# Patient Record
Sex: MALE | Race: WHITE | NOT HISPANIC OR LATINO | Employment: OTHER | URBAN - METROPOLITAN AREA
[De-identification: names, ages, dates, MRNs, and addresses within clinical notes are randomized per-mention and may not be internally consistent; named-entity substitution may affect disease eponyms.]

---

## 2019-10-08 ENCOUNTER — TRANSCRIBE ORDERS (OUTPATIENT)
Dept: CARDIOLOGY | Facility: HOSPITAL | Age: 74
End: 2019-10-08

## 2019-10-08 DIAGNOSIS — I25.10 CAD IN NATIVE ARTERY: ICD-10-CM

## 2019-10-08 DIAGNOSIS — I10 HYPERTENSION, UNSPECIFIED TYPE: Primary | ICD-10-CM

## 2019-10-08 DIAGNOSIS — E78.5 DYSLIPIDEMIA: ICD-10-CM

## 2020-11-22 PROCEDURE — U0003 INFECTIOUS AGENT DETECTION BY NUCLEIC ACID (DNA OR RNA); SEVERE ACUTE RESPIRATORY SYNDROME CORONAVIRUS 2 (SARS-COV-2) (CORONAVIRUS DISEASE [COVID-19]), AMPLIFIED PROBE TECHNIQUE, MAKING USE OF HIGH THROUGHPUT TECHNOLOGIES AS DESCRIBED BY CMS-2020-01-R: HCPCS | Performed by: NURSE PRACTITIONER

## 2020-12-25 ENCOUNTER — APPOINTMENT (OUTPATIENT)
Dept: CT IMAGING | Facility: HOSPITAL | Age: 75
End: 2020-12-25

## 2020-12-25 ENCOUNTER — HOSPITAL ENCOUNTER (OUTPATIENT)
Facility: HOSPITAL | Age: 75
Setting detail: OBSERVATION
Discharge: HOME | End: 2020-12-27
Attending: HOSPITALIST | Admitting: HOSPITALIST

## 2020-12-25 ENCOUNTER — APPOINTMENT (OUTPATIENT)
Dept: GENERAL RADIOLOGY | Facility: HOSPITAL | Age: 75
End: 2020-12-25

## 2020-12-25 DIAGNOSIS — R50.9 FEVER, UNSPECIFIED FEVER CAUSE: ICD-10-CM

## 2020-12-25 DIAGNOSIS — R41.82 ALTERED MENTAL STATUS, UNSPECIFIED ALTERED MENTAL STATUS TYPE: ICD-10-CM

## 2020-12-25 DIAGNOSIS — U07.1 COVID-19 VIRUS DETECTED: Primary | ICD-10-CM

## 2020-12-25 LAB
ALBUMIN SERPL-MCNC: 3.8 G/DL (ref 3.5–5.2)
ALBUMIN/GLOB SERPL: 1.2 G/DL
ALP SERPL-CCNC: 96 U/L (ref 39–117)
ALT SERPL W P-5'-P-CCNC: 26 U/L (ref 1–41)
AMPHET+METHAMPHET UR QL: POSITIVE
ANION GAP SERPL CALCULATED.3IONS-SCNC: 14 MMOL/L (ref 5–15)
AST SERPL-CCNC: 34 U/L (ref 1–40)
B PARAPERT DNA SPEC QL NAA+PROBE: NOT DETECTED
B PERT DNA SPEC QL NAA+PROBE: NOT DETECTED
BACTERIA UR QL AUTO: ABNORMAL /HPF
BARBITURATES UR QL SCN: NEGATIVE
BASOPHILS # BLD AUTO: 0 10*3/MM3 (ref 0–0.2)
BASOPHILS NFR BLD AUTO: 0.3 % (ref 0–1.5)
BENZODIAZ UR QL SCN: NEGATIVE
BILIRUB SERPL-MCNC: 1.2 MG/DL (ref 0–1.2)
BILIRUB UR QL STRIP: ABNORMAL
BUN SERPL-MCNC: 31 MG/DL (ref 8–23)
BUN/CREAT SERPL: 25.6 (ref 7–25)
C PNEUM DNA NPH QL NAA+NON-PROBE: NOT DETECTED
CALCIUM SPEC-SCNC: 8.5 MG/DL (ref 8.6–10.5)
CANNABINOIDS SERPL QL: NEGATIVE
CHLORIDE SERPL-SCNC: 93 MMOL/L (ref 98–107)
CLARITY UR: CLEAR
CO2 SERPL-SCNC: 22 MMOL/L (ref 22–29)
COCAINE UR QL: NEGATIVE
COLOR UR: ABNORMAL
CREAT SERPL-MCNC: 1.21 MG/DL (ref 0.76–1.27)
CRP SERPL-MCNC: 10.28 MG/DL (ref 0–0.5)
D DIMER PPP FEU-MCNC: 0.86 MG/L (FEU) (ref 0–0.59)
D-LACTATE SERPL-SCNC: 1.4 MMOL/L (ref 0.5–2)
DEPRECATED RDW RBC AUTO: 40.3 FL (ref 37–54)
EOSINOPHIL # BLD AUTO: 0 10*3/MM3 (ref 0–0.4)
EOSINOPHIL NFR BLD AUTO: 0.1 % (ref 0.3–6.2)
ERYTHROCYTE [DISTWIDTH] IN BLOOD BY AUTOMATED COUNT: 12.6 % (ref 12.3–15.4)
FLUAV SUBTYP SPEC NAA+PROBE: NOT DETECTED
FLUBV RNA ISLT QL NAA+PROBE: NOT DETECTED
GFR SERPL CREATININE-BSD FRML MDRD: 59 ML/MIN/1.73
GLOBULIN UR ELPH-MCNC: 3.1 GM/DL
GLUCOSE BLDC GLUCOMTR-MCNC: 185 MG/DL (ref 70–105)
GLUCOSE SERPL-MCNC: 263 MG/DL (ref 65–99)
GLUCOSE UR STRIP-MCNC: ABNORMAL MG/DL
HADV DNA SPEC NAA+PROBE: NOT DETECTED
HCOV 229E RNA SPEC QL NAA+PROBE: NOT DETECTED
HCOV HKU1 RNA SPEC QL NAA+PROBE: NOT DETECTED
HCOV NL63 RNA SPEC QL NAA+PROBE: NOT DETECTED
HCOV OC43 RNA SPEC QL NAA+PROBE: NOT DETECTED
HCT VFR BLD AUTO: 44.5 % (ref 37.5–51)
HGB BLD-MCNC: 15.1 G/DL (ref 13–17.7)
HGB UR QL STRIP.AUTO: ABNORMAL
HMPV RNA NPH QL NAA+NON-PROBE: NOT DETECTED
HPIV1 RNA SPEC QL NAA+PROBE: NOT DETECTED
HPIV2 RNA SPEC QL NAA+PROBE: NOT DETECTED
HPIV3 RNA NPH QL NAA+PROBE: NOT DETECTED
HPIV4 P GENE NPH QL NAA+PROBE: NOT DETECTED
HYALINE CASTS UR QL AUTO: ABNORMAL /LPF
KETONES UR QL STRIP: ABNORMAL
LDH SERPL-CCNC: 286 U/L (ref 135–225)
LEUKOCYTE ESTERASE UR QL STRIP.AUTO: NEGATIVE
LYMPHOCYTES # BLD AUTO: 0.5 10*3/MM3 (ref 0.7–3.1)
LYMPHOCYTES NFR BLD AUTO: 5.5 % (ref 19.6–45.3)
M PNEUMO IGG SER IA-ACNC: NOT DETECTED
MCH RBC QN AUTO: 30.8 PG (ref 26.6–33)
MCHC RBC AUTO-ENTMCNC: 34 G/DL (ref 31.5–35.7)
MCV RBC AUTO: 90.4 FL (ref 79–97)
METHADONE UR QL SCN: NEGATIVE
MONOCYTES # BLD AUTO: 0.4 10*3/MM3 (ref 0.1–0.9)
MONOCYTES NFR BLD AUTO: 4.7 % (ref 5–12)
NEUTROPHILS NFR BLD AUTO: 8.2 10*3/MM3 (ref 1.7–7)
NEUTROPHILS NFR BLD AUTO: 89.4 % (ref 42.7–76)
NITRITE UR QL STRIP: NEGATIVE
NRBC BLD AUTO-RTO: 0.1 /100 WBC (ref 0–0.2)
OPIATES UR QL: NEGATIVE
OXYCODONE UR QL SCN: NEGATIVE
PH UR STRIP.AUTO: 5.5 [PH] (ref 5–8)
PLATELET # BLD AUTO: 200 10*3/MM3 (ref 140–450)
PMV BLD AUTO: 8 FL (ref 6–12)
POTASSIUM SERPL-SCNC: 4.3 MMOL/L (ref 3.5–5.2)
PROCALCITONIN SERPL-MCNC: 0.25 NG/ML (ref 0–0.25)
PROT SERPL-MCNC: 6.9 G/DL (ref 6–8.5)
PROT UR QL STRIP: ABNORMAL
RBC # BLD AUTO: 4.92 10*6/MM3 (ref 4.14–5.8)
RBC # UR: ABNORMAL /HPF
REF LAB TEST METHOD: ABNORMAL
RHINOVIRUS RNA SPEC NAA+PROBE: NOT DETECTED
RSV RNA NPH QL NAA+NON-PROBE: NOT DETECTED
SARS-COV-2 RNA NPH QL NAA+NON-PROBE: DETECTED
SODIUM SERPL-SCNC: 129 MMOL/L (ref 136–145)
SP GR UR STRIP: 1.03 (ref 1–1.03)
SQUAMOUS #/AREA URNS HPF: ABNORMAL /HPF
UROBILINOGEN UR QL STRIP: ABNORMAL
WBC # BLD AUTO: 9.1 10*3/MM3 (ref 3.4–10.8)
WBC UR QL AUTO: ABNORMAL /HPF

## 2020-12-25 PROCEDURE — 85025 COMPLETE CBC W/AUTO DIFF WBC: CPT | Performed by: PHYSICIAN ASSISTANT

## 2020-12-25 PROCEDURE — 80307 DRUG TEST PRSMV CHEM ANLYZR: CPT | Performed by: PHYSICIAN ASSISTANT

## 2020-12-25 PROCEDURE — 0202U NFCT DS 22 TRGT SARS-COV-2: CPT | Performed by: PHYSICIAN ASSISTANT

## 2020-12-25 PROCEDURE — 25010000002 CEFTRIAXONE PER 250 MG: Performed by: PHYSICIAN ASSISTANT

## 2020-12-25 PROCEDURE — 82962 GLUCOSE BLOOD TEST: CPT

## 2020-12-25 PROCEDURE — 25010000002 ENOXAPARIN PER 10 MG: Performed by: HOSPITALIST

## 2020-12-25 PROCEDURE — 85379 FIBRIN DEGRADATION QUANT: CPT | Performed by: HOSPITALIST

## 2020-12-25 PROCEDURE — 96361 HYDRATE IV INFUSION ADD-ON: CPT

## 2020-12-25 PROCEDURE — G0378 HOSPITAL OBSERVATION PER HR: HCPCS

## 2020-12-25 PROCEDURE — 87040 BLOOD CULTURE FOR BACTERIA: CPT | Performed by: PHYSICIAN ASSISTANT

## 2020-12-25 PROCEDURE — 70450 CT HEAD/BRAIN W/O DYE: CPT

## 2020-12-25 PROCEDURE — 86140 C-REACTIVE PROTEIN: CPT | Performed by: PHYSICIAN ASSISTANT

## 2020-12-25 PROCEDURE — 99220 PR INITIAL OBSERVATION CARE/DAY 70 MINUTES: CPT | Performed by: HOSPITALIST

## 2020-12-25 PROCEDURE — 71045 X-RAY EXAM CHEST 1 VIEW: CPT

## 2020-12-25 PROCEDURE — 84145 PROCALCITONIN (PCT): CPT | Performed by: PHYSICIAN ASSISTANT

## 2020-12-25 PROCEDURE — 81001 URINALYSIS AUTO W/SCOPE: CPT | Performed by: PHYSICIAN ASSISTANT

## 2020-12-25 PROCEDURE — 96374 THER/PROPH/DIAG INJ IV PUSH: CPT

## 2020-12-25 PROCEDURE — 99285 EMERGENCY DEPT VISIT HI MDM: CPT

## 2020-12-25 PROCEDURE — 83605 ASSAY OF LACTIC ACID: CPT

## 2020-12-25 PROCEDURE — 83615 LACTATE (LD) (LDH) ENZYME: CPT | Performed by: PHYSICIAN ASSISTANT

## 2020-12-25 PROCEDURE — 96372 THER/PROPH/DIAG INJ SC/IM: CPT

## 2020-12-25 PROCEDURE — 80053 COMPREHEN METABOLIC PANEL: CPT | Performed by: PHYSICIAN ASSISTANT

## 2020-12-25 RX ORDER — AZITHROMYCIN 250 MG/1
500 TABLET, FILM COATED ORAL
Status: COMPLETED | OUTPATIENT
Start: 2020-12-26 | End: 2020-12-27

## 2020-12-25 RX ORDER — ACETAMINOPHEN 500 MG
1000 TABLET ORAL ONCE
Status: COMPLETED | OUTPATIENT
Start: 2020-12-25 | End: 2020-12-25

## 2020-12-25 RX ORDER — ACETAMINOPHEN 325 MG/1
650 TABLET ORAL EVERY 4 HOURS PRN
Status: DISCONTINUED | OUTPATIENT
Start: 2020-12-25 | End: 2020-12-27 | Stop reason: HOSPADM

## 2020-12-25 RX ORDER — SODIUM CHLORIDE 0.9 % (FLUSH) 0.9 %
10 SYRINGE (ML) INJECTION EVERY 12 HOURS SCHEDULED
Status: DISCONTINUED | OUTPATIENT
Start: 2020-12-25 | End: 2020-12-27 | Stop reason: HOSPADM

## 2020-12-25 RX ORDER — ACETAMINOPHEN 160 MG/5ML
650 SOLUTION ORAL EVERY 4 HOURS PRN
Status: DISCONTINUED | OUTPATIENT
Start: 2020-12-25 | End: 2020-12-27 | Stop reason: HOSPADM

## 2020-12-25 RX ORDER — SODIUM CHLORIDE 0.9 % (FLUSH) 0.9 %
10 SYRINGE (ML) INJECTION AS NEEDED
Status: DISCONTINUED | OUTPATIENT
Start: 2020-12-25 | End: 2020-12-27 | Stop reason: HOSPADM

## 2020-12-25 RX ORDER — ACETAMINOPHEN 650 MG/1
650 SUPPOSITORY RECTAL EVERY 4 HOURS PRN
Status: DISCONTINUED | OUTPATIENT
Start: 2020-12-25 | End: 2020-12-27 | Stop reason: HOSPADM

## 2020-12-25 RX ORDER — AZITHROMYCIN 250 MG/1
500 TABLET, FILM COATED ORAL ONCE
Status: COMPLETED | OUTPATIENT
Start: 2020-12-25 | End: 2020-12-25

## 2020-12-25 RX ORDER — CHOLECALCIFEROL (VITAMIN D3) 125 MCG
5 CAPSULE ORAL NIGHTLY PRN
Status: DISCONTINUED | OUTPATIENT
Start: 2020-12-25 | End: 2020-12-27 | Stop reason: HOSPADM

## 2020-12-25 RX ADMIN — SODIUM CHLORIDE 1000 ML: 9 INJECTION, SOLUTION INTRAVENOUS at 15:10

## 2020-12-25 RX ADMIN — Medication 10 ML: at 20:46

## 2020-12-25 RX ADMIN — AZITHROMYCIN DIHYDRATE 500 MG: 250 TABLET, FILM COATED ORAL at 15:09

## 2020-12-25 RX ADMIN — ENOXAPARIN SODIUM 40 MG: 40 INJECTION SUBCUTANEOUS at 22:56

## 2020-12-25 RX ADMIN — CEFTRIAXONE SODIUM 2 G: 10 INJECTION, POWDER, FOR SOLUTION INTRAVENOUS at 15:11

## 2020-12-25 RX ADMIN — ACETAMINOPHEN 1000 MG: 500 TABLET, FILM COATED ORAL at 15:09

## 2020-12-26 LAB
ANION GAP SERPL CALCULATED.3IONS-SCNC: 12 MMOL/L (ref 5–15)
BASOPHILS # BLD AUTO: 0 10*3/MM3 (ref 0–0.2)
BASOPHILS NFR BLD AUTO: 0.3 % (ref 0–1.5)
BUN SERPL-MCNC: 28 MG/DL (ref 8–23)
BUN/CREAT SERPL: 23.5 (ref 7–25)
CALCIUM SPEC-SCNC: 8.5 MG/DL (ref 8.6–10.5)
CHLORIDE SERPL-SCNC: 95 MMOL/L (ref 98–107)
CO2 SERPL-SCNC: 23 MMOL/L (ref 22–29)
CREAT SERPL-MCNC: 1.19 MG/DL (ref 0.76–1.27)
DEPRECATED RDW RBC AUTO: 40.3 FL (ref 37–54)
EOSINOPHIL # BLD AUTO: 0 10*3/MM3 (ref 0–0.4)
EOSINOPHIL NFR BLD AUTO: 0 % (ref 0.3–6.2)
ERYTHROCYTE [DISTWIDTH] IN BLOOD BY AUTOMATED COUNT: 12.6 % (ref 12.3–15.4)
GFR SERPL CREATININE-BSD FRML MDRD: 60 ML/MIN/1.73
GLUCOSE BLDC GLUCOMTR-MCNC: 112 MG/DL (ref 70–105)
GLUCOSE BLDC GLUCOMTR-MCNC: 167 MG/DL (ref 70–105)
GLUCOSE BLDC GLUCOMTR-MCNC: 178 MG/DL (ref 70–105)
GLUCOSE BLDC GLUCOMTR-MCNC: 190 MG/DL (ref 70–105)
GLUCOSE SERPL-MCNC: 162 MG/DL (ref 65–99)
HCT VFR BLD AUTO: 41.3 % (ref 37.5–51)
HGB BLD-MCNC: 13.9 G/DL (ref 13–17.7)
LYMPHOCYTES # BLD AUTO: 0.8 10*3/MM3 (ref 0.7–3.1)
LYMPHOCYTES NFR BLD AUTO: 10.1 % (ref 19.6–45.3)
MCH RBC QN AUTO: 30.5 PG (ref 26.6–33)
MCHC RBC AUTO-ENTMCNC: 33.7 G/DL (ref 31.5–35.7)
MCV RBC AUTO: 90.6 FL (ref 79–97)
MONOCYTES # BLD AUTO: 0.4 10*3/MM3 (ref 0.1–0.9)
MONOCYTES NFR BLD AUTO: 4.4 % (ref 5–12)
NEUTROPHILS NFR BLD AUTO: 7 10*3/MM3 (ref 1.7–7)
NEUTROPHILS NFR BLD AUTO: 85.2 % (ref 42.7–76)
NRBC BLD AUTO-RTO: 0.2 /100 WBC (ref 0–0.2)
PLATELET # BLD AUTO: 187 10*3/MM3 (ref 140–450)
PMV BLD AUTO: 8.3 FL (ref 6–12)
POTASSIUM SERPL-SCNC: 4.3 MMOL/L (ref 3.5–5.2)
RBC # BLD AUTO: 4.56 10*6/MM3 (ref 4.14–5.8)
SODIUM SERPL-SCNC: 130 MMOL/L (ref 136–145)
WBC # BLD AUTO: 8.3 10*3/MM3 (ref 3.4–10.8)

## 2020-12-26 PROCEDURE — 99225 PR SBSQ OBSERVATION CARE/DAY 25 MINUTES: CPT | Performed by: HOSPITALIST

## 2020-12-26 PROCEDURE — 80048 BASIC METABOLIC PNL TOTAL CA: CPT | Performed by: HOSPITALIST

## 2020-12-26 PROCEDURE — 85025 COMPLETE CBC W/AUTO DIFF WBC: CPT | Performed by: HOSPITALIST

## 2020-12-26 PROCEDURE — 96372 THER/PROPH/DIAG INJ SC/IM: CPT

## 2020-12-26 PROCEDURE — 96376 TX/PRO/DX INJ SAME DRUG ADON: CPT

## 2020-12-26 PROCEDURE — 25010000002 ENOXAPARIN PER 10 MG: Performed by: HOSPITALIST

## 2020-12-26 PROCEDURE — G0378 HOSPITAL OBSERVATION PER HR: HCPCS

## 2020-12-26 PROCEDURE — 25010000002 CEFTRIAXONE PER 250 MG: Performed by: HOSPITALIST

## 2020-12-26 PROCEDURE — 63710000001 INSULIN LISPRO (HUMAN) PER 5 UNITS: Performed by: NURSE PRACTITIONER

## 2020-12-26 PROCEDURE — 82962 GLUCOSE BLOOD TEST: CPT

## 2020-12-26 RX ORDER — BENZONATATE 100 MG/1
100 CAPSULE ORAL 3 TIMES DAILY PRN
Status: DISCONTINUED | OUTPATIENT
Start: 2020-12-26 | End: 2020-12-27 | Stop reason: HOSPADM

## 2020-12-26 RX ORDER — LISINOPRIL 20 MG/1
40 TABLET ORAL DAILY
Status: DISCONTINUED | OUTPATIENT
Start: 2020-12-26 | End: 2020-12-27 | Stop reason: HOSPADM

## 2020-12-26 RX ORDER — DEXTROSE MONOHYDRATE 25 G/50ML
25 INJECTION, SOLUTION INTRAVENOUS
Status: DISCONTINUED | OUTPATIENT
Start: 2020-12-26 | End: 2020-12-27 | Stop reason: HOSPADM

## 2020-12-26 RX ORDER — INSULIN LISPRO 100 [IU]/ML
0-7 INJECTION, SOLUTION INTRAVENOUS; SUBCUTANEOUS AS NEEDED
Status: DISCONTINUED | OUTPATIENT
Start: 2020-12-26 | End: 2020-12-27 | Stop reason: HOSPADM

## 2020-12-26 RX ORDER — NICOTINE POLACRILEX 4 MG
15 LOZENGE BUCCAL
Status: DISCONTINUED | OUTPATIENT
Start: 2020-12-26 | End: 2020-12-27 | Stop reason: HOSPADM

## 2020-12-26 RX ORDER — ATORVASTATIN CALCIUM 20 MG/1
20 TABLET, FILM COATED ORAL DAILY
Status: DISCONTINUED | OUTPATIENT
Start: 2020-12-26 | End: 2020-12-27 | Stop reason: HOSPADM

## 2020-12-26 RX ORDER — INSULIN LISPRO 100 [IU]/ML
0-7 INJECTION, SOLUTION INTRAVENOUS; SUBCUTANEOUS
Status: DISCONTINUED | OUTPATIENT
Start: 2020-12-26 | End: 2020-12-27 | Stop reason: HOSPADM

## 2020-12-26 RX ORDER — GUAIFENESIN/DEXTROMETHORPHAN 100-10MG/5
5 SYRUP ORAL EVERY 4 HOURS PRN
Status: DISCONTINUED | OUTPATIENT
Start: 2020-12-26 | End: 2020-12-27 | Stop reason: HOSPADM

## 2020-12-26 RX ADMIN — Medication 10 ML: at 20:25

## 2020-12-26 RX ADMIN — ENOXAPARIN SODIUM 40 MG: 40 INJECTION SUBCUTANEOUS at 15:47

## 2020-12-26 RX ADMIN — GUAIFENESIN AND DEXTROMETHORPHAN 5 ML: 100; 10 SYRUP ORAL at 23:44

## 2020-12-26 RX ADMIN — LINAGLIPTIN 5 MG: 5 TABLET, FILM COATED ORAL at 09:13

## 2020-12-26 RX ADMIN — BENZONATATE 100 MG: 100 CAPSULE ORAL at 20:17

## 2020-12-26 RX ADMIN — Medication 10 ML: at 09:13

## 2020-12-26 RX ADMIN — LISINOPRIL 40 MG: 20 TABLET ORAL at 09:13

## 2020-12-26 RX ADMIN — ACETAMINOPHEN 650 MG: 325 TABLET, FILM COATED ORAL at 17:08

## 2020-12-26 RX ADMIN — GUAIFENESIN AND DEXTROMETHORPHAN 5 ML: 100; 10 SYRUP ORAL at 11:45

## 2020-12-26 RX ADMIN — INSULIN LISPRO 2 UNITS: 100 INJECTION, SOLUTION INTRAVENOUS; SUBCUTANEOUS at 17:08

## 2020-12-26 RX ADMIN — INSULIN LISPRO 2 UNITS: 100 INJECTION, SOLUTION INTRAVENOUS; SUBCUTANEOUS at 11:45

## 2020-12-26 RX ADMIN — CEFTRIAXONE SODIUM 1 G: 10 INJECTION, POWDER, FOR SOLUTION INTRAVENOUS at 17:07

## 2020-12-26 RX ADMIN — ACETAMINOPHEN 650 MG: 325 TABLET, FILM COATED ORAL at 03:30

## 2020-12-26 RX ADMIN — ATORVASTATIN CALCIUM 20 MG: 20 TABLET, FILM COATED ORAL at 09:14

## 2020-12-26 RX ADMIN — AZITHROMYCIN DIHYDRATE 500 MG: 250 TABLET, FILM COATED ORAL at 09:13

## 2020-12-27 ENCOUNTER — READMISSION MANAGEMENT (OUTPATIENT)
Dept: CALL CENTER | Facility: HOSPITAL | Age: 75
End: 2020-12-27

## 2020-12-27 ENCOUNTER — APPOINTMENT (OUTPATIENT)
Dept: CT IMAGING | Facility: HOSPITAL | Age: 75
End: 2020-12-27

## 2020-12-27 VITALS
OXYGEN SATURATION: 90 % | TEMPERATURE: 99.7 F | HEIGHT: 71 IN | WEIGHT: 173.2 LBS | DIASTOLIC BLOOD PRESSURE: 56 MMHG | RESPIRATION RATE: 19 BRPM | HEART RATE: 82 BPM | BODY MASS INDEX: 24.25 KG/M2 | SYSTOLIC BLOOD PRESSURE: 160 MMHG

## 2020-12-27 LAB
ANION GAP SERPL CALCULATED.3IONS-SCNC: 14 MMOL/L (ref 5–15)
BASOPHILS # BLD AUTO: 0 10*3/MM3 (ref 0–0.2)
BASOPHILS NFR BLD AUTO: 0.1 % (ref 0–1.5)
BUN SERPL-MCNC: 35 MG/DL (ref 8–23)
BUN/CREAT SERPL: 27.6 (ref 7–25)
CALCIUM SPEC-SCNC: 8.8 MG/DL (ref 8.6–10.5)
CHLORIDE SERPL-SCNC: 94 MMOL/L (ref 98–107)
CO2 SERPL-SCNC: 23 MMOL/L (ref 22–29)
CREAT SERPL-MCNC: 1.27 MG/DL (ref 0.76–1.27)
D DIMER PPP FEU-MCNC: 1.12 MG/L (FEU) (ref 0–0.59)
DEPRECATED RDW RBC AUTO: 40.3 FL (ref 37–54)
EOSINOPHIL # BLD AUTO: 0 10*3/MM3 (ref 0–0.4)
EOSINOPHIL NFR BLD AUTO: 0 % (ref 0.3–6.2)
ERYTHROCYTE [DISTWIDTH] IN BLOOD BY AUTOMATED COUNT: 12.8 % (ref 12.3–15.4)
GFR SERPL CREATININE-BSD FRML MDRD: 56 ML/MIN/1.73
GLUCOSE BLDC GLUCOMTR-MCNC: 160 MG/DL (ref 70–105)
GLUCOSE BLDC GLUCOMTR-MCNC: 165 MG/DL (ref 70–105)
GLUCOSE BLDC GLUCOMTR-MCNC: 215 MG/DL (ref 70–105)
GLUCOSE SERPL-MCNC: 171 MG/DL (ref 65–99)
HCT VFR BLD AUTO: 39.1 % (ref 37.5–51)
HGB BLD-MCNC: 13.2 G/DL (ref 13–17.7)
LYMPHOCYTES # BLD AUTO: 0.7 10*3/MM3 (ref 0.7–3.1)
LYMPHOCYTES NFR BLD AUTO: 5.7 % (ref 19.6–45.3)
MCH RBC QN AUTO: 30.3 PG (ref 26.6–33)
MCHC RBC AUTO-ENTMCNC: 33.7 G/DL (ref 31.5–35.7)
MCV RBC AUTO: 90 FL (ref 79–97)
MONOCYTES # BLD AUTO: 0.4 10*3/MM3 (ref 0.1–0.9)
MONOCYTES NFR BLD AUTO: 3.4 % (ref 5–12)
NEUTROPHILS NFR BLD AUTO: 10.5 10*3/MM3 (ref 1.7–7)
NEUTROPHILS NFR BLD AUTO: 90.8 % (ref 42.7–76)
NRBC BLD AUTO-RTO: 0.1 /100 WBC (ref 0–0.2)
PLATELET # BLD AUTO: 249 10*3/MM3 (ref 140–450)
PMV BLD AUTO: 8.1 FL (ref 6–12)
POTASSIUM SERPL-SCNC: 3.7 MMOL/L (ref 3.5–5.2)
RBC # BLD AUTO: 4.35 10*6/MM3 (ref 4.14–5.8)
SODIUM SERPL-SCNC: 131 MMOL/L (ref 136–145)
WBC # BLD AUTO: 11.6 10*3/MM3 (ref 3.4–10.8)

## 2020-12-27 PROCEDURE — 82962 GLUCOSE BLOOD TEST: CPT

## 2020-12-27 PROCEDURE — 80048 BASIC METABOLIC PNL TOTAL CA: CPT | Performed by: HOSPITALIST

## 2020-12-27 PROCEDURE — 99217 PR OBSERVATION CARE DISCHARGE MANAGEMENT: CPT | Performed by: HOSPITALIST

## 2020-12-27 PROCEDURE — 71275 CT ANGIOGRAPHY CHEST: CPT

## 2020-12-27 PROCEDURE — 85025 COMPLETE CBC W/AUTO DIFF WBC: CPT | Performed by: HOSPITALIST

## 2020-12-27 PROCEDURE — 0 IOPAMIDOL PER 1 ML: Performed by: HOSPITALIST

## 2020-12-27 PROCEDURE — 96376 TX/PRO/DX INJ SAME DRUG ADON: CPT

## 2020-12-27 PROCEDURE — 85379 FIBRIN DEGRADATION QUANT: CPT | Performed by: HOSPITALIST

## 2020-12-27 PROCEDURE — G0378 HOSPITAL OBSERVATION PER HR: HCPCS

## 2020-12-27 PROCEDURE — 25010000002 CEFTRIAXONE PER 250 MG: Performed by: HOSPITALIST

## 2020-12-27 PROCEDURE — 25010000002 ENOXAPARIN PER 10 MG: Performed by: HOSPITALIST

## 2020-12-27 PROCEDURE — 96372 THER/PROPH/DIAG INJ SC/IM: CPT

## 2020-12-27 PROCEDURE — 63710000001 INSULIN LISPRO (HUMAN) PER 5 UNITS: Performed by: NURSE PRACTITIONER

## 2020-12-27 RX ORDER — CEFDINIR 300 MG/1
300 CAPSULE ORAL DAILY
Qty: 3 CAPSULE | Refills: 0 | Status: SHIPPED | OUTPATIENT
Start: 2020-12-27 | End: 2020-12-30

## 2020-12-27 RX ADMIN — ENOXAPARIN SODIUM 40 MG: 40 INJECTION SUBCUTANEOUS at 11:31

## 2020-12-27 RX ADMIN — CEFTRIAXONE SODIUM 1 G: 10 INJECTION, POWDER, FOR SOLUTION INTRAVENOUS at 17:13

## 2020-12-27 RX ADMIN — ACETAMINOPHEN 650 MG: 325 TABLET, FILM COATED ORAL at 04:33

## 2020-12-27 RX ADMIN — AZITHROMYCIN DIHYDRATE 500 MG: 250 TABLET, FILM COATED ORAL at 08:23

## 2020-12-27 RX ADMIN — ATORVASTATIN CALCIUM 20 MG: 20 TABLET, FILM COATED ORAL at 08:23

## 2020-12-27 RX ADMIN — IOPAMIDOL 100 ML: 755 INJECTION, SOLUTION INTRAVENOUS at 16:15

## 2020-12-27 RX ADMIN — Medication 10 ML: at 08:24

## 2020-12-27 RX ADMIN — BENZONATATE 100 MG: 100 CAPSULE ORAL at 11:31

## 2020-12-27 RX ADMIN — LINAGLIPTIN 5 MG: 5 TABLET, FILM COATED ORAL at 08:24

## 2020-12-27 RX ADMIN — INSULIN LISPRO 3 UNITS: 100 INJECTION, SOLUTION INTRAVENOUS; SUBCUTANEOUS at 11:42

## 2020-12-27 RX ADMIN — BENZONATATE 100 MG: 100 CAPSULE ORAL at 04:33

## 2020-12-27 RX ADMIN — LISINOPRIL 40 MG: 20 TABLET ORAL at 08:23

## 2020-12-27 NOTE — OUTREACH NOTE
Prep Survey      Responses   [facility] patient discharged from?  [facility]   Is LACE score < 7 ?  Yes   Emergency Room discharge w/ pulse ox?  No   Eligibility  Readm Mgmt   Discharge diagnosis  COVID-19 PNA w/ CAP    Does the patient have one of the following disease processes/diagnoses(primary or secondary)?  COVID-19   Does the patient have Home health ordered?  No   Is there a DME ordered?  No   Prep survey completed?  Yes          Chandni Eason RN

## 2020-12-28 ENCOUNTER — READMISSION MANAGEMENT (OUTPATIENT)
Dept: CALL CENTER | Facility: HOSPITAL | Age: 75
End: 2020-12-28

## 2020-12-28 NOTE — OUTREACH NOTE
COVID-19 Week 1 Survey      Responses   [facility] patient discharged from?  [facility]   Does the patient have one of the following disease processes/diagnoses(primary or secondary)?  COVID-19   COVID-19 underlying condition?  None   Call Number  Call 1   Week 1 Call successful?  Yes   Call start time  [phone]   Call end time  [phone]   Discharge diagnosis  COVID-19 PNA w/ CAP    Is patient permission given to speak with other caregiver?  Yes   List who call center can speak with  wife   Person spoke with today (if not patient) and relationship  wife   Meds reviewed with patient/caregiver?  Yes   Is the patient having any side effects they believe may be caused by any medication additions or changes?  No   Does the patient have all medications ordered at discharge?  Yes   Is the patient taking all medications as directed (includes completed medication regime)?  Yes   Does the patient have a primary care provider?   Yes   Does the patient have an appointment with their PCP or specialist within 7 days of discharge?  No   What is preventing the patient from scheduling follow up appointments within 7 days of discharge?  Haven't had time   Nursing Interventions  Advised patient to make appointment   Has the patient kept scheduled appointments due by today?  N/A   Comments  He does not have a PCP but she does.  She is going to call hers and see it they are accepting new pts.   Has home health visited the patient within 72 hours of discharge?  N/A   Psychosocial issues?  No   Did the patient receive a copy of their discharge instructions?  Yes   Did the patient receive a copy of COVID-19 specific instructions?  Yes   Nursing interventions  Reviewed instructions with patient   What is the patient's perception of their health status since discharge?  Improving   Does the patient have any of the following symptoms?  Cough   Nursing Interventions  Nurse provided patient education   Pulse Ox monitoring  Intermittent   Pulse Ox  device source  Patient   O2 Sat comments  hasn't checked today.  She is unable to find it at the moment to check during call.   O2 Sat: education provided  Monitoring frequency, Sat levels   Is the patient/caregiver able to teach back steps to recovery at home?  Set small, achievable goals for return to baseline health, Rest and rebuild strength, gradually increase activity, Eat a well-balance diet, Make a list of questions for provider's appointment   If the patient is a current smoker, are they able to teach back resources for cessation?  Not a smoker   Is the patient/caregiver able to teach back the hierarchy of who to call/visit for symptoms/problems? PCP, Specialist, Home health nurse, Urgent Care, ED, 911  Yes   COVID-19 call completed?  Yes   Wrap up additional comments  Wife states that he is improving.  No questions or problems at this time.          Samantha Griffiths, RN

## 2020-12-29 ENCOUNTER — READMISSION MANAGEMENT (OUTPATIENT)
Dept: CALL CENTER | Facility: HOSPITAL | Age: 75
End: 2020-12-29

## 2020-12-29 NOTE — OUTREACH NOTE
COVID-19 Week 1 Survey      Responses   [facility] patient discharged from?  [facility]   Does the patient have one of the following disease processes/diagnoses(primary or secondary)?  COVID-19   COVID-19 underlying condition?  None   Call Number  Call 2   Week 1 Call successful?  No   Discharge diagnosis  COVID-19 PNA w/ CAP           Charity Peña RN

## 2020-12-30 ENCOUNTER — READMISSION MANAGEMENT (OUTPATIENT)
Dept: CALL CENTER | Facility: HOSPITAL | Age: 75
End: 2020-12-30

## 2020-12-30 LAB
BACTERIA SPEC AEROBE CULT: NORMAL
BACTERIA SPEC AEROBE CULT: NORMAL

## 2020-12-30 NOTE — OUTREACH NOTE
COVID-19 Week 1 Survey      Responses   [facility] patient discharged from?  [facility]   Does the patient have one of the following disease processes/diagnoses(primary or secondary)?  COVID-19   COVID-19 underlying condition?  None   Call Number  Call 3   Week 1 Call successful?  No   Discharge diagnosis  COVID-19 PNA w/ CAP           Charity Peña RN

## 2021-01-05 ENCOUNTER — READMISSION MANAGEMENT (OUTPATIENT)
Dept: CALL CENTER | Facility: HOSPITAL | Age: 76
End: 2021-01-05

## 2021-01-05 NOTE — OUTREACH NOTE
COVID-19 Week 2 Survey      Responses   [facility] patient discharged from?  [facility]   Does the patient have one of the following disease processes/diagnoses(primary or secondary)?  COVID-19   COVID-19 underlying condition?  None   Call Number  Call 1   COVID-19 Week 2: Call 1 attempt successful?  No   Discharge diagnosis  COVID-19 PNA w/ CAP           Laureen Lovelace RN

## 2021-01-18 NOTE — DISCHARGE SUMMARY
[facility] Medicine Services  DISCHARGE SUMMARY        Prepared For PCP:  Provider, No Known    Patient Name: aTo Pozo  : 1945  MRN: [id number]      Date of Admission:   2020    Date of Discharge:  2020    Length of stay:  LOS: 0 days     Hospital Course     Presenting Problem:   Fever, unspecified fever cause [R50.9]  Altered mental status, unspecified altered mental status type [R41.82]  COVID-19 virus detected [U07.1]      Active Hospital Problems    Diagnosis  POA   • COVID-19 virus detected [U07.1]  Yes      Resolved Hospital Problems   No resolved problems to display.       Hospital Course:  Tao Pozo is a 76 y.o. male admitted with:     COVID-19 PNA w/ CAP   -PCT elevated, continue empiric abx   -Patient comfortable on RA, hold dexamethasone for now, may exacerbate altered mental status   -Does not meet institutional criteria for remdesivir   -Pharmacy to dose lovenox     Acute toxic/metabolic encephalopathy  -Likely secondary to fever/COVID-19 PNA  -Continue to monitor patient closely  -Hold steroids for now as patient on RA      HTN  -Continue home lisinopril     Diabetes   -Correctional scale insulin      Amphetamine+ UDS   -Denies use of any illegal substances   -Patient is on metformin which can cause false positive      VTE Prophylaxis - Pharmacy to dose Lovenox.    Discharged in stable condition on RA.    Recommendation for Outpatient Providers:             Reasons For Change In Medications and Indications for New Medications:        Day of Discharge     HPI:   No complaints     Vital Signs:   Vitals:    20 1251   BP: 160/56   Pulse: 82   Resp: 19   Temp: 99.7 °F (37.6 °C)   SpO2: 90%     Stable    Physical Exam:  Physical Exam  Unchanged from previously documented physical exam by hospitalist provider     Pertinent  and/or Most Recent Results               Invalid input(s): PROT, LABALBU        Invalid input(s): TG, LDLCALC, LDLREALC         Brief Urine Lab Results  (Last result in the past 365 days)      Color   Clarity   Blood   Leuk Est   Nitrite   Protein   CREAT   Urine HCG        12/25/20 1620 Dark Yellow  Comment:  Result checked  Clear Trace Negative Negative 30 mg/dL (1+)               Microbiology Results Abnormal     Procedure Component Value - Date/Time    Blood Culture - Blood, Arm, Right [[id number]] Collected: 12/25/20 1454    Lab Status: Final result Specimen: Blood from Arm, Right Updated: 12/30/20 1515     Blood Culture No growth at 5 days    Blood Culture - Blood, Arm, Left [[id number]] Collected: 12/25/20 1454    Lab Status: Final result Specimen: Blood from Arm, Left Updated: 12/30/20 1515     Blood Culture No growth at 5 days    COVID PRE-OP / PRE-PROCEDURE SCREENING ORDER (NO ISOLATION) - Swab, Nasopharynx [[id number]]  (Abnormal) Collected: 12/25/20 1457    Lab Status: Final result Specimen: Swab from Nasopharynx Updated: 12/25/20 1618    Narrative:      The following orders were created for panel order COVID PRE-OP / PRE-PROCEDURE SCREENING ORDER (NO ISOLATION) - Swab, Nasopharynx.  Procedure                               Abnormality         Status                     ---------                               -----------         ------                     Respiratory Panel PCR w/...[[id number]]  Abnormal            Final result                 Please view results for these tests on the individual orders.    Respiratory Panel PCR w/COVID-19(SARS-CoV-2) ILDEFONSO/TAMAR/SUHAS/PAD/COR/MAD/ARTEMIO In-House, NP Swab in UTM/VTM, 3-4 HR TAT - Swab, Nasopharynx [[id number]]  (Abnormal) Collected: 12/25/20 1457    Lab Status: Final result Specimen: Swab from Nasopharynx Updated: 12/25/20 1618     ADENOVIRUS, PCR Not Detected     Coronavirus 229E Not Detected     Coronavirus HKU1 Not Detected     Coronavirus NL63 Not Detected     Coronavirus OC43 Not Detected     COVID19 Detected     Human Metapneumovirus Not Detected     Human Rhinovirus/Enterovirus Not  Detected     Influenza A PCR Not Detected     Influenza B PCR Not Detected     Parainfluenza Virus 1 Not Detected     Parainfluenza Virus 2 Not Detected     Parainfluenza Virus 3 Not Detected     Parainfluenza Virus 4 Not Detected     RSV, PCR Not Detected     Bordetella pertussis pcr Not Detected     Bordetella parapertussis PCR Not Detected     Chlamydophila pneumoniae PCR Not Detected     Mycoplasma pneumo by PCR Not Detected    Narrative:      Fact sheet for providers: https://docs.[employer]/wp-content/uploads/LBD8088-4873-JG7.1-EUA-Provider-Fact-Sheet-3.pdf    Fact sheet for patients: https://docs.[employer]/wp-content/uploads/QGD2964-2993-IQ2.1-EUA-Patient-Fact-Sheet-1.pdf    Test performed by PCR.          Ct Head Without Contrast    Result Date: 12/25/2020  Impression:  1. No acute intracranial abnormality seen.  Electronically Signed By-Stepan Patterson MD On:12/25/2020 4:03 PM This report was finalized on [id number] by  Stepan Patterson MD.    Xr Chest 1 View    Result Date: 12/25/2020  Impression:  1. Bilateral diffuse increased lung markings in calcified granulomas in the right left lungs unchanged since 12/12/2014  Electronically Signed By-Stepan Patterson MD On:12/25/2020 3:28 PM This report was finalized on [id number] by  Stepan Patterson MD.    Ct Chest Pulmonary Embolism    Result Date: 12/27/2020  Impression:  1. No acute pulmonary embolic disease. 2. Abnormal appearance of lungs which can be seen with an atypical infection such as Covid 19. 3. Mild mediastinal and bilateral hilar adenopathy felt to be secondary to reactive hyperplasia. 4. Fatty infiltration of the liver.  Electronically Signed By-Xavi Juares MD On:12/27/2020 4:13 PM This report was finalized on [id number] by  Xavi Juares MD.                             Test Results Pending at Discharge        Procedures Performed           Consults:   Consults     Date and Time Order Name Status Description     12/25/2020 1634 Hospitalist (on-call MD unless specified) Completed             Discharge Details        Discharge Medications      Continue These Medications      Instructions Start Date   atorvastatin 20 MG tablet  Commonly known as: LIPITOR   20 mg, Oral, Daily      glimepiride 1 MG tablet  Commonly known as: AMARYL   2 mg, Oral, Daily      hydroCHLOROthiazide 12.5 MG tablet  Commonly known as: HYDRODIURIL   12.5 mg, Oral, Daily      lisinopril 40 MG tablet  Commonly known as: PRINIVIL,ZESTRIL   40 mg, Oral, Daily      metFORMIN 1000 MG tablet  Commonly known as: GLUCOPHAGE   1,000 mg, Oral, 2 Times Daily With Meals      SITagliptin 100 MG tablet  Commonly known as: JANUVIA   100 mg, Oral, Daily         ASK your doctor about these medications      Instructions Start Date   cefdinir 300 MG capsule  Commonly known as: OMNICEF  Ask about: Should I take this medication?   300 mg, Oral, Daily             No Known Allergies      Discharge Disposition:  Home or Self Care    Diet:  Hospital:No active diet order        Discharge Activity:     Activity as tolerated     CODE STATUS:    Code Status and Medical Interventions:   Ordered at: 12/25/20 1722     Level Of Support Discussed With:    Patient     Code Status:    CPR     Medical Interventions (Level of Support Prior to Arrest):    Full         Follow-up Appointments  No future appointments.          Condition on Discharge:      Stable          Electronically signed by Patti Matos MD, 01/17/21, 11:26 PM EST.    Time: I spent  25  minutes on this discharge activity which included face-to-face encounter with the patient/reviewing the data in the system/coordination of the care with the nursing staff as well as consultants/documentation/entering orders.

## 2021-02-09 ENCOUNTER — OFFICE VISIT (OUTPATIENT)
Dept: CARDIOLOGY | Facility: CLINIC | Age: 76
End: 2021-02-09

## 2021-02-09 VITALS
SYSTOLIC BLOOD PRESSURE: 122 MMHG | DIASTOLIC BLOOD PRESSURE: 56 MMHG | HEIGHT: 71 IN | BODY MASS INDEX: 24.08 KG/M2 | OXYGEN SATURATION: 96 % | WEIGHT: 172 LBS | HEART RATE: 56 BPM | TEMPERATURE: 96.9 F

## 2021-02-09 DIAGNOSIS — E08.9 DIABETES MELLITUS DUE TO UNDERLYING CONDITION WITHOUT COMPLICATION, WITHOUT LONG-TERM CURRENT USE OF INSULIN (HCC): ICD-10-CM

## 2021-02-09 DIAGNOSIS — I25.10 CHRONIC CORONARY ARTERY DISEASE: ICD-10-CM

## 2021-02-09 DIAGNOSIS — E78.5 DYSLIPIDEMIA: ICD-10-CM

## 2021-02-09 DIAGNOSIS — R07.89 CHEST DISCOMFORT: Primary | ICD-10-CM

## 2021-02-09 DIAGNOSIS — I10 ESSENTIAL HYPERTENSION: ICD-10-CM

## 2021-02-09 PROCEDURE — 99204 OFFICE O/P NEW MOD 45 MIN: CPT | Performed by: INTERNAL MEDICINE

## 2021-02-09 PROCEDURE — 93000 ELECTROCARDIOGRAM COMPLETE: CPT | Performed by: INTERNAL MEDICINE

## 2021-02-09 RX ORDER — LORATADINE 10 MG/1
10 TABLET ORAL DAILY
COMMUNITY
End: 2021-03-08

## 2021-02-09 RX ORDER — TRIAMCINOLONE ACETONIDE 55 UG/1
2 SPRAY, METERED NASAL DAILY
COMMUNITY
End: 2021-10-19

## 2021-02-09 NOTE — PROGRESS NOTES
Encounter Date:02/09/2021      Patient ID: Tao Pozo is a 76 y.o. male.    Chief Complaint:  New patient-last seen 7/2/2015  Chest discomfort  Hypertension  Dyslipidemia  History of coronary artery disease      History of Present Illness  The patient is a pleasant 76-year-old white male he is here for evaluation of chest discomfort.  Typically patient recently has been having chest discomfort in the upper chest area burning sensation especially in cold weather especially with carrying in his hand.  Usually relieved by resting and relaxing.  No other associated aggravating or elevating factors.  Denies having any sweating nausea vomiting.  Chest discomfort is relieved by resting.  Patient has multiple risk factors.    Patient is not having any shortness of breath, palpitations, dizziness or syncope.  Denies having any headache ,abdominal pain ,nausea, vomiting , diarrhea constipation, loss of weight or loss of appetite.  Denies having any excessive bruising ,hematuria or blood in the stool.    Review of all systems negative except as indicated.    Reviewed ROS.    Assessment and Plan     ]]]]]]]]]]]]]]]]]]]  Impression  ========  -Chest discomfort-exertional burning sensation in the chest.  More with carrying.  Relieved by rest.  Suggestive of angina pectoris.  Cardiac cath 12/13/2014 revealed 40 to 50% mid LAD ostial 60 to 70% RCA (FFR did not reveal any significant disease)  History of left heart cath    -Hypertension dyslipidemia diabetes.  CKD 3.  Recent BUN 35 creatinine 1.27-12/27/2020    -History of significant sinus bradycardia that has improved in the past with discontinuation of Bystolic.    -History of nonsustained ventricular tachycardia with exercise in the past.    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  ==============  Plan  =========  Patient has symptoms that are concerning for angina  pectoris.  Patient had coronary artery disease at cardiac cath in 2014 as described above.  EKG showed sinus rhythm nonspecific ST-T wave changes.  Ischemic cardiac work-up.  Stress Cardiolite test  Echocardiogram  Patient was asked to take aspirin a day  Patient was given prescription for sublingual nitroglycerin.  Patient was asked to avoid any situations that are causing chest discomfort  Patient was explained about concerning symptoms for angina pectoris.  Patient may need cardiac catheterization coronary arteriography.  Patient would benefit from holding Metformin and renal precautions as well as precautions for allergy to shellfish if patient needs cardiac catheterization.  Further plan will depend on patient's progress  ]]]]]]]]]]]]]              Diagnosis Plan   1. Chest discomfort  Adult Transthoracic Echo Complete W/ Cont if Necessary Per Protocol    Stress Test With Myocardial Perfusion One Day   2. Essential hypertension  Adult Transthoracic Echo Complete W/ Cont if Necessary Per Protocol    Stress Test With Myocardial Perfusion One Day   3. Diabetes mellitus due to underlying condition without complication, without long-term current use of insulin (CMS/HCC)  Adult Transthoracic Echo Complete W/ Cont if Necessary Per Protocol    Stress Test With Myocardial Perfusion One Day   4. Dyslipidemia  Adult Transthoracic Echo Complete W/ Cont if Necessary Per Protocol    Stress Test With Myocardial Perfusion One Day   5. Chronic coronary artery disease  Adult Transthoracic Echo Complete W/ Cont if Necessary Per Protocol    Stress Test With Myocardial Perfusion One Day   LAB RESULTS (LAST 7 DAYS)    CBC        BMP        CMP         BNP        TROPONIN        CoAg        Creatinine Clearance  CrCl cannot be calculated (Patient's most recent lab result is older than the maximum 30 days allowed.).    ABG        Radiology  No radiology results for the last day                The following portions of the patient's  "history were reviewed and updated as appropriate: allergies, current medications, past family history, past medical history, past social history, past surgical history and problem list.    Review of Systems   Constitution: Negative for malaise/fatigue.   Cardiovascular: Positive for chest pain (\"burning sensation\" in cold weather). Negative for leg swelling, palpitations and syncope.   Respiratory: Negative for shortness of breath.    Skin: Negative for rash.   Gastrointestinal: Negative for nausea and vomiting.   Neurological: Negative for dizziness, light-headedness and numbness.         Current Outpatient Medications:   •  atorvastatin (LIPITOR) 20 MG tablet, Take 20 mg by mouth Daily., Disp: , Rfl:   •  glimepiride (AMARYL) 1 MG tablet, Take 2 mg by mouth Daily., Disp: , Rfl:   •  hydroCHLOROthiazide (HYDRODIURIL) 12.5 MG tablet, Take 12.5 mg by mouth Daily., Disp: , Rfl:   •  lisinopril (PRINIVIL,ZESTRIL) 40 MG tablet, Take 40 mg by mouth Daily., Disp: , Rfl:   •  loratadine (KLS AllerClear) 10 MG tablet, Take 10 mg by mouth Daily., Disp: , Rfl:   •  Loratadine-Pseudoephedrine (KLS ALLERCLEAR D-12HR PO), Take  by mouth., Disp: , Rfl:   •  metFORMIN (GLUCOPHAGE) 1000 MG tablet, Take 1,000 mg by mouth 2 (Two) Times a Day With Meals., Disp: , Rfl:   •  SITagliptin (JANUVIA) 100 MG tablet, Take 100 mg by mouth Daily., Disp: , Rfl:   •  Triamcinolone Acetonide (NASACORT) 55 MCG/ACT nasal inhaler, 2 sprays into the nostril(s) as directed by provider Daily., Disp: , Rfl:     No Known Allergies    Family History   Problem Relation Age of Onset   • Diabetes Mother    • Heart disease Father    • Diabetes Sister    • Diabetes Brother    • Hypertension Brother    • Heart disease Brother        Past Surgical History:   Procedure Laterality Date   • TONSILECTOMY, ADENOIDECTOMY, BILATERAL MYRINGOTOMY AND TUBES  1952       Past Medical History:   Diagnosis Date   • Arthritis    • Coronary artery disease 2/14/2012   • Diabetes " "mellitus (CMS/[facility])    • Elevated cholesterol    • Hyperlipidemia    • Hypertension        Family History   Problem Relation Age of Onset   • Diabetes Mother    • Heart disease Father    • Diabetes Sister    • Diabetes Brother    • Hypertension Brother    • Heart disease Brother        Social History     Socioeconomic History   • Marital status:      Spouse name: Not on file   • Number of children: Not on file   • Years of education: Not on file   • Highest education level: Not on file   Tobacco Use   • Smoking status: Never Smoker   • Smokeless tobacco: Never Used   Substance and Sexual Activity   • Alcohol use: No     Frequency: Never   • Drug use: Defer   • Sexual activity: Defer           ECG 12 Lead    Date/Time: 2/9/2021 10:47 AM  Performed by: Huseyin Do MD  Authorized by: Huseyin Do MD   Comparison: compared with previous ECG   Similar to previous ECG  Comparison to previous ECG: Normal sinus rhythm nonspecific ST-T wave changes small inferior Q waves 64/min normal axis normal intervals                Objective:       Physical Exam    /56 (BP Location: Left arm, Patient Position: Sitting, Cuff Size: Large Adult)   Pulse 56   Temp 96.9 °F (36.1 °C)   Ht 180.3 cm (71\")   Wt 78 kg (172 lb)   SpO2 96%   BMI 23.99 kg/m²   The patient is alert, oriented and in no distress.    Vital signs as noted above.    Head and neck revealed no carotid bruits or jugular venous distension.  No thyromegaly or lymphadenopathy is present.    Lungs clear.  No wheezing.  Breath sounds are normal bilaterally.    Heart normal first and second heart sounds.  No murmur..  No pericardial rub is present.  No gallop is present.    Abdomen soft and nontender.  No organomegaly is present.    Extremities revealed good peripheral pulses without any pedal edema.    Skin warm and dry.    Musculoskeletal system is grossly normal.    CNS grossly normal.    Reviewed and unchanged from last visit.        "

## 2021-02-11 ENCOUNTER — HOSPITAL ENCOUNTER (OUTPATIENT)
Dept: CARDIOLOGY | Facility: HOSPITAL | Age: 76
Discharge: HOME | End: 2021-02-11

## 2021-02-11 ENCOUNTER — HOSPITAL ENCOUNTER (OUTPATIENT)
Dept: CARDIOLOGY | Facility: HOSPITAL | Age: 76
End: 2021-02-11

## 2021-02-11 DIAGNOSIS — E78.5 DYSLIPIDEMIA: ICD-10-CM

## 2021-02-11 DIAGNOSIS — E08.9 DIABETES MELLITUS DUE TO UNDERLYING CONDITION WITHOUT COMPLICATION, WITHOUT LONG-TERM CURRENT USE OF INSULIN (HCC): ICD-10-CM

## 2021-02-11 DIAGNOSIS — I25.10 CHRONIC CORONARY ARTERY DISEASE: ICD-10-CM

## 2021-02-11 DIAGNOSIS — I10 ESSENTIAL HYPERTENSION: ICD-10-CM

## 2021-02-11 DIAGNOSIS — R07.89 CHEST DISCOMFORT: ICD-10-CM

## 2021-02-16 ENCOUNTER — APPOINTMENT (OUTPATIENT)
Dept: CARDIOLOGY | Facility: HOSPITAL | Age: 76
End: 2021-02-16

## 2021-02-23 ENCOUNTER — HOSPITAL ENCOUNTER (OUTPATIENT)
Dept: CARDIOLOGY | Facility: HOSPITAL | Age: 76
Discharge: HOME | End: 2021-02-23

## 2021-02-23 ENCOUNTER — OFFICE VISIT (OUTPATIENT)
Dept: CARDIOLOGY | Facility: CLINIC | Age: 76
End: 2021-02-23

## 2021-02-23 VITALS
SYSTOLIC BLOOD PRESSURE: 132 MMHG | BODY MASS INDEX: 24.08 KG/M2 | DIASTOLIC BLOOD PRESSURE: 76 MMHG | WEIGHT: 172 LBS | HEIGHT: 71 IN

## 2021-02-23 VITALS
SYSTOLIC BLOOD PRESSURE: 120 MMHG | WEIGHT: 172 LBS | TEMPERATURE: 97.3 F | DIASTOLIC BLOOD PRESSURE: 72 MMHG | BODY MASS INDEX: 24.08 KG/M2 | HEART RATE: 50 BPM | HEIGHT: 71 IN

## 2021-02-23 DIAGNOSIS — I25.10 CHRONIC CORONARY ARTERY DISEASE: ICD-10-CM

## 2021-02-23 DIAGNOSIS — R07.89 CHEST DISCOMFORT: ICD-10-CM

## 2021-02-23 DIAGNOSIS — R94.39 ABNORMAL NUCLEAR STRESS TEST: Primary | ICD-10-CM

## 2021-02-23 DIAGNOSIS — I10 ESSENTIAL HYPERTENSION: ICD-10-CM

## 2021-02-23 DIAGNOSIS — E78.5 DYSLIPIDEMIA: ICD-10-CM

## 2021-02-23 DIAGNOSIS — E08.9 DIABETES MELLITUS DUE TO UNDERLYING CONDITION WITHOUT COMPLICATION, WITHOUT LONG-TERM CURRENT USE OF INSULIN (HCC): ICD-10-CM

## 2021-02-23 LAB
BH CV ECHO MEAS - ACS: 1.7 CM
BH CV ECHO MEAS - AO MAX PG (FULL): 6.8 MMHG
BH CV ECHO MEAS - AO MAX PG: 12.5 MMHG
BH CV ECHO MEAS - AO MEAN PG (FULL): 3.3 MMHG
BH CV ECHO MEAS - AO MEAN PG: 6.3 MMHG
BH CV ECHO MEAS - AO ROOT AREA (BSA CORRECTED): 1.4
BH CV ECHO MEAS - AO ROOT AREA: 5.7 CM^2
BH CV ECHO MEAS - AO ROOT DIAM: 2.7 CM
BH CV ECHO MEAS - AO V2 MAX: 176.7 CM/SEC
BH CV ECHO MEAS - AO V2 MEAN: 117.2 CM/SEC
BH CV ECHO MEAS - AO V2 VTI: 36.5 CM
BH CV ECHO MEAS - ASC AORTA: 2.7 CM
BH CV ECHO MEAS - AVA(I,A): 2 CM^2
BH CV ECHO MEAS - AVA(I,D): 2 CM^2
BH CV ECHO MEAS - AVA(V,A): 1.9 CM^2
BH CV ECHO MEAS - AVA(V,D): 1.9 CM^2
BH CV ECHO MEAS - BSA(HAYCOCK): 2 M^2
BH CV ECHO MEAS - BSA: 2 M^2
BH CV ECHO MEAS - BZI_BMI: 24 KILOGRAMS/M^2
BH CV ECHO MEAS - BZI_METRIC_HEIGHT: 180.3 CM
BH CV ECHO MEAS - BZI_METRIC_WEIGHT: 78 KG
BH CV ECHO MEAS - EDV(CUBED): 129.1 ML
BH CV ECHO MEAS - EDV(MOD-SP4): 81.5 ML
BH CV ECHO MEAS - EDV(TEICH): 121.3 ML
BH CV ECHO MEAS - EF(CUBED): 86.4 %
BH CV ECHO MEAS - EF(MOD-SP4): 81 %
BH CV ECHO MEAS - EF(TEICH): 79.7 %
BH CV ECHO MEAS - ESV(CUBED): 17.6 ML
BH CV ECHO MEAS - ESV(MOD-SP4): 15.5 ML
BH CV ECHO MEAS - ESV(TEICH): 24.6 ML
BH CV ECHO MEAS - FS: 48.5 %
BH CV ECHO MEAS - IVS/LVPW: 0.88
BH CV ECHO MEAS - IVSD: 0.83 CM
BH CV ECHO MEAS - LA DIMENSION: 4.4 CM
BH CV ECHO MEAS - LA/AO: 1.6
BH CV ECHO MEAS - LV DIASTOLIC VOL/BSA (35-75): 41.2 ML/M^2
BH CV ECHO MEAS - LV MASS(C)D: 158.8 GRAMS
BH CV ECHO MEAS - LV MASS(C)DI: 80.3 GRAMS/M^2
BH CV ECHO MEAS - LV MAX PG: 5.7 MMHG
BH CV ECHO MEAS - LV MEAN PG: 3 MMHG
BH CV ECHO MEAS - LV SYSTOLIC VOL/BSA (12-30): 7.8 ML/M^2
BH CV ECHO MEAS - LV V1 MAX: 119.4 CM/SEC
BH CV ECHO MEAS - LV V1 MEAN: 82.2 CM/SEC
BH CV ECHO MEAS - LV V1 VTI: 26.2 CM
BH CV ECHO MEAS - LVIDD: 5.1 CM
BH CV ECHO MEAS - LVIDS: 2.6 CM
BH CV ECHO MEAS - LVOT AREA: 2.7 CM^2
BH CV ECHO MEAS - LVOT DIAM: 1.9 CM
BH CV ECHO MEAS - LVPWD: 0.95 CM
BH CV ECHO MEAS - MV A MAX VEL: 83.7 CM/SEC
BH CV ECHO MEAS - MV DEC SLOPE: 388.5 CM/SEC^2
BH CV ECHO MEAS - MV DEC TIME: 0.27 SEC
BH CV ECHO MEAS - MV E MAX VEL: 103.6 CM/SEC
BH CV ECHO MEAS - MV E/A: 1.2
BH CV ECHO MEAS - MV MAX PG: 4.5 MMHG
BH CV ECHO MEAS - MV MEAN PG: 1.4 MMHG
BH CV ECHO MEAS - MV V2 MAX: 106.3 CM/SEC
BH CV ECHO MEAS - MV V2 MEAN: 52.4 CM/SEC
BH CV ECHO MEAS - MV V2 VTI: 36.5 CM
BH CV ECHO MEAS - MVA(VTI): 2 CM^2
BH CV ECHO MEAS - PA ACC TIME: 0.1 SEC
BH CV ECHO MEAS - PA PR(ACCEL): 32.3 MMHG
BH CV ECHO MEAS - PULM DIAS VEL: 56.1 CM/SEC
BH CV ECHO MEAS - PULM S/D: 1.1
BH CV ECHO MEAS - PULM SYS VEL: 62.9 CM/SEC
BH CV ECHO MEAS - RAP SYSTOLE: 3 MMHG
BH CV ECHO MEAS - RV MAX PG: 2.7 MMHG
BH CV ECHO MEAS - RV MEAN PG: 1.3 MMHG
BH CV ECHO MEAS - RV V1 MAX: 81.8 CM/SEC
BH CV ECHO MEAS - RV V1 MEAN: 54.4 CM/SEC
BH CV ECHO MEAS - RV V1 VTI: 20.1 CM
BH CV ECHO MEAS - RVDD: 3.1 CM
BH CV ECHO MEAS - RVSP: 16.2 MMHG
BH CV ECHO MEAS - SI(AO): 104.4 ML/M^2
BH CV ECHO MEAS - SI(CUBED): 56.4 ML/M^2
BH CV ECHO MEAS - SI(LVOT): 36.3 ML/M^2
BH CV ECHO MEAS - SI(MOD-SP4): 33.4 ML/M^2
BH CV ECHO MEAS - SI(TEICH): 48.9 ML/M^2
BH CV ECHO MEAS - SV(AO): 206.4 ML
BH CV ECHO MEAS - SV(CUBED): 111.5 ML
BH CV ECHO MEAS - SV(LVOT): 71.9 ML
BH CV ECHO MEAS - SV(MOD-SP4): 66.1 ML
BH CV ECHO MEAS - SV(TEICH): 96.6 ML
BH CV ECHO MEAS - TR MAX VEL: 181.9 CM/SEC
BH CV STRESS COMMENTS STAGE 1: NORMAL
BH CV STRESS DOSE REGADENOSON STAGE 1: 0.4
BH CV STRESS DURATION MIN STAGE 1: 0
BH CV STRESS DURATION SEC STAGE 1: 10
BH CV STRESS PROTOCOL 1: NORMAL
BH CV STRESS RECOVERY BP: NORMAL MMHG
BH CV STRESS RECOVERY HR: 81 BPM
BH CV STRESS STAGE 1: 1
LV EF 2D ECHO EST: 60 %
MAXIMAL PREDICTED HEART RATE: 146 BPM
MAXIMAL PREDICTED HEART RATE: 146 BPM
STRESS BASELINE BP: NORMAL MMHG
STRESS BASELINE HR: 49 BPM
STRESS TARGET HR: 124 BPM
STRESS TARGET HR: 124 BPM

## 2021-02-23 PROCEDURE — 93306 TTE W/DOPPLER COMPLETE: CPT | Performed by: INTERNAL MEDICINE

## 2021-02-23 PROCEDURE — 0 TECHNETIUM SESTAMIBI: Performed by: INTERNAL MEDICINE

## 2021-02-23 PROCEDURE — 93016 CV STRESS TEST SUPVJ ONLY: CPT | Performed by: INTERNAL MEDICINE

## 2021-02-23 PROCEDURE — 78452 HT MUSCLE IMAGE SPECT MULT: CPT | Performed by: INTERNAL MEDICINE

## 2021-02-23 PROCEDURE — 25010000002 REGADENOSON 0.4 MG/5ML SOLUTION: Performed by: INTERNAL MEDICINE

## 2021-02-23 PROCEDURE — 78452 HT MUSCLE IMAGE SPECT MULT: CPT

## 2021-02-23 PROCEDURE — 93017 CV STRESS TEST TRACING ONLY: CPT

## 2021-02-23 PROCEDURE — 93306 TTE W/DOPPLER COMPLETE: CPT

## 2021-02-23 PROCEDURE — 99215 OFFICE O/P EST HI 40 MIN: CPT | Performed by: INTERNAL MEDICINE

## 2021-02-23 PROCEDURE — A9500 TC99M SESTAMIBI: HCPCS | Performed by: INTERNAL MEDICINE

## 2021-02-23 PROCEDURE — 93018 CV STRESS TEST I&R ONLY: CPT | Performed by: INTERNAL MEDICINE

## 2021-02-23 RX ORDER — NITROGLYCERIN 0.4 MG/1
1 TABLET SUBLINGUAL AS NEEDED
COMMUNITY
Start: 2021-02-20 | End: 2021-03-18 | Stop reason: HOSPADM

## 2021-02-23 RX ADMIN — REGADENOSON 0.4 MG: 0.08 INJECTION, SOLUTION INTRAVENOUS at 14:15

## 2021-02-23 RX ADMIN — TECHNETIUM TC 99M SESTAMIBI 1 DOSE: 1 INJECTION INTRAVENOUS at 13:00

## 2021-02-23 NOTE — PROGRESS NOTES
Encounter Date:02/23/2021  Last seen 2/9/2021      Patient ID: Tao Pozo is a 76 y.o. male.    Chief Complaint:  Chest discomfort  Hypertension  Dyslipidemia  History of coronary artery disease        History of Present Illness  Patient recently was seen with following history.    The patient is a pleasant 76-year-old white male he is here for evaluation of chest discomfort.  Typically patient recently has been having chest discomfort in the upper chest area burning sensation especially in cold weather especially with carrying in his hand.  Usually relieved by resting and relaxing.  No other associated aggravating or elevating factors.  Denies having any sweating nausea vomiting.  Chest discomfort is relieved by resting.  Patient has multiple risk factors.     Patient is not having any shortness of breath, palpitations, dizziness or syncope.  Denies having any headache ,abdominal pain ,nausea, vomiting , diarrhea constipation, loss of weight or loss of appetite.  Denies having any excessive bruising ,hematuria or blood in the stool.     Review of all systems negative except as indicated.     Reviewed ROS.     Assessment and Plan      ]]]]]]]]]]]]]]]]]]]  Impression  ========  -Chest discomfort-exertional burning sensation in the chest.  More with carrying.  Relieved by rest.  Suggestive of angina pectoris.  Cardiac cath 12/13/2014 revealed 40 to 50% mid LAD ostial 60 to 70% RCA (FFR did not reveal any significant disease)  History of left heart cath    Echocardiogram-normal 2/23/2021  Lexiscan Cardiolite test 2/23/2021-abnormal with apical ischemia.     -Hypertension dyslipidemia diabetes.  CKD 3.  Recent BUN 35 creatinine 1.27-12/27/2020     -History of significant sinus bradycardia that has improved in the past with discontinuation of Bystolic.     -History of nonsustained ventricular tachycardia with exercise in the past.     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  ==============  Plan  =========  Patient has symptoms that are concerning for angina pectoris.  Patient had coronary artery disease at cardiac cath in 2014 as described above.  Recent EKG showed sinus rhythm nonspecific ST-T wave changes.  Ischemic cardiac work-up.  Stress Cardiolite test-abnormal with apical ischemia  Echocardiogram-normal as above  Patient was asked to take aspirin a day  Patient was given prescription for sublingual nitroglycerin.  Patient was asked to avoid any situations that are causing chest discomfort  Patient was explained about concerning symptoms for angina pectoris.  Patient was advised cardiac catheterization and coronary arteriography for definitive evaluation of coronary artery status.  Risks and benefits pros and cons of the procedure were discussed.  Patient to hold Metformin the evening before dose.  Patient is allergic to shellfish.  Patient was given prescription for prednisone 20 mg p.o. every 6 hours 24 hours prior to the procedure.  Patient was advised regarding possible need for intervention.  Further plan will depend on patient's progress  ]]]]]]]]]]]]]                      Diagnosis Plan   1. Abnormal nuclear stress test  Case Request Cath Lab: Left Heart Cath with Coronary Angiography    CBC (No Diff)    Basic Metabolic Panel    Protime-INR    Lipid Panel    ECG 12 Lead    XR Chest 2 View    COVID PRE-OP / PRE-PROCEDURE SCREENING ORDER (NO ISOLATION) - Swab, Nasopharynx   2. Chest discomfort  Case Request Cath Lab: Left Heart Cath with Coronary Angiography    CBC (No Diff)    Basic Metabolic Panel    Protime-INR    Lipid Panel    ECG 12 Lead    XR Chest 2 View    COVID PRE-OP / PRE-PROCEDURE SCREENING ORDER (NO ISOLATION) - Swab, Nasopharynx   3. Essential hypertension  Case Request Cath Lab: Left Heart Cath with Coronary Angiography    CBC (No Diff)    Basic Metabolic Panel    Protime-INR    Lipid Panel    ECG  12 Lead    XR Chest 2 View    COVID PRE-OP / PRE-PROCEDURE SCREENING ORDER (NO ISOLATION) - Swab, Nasopharynx   4. Diabetes mellitus due to underlying condition without complication, without long-term current use of insulin (CMS/[facility])  Case Request Cath Lab: Left Heart Cath with Coronary Angiography    CBC (No Diff)    Basic Metabolic Panel    Protime-INR    Lipid Panel    ECG 12 Lead    XR Chest 2 View    COVID PRE-OP / PRE-PROCEDURE SCREENING ORDER (NO ISOLATION) - Swab, Nasopharynx   5. Dyslipidemia  Case Request Cath Lab: Left Heart Cath with Coronary Angiography    CBC (No Diff)    Basic Metabolic Panel    Protime-INR    Lipid Panel    ECG 12 Lead    XR Chest 2 View    COVID PRE-OP / PRE-PROCEDURE SCREENING ORDER (NO ISOLATION) - Swab, Nasopharynx   6. Chronic coronary artery disease  Case Request Cath Lab: Left Heart Cath with Coronary Angiography    CBC (No Diff)    Basic Metabolic Panel    Protime-INR    Lipid Panel    ECG 12 Lead    XR Chest 2 View    COVID PRE-OP / PRE-PROCEDURE SCREENING ORDER (NO ISOLATION) - Swab, Nasopharynx   LAB RESULTS (LAST 7 DAYS)    CBC        BMP        CMP         BNP        TROPONIN        CoAg        Creatinine Clearance  CrCl cannot be calculated (Patient's most recent lab result is older than the maximum 30 days allowed.).    ABG        Radiology  No radiology results for the last day                The following portions of the patient's history were reviewed and updated as appropriate: allergies, current medications, past family history, past medical history, past social history, past surgical history and problem list.    Review of Systems   Constitution: Negative for malaise/fatigue.   Cardiovascular: Negative for chest pain, leg swelling, palpitations and syncope.   Respiratory: Negative for shortness of breath.    Skin: Negative for rash.   Gastrointestinal: Negative for nausea and vomiting.   Neurological: Negative for dizziness, light-headedness and numbness.          Current Outpatient Medications:   •  atorvastatin (LIPITOR) 20 MG tablet, Take 20 mg by mouth Daily., Disp: , Rfl:   •  glimepiride (AMARYL) 1 MG tablet, Take 2 mg by mouth Daily., Disp: , Rfl:   •  hydroCHLOROthiazide (HYDRODIURIL) 12.5 MG tablet, Take 12.5 mg by mouth Daily., Disp: , Rfl:   •  lisinopril (PRINIVIL,ZESTRIL) 40 MG tablet, Take 40 mg by mouth Daily., Disp: , Rfl:   •  loratadine (KLS AllerClear) 10 MG tablet, Take 10 mg by mouth Daily., Disp: , Rfl:   •  Loratadine-Pseudoephedrine (KLS ALLERCLEAR D-12HR PO), Take  by mouth., Disp: , Rfl:   •  metFORMIN (GLUCOPHAGE) 1000 MG tablet, Take 1,000 mg by mouth 2 (Two) Times a Day With Meals., Disp: , Rfl:   •  nitroglycerin (NITROSTAT) 0.4 MG SL tablet, Place 1 tablet under the tongue As Needed., Disp: , Rfl:   •  SITagliptin (JANUVIA) 100 MG tablet, Take 100 mg by mouth Daily., Disp: , Rfl:   •  Triamcinolone Acetonide (NASACORT) 55 MCG/ACT nasal inhaler, 2 sprays into the nostril(s) as directed by provider Daily., Disp: , Rfl:   No current facility-administered medications for this visit.     No Known Allergies    Family History   Problem Relation Age of Onset   • Diabetes Mother    • Heart disease Father    • Diabetes Sister    • Diabetes Brother    • Hypertension Brother    • Heart disease Brother        Past Surgical History:   Procedure Laterality Date   • TONSILECTOMY, ADENOIDECTOMY, BILATERAL MYRINGOTOMY AND TUBES  1952       Past Medical History:   Diagnosis Date   • Arthritis    • Coronary artery disease 2/14/2012   • Diabetes mellitus (CMS/[facility])    • Elevated cholesterol    • Hyperlipidemia    • Hypertension        Family History   Problem Relation Age of Onset   • Diabetes Mother    • Heart disease Father    • Diabetes Sister    • Diabetes Brother    • Hypertension Brother    • Heart disease Brother        Social History     Socioeconomic History   • Marital status:      Spouse name: Not on file   • Number of children: Not on file  "  • Years of education: Not on file   • Highest education level: Not on file   Tobacco Use   • Smoking status: Never Smoker   • Smokeless tobacco: Never Used   Substance and Sexual Activity   • Alcohol use: No     Frequency: Never   • Drug use: Defer   • Sexual activity: Defer         Procedures      Objective:       Physical Exam    /72   Pulse 50   Temp 97.3 °F (36.3 °C)   Ht 180.3 cm (71\")   Wt 78 kg (172 lb)   BMI 23.99 kg/m²   The patient is alert, oriented and in no distress.    Vital signs as noted above.    Head and neck revealed no carotid bruits or jugular venous distension.  No thyromegaly or lymphadenopathy is present.    Lungs clear.  No wheezing.  Breath sounds are normal bilaterally.    Heart normal first and second heart sounds.  No murmur..  No pericardial rub is present.  No gallop is present.    Abdomen soft and nontender.  No organomegaly is present.    Extremities revealed good peripheral pulses without any pedal edema.    Skin warm and dry.    Musculoskeletal system is grossly normal.    CNS grossly normal.    Reviewed and unchanged from last visit.        "

## 2021-03-12 ENCOUNTER — HOSPITAL ENCOUNTER (OUTPATIENT)
Dept: CARDIOLOGY | Facility: HOSPITAL | Age: 76
Discharge: HOME | End: 2021-03-12

## 2021-03-12 ENCOUNTER — APPOINTMENT (OUTPATIENT)
Dept: CARDIOLOGY | Facility: HOSPITAL | Age: 76
End: 2021-03-12

## 2021-03-12 ENCOUNTER — HOSPITAL ENCOUNTER (OUTPATIENT)
Dept: GENERAL RADIOLOGY | Facility: HOSPITAL | Age: 76
Discharge: HOME | End: 2021-03-12

## 2021-03-12 ENCOUNTER — HOSPITAL ENCOUNTER (INPATIENT)
Facility: HOSPITAL | Age: 76
LOS: 6 days | Discharge: HOME | End: 2021-03-18
Attending: INTERNAL MEDICINE | Admitting: THORACIC SURGERY (CARDIOTHORACIC VASCULAR SURGERY)

## 2021-03-12 ENCOUNTER — LAB (OUTPATIENT)
Dept: LAB | Facility: HOSPITAL | Age: 76
End: 2021-03-12

## 2021-03-12 DIAGNOSIS — I25.10 CORONARY ARTERY DISEASE INVOLVING NATIVE CORONARY ARTERY OF NATIVE HEART WITHOUT ANGINA PECTORIS: Primary | ICD-10-CM

## 2021-03-12 DIAGNOSIS — R94.39 ABNORMAL NUCLEAR STRESS TEST: ICD-10-CM

## 2021-03-12 DIAGNOSIS — E78.5 DYSLIPIDEMIA: ICD-10-CM

## 2021-03-12 DIAGNOSIS — R07.89 CHEST DISCOMFORT: ICD-10-CM

## 2021-03-12 DIAGNOSIS — E08.9 DIABETES MELLITUS DUE TO UNDERLYING CONDITION WITHOUT COMPLICATION, WITHOUT LONG-TERM CURRENT USE OF INSULIN (HCC): ICD-10-CM

## 2021-03-12 DIAGNOSIS — Z95.1 S/P CABG X 3: ICD-10-CM

## 2021-03-12 DIAGNOSIS — I25.10 CHRONIC CORONARY ARTERY DISEASE: ICD-10-CM

## 2021-03-12 DIAGNOSIS — I10 ESSENTIAL HYPERTENSION: ICD-10-CM

## 2021-03-12 LAB
ANION GAP SERPL CALCULATED.3IONS-SCNC: 10 MMOL/L (ref 5–15)
BH CV XLRA MEAS - DIST GSV CALF DIST LEFT: 0.17 CM
BH CV XLRA MEAS - DIST GSV CALF DIST RIGHT: 0.19 CM
BH CV XLRA MEAS - DIST GSV THIGH DIST LEFT: 0.32 CM
BH CV XLRA MEAS - DIST GSV THIGH DIST RIGHT: 0.3 CM
BH CV XLRA MEAS - MID GSV CALF LEFT: 0.12 CM
BH CV XLRA MEAS - MID GSV CALF RIGHT: 0.19 CM
BH CV XLRA MEAS - MID GSV THIGH  LEFT: 0.32 CM
BH CV XLRA MEAS - MID GSV THIGH  RIGHT: 0.33 CM
BH CV XLRA MEAS - PROX GSV CALF DIST LEFT: 0.18 CM
BH CV XLRA MEAS - PROX GSV CALF DIST RIGHT: 0.27 CM
BH CV XLRA MEAS - PROX GSV THIGH  LEFT: 0.43 CM
BH CV XLRA MEAS - PROX GSV THIGH  RIGHT: 0.4 CM
BH CV XLRA MEAS LEFT BULB PSV: -76.4 CM/SEC
BH CV XLRA MEAS LEFT CCA RATIO VEL: -85.5 CM/SEC
BH CV XLRA MEAS LEFT DIST CCA EDV: -17.7 CM/SEC
BH CV XLRA MEAS LEFT DIST CCA PSV: -85.5 CM/SEC
BH CV XLRA MEAS LEFT DIST ICA EDV: -21.7 CM/SEC
BH CV XLRA MEAS LEFT DIST ICA PSV: -111 CM/SEC
BH CV XLRA MEAS LEFT ICA RATIO VEL: -111 CM/SEC
BH CV XLRA MEAS LEFT ICA/CCA RATIO: 1.3
BH CV XLRA MEAS LEFT PROX CCA EDV: 16.2 CM/SEC
BH CV XLRA MEAS LEFT PROX CCA PSV: 80.6 CM/SEC
BH CV XLRA MEAS LEFT PROX ECA PSV: -315 CM/SEC
BH CV XLRA MEAS LEFT PROX ICA EDV: -15.5 CM/SEC
BH CV XLRA MEAS LEFT PROX ICA PSV: -76.4 CM/SEC
BH CV XLRA MEAS LEFT PROX SCLA PSV: 173 CM/SEC
BH CV XLRA MEAS LEFT VERTEBRAL A PSV: -50.6 CM/SEC
BH CV XLRA MEAS RIGHT BULB PSV: -62 CM/SEC
BH CV XLRA MEAS RIGHT CCA RATIO VEL: -95.1 CM/SEC
BH CV XLRA MEAS RIGHT DIST CCA EDV: -12.4 CM/SEC
BH CV XLRA MEAS RIGHT DIST CCA PSV: -82.6 CM/SEC
BH CV XLRA MEAS RIGHT DIST ICA EDV: -14 CM/SEC
BH CV XLRA MEAS RIGHT DIST ICA PSV: -60.6 CM/SEC
BH CV XLRA MEAS RIGHT ICA RATIO VEL: -107 CM/SEC
BH CV XLRA MEAS RIGHT ICA/CCA RATIO: 1.1
BH CV XLRA MEAS RIGHT PROX CCA EDV: -13 CM/SEC
BH CV XLRA MEAS RIGHT PROX CCA PSV: -95.1 CM/SEC
BH CV XLRA MEAS RIGHT PROX ECA PSV: -189 CM/SEC
BH CV XLRA MEAS RIGHT PROX ICA EDV: -16.8 CM/SEC
BH CV XLRA MEAS RIGHT PROX ICA PSV: -107 CM/SEC
BH CV XLRA MEAS RIGHT PROX SCLA PSV: 184 CM/SEC
BH CV XLRA MEAS RIGHT VERTEBRAL A PSV: -56 CM/SEC
BILIRUB UR QL STRIP: NEGATIVE
BUN SERPL-MCNC: 18 MG/DL (ref 8–23)
BUN/CREAT SERPL: 15.5 (ref 7–25)
CALCIUM SPEC-SCNC: 9.7 MG/DL (ref 8.6–10.5)
CHLORIDE SERPL-SCNC: 98 MMOL/L (ref 98–107)
CHOLEST SERPL-MCNC: 205 MG/DL (ref 0–200)
CLARITY UR: CLEAR
CO2 SERPL-SCNC: 26 MMOL/L (ref 22–29)
COLOR UR: YELLOW
CREAT SERPL-MCNC: 1.16 MG/DL (ref 0.76–1.27)
DEPRECATED RDW RBC AUTO: 43.8 FL (ref 37–54)
ERYTHROCYTE [DISTWIDTH] IN BLOOD BY AUTOMATED COUNT: 13.7 % (ref 12.3–15.4)
GFR SERPL CREATININE-BSD FRML MDRD: 62 ML/MIN/1.73
GLUCOSE BLDC GLUCOMTR-MCNC: 212 MG/DL (ref 70–105)
GLUCOSE BLDC GLUCOMTR-MCNC: 343 MG/DL (ref 70–105)
GLUCOSE SERPL-MCNC: 378 MG/DL (ref 65–99)
GLUCOSE UR STRIP-MCNC: ABNORMAL MG/DL
HCT VFR BLD AUTO: 41 % (ref 37.5–51)
HDLC SERPL-MCNC: 37 MG/DL (ref 40–60)
HGB BLD-MCNC: 14.5 G/DL (ref 13–17.7)
HGB UR QL STRIP.AUTO: NEGATIVE
INR PPP: 1.02 (ref 0.93–1.1)
KETONES UR QL STRIP: NEGATIVE
LDLC SERPL CALC-MCNC: 133 MG/DL (ref 0–100)
LDLC/HDLC SERPL: 3.49 {RATIO}
LEUKOCYTE ESTERASE UR QL STRIP.AUTO: NEGATIVE
MCH RBC QN AUTO: 31.7 PG (ref 26.6–33)
MCHC RBC AUTO-ENTMCNC: 35.3 G/DL (ref 31.5–35.7)
MCV RBC AUTO: 89.9 FL (ref 79–97)
NITRITE UR QL STRIP: NEGATIVE
PH UR STRIP.AUTO: 5.5 [PH] (ref 5–8)
PLATELET # BLD AUTO: 262 10*3/MM3 (ref 140–450)
PMV BLD AUTO: 8 FL (ref 6–12)
POTASSIUM SERPL-SCNC: 4.6 MMOL/L (ref 3.5–5.2)
PROT UR QL STRIP: NEGATIVE
PROTHROMBIN TIME: 11.2 SECONDS (ref 9.6–11.7)
RBC # BLD AUTO: 4.57 10*6/MM3 (ref 4.14–5.8)
SODIUM SERPL-SCNC: 134 MMOL/L (ref 136–145)
SP GR UR STRIP: 1.07 (ref 1–1.03)
TRIGL SERPL-MCNC: 194 MG/DL (ref 0–150)
UROBILINOGEN UR QL STRIP: ABNORMAL
VLDLC SERPL-MCNC: 35 MG/DL (ref 5–40)
WBC # BLD AUTO: 12 10*3/MM3 (ref 3.4–10.8)

## 2021-03-12 PROCEDURE — 81003 URINALYSIS AUTO W/O SCOPE: CPT | Performed by: NURSE PRACTITIONER

## 2021-03-12 PROCEDURE — 4A023N7 MEASUREMENT OF CARDIAC SAMPLING AND PRESSURE, LEFT HEART, PERCUTANEOUS APPROACH: ICD-10-PCS | Performed by: INTERNAL MEDICINE

## 2021-03-12 PROCEDURE — 99221 1ST HOSP IP/OBS SF/LOW 40: CPT | Performed by: INTERNAL MEDICINE

## 2021-03-12 PROCEDURE — C1769 GUIDE WIRE: HCPCS | Performed by: INTERNAL MEDICINE

## 2021-03-12 PROCEDURE — 99222 1ST HOSP IP/OBS MODERATE 55: CPT | Performed by: THORACIC SURGERY (CARDIOTHORACIC VASCULAR SURGERY)

## 2021-03-12 PROCEDURE — 99152 MOD SED SAME PHYS/QHP 5/>YRS: CPT | Performed by: INTERNAL MEDICINE

## 2021-03-12 PROCEDURE — 93880 EXTRACRANIAL BILAT STUDY: CPT

## 2021-03-12 PROCEDURE — 0 IOPAMIDOL PER 1 ML: Performed by: INTERNAL MEDICINE

## 2021-03-12 PROCEDURE — 85027 COMPLETE CBC AUTOMATED: CPT

## 2021-03-12 PROCEDURE — 93459 L HRT ART/GRFT ANGIO: CPT | Performed by: INTERNAL MEDICINE

## 2021-03-12 PROCEDURE — 82962 GLUCOSE BLOOD TEST: CPT

## 2021-03-12 PROCEDURE — 85610 PROTHROMBIN TIME: CPT

## 2021-03-12 PROCEDURE — 63710000001 INSULIN LISPRO (HUMAN) PER 5 UNITS: Performed by: NURSE PRACTITIONER

## 2021-03-12 PROCEDURE — 80061 LIPID PANEL: CPT

## 2021-03-12 PROCEDURE — 71046 X-RAY EXAM CHEST 2 VIEWS: CPT

## 2021-03-12 PROCEDURE — 93005 ELECTROCARDIOGRAM TRACING: CPT | Performed by: INTERNAL MEDICINE

## 2021-03-12 PROCEDURE — 25010000002 FENTANYL CITRATE (PF) 100 MCG/2ML SOLUTION: Performed by: INTERNAL MEDICINE

## 2021-03-12 PROCEDURE — B2111ZZ FLUOROSCOPY OF MULTIPLE CORONARY ARTERIES USING LOW OSMOLAR CONTRAST: ICD-10-PCS | Performed by: INTERNAL MEDICINE

## 2021-03-12 PROCEDURE — 63710000001 INSULIN REGULAR HUMAN PER 5 UNITS: Performed by: INTERNAL MEDICINE

## 2021-03-12 PROCEDURE — 63710000001 DIPHENHYDRAMINE PER 50 MG: Performed by: NURSE PRACTITIONER

## 2021-03-12 PROCEDURE — 80048 BASIC METABOLIC PNL TOTAL CA: CPT

## 2021-03-12 PROCEDURE — C1894 INTRO/SHEATH, NON-LASER: HCPCS | Performed by: INTERNAL MEDICINE

## 2021-03-12 PROCEDURE — 36415 COLL VENOUS BLD VENIPUNCTURE: CPT

## 2021-03-12 PROCEDURE — 25010000002 MIDAZOLAM PER 1 MG: Performed by: INTERNAL MEDICINE

## 2021-03-12 PROCEDURE — 93010 ELECTROCARDIOGRAM REPORT: CPT | Performed by: INTERNAL MEDICINE

## 2021-03-12 PROCEDURE — B2151ZZ FLUOROSCOPY OF LEFT HEART USING LOW OSMOLAR CONTRAST: ICD-10-PCS | Performed by: INTERNAL MEDICINE

## 2021-03-12 RX ORDER — SODIUM CHLORIDE 0.9 % (FLUSH) 0.9 %
30 SYRINGE (ML) INJECTION ONCE AS NEEDED
Status: DISCONTINUED | OUTPATIENT
Start: 2021-03-12 | End: 2021-03-15 | Stop reason: SDUPTHER

## 2021-03-12 RX ORDER — FENTANYL CITRATE 50 UG/ML
INJECTION, SOLUTION INTRAMUSCULAR; INTRAVENOUS AS NEEDED
Status: DISCONTINUED | OUTPATIENT
Start: 2021-03-12 | End: 2021-03-12 | Stop reason: HOSPADM

## 2021-03-12 RX ORDER — INSULIN LISPRO 100 [IU]/ML
0-9 INJECTION, SOLUTION INTRAVENOUS; SUBCUTANEOUS AS NEEDED
Status: DISCONTINUED | OUTPATIENT
Start: 2021-03-12 | End: 2021-03-15 | Stop reason: HOSPADM

## 2021-03-12 RX ORDER — ATORVASTATIN CALCIUM 20 MG/1
20 TABLET, FILM COATED ORAL NIGHTLY
Status: DISCONTINUED | OUTPATIENT
Start: 2021-03-12 | End: 2021-03-15 | Stop reason: SDUPTHER

## 2021-03-12 RX ORDER — DIPHENHYDRAMINE HCL 25 MG
25 CAPSULE ORAL NIGHTLY PRN
Status: DISCONTINUED | OUTPATIENT
Start: 2021-03-12 | End: 2021-03-15 | Stop reason: HOSPADM

## 2021-03-12 RX ORDER — GLIPIZIDE 5 MG/1
5 TABLET ORAL
Status: DISCONTINUED | OUTPATIENT
Start: 2021-03-13 | End: 2021-03-15 | Stop reason: HOSPADM

## 2021-03-12 RX ORDER — SODIUM CHLORIDE 0.9 % (FLUSH) 0.9 %
10 SYRINGE (ML) INJECTION AS NEEDED
Status: DISCONTINUED | OUTPATIENT
Start: 2021-03-12 | End: 2021-03-15 | Stop reason: HOSPADM

## 2021-03-12 RX ORDER — SODIUM CHLORIDE 0.9 % (FLUSH) 0.9 %
3 SYRINGE (ML) INJECTION EVERY 12 HOURS SCHEDULED
Status: DISCONTINUED | OUTPATIENT
Start: 2021-03-12 | End: 2021-03-15 | Stop reason: HOSPADM

## 2021-03-12 RX ORDER — LIDOCAINE HYDROCHLORIDE 20 MG/ML
INJECTION, SOLUTION INFILTRATION; PERINEURAL AS NEEDED
Status: DISCONTINUED | OUTPATIENT
Start: 2021-03-12 | End: 2021-03-12 | Stop reason: HOSPADM

## 2021-03-12 RX ORDER — DEXTROSE MONOHYDRATE 25 G/50ML
25 INJECTION, SOLUTION INTRAVENOUS
Status: DISCONTINUED | OUTPATIENT
Start: 2021-03-12 | End: 2021-03-15 | Stop reason: HOSPADM

## 2021-03-12 RX ORDER — NICOTINE POLACRILEX 4 MG
15 LOZENGE BUCCAL
Status: DISCONTINUED | OUTPATIENT
Start: 2021-03-12 | End: 2021-03-15 | Stop reason: HOSPADM

## 2021-03-12 RX ORDER — ALPRAZOLAM 0.25 MG/1
0.25 TABLET ORAL EVERY 8 HOURS PRN
Status: DISCONTINUED | OUTPATIENT
Start: 2021-03-12 | End: 2021-03-15 | Stop reason: HOSPADM

## 2021-03-12 RX ORDER — NITROGLYCERIN 0.4 MG/1
0.4 TABLET SUBLINGUAL
Status: DISCONTINUED | OUTPATIENT
Start: 2021-03-12 | End: 2021-03-15 | Stop reason: HOSPADM

## 2021-03-12 RX ORDER — CHLORHEXIDINE GLUCONATE 0.12 MG/ML
15 RINSE ORAL 2 TIMES DAILY
Status: COMPLETED | OUTPATIENT
Start: 2021-03-14 | End: 2021-03-15

## 2021-03-12 RX ORDER — SODIUM CHLORIDE 0.9 % (FLUSH) 0.9 %
10 SYRINGE (ML) INJECTION EVERY 12 HOURS SCHEDULED
Status: DISCONTINUED | OUTPATIENT
Start: 2021-03-12 | End: 2021-03-15 | Stop reason: HOSPADM

## 2021-03-12 RX ORDER — ACETAMINOPHEN 325 MG/1
650 TABLET ORAL EVERY 4 HOURS PRN
Status: DISCONTINUED | OUTPATIENT
Start: 2021-03-12 | End: 2021-03-15 | Stop reason: SDUPTHER

## 2021-03-12 RX ORDER — INSULIN LISPRO 100 [IU]/ML
0-9 INJECTION, SOLUTION INTRAVENOUS; SUBCUTANEOUS
Status: DISCONTINUED | OUTPATIENT
Start: 2021-03-12 | End: 2021-03-15 | Stop reason: HOSPADM

## 2021-03-12 RX ORDER — CHLORHEXIDINE GLUCONATE 500 MG/1
1 CLOTH TOPICAL EVERY 12 HOURS PRN
Status: DISCONTINUED | OUTPATIENT
Start: 2021-03-14 | End: 2021-03-15 | Stop reason: HOSPADM

## 2021-03-12 RX ORDER — SODIUM CHLORIDE 9 MG/ML
30 INJECTION, SOLUTION INTRAVENOUS CONTINUOUS PRN
Status: DISCONTINUED | OUTPATIENT
Start: 2021-03-15 | End: 2021-03-15 | Stop reason: SDUPTHER

## 2021-03-12 RX ORDER — MIDAZOLAM HYDROCHLORIDE 1 MG/ML
INJECTION INTRAMUSCULAR; INTRAVENOUS AS NEEDED
Status: DISCONTINUED | OUTPATIENT
Start: 2021-03-12 | End: 2021-03-12 | Stop reason: HOSPADM

## 2021-03-12 RX ORDER — NITROGLYCERIN 20 MG/100ML
10-50 INJECTION INTRAVENOUS
Status: DISCONTINUED | OUTPATIENT
Start: 2021-03-12 | End: 2021-03-12

## 2021-03-12 RX ORDER — LISINOPRIL 20 MG/1
20 TABLET ORAL
Status: DISCONTINUED | OUTPATIENT
Start: 2021-03-12 | End: 2021-03-15 | Stop reason: HOSPADM

## 2021-03-12 RX ADMIN — ALPRAZOLAM 0.25 MG: 0.25 TABLET ORAL at 20:13

## 2021-03-12 RX ADMIN — ATORVASTATIN CALCIUM 20 MG: 20 TABLET, FILM COATED ORAL at 20:14

## 2021-03-12 RX ADMIN — INSULIN HUMAN 5 UNITS: 100 INJECTION, SOLUTION PARENTERAL at 07:29

## 2021-03-12 RX ADMIN — DIPHENHYDRAMINE HYDROCHLORIDE 25 MG: 25 CAPSULE ORAL at 20:16

## 2021-03-12 RX ADMIN — SODIUM CHLORIDE 5 MG/HR: 9 INJECTION, SOLUTION INTRAVENOUS at 21:04

## 2021-03-12 RX ADMIN — SODIUM CHLORIDE 5 MG/HR: 9 INJECTION, SOLUTION INTRAVENOUS at 18:22

## 2021-03-12 RX ADMIN — LISINOPRIL 20 MG: 20 TABLET ORAL at 07:29

## 2021-03-12 RX ADMIN — Medication 10 ML: at 20:14

## 2021-03-12 RX ADMIN — INSULIN LISPRO 3 UNITS: 100 INJECTION, SOLUTION INTRAVENOUS; SUBCUTANEOUS at 17:18

## 2021-03-12 RX ADMIN — SODIUM CHLORIDE 7.5 MG/HR: 9 INJECTION, SOLUTION INTRAVENOUS at 20:13

## 2021-03-12 RX ADMIN — LINAGLIPTIN 5 MG: 5 TABLET, FILM COATED ORAL at 17:18

## 2021-03-12 NOTE — CONSULTS
Patient Care Team:  Provider, No Known as PCP - Huseyin Neal MD as Consulting Physician (Cardiology)  Referring Provider:  Dr. Do  Reason for consultation:  CAD    Chief complaint:  Chest discomfort    Subjective     History of Present Illness:  77 y/o gentleman with prior hx of non-obstructive heart disease presented to see Dr. Do in late February for c/o throat burning and chest discomfort when exerting himself that sometimes radiates to his left shoulder.  Cold weather worsened his symptoms.  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  Rest alleviates symptoms.  PMHx includes:  DM type 2, HTN, dyslipidemia, CKD stage 2, and strong family hx of CAD.  His father, twin brother, and sister have all had CABG.  Additionally, he was hospitalized at [place] for COVID-19 infection with confusion.  He and his wife report he was confused for approx 4 weeks following COVID.  Head CT was negative.  His tox screen was positive amphet/meth which he states he has never used illicit drugs.  He underwent stress testing which showed apical ischemia.  Echo showed EF 60% and no significant VHD.  He presented today for elective heart cath EF 60%, LAD ostial 60%, prox 90%, mid 90%; LCx 60% prox, marginals small and diffuse 60-70%; RCA very large vessel, ostial 99% and appears to have a clot--per dictated report.  Dr. Vasquez was asked to evaluate pt for CABG.    Review of Systems   Constitutional: Positive for fatigue.   HENT: Negative for postnasal drip and rhinorrhea.         Denies loss of smell.  Taste still skewed.   Respiratory: Positive for cough.    Cardiovascular: Positive for chest pain. Negative for palpitations and leg swelling.        +throat burning   Gastrointestinal: Positive for constipation. Negative for abdominal pain, diarrhea, nausea and vomiting.   Genitourinary: Negative for difficulty urinating.   Musculoskeletal:  Positive for arthralgias.   Skin: Negative for rash and wound.   Allergic/Immunologic: Negative for immunocompromised state.   Neurological: Negative for dizziness, syncope, speech difficulty, weakness and light-headedness.   Hematological: Does not bruise/bleed easily.   Psychiatric/Behavioral: Negative for sleep disturbance. The patient is not nervous/anxious.         Past Medical History:   Diagnosis Date    Arthritis     Coronary artery disease 2/14/2012    Diabetes mellitus (CMS/HCC)     Elevated cholesterol     Hyperlipidemia     Hypertension      Past Surgical History:   Procedure Laterality Date    TONSILECTOMY, ADENOIDECTOMY, BILATERAL MYRINGOTOMY AND TUBES  1952     Family History   Problem Relation Age of Onset    Diabetes Mother     Heart disease Father     Diabetes Sister     Diabetes Brother     Hypertension Brother     Heart disease Brother      Social History     Tobacco Use    Smoking status: Never Smoker    Smokeless tobacco: Never Used   Vaping Use    Vaping Use: Never used   Substance Use Topics    Alcohol use: No    Drug use: Defer     Medications Prior to Admission   Medication Sig Dispense Refill Last Dose    atorvastatin (LIPITOR) 20 MG tablet Take 20 mg by mouth Daily.   3/11/2021 at Unknown time    CBD (cannabidiol) oral oil Take 1 mL by mouth Daily. For arthritis   Past Week at Unknown time    metFORMIN (GLUCOPHAGE) 1000 MG tablet Take 1,000 mg by mouth 2 (Two) Times a Day With Meals.   3/11/2021 at Unknown time    SITagliptin (JANUVIA) 100 MG tablet Take 100 mg by mouth Daily.   3/11/2021 at Unknown time    glimepiride (AMARYL) 1 MG tablet Take 2 mg by mouth Daily.   3/10/2021    hydroCHLOROthiazide (HYDRODIURIL) 12.5 MG tablet Take 12.5 mg by mouth Daily.   3/10/2021    lisinopril (PRINIVIL,ZESTRIL) 40 MG tablet Take 40 mg by mouth Daily.   3/10/2021    nitroglycerin (NITROSTAT) 0.4 MG SL tablet Place 1 tablet under the tongue As Needed.   Unknown at Unknown time    Triamcinolone  "Acetonide (NASACORT) 55 MCG/ACT nasal inhaler 2 sprays into the nostril(s) as directed by provider Daily.   More than a month at Unknown time     lisinopril, 20 mg, Oral, Q24H      Allergies:  Shellfish-derived products    Objective      Vital Signs  Temp:  [98 °F (36.7 °C)] 98 °F (36.7 °C)  Heart Rate:  [66] 66  Resp:  [19] 19  BP: (170)/(54) 170/54    Flowsheet Rows        First Filed Value   Admission Height  176.5 cm (69.5\") Documented at 03/12/2021 0653   Admission Weight  78.8 kg (173 lb 11.6 oz) Documented at 03/12/2021 0653          176.5 cm (69.5\")    Physical Exam  Vitals and nursing note reviewed.   Constitutional:       General: He is awake.      Appearance: Normal appearance. He is well-developed, well-groomed and normal weight.      Comments: Seen in [facility] with wife at bedside   HENT:      Head: Normocephalic and atraumatic.      Nose: Nose normal.      Mouth/Throat:      Pharynx: Uvula midline.   Eyes:      General: Lids are normal. No scleral icterus.     Extraocular Movements: Extraocular movements intact.      Conjunctiva/sclera: Conjunctivae normal.      Pupils: Pupils are equal, round, and reactive to light.      Comments: Wearing glasses   Neck:      Thyroid: No thyroid mass or thyromegaly.      Vascular: Normal carotid pulses. No carotid bruit, hepatojugular reflux or JVD.      Trachea: Trachea normal.   Cardiovascular:      Rate and Rhythm: Normal rate and regular rhythm.      Pulses:           Carotid pulses are 1+ on the right side and 1+ on the left side.       Radial pulses are 1+ on the right side and 1+ on the left side.        Femoral pulses are 1+ on the right side and 1+ on the left side.       Popliteal pulses are 1+ on the right side and 1+ on the left side.        Dorsalis pedis pulses are 1+ on the right side and 1+ on the left side.        Posterior tibial pulses are 1+ on the right side and 1+ on the left side.      Heart sounds: Normal heart sounds. No murmur.      Comments: " Tele:  SR 60s  Right femoral cath site  Pulmonary:      Effort: Pulmonary effort is normal.      Breath sounds: Normal breath sounds.      Comments: On room air  Abdominal:      General: Abdomen is flat. Bowel sounds are normal. There is no distension or abdominal bruit.      Palpations: Abdomen is soft. There is no hepatomegaly or splenomegaly.      Tenderness: There is no abdominal tenderness.   Musculoskeletal:      Cervical back: Neck supple.      Right lower leg: No edema.      Left lower leg: No edema.      Comments: Gait steady and strong without use of assistive devices   Lymphadenopathy:      Cervical: No cervical adenopathy.      Upper Body:      Right upper body: No supraclavicular adenopathy.      Left upper body: No supraclavicular adenopathy.   Skin:     General: Skin is warm and dry.      Capillary Refill: Capillary refill takes less than 2 seconds.      Findings: No erythema or rash.      Nails: There is no clubbing.   Neurological:      Mental Status: He is alert and oriented to person, place, and time.      GCS: GCS eye subscore is 4. GCS verbal subscore is 5. GCS motor subscore is 6.   Psychiatric:         Attention and Perception: Attention and perception normal.         Mood and Affect: Mood and affect normal.         Speech: Speech normal.         Behavior: Behavior normal. Behavior is cooperative.         Thought Content: Thought content normal.         Cognition and Memory: Cognition and memory normal.         Judgment: Judgment normal.         Results Review:   Lab Results (last 24 hours)       ** No results found for the last 24 hours. **                Assessment/Plan       Chest discomfort    Essential hypertension    Diabetes mellitus due to underlying condition without complication, without long-term current use of insulin (CMS/[facility])    Dyslipidemia    Chronic coronary artery disease    Abnormal nuclear stress test      Assessment & Plan    Progressive CAD, EF 60% (cath)--cardiac workup  in progress  HTN--stable on lisinopril/HCTZ  DM type 2--oral agents  Dyslipidemia--statin  CKD stage 2--pt unaware  Hx NSVT with stress testing  COVID-19 infection--Dec 2020  Fatty liver infiltration--per CT chest    Carotid duplex, vein mapping, UA ordered.  Dr. Vasquez  evaluated studies and d/w pt/wife and team.  Dr. Do wants pt to stay inpatient d/t his RCA disease/clot.    Thank you for allowing us to participate in the care of this patient.      Rosibel Tripathi, APRN  03/12/21  09:36 EST    **all problems new to this examiner  **EKG and CXR independently reviewed and interpreted  Addendum  Patient was seen and examined by me, I reviewed the studies myself and discussed them with cardiology and the note was verified by me, agree with findings.  Patient has severe three-vessel coronary artery disease, and abnormal stress test and angina.  I recommend urgent CABG with ERIC to prolong survival, symptomatic relief and prevent adverse events.  I discussed the risks STS calculated, benefits and terms of surgery and he wishes to proceed.  We will complete the work-up and plan to do surgery in the next 2 days.  He will need some time of hydration due to his renal insufficiency before surgery  Paxton Vasquez MD

## 2021-03-12 NOTE — NURSING NOTE
Pt up from cath lab, BP elevated.Hi contacted for BP control, ordered Nitro. [facility] consulted and is now primary. Pt due to have surgery 3/15 @ 1200. Will monitor.

## 2021-03-13 LAB
ABO GROUP BLD: NORMAL
ALBUMIN SERPL-MCNC: 3.7 G/DL (ref 3.5–5.2)
ALBUMIN/GLOB SERPL: 1.7 G/DL
ALP SERPL-CCNC: 72 U/L (ref 39–117)
ALT SERPL W P-5'-P-CCNC: 16 U/L (ref 1–41)
ANION GAP SERPL CALCULATED.3IONS-SCNC: 10 MMOL/L (ref 5–15)
APTT PPP: 23.6 SECONDS (ref 24–31)
AST SERPL-CCNC: 14 U/L (ref 1–40)
BASOPHILS # BLD AUTO: 0.1 10*3/MM3 (ref 0–0.2)
BASOPHILS NFR BLD AUTO: 1.2 % (ref 0–1.5)
BILIRUB SERPL-MCNC: 0.9 MG/DL (ref 0–1.2)
BLD GP AB SCN SERPL QL: NEGATIVE
BUN SERPL-MCNC: 20 MG/DL (ref 8–23)
BUN/CREAT SERPL: 19.4 (ref 7–25)
CALCIUM SPEC-SCNC: 8.5 MG/DL (ref 8.6–10.5)
CHLORIDE SERPL-SCNC: 104 MMOL/L (ref 98–107)
CLOSE TME COLL+ADP + EPINEP PNL BLD: 97 % (ref 86–100)
CO2 SERPL-SCNC: 25 MMOL/L (ref 22–29)
CREAT SERPL-MCNC: 1.03 MG/DL (ref 0.76–1.27)
DEPRECATED RDW RBC AUTO: 44.6 FL (ref 37–54)
EOSINOPHIL # BLD AUTO: 0 10*3/MM3 (ref 0–0.4)
EOSINOPHIL NFR BLD AUTO: 0.3 % (ref 0.3–6.2)
ERYTHROCYTE [DISTWIDTH] IN BLOOD BY AUTOMATED COUNT: 14.1 % (ref 12.3–15.4)
GFR SERPL CREATININE-BSD FRML MDRD: 71 ML/MIN/1.73
GLOBULIN UR ELPH-MCNC: 2.2 GM/DL
GLUCOSE BLDC GLUCOMTR-MCNC: 145 MG/DL (ref 70–105)
GLUCOSE BLDC GLUCOMTR-MCNC: 176 MG/DL (ref 70–105)
GLUCOSE BLDC GLUCOMTR-MCNC: 192 MG/DL (ref 70–105)
GLUCOSE BLDC GLUCOMTR-MCNC: 231 MG/DL (ref 70–105)
GLUCOSE SERPL-MCNC: 194 MG/DL (ref 65–99)
HCT VFR BLD AUTO: 38.9 % (ref 37.5–51)
HGB BLD-MCNC: 13.8 G/DL (ref 13–17.7)
INR PPP: 1.03 (ref 0.93–1.1)
LYMPHOCYTES # BLD AUTO: 2.3 10*3/MM3 (ref 0.7–3.1)
LYMPHOCYTES NFR BLD AUTO: 18.7 % (ref 19.6–45.3)
MAGNESIUM SERPL-MCNC: 1.7 MG/DL (ref 1.6–2.4)
MCH RBC QN AUTO: 31.9 PG (ref 26.6–33)
MCHC RBC AUTO-ENTMCNC: 35.5 G/DL (ref 31.5–35.7)
MCV RBC AUTO: 89.7 FL (ref 79–97)
MONOCYTES # BLD AUTO: 0.7 10*3/MM3 (ref 0.1–0.9)
MONOCYTES NFR BLD AUTO: 5.9 % (ref 5–12)
NEUTROPHILS NFR BLD AUTO: 73.9 % (ref 42.7–76)
NEUTROPHILS NFR BLD AUTO: 9.1 10*3/MM3 (ref 1.7–7)
NRBC BLD AUTO-RTO: 0 /100 WBC (ref 0–0.2)
NT-PROBNP SERPL-MCNC: 258.5 PG/ML (ref 0–900)
PLATELET # BLD AUTO: 224 10*3/MM3 (ref 140–450)
PMV BLD AUTO: 7.9 FL (ref 6–12)
POTASSIUM SERPL-SCNC: 3.7 MMOL/L (ref 3.5–5.2)
PROT SERPL-MCNC: 5.9 G/DL (ref 6–8.5)
PROTHROMBIN TIME: 11.3 SECONDS (ref 9.6–11.7)
RBC # BLD AUTO: 4.34 10*6/MM3 (ref 4.14–5.8)
RH BLD: POSITIVE
SODIUM SERPL-SCNC: 139 MMOL/L (ref 136–145)
T&S EXPIRATION DATE: NORMAL
WBC # BLD AUTO: 12.3 10*3/MM3 (ref 3.4–10.8)

## 2021-03-13 PROCEDURE — 83735 ASSAY OF MAGNESIUM: CPT | Performed by: NURSE PRACTITIONER

## 2021-03-13 PROCEDURE — 86901 BLOOD TYPING SEROLOGIC RH(D): CPT

## 2021-03-13 PROCEDURE — 63710000001 INSULIN LISPRO (HUMAN) PER 5 UNITS: Performed by: NURSE PRACTITIONER

## 2021-03-13 PROCEDURE — 82962 GLUCOSE BLOOD TEST: CPT

## 2021-03-13 PROCEDURE — 86900 BLOOD TYPING SEROLOGIC ABO: CPT | Performed by: NURSE PRACTITIONER

## 2021-03-13 PROCEDURE — 86850 RBC ANTIBODY SCREEN: CPT | Performed by: NURSE PRACTITIONER

## 2021-03-13 PROCEDURE — 86900 BLOOD TYPING SEROLOGIC ABO: CPT

## 2021-03-13 PROCEDURE — 80053 COMPREHEN METABOLIC PANEL: CPT | Performed by: NURSE PRACTITIONER

## 2021-03-13 PROCEDURE — 86923 COMPATIBILITY TEST ELECTRIC: CPT

## 2021-03-13 PROCEDURE — 99233 SBSQ HOSP IP/OBS HIGH 50: CPT | Performed by: INTERNAL MEDICINE

## 2021-03-13 PROCEDURE — 85730 THROMBOPLASTIN TIME PARTIAL: CPT | Performed by: NURSE PRACTITIONER

## 2021-03-13 PROCEDURE — 86901 BLOOD TYPING SEROLOGIC RH(D): CPT | Performed by: NURSE PRACTITIONER

## 2021-03-13 PROCEDURE — 83036 HEMOGLOBIN GLYCOSYLATED A1C: CPT | Performed by: NURSE PRACTITIONER

## 2021-03-13 PROCEDURE — 85025 COMPLETE CBC W/AUTO DIFF WBC: CPT | Performed by: NURSE PRACTITIONER

## 2021-03-13 PROCEDURE — 85610 PROTHROMBIN TIME: CPT | Performed by: NURSE PRACTITIONER

## 2021-03-13 PROCEDURE — 85576 BLOOD PLATELET AGGREGATION: CPT | Performed by: NURSE PRACTITIONER

## 2021-03-13 PROCEDURE — 83880 ASSAY OF NATRIURETIC PEPTIDE: CPT | Performed by: NURSE PRACTITIONER

## 2021-03-13 RX ORDER — ASPIRIN 325 MG
325 TABLET ORAL DAILY
Status: DISCONTINUED | OUTPATIENT
Start: 2021-03-13 | End: 2021-03-15 | Stop reason: SDUPTHER

## 2021-03-13 RX ADMIN — LISINOPRIL 20 MG: 20 TABLET ORAL at 08:58

## 2021-03-13 RX ADMIN — INSULIN LISPRO 2 UNITS: 100 INJECTION, SOLUTION INTRAVENOUS; SUBCUTANEOUS at 17:17

## 2021-03-13 RX ADMIN — Medication 3 ML: at 20:05

## 2021-03-13 RX ADMIN — Medication 3 ML: at 09:02

## 2021-03-13 RX ADMIN — ASPIRIN 325 MG ORAL TABLET 325 MG: 325 PILL ORAL at 13:00

## 2021-03-13 RX ADMIN — GLIPIZIDE 5 MG: 5 TABLET ORAL at 09:03

## 2021-03-13 RX ADMIN — ALPRAZOLAM 0.25 MG: 0.25 TABLET ORAL at 20:04

## 2021-03-13 RX ADMIN — Medication 10 ML: at 20:04

## 2021-03-13 RX ADMIN — Medication 10 ML: at 09:02

## 2021-03-13 RX ADMIN — LINAGLIPTIN 5 MG: 5 TABLET, FILM COATED ORAL at 08:58

## 2021-03-13 RX ADMIN — INSULIN LISPRO 4 UNITS: 100 INJECTION, SOLUTION INTRAVENOUS; SUBCUTANEOUS at 20:13

## 2021-03-13 RX ADMIN — ATORVASTATIN CALCIUM 20 MG: 20 TABLET, FILM COATED ORAL at 20:04

## 2021-03-13 RX ADMIN — GLIPIZIDE 5 MG: 5 TABLET ORAL at 08:58

## 2021-03-13 RX ADMIN — INSULIN LISPRO 2 UNITS: 100 INJECTION, SOLUTION INTRAVENOUS; SUBCUTANEOUS at 08:58

## 2021-03-13 NOTE — PLAN OF CARE
Goal Outcome Evaluation:  Problem: Adult Inpatient Plan of Care  Goal: Plan of Care Review  Outcome: Ongoing, Progressing     Pt started on PO amio this shift, tolerating well. Pt to d/c home tomorrow. Will cont to monitor.

## 2021-03-13 NOTE — PROGRESS NOTES
Referring Provider: Paxton Vasquez MD    Reason for follow-up:  Severe coronary artery disease  Chest pain     Patient Care Team:  Provider, No Known as PCP - Huseyin Neal MD as Consulting Physician (Cardiology)    Subjective .      ROS    Since I have last seen him yesterday, the patient has been without any chest discomfort ,shortness of breath, palpitations, dizziness or syncope.  Denies having any headache ,abdominal pain ,nausea, vomiting , diarrhea constipation, loss of weight or loss of appetite.  Denies having any excessive bruising ,hematuria or blood in the stool.    Review of all systems negative except as indicated    History  Past Medical History:   Diagnosis Date   • Arthritis    • Coronary artery disease 2/14/2012   • Diabetes mellitus (CMS/formerly [facility])    • Elevated cholesterol    • Hyperlipidemia    • Hypertension        Past Surgical History:   Procedure Laterality Date   • TONSILECTOMY, ADENOIDECTOMY, BILATERAL MYRINGOTOMY AND TUBES  1952       Family History   Problem Relation Age of Onset   • Diabetes Mother    • Heart disease Father    • Diabetes Sister    • Diabetes Brother    • Hypertension Brother    • Heart disease Brother        Social History     Tobacco Use   • Smoking status: Never Smoker   • Smokeless tobacco: Never Used   Vaping Use   • Vaping Use: Never used   Substance Use Topics   • Alcohol use: No   • Drug use: Defer        Medications Prior to Admission   Medication Sig Dispense Refill Last Dose   • atorvastatin (LIPITOR) 20 MG tablet Take 20 mg by mouth Daily.   3/11/2021 at Unknown time   • CBD (cannabidiol) oral oil Take 1 mL by mouth Daily. For arthritis   Past Week at Unknown time   • metFORMIN (GLUCOPHAGE) 1000 MG tablet Take 1,000 mg by mouth 2 (Two) Times a Day With Meals.   3/11/2021 at Unknown time   • SITagliptin (JANUVIA) 100 MG tablet Take 100 mg by mouth Daily.   3/11/2021 at Unknown time   • glimepiride (AMARYL) 1 MG tablet Take 2 mg by mouth Daily.    "3/10/2021   • hydroCHLOROthiazide (HYDRODIURIL) 12.5 MG tablet Take 12.5 mg by mouth Daily.   3/10/2021   • lisinopril (PRINIVIL,ZESTRIL) 40 MG tablet Take 40 mg by mouth Daily.   3/10/2021   • nitroglycerin (NITROSTAT) 0.4 MG SL tablet Place 1 tablet under the tongue As Needed.   Unknown at Unknown time   • Triamcinolone Acetonide (NASACORT) 55 MCG/ACT nasal inhaler 2 sprays into the nostril(s) as directed by provider Daily.   More than a month at Unknown time       Allergies  Shellfish-derived products    Scheduled Meds:atorvastatin, 20 mg, Oral, Nightly  [START ON 3/15/2021] ceFAZolin, 2 g, Intravenous, Once  [START ON 3/14/2021] chlorhexidine, 15 mL, Mouth/Throat, BID  glipizide, 5 mg, Oral, QAM AC  insulin lispro, 0-9 Units, Subcutaneous, TID AC  linagliptin, 5 mg, Oral, Daily  lisinopril, 20 mg, Oral, Q24H  [START ON 3/15/2021] metoprolol tartrate, 12.5 mg, Oral, Once  [START ON 3/14/2021] mupirocin, 1 application, Each Nare, Q12H  sodium chloride, 10 mL, Intravenous, Q12H  sodium chloride, 3 mL, Intravenous, Q12H      Continuous Infusions:[START ON 3/15/2021] insulin, 0-50 Units/hr  niCARdipine, 5-15 mg/hr, Last Rate: Stopped (03/13/21 0154)  [START ON 3/15/2021] sodium chloride, 30 mL/hr      PRN Meds:.•  acetaminophen  •  ALPRAZolam  •  [START ON 3/14/2021] Chlorhexidine Gluconate Cloth  •  dextrose  •  dextrose  •  diphenhydrAMINE  •  glucagon (human recombinant)  •  insulin lispro **AND** insulin lispro  •  [START ON 3/15/2021] insulin  •  nitroglycerin  •  sodium chloride  •  sodium chloride  •  sodium chloride  •  [START ON 3/15/2021] sodium chloride    Objective     VITAL SIGNS  Vitals:    03/13/21 0300 03/13/21 0400 03/13/21 0500 03/13/21 0600   BP: 122/52 132/54 143/54 129/56   Pulse: 53 57 53 51   Resp:       Temp:  97.9 °F (36.6 °C)     TempSrc:  Oral     SpO2: 95% 96% 96% 97%   Weight:       Height:           Flowsheet Rows      First Filed Value   Admission Height  176.5 cm (69.5\") Documented at " 03/12/2021 0653   Admission Weight  78.8 kg (173 lb 11.6 oz) Documented at 03/12/2021 0653            Intake/Output Summary (Last 24 hours) at 3/13/2021 0716  Last data filed at 3/13/2021 0600  Gross per 24 hour   Intake 550 ml   Output 1400 ml   Net -850 ml        TELEMETRY: Sinus rhythm    Physical Exam:  The patient is alert, oriented and in no distress.  Vital signs as noted above.  Head and neck revealed no carotid bruits or jugular venous distention.  No thyromegaly or lymphadenopathy is present  Lungs clear.  No wheezing.  Breath sounds are normal bilaterally.  Heart normal first and second heart sounds.  No murmur. No precordial rub is present.  No gallop is present.  Abdomen soft and nontender.  No organomegaly is present.  Extremities with good peripheral pulses without any pedal edema.  Skin warm and dry.  Musculoskeletal system is grossly normal  CNS grossly normal      Results Review:   I reviewed the patient's new clinical results.  Lab Results (last 24 hours)     Procedure Component Value Units Date/Time    Comprehensive Metabolic Panel [[id number]]  (Abnormal) Collected: 03/13/21 0405    Specimen: Blood Updated: 03/13/21 0437     Glucose 194 mg/dL      BUN 20 mg/dL      Creatinine 1.03 mg/dL      Sodium 139 mmol/L      Potassium 3.7 mmol/L      Chloride 104 mmol/L      CO2 25.0 mmol/L      Calcium 8.5 mg/dL      Total Protein 5.9 g/dL      Albumin 3.70 g/dL      ALT (SGPT) 16 U/L      AST (SGOT) 14 U/L      Alkaline Phosphatase 72 U/L      Total Bilirubin 0.9 mg/dL      eGFR Non African Amer 71 mL/min/1.73      Globulin 2.2 gm/dL      A/G Ratio 1.7 g/dL      BUN/Creatinine Ratio 19.4     Anion Gap 10.0 mmol/L     Narrative:      GFR Normal >60  Chronic Kidney Disease <60  Kidney Failure <15      Magnesium [[id number]]  (Normal) Collected: 03/13/21 0405    Specimen: Blood Updated: 03/13/21 0437     Magnesium 1.7 mg/dL     BNP [[id number]]  (Normal) Collected: 03/13/21 0405    Specimen: Blood Updated:  03/13/21 0430     proBNP 258.5 pg/mL     Narrative:      Among patients with dyspnea, NT-proBNP is highly sensitive for the detection of acute congestive heart failure. In addition NT-proBNP of <300 pg/ml effectively rules out acute congestive heart failure with 99% negative predictive value.    Results may be falsely decreased if patient taking Biotin.      aPTT [[id number]]  (Abnormal) Collected: 03/13/21 0405    Specimen: Blood Updated: 03/13/21 0422     PTT 23.6 seconds     Protime-INR [[id number]]  (Normal) Collected: 03/13/21 0405    Specimen: Blood Updated: 03/13/21 0422     Protime 11.3 Seconds      INR 1.03    Platelet Function ADP [[id number]]  (Normal) Collected: 03/13/21 0405    Specimen: Blood Updated: 03/13/21 0413     ADP Aggregation, % Platelet 97 %     CBC & Differential [[id number]]  (Abnormal) Collected: 03/13/21 0405    Specimen: Blood Updated: 03/13/21 0412    Narrative:      The following orders were created for panel order CBC & Differential.  Procedure                               Abnormality         Status                     ---------                               -----------         ------                     CBC Auto Differential[[id number]]        Abnormal            Final result                 Please view results for these tests on the individual orders.    CBC Auto Differential [[id number]]  (Abnormal) Collected: 03/13/21 0405    Specimen: Blood Updated: 03/13/21 0412     WBC 12.30 10*3/mm3      RBC 4.34 10*6/mm3      Hemoglobin 13.8 g/dL      Hematocrit 38.9 %      MCV 89.7 fL      MCH 31.9 pg      MCHC 35.5 g/dL      RDW 14.1 %      RDW-SD 44.6 fl      MPV 7.9 fL      Platelets 224 10*3/mm3      Neutrophil % 73.9 %      Lymphocyte % 18.7 %      Monocyte % 5.9 %      Eosinophil % 0.3 %      Basophil % 1.2 %      Neutrophils, Absolute 9.10 10*3/mm3      Lymphocytes, Absolute 2.30 10*3/mm3      Monocytes, Absolute 0.70 10*3/mm3      Eosinophils, Absolute 0.00 10*3/mm3      Basophils,  Absolute 0.10 10*3/mm3      nRBC 0.0 /100 WBC     Hemoglobin A1c [[id number]] Collected: 03/13/21 0405    Specimen: Blood Updated: 03/13/21 0407    POC Glucose Once [[id number]]  (Abnormal) Collected: 03/12/21 2347    Specimen: Blood Updated: 03/12/21 2347     Glucose 212 mg/dL      Comment: Serial Number: [id number]:  [id number]       POC Glucose Once [[id number]]  (Abnormal) Collected: 03/12/21 1415    Specimen: Blood Updated: 03/12/21 1415     Glucose 343 mg/dL      Comment: Serial Number: [id number]:  [id number]       Urinalysis With Culture If Indicated - Urine, Random Void [[id number]]  (Abnormal) Collected: 03/12/21 1330    Specimen: Urine, Random Void Updated: 03/12/21 1338     Color, UA Yellow     Appearance, UA Clear     pH, UA 5.5     Specific Gravity, UA 1.075     Glucose, UA >=1000 mg/dL (3+)     Ketones, UA Negative     Bilirubin, UA Negative     Blood, UA Negative     Protein, UA Negative     Leuk Esterase, UA Negative     Nitrite, UA Negative     Urobilinogen, UA 0.2 E.U./dL    Narrative:      Urine microscopic not indicated.          Imaging Results (Last 24 Hours)     ** No results found for the last 24 hours. **      LAB RESULTS (LAST 7 DAYS)    CBC  Results from last 7 days   Lab Units 03/13/21  0405 03/12/21  0615   WBC 10*3/mm3 12.30* 12.00*   RBC 10*6/mm3 4.34 4.57   HEMOGLOBIN g/dL 13.8 14.5   HEMATOCRIT % 38.9 41.0   MCV fL 89.7 89.9   PLATELETS 10*3/mm3 224 262       BMP  Results from last 7 days   Lab Units 03/13/21  0405 03/12/21  0615   SODIUM mmol/L 139 134*   POTASSIUM mmol/L 3.7 4.6   CHLORIDE mmol/L 104 98   CO2 mmol/L 25.0 26.0   BUN mg/dL 20 18   CREATININE mg/dL 1.03 1.16   GLUCOSE mg/dL 194* 378*   MAGNESIUM mg/dL 1.7  --        CMP   Results from last 7 days   Lab Units 03/13/21  0405 03/12/21  0615   SODIUM mmol/L 139 134*   POTASSIUM mmol/L 3.7 4.6   CHLORIDE mmol/L 104 98   CO2 mmol/L 25.0 26.0   BUN mg/dL 20 18   CREATININE mg/dL 1.03 1.16   GLUCOSE mg/dL 194* 378*    ALBUMIN g/dL 3.70  --    BILIRUBIN mg/dL 0.9  --    ALK PHOS U/L 72  --    AST (SGOT) U/L 14  --    ALT (SGPT) U/L 16  --          BNP        TROPONIN        CoAg  Results from last 7 days   Lab Units 03/13/21  0405 03/12/21  0615   INR  1.03 1.02   APTT seconds 23.6*  --        Creatinine Clearance  Estimated Creatinine Clearance: 68.1 mL/min (by C-G formula based on SCr of 1.03 mg/dL).    ABG        Radiology  XR Chest 2 View    Result Date: 3/12/2021  No acute cardiac pulmonary findings.  Electronically Signed By-Smiley York MD On:3/12/2021 8:07 AM This report was finalized on [id number] by  Smiley York MD.              EKG      I personally viewed and interpreted the patient's EKG/Telemetry data:    ECHOCARDIOGRAM:    Results for orders placed during the hospital encounter of 02/11/21    Adult Transthoracic Echo Complete W/ Cont if Necessary Per Protocol    Interpretation Summary  · Estimated left ventricular EF = 60% Left ventricular systolic function is normal.    Indications  Chest pain    Technically satisfactory study.  Mitral valve is structurally normal.  Tricuspid valve is structurally normal.  Aortic valve is structurally normal.  Pulmonic valve could not be well visualized.  No evidence for mitral tricuspid or aortic regurgitation is seen by Doppler study.  Left atrium is mildly enlarged.  Right atrium is normal in size.  Left ventricle is normal in size and contractility with ejection fraction of 60%.  Right ventricle is normal in size.  Atrial septum is intact.  Aorta is normal.  No pericardial effusion or intracardiac thrombus is seen.    Impression  Mild left atrial enlargement.  Structurally and functionally normal cardiac valves.  Left ventricular size and contractility is normal with ejection fraction of 60%.          STRESS MYOVIEW:    Cardiolite (Tc-99m Sestamibi) stress test    CARDIAC CATHETERIZATION:            OTHER:         Assessment/Plan     Principal Problem:    Coronary  artery disease involving native coronary artery of native heart without angina pectoris  Active Problems:    Chest discomfort    Essential hypertension    Diabetes mellitus due to underlying condition without complication, without long-term current use of insulin (CMS/[facility])    Dyslipidemia    Chronic coronary artery disease    Abnormal nuclear stress test    Coronary artery disease      ]]]]]]]]]]]]]]]]]]]  Impression  ========  -Chest discomfort-exertional burning sensation in the chest.  More with carrying.  Relieved by rest.  Suggestive of angina pectoris.    Cardiac catheterization 3/12/2021 revealed left ventricle size and contractility is normal with ejection fraction of 60%.  No mitral regurgitation is present.  Left subclavian artery and left internal mammary arteries are normal.     Left main coronary artery normal.  Left anterior descending artery has diffuse calcification with ostial 60% proximal 90 and mid segment 90% disease.  Circumflex coronary artery provided a large marginal branch.  Circumflex coronary artery has 60% disease proximal to the origin of marginal branch.  Circumflex coronary artery distal to the marginal branches small in caliber and has diffuse 60 to 70% disease.  Right coronary artery is a very large and dominant vessel that has ostial 99% disease and mid segment 90% disease.  There appears to be a clot.    Cardiac cath 12/13/2014 revealed 40 to 50% mid LAD ostial 60 to 70% RCA (FFR did not reveal any significant disease)  History of left heart cath     Echocardiogram-normal 2/23/2021  Lexiscan Cardiolite test 2/23/2021-abnormal with apical ischemia.     -Hypertension dyslipidemia diabetes.  CKD 3.  Recent BUN 35 creatinine 1.27-12/27/2020     -History of significant sinus bradycardia that has improved in the past with discontinuation of Bystolic.     -History of nonsustained ventricular tachycardia with exercise in the past.     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  ==============  Plan  =========  Patient has symptoms that are concerning for angina pectoris.  Patient had coronary artery disease at cardiac cath in 2014 as described above.  Recent EKG showed sinus rhythm nonspecific ST-T wave changes.  Ischemic cardiac work-up.  Stress Cardiolite test-abnormal with apical ischemia  Echocardiogram-normal as above    Cardiac catheterization 3/12/2021 revealed  Left ventricle size and contractility is normal with ejection fraction of 60%.  No mitral regurgitation is present.  Left subclavian artery and left internal mammary arteries are normal.     Left main coronary artery normal.  Left anterior descending artery has diffuse calcification with ostial 60% proximal 90 and mid segment 90% disease.  Circumflex coronary artery provided a large marginal branch.  Circumflex coronary artery has 60% disease proximal to the origin of marginal branch.  Circumflex coronary artery distal to the marginal branches small in caliber and has diffuse 60 to 70% disease.  Right coronary artery is a very large and dominant vessel that has ostial 99% disease and mid segment 90% disease.  There appears to be a clot.      Patient has triple-vessel coronary artery disease and normal left ventricle function.    CABG on Monday    Have discussed with patient regarding present plans and expectations etc. from surgery.    Patient had significant hypotension yesterday which is better today.    Further plan will depend on patient's progress  ]]]]]]]]]]]]]             Huseyin Do MD  03/13/21  07:16 EST

## 2021-03-13 NOTE — PLAN OF CARE
Goal Outcome Evaluation:   Cardene off. No complaints of pain. Sinus rhythm at times but multiple PVC's. Bigem at times. No SOA. Pt turns self. Safety maintained. Continue to monitor.

## 2021-03-13 NOTE — CONSULTS
Referring Provider: Paxton Vasquez MD  Reason for Consultation:  Chest discomfort  Previous coronary artery disease  Severe coronary artery disease at cardiac cath 3/12/2021    Patient Care Team:  Provider, No Known as PCP - Huseyin Neal MD as Consulting Physician (Cardiology)    Chief complaint  Chest pain    Subjective .     History of present illness:  Tao Pozo is a 76 y.o. male who presents with with following history.       The patient is a pleasant 76-year-old white male he is here for evaluation of chest discomfort.  Typically patient recently has been having chest discomfort in the upper chest area burning sensation especially in cold weather especially with carrying in his hand.  Usually relieved by resting and relaxing.  No other associated aggravating or elevating factors.  Denies having any sweating nausea vomiting.  Chest discomfort is relieved by resting.  Patient has multiple risk factors.  Patient had abnormal stress Cardiolite test.  Patient was brought in for cardiac catheterization coronary actigraphy    ROS      Patient is not having any shortness of breath, palpitations, dizziness or syncope.  Denies having any headache ,abdominal pain ,nausea, vomiting , diarrhea constipation, loss of weight or loss of appetite.  Denies having any excessive bruising ,hematuria or blood in the stool.    Review of all systems negative except as indicated      History  Past Medical History:   Diagnosis Date   • Arthritis    • Coronary artery disease 2/14/2012   • Diabetes mellitus (CMS/[facility])    • Elevated cholesterol    • Hyperlipidemia    • Hypertension        Past Surgical History:   Procedure Laterality Date   • TONSILECTOMY, ADENOIDECTOMY, BILATERAL MYRINGOTOMY AND TUBES  1952       Family History   Problem Relation Age of Onset   • Diabetes Mother    • Heart disease Father    • Diabetes Sister    • Diabetes Brother    • Hypertension Brother    • Heart disease Brother        Social  History     Tobacco Use   • Smoking status: Never Smoker   • Smokeless tobacco: Never Used   Vaping Use   • Vaping Use: Never used   Substance Use Topics   • Alcohol use: No   • Drug use: Defer        Medications Prior to Admission   Medication Sig Dispense Refill Last Dose   • atorvastatin (LIPITOR) 20 MG tablet Take 20 mg by mouth Daily.   3/11/2021 at Unknown time   • CBD (cannabidiol) oral oil Take 1 mL by mouth Daily. For arthritis   Past Week at Unknown time   • metFORMIN (GLUCOPHAGE) 1000 MG tablet Take 1,000 mg by mouth 2 (Two) Times a Day With Meals.   3/11/2021 at Unknown time   • SITagliptin (JANUVIA) 100 MG tablet Take 100 mg by mouth Daily.   3/11/2021 at Unknown time   • glimepiride (AMARYL) 1 MG tablet Take 2 mg by mouth Daily.   3/10/2021   • hydroCHLOROthiazide (HYDRODIURIL) 12.5 MG tablet Take 12.5 mg by mouth Daily.   3/10/2021   • lisinopril (PRINIVIL,ZESTRIL) 40 MG tablet Take 40 mg by mouth Daily.   3/10/2021   • nitroglycerin (NITROSTAT) 0.4 MG SL tablet Place 1 tablet under the tongue As Needed.   Unknown at Unknown time   • Triamcinolone Acetonide (NASACORT) 55 MCG/ACT nasal inhaler 2 sprays into the nostril(s) as directed by provider Daily.   More than a month at Unknown time         Shellfish-derived products    Scheduled Meds:atorvastatin, 20 mg, Oral, Nightly  [START ON 3/15/2021] ceFAZolin, 2 g, Intravenous, Once  [START ON 3/14/2021] chlorhexidine, 15 mL, Mouth/Throat, BID  [START ON 3/13/2021] glipizide, 5 mg, Oral, QAM AC  insulin lispro, 0-9 Units, Subcutaneous, TID AC  linagliptin, 5 mg, Oral, Daily  lisinopril, 20 mg, Oral, Q24H  [START ON 3/15/2021] metoprolol tartrate, 12.5 mg, Oral, Once  [START ON 3/14/2021] mupirocin, 1 application, Each Nare, Q12H  sodium chloride, 10 mL, Intravenous, Q12H  sodium chloride, 3 mL, Intravenous, Q12H      Continuous Infusions:[START ON 3/15/2021] insulin, 0-50 Units/hr  niCARdipine, 5-15 mg/hr, Last Rate: 5 mg/hr (03/12/21 2104)  [START ON  "3/15/2021] sodium chloride, 30 mL/hr      PRN Meds:.•  acetaminophen  •  ALPRAZolam  •  [START ON 3/14/2021] Chlorhexidine Gluconate Cloth  •  dextrose  •  dextrose  •  diphenhydrAMINE  •  glucagon (human recombinant)  •  insulin lispro **AND** insulin lispro  •  [START ON 3/15/2021] insulin  •  nitroglycerin  •  sodium chloride  •  sodium chloride  •  sodium chloride  •  [START ON 3/15/2021] sodium chloride    Objective     VITAL SIGNS  Vitals:    03/12/21 1930 03/12/21 1945 03/12/21 2000 03/12/21 2015   BP:   (!) 136/36    BP Location:       Patient Position:       Pulse: 84 77 75 76   Resp:       Temp:   97.6 °F (36.4 °C)    TempSrc:   Oral    SpO2: 96% 97% 96% 96%   Weight:       Height:           Flowsheet Rows      First Filed Value   Admission Height  176.5 cm (69.5\") Documented at 03/12/2021 0653   Admission Weight  78.8 kg (173 lb 11.6 oz) Documented at 03/12/2021 0653            Intake/Output Summary (Last 24 hours) at 3/12/2021 2207  Last data filed at 3/12/2021 1839  Gross per 24 hour   Intake 550 ml   Output 800 ml   Net -250 ml        TELEMETRY: Sinus rhythm    Physical Exam:  The patient is alert, oriented and in no distress.  Vital signs as noted above.  Head and neck revealed no carotid bruits or jugular venous distention.  No thyromegaly or lymph adenopathy is present  Lungs clear.  No wheezing.  Breath sounds are normal bilaterally.  Heart normal first and second heart sounds.No murmur.  No precordial rub is present.  No gallop is present.  Abdomen soft and nontender.  No organomegaly is present.  Extremities with good peripheral pulses without any pedal edema.  Cardiac cath site looks normal  Skin warm and dry.  Musculoskeletal system is grossly normal  CNS grossly normal      Results Review:   I reviewed the patient's new clinical results.  Lab Results (last 24 hours)     Procedure Component Value Units Date/Time    POC Glucose Once [[id number]]  (Abnormal) Collected: 03/12/21 1415    Specimen: " Blood Updated: 03/12/21 1415     Glucose 343 mg/dL      Comment: Serial Number: [id number]:  [id number]       Urinalysis With Culture If Indicated - Urine, Random Void [[id number]]  (Abnormal) Collected: 03/12/21 1330    Specimen: Urine, Random Void Updated: 03/12/21 1338     Color, UA Yellow     Appearance, UA Clear     pH, UA 5.5     Specific Gravity, UA 1.075     Glucose, UA >=1000 mg/dL (3+)     Ketones, UA Negative     Bilirubin, UA Negative     Blood, UA Negative     Protein, UA Negative     Leuk Esterase, UA Negative     Nitrite, UA Negative     Urobilinogen, UA 0.2 E.U./dL    Narrative:      Urine microscopic not indicated.          Imaging Results (Last 24 Hours)     ** No results found for the last 24 hours. **      LAB RESULTS (LAST 7 DAYS)    CBC  Results from last 7 days   Lab Units 03/12/21  0615   WBC 10*3/mm3 12.00*   RBC 10*6/mm3 4.57   HEMOGLOBIN g/dL 14.5   HEMATOCRIT % 41.0   MCV fL 89.9   PLATELETS 10*3/mm3 262       BMP  Results from last 7 days   Lab Units 03/12/21  0615   SODIUM mmol/L 134*   POTASSIUM mmol/L 4.6   CHLORIDE mmol/L 98   CO2 mmol/L 26.0   BUN mg/dL 18   CREATININE mg/dL 1.16   GLUCOSE mg/dL 378*       CMP   Results from last 7 days   Lab Units 03/12/21  0615   SODIUM mmol/L 134*   POTASSIUM mmol/L 4.6   CHLORIDE mmol/L 98   CO2 mmol/L 26.0   BUN mg/dL 18   CREATININE mg/dL 1.16   GLUCOSE mg/dL 378*         BNP        TROPONIN        CoAg  Results from last 7 days   Lab Units 03/12/21  0615   INR  1.02       Creatinine Clearance  Estimated Creatinine Clearance: 60.5 mL/min (by C-G formula based on SCr of 1.16 mg/dL).    ABG        Radiology  XR Chest 2 View    Result Date: 3/12/2021  No acute cardiac pulmonary findings.  Electronically Signed By-Smiley York MD On:3/12/2021 8:07 AM This report was finalized on [id number] by  Smiley York MD.              EKG      I personally viewed and interpreted the patient's EKG/Telemetry data:    ECHOCARDIOGRAM:    Results for  orders placed during the hospital encounter of 02/11/21    Adult Transthoracic Echo Complete W/ Cont if Necessary Per Protocol    Interpretation Summary  · Estimated left ventricular EF = 60% Left ventricular systolic function is normal.    Indications  Chest pain    Technically satisfactory study.  Mitral valve is structurally normal.  Tricuspid valve is structurally normal.  Aortic valve is structurally normal.  Pulmonic valve could not be well visualized.  No evidence for mitral tricuspid or aortic regurgitation is seen by Doppler study.  Left atrium is mildly enlarged.  Right atrium is normal in size.  Left ventricle is normal in size and contractility with ejection fraction of 60%.  Right ventricle is normal in size.  Atrial septum is intact.  Aorta is normal.  No pericardial effusion or intracardiac thrombus is seen.    Impression  Mild left atrial enlargement.  Structurally and functionally normal cardiac valves.  Left ventricular size and contractility is normal with ejection fraction of 60%.              Cardiolite (Tc-99m Sestamibi) stress test      OTHER:     Assessment/Plan     Principal Problem:    Coronary artery disease involving native coronary artery of native heart without angina pectoris  Active Problems:    Chest discomfort    Essential hypertension    Diabetes mellitus due to underlying condition without complication, without long-term current use of insulin (CMS/[facility])    Dyslipidemia    Chronic coronary artery disease    Abnormal nuclear stress test    Coronary artery disease    Chest discomfort  Hypertension  Dyslipidemia  History of coronary artery disease        History of Present Illness  Patient recently was seen with following history.     The patient is a pleasant 76-year-old white male he is here for evaluation of chest discomfort.  Typically patient recently has been having chest discomfort in the upper chest area burning sensation especially in cold weather especially with carrying in  his hand.  Usually relieved by resting and relaxing.  No other associated aggravating or elevating factors.  Denies having any sweating nausea vomiting.  Chest discomfort is relieved by resting.  Patient has multiple risk factors.     Patient is not having any shortness of breath, palpitations, dizziness or syncope.  Denies having any headache ,abdominal pain ,nausea, vomiting , diarrhea constipation, loss of weight or loss of appetite.  Denies having any excessive bruising ,hematuria or blood in the stool.     Review of all systems negative except as indicated.     Reviewed ROS.     Assessment and Plan      ]]]]]]]]]]]]]]]]]]]  Impression  ========  -Chest discomfort-exertional burning sensation in the chest.  More with carrying.  Relieved by rest.  Suggestive of angina pectoris.  Cardiac cath 12/13/2014 revealed 40 to 50% mid LAD ostial 60 to 70% RCA (FFR did not reveal any significant disease)  History of left heart cath     Echocardiogram-normal 2/23/2021  Lexiscan Cardiolite test 2/23/2021-abnormal with apical ischemia.     -Hypertension dyslipidemia diabetes.  CKD 3.  Recent BUN 35 creatinine 1.27-12/27/2020     -History of significant sinus bradycardia that has improved in the past with discontinuation of Bystolic.     -History of nonsustained ventricular tachycardia with exercise in the past.     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  ==============  Plan  =========  Patient has symptoms that are concerning for angina pectoris.  Patient had coronary artery disease at cardiac cath in 2014 as described above.  Recent EKG showed sinus rhythm nonspecific ST-T wave changes.  Ischemic cardiac work-up.  Stress Cardiolite test-abnormal with apical ischemia  Echocardiogram-normal as above  Patient was asked to take aspirin a day  Patient was given prescription for sublingual nitroglycerin.  Patient was asked to  avoid any situations that are causing chest discomfort  Patient was explained about concerning symptoms for angina pectoris.  Patient was advised cardiac catheterization and coronary arteriography for definitive evaluation of coronary artery status.  Risks and benefits pros and cons of the procedure were discussed.  Patient to hold Metformin the evening before dose.  Patient is allergic to shellfish.  Patient was given prescription for prednisone 20 mg p.o. every 6 hours 24 hours prior to the procedure.  Patient was advised regarding possible need for intervention.  Further plan will depend on patient's progress  ]]]]]]]]]]]]]     SUMMARY:  Left ventricle size and contractility is normal with ejection fraction of 60%.  No mitral regurgitation is present.  Left subclavian artery and left internal mammary arteries are normal.     Left main coronary artery normal.  Left anterior descending artery has diffuse calcification with ostial 60% proximal 90 and mid segment 90% disease.  Circumflex coronary artery provided a large marginal branch.  Circumflex coronary artery has 60% disease proximal to the origin of marginal branch.  Circumflex coronary artery distal to the marginal branches small in caliber and has diffuse 60 to 70% disease.  Right coronary artery is a very large and dominant vessel that has ostial 99% disease and mid segment 90% disease.  There appears to be a clot.     RECOMMENDATIONS:  Patient has triple-vessel coronary artery disease and normal left ventricle function.  Films were reviewed with Dr. Garcia interventionist.  CABG was thought to be the best option.  Cardiovascular surgery consultation initiated.    After discussion with cardiovascular surgery team patient is being admitted to cardiovascular surgery service and patient is seen in consultation and for cardiovascular follow-up.  Have discussed with patient and patient's family regarding present plans.  Precardiac surgery evaluation has  started.  Likely surgery will be on Monday.    Huseyin Do MD  03/12/21  22:07 EST

## 2021-03-14 ENCOUNTER — APPOINTMENT (OUTPATIENT)
Dept: GENERAL RADIOLOGY | Facility: HOSPITAL | Age: 76
End: 2021-03-14

## 2021-03-14 ENCOUNTER — ANESTHESIA EVENT (OUTPATIENT)
Dept: PERIOP | Facility: HOSPITAL | Age: 76
End: 2021-03-14

## 2021-03-14 LAB
ARTERIAL PATENCY WRIST A: POSITIVE
ATMOSPHERIC PRESS: ABNORMAL MM[HG]
BASE EXCESS BLDA CALC-SCNC: 1.2 MMOL/L (ref 0–3)
BDY SITE: ABNORMAL
CA-I BLDA-SCNC: 0.96 MMOL/L (ref 1.15–1.33)
CO2 BLDA-SCNC: 27.4 MMOL/L (ref 22–29)
GLUCOSE BLDC GLUCOMTR-MCNC: 153 MG/DL (ref 70–105)
GLUCOSE BLDC GLUCOMTR-MCNC: 165 MG/DL (ref 70–105)
GLUCOSE BLDC GLUCOMTR-MCNC: 187 MG/DL (ref 74–100)
GLUCOSE BLDC GLUCOMTR-MCNC: 187 MG/DL (ref 74–100)
GLUCOSE BLDC GLUCOMTR-MCNC: 240 MG/DL (ref 70–105)
HCO3 BLDA-SCNC: 26.1 MMOL/L (ref 21–28)
HCT VFR BLDA CALC: 42 % (ref 38–51)
HEMODILUTION: YES
HGB BLDA-MCNC: 14.2 G/DL (ref 12–17)
INHALED O2 CONCENTRATION: 21 %
MODALITY: ABNORMAL
MRSA DNA SPEC QL NAA+PROBE: NORMAL
PCO2 BLDA: 41.7 MM HG (ref 35–48)
PH BLDA: 7.41 PH UNITS (ref 7.35–7.45)
PO2 BLDA: 75.3 MM HG (ref 83–108)
POTASSIUM BLDA-SCNC: 4 MMOL/L (ref 3.5–4.5)
SAO2 % BLDCOA: 95 % (ref 94–98)
SARS-COV-2 RNA PNL SPEC NAA+PROBE: DETECTED
SODIUM BLD-SCNC: 137 MMOL/L (ref 138–146)

## 2021-03-14 PROCEDURE — 86769 SARS-COV-2 COVID-19 ANTIBODY: CPT | Performed by: THORACIC SURGERY (CARDIOTHORACIC VASCULAR SURGERY)

## 2021-03-14 PROCEDURE — U0005 INFEC AGEN DETEC AMPLI PROBE: HCPCS | Performed by: NURSE PRACTITIONER

## 2021-03-14 PROCEDURE — 63710000001 DIPHENHYDRAMINE PER 50 MG: Performed by: NURSE PRACTITIONER

## 2021-03-14 PROCEDURE — 82330 ASSAY OF CALCIUM: CPT

## 2021-03-14 PROCEDURE — 82803 BLOOD GASES ANY COMBINATION: CPT

## 2021-03-14 PROCEDURE — U0003 INFECTIOUS AGENT DETECTION BY NUCLEIC ACID (DNA OR RNA); SEVERE ACUTE RESPIRATORY SYNDROME CORONAVIRUS 2 (SARS-COV-2) (CORONAVIRUS DISEASE [COVID-19]), AMPLIFIED PROBE TECHNIQUE, MAKING USE OF HIGH THROUGHPUT TECHNOLOGIES AS DESCRIBED BY CMS-2020-01-R: HCPCS | Performed by: NURSE PRACTITIONER

## 2021-03-14 PROCEDURE — 36600 WITHDRAWAL OF ARTERIAL BLOOD: CPT

## 2021-03-14 PROCEDURE — 80051 ELECTROLYTE PANEL: CPT

## 2021-03-14 PROCEDURE — 82962 GLUCOSE BLOOD TEST: CPT

## 2021-03-14 PROCEDURE — 87641 MR-STAPH DNA AMP PROBE: CPT | Performed by: NURSE PRACTITIONER

## 2021-03-14 PROCEDURE — 85018 HEMOGLOBIN: CPT

## 2021-03-14 PROCEDURE — 63710000001 INSULIN LISPRO (HUMAN) PER 5 UNITS: Performed by: NURSE PRACTITIONER

## 2021-03-14 PROCEDURE — 71045 X-RAY EXAM CHEST 1 VIEW: CPT

## 2021-03-14 PROCEDURE — 99233 SBSQ HOSP IP/OBS HIGH 50: CPT | Performed by: INTERNAL MEDICINE

## 2021-03-14 RX ADMIN — INSULIN LISPRO 2 UNITS: 100 INJECTION, SOLUTION INTRAVENOUS; SUBCUTANEOUS at 08:25

## 2021-03-14 RX ADMIN — Medication 3 ML: at 08:27

## 2021-03-14 RX ADMIN — Medication 10 ML: at 20:38

## 2021-03-14 RX ADMIN — ATORVASTATIN CALCIUM 20 MG: 20 TABLET, FILM COATED ORAL at 20:38

## 2021-03-14 RX ADMIN — ALPRAZOLAM 0.25 MG: 0.25 TABLET ORAL at 22:08

## 2021-03-14 RX ADMIN — LINAGLIPTIN 5 MG: 5 TABLET, FILM COATED ORAL at 08:24

## 2021-03-14 RX ADMIN — SODIUM CHLORIDE 10 MG/HR: 9 INJECTION, SOLUTION INTRAVENOUS at 20:38

## 2021-03-14 RX ADMIN — LISINOPRIL 20 MG: 20 TABLET ORAL at 08:25

## 2021-03-14 RX ADMIN — GLIPIZIDE 5 MG: 5 TABLET ORAL at 08:24

## 2021-03-14 RX ADMIN — Medication 3 ML: at 20:38

## 2021-03-14 RX ADMIN — CHLORHEXIDINE GLUCONATE 0.12% ORAL RINSE 15 ML: 1.2 LIQUID ORAL at 18:31

## 2021-03-14 RX ADMIN — MUPIROCIN 1 APPLICATION: 20 OINTMENT TOPICAL at 18:31

## 2021-03-14 RX ADMIN — SODIUM CHLORIDE 5 MG/HR: 9 INJECTION, SOLUTION INTRAVENOUS at 18:14

## 2021-03-14 RX ADMIN — ASPIRIN 325 MG ORAL TABLET 325 MG: 325 PILL ORAL at 08:24

## 2021-03-14 RX ADMIN — SODIUM CHLORIDE 10 MG/HR: 9 INJECTION, SOLUTION INTRAVENOUS at 22:14

## 2021-03-14 RX ADMIN — Medication 10 ML: at 08:27

## 2021-03-14 RX ADMIN — DIPHENHYDRAMINE HYDROCHLORIDE 25 MG: 25 CAPSULE ORAL at 22:08

## 2021-03-14 RX ADMIN — INSULIN LISPRO 2 UNITS: 100 INJECTION, SOLUTION INTRAVENOUS; SUBCUTANEOUS at 18:30

## 2021-03-14 RX ADMIN — INSULIN LISPRO 3 UNITS: 100 INJECTION, SOLUTION INTRAVENOUS; SUBCUTANEOUS at 13:18

## 2021-03-14 NOTE — PLAN OF CARE
Goal Outcome Evaluation:   Pt remains in sinus bradycardia all night. Asymptomatic. Pt encouraged to turn. Pt blood sugar elevated. PRN insulin per protocol. Plan to show CABG teaching video today. Safety maintained. Continue to monitor.

## 2021-03-14 NOTE — CONSULTS
Infectious Diseases Consult Note    Referring Provider: Paxton Vasquez MD    Reason for Consultation: Positive COVID-19 screen    Patient Care Team:  Provider, No Known as PCP - Huseyin Neal MD as Consulting Physician (Cardiology)    Chief complaint     The patient came for elective CABG    Subjective     History of present illness:      This is 76-year-old white male who was admitted to CVICU for elective CABG and he scheduled to have the procedure tomorrow.  Patient had abnormal cardiac cath with three-vessel disease.  The patient denied having any symptoms.  The patient was tested positive for COVID-19 back on December 25, 2020.  He was symptomatic with fever and confusion but never been hypoxic.  He was admitted to hospital for 2 days and discharged.  Patient has no symptoms after that.  The patient had a routine preop Covid screen this admission and was positive.    Review of Systems   Review of Systems   Constitutional: Negative.    HENT: Negative.    Eyes: Negative.    Respiratory: Negative.    Cardiovascular: Negative.    Gastrointestinal: Negative.    Genitourinary: Negative.    Musculoskeletal: Negative.    Skin: Negative.    Neurological: Negative.    Hematological: Negative.    Psychiatric/Behavioral: Negative.        Medications  Medications Prior to Admission   Medication Sig Dispense Refill Last Dose   • atorvastatin (LIPITOR) 20 MG tablet Take 20 mg by mouth Daily.   3/11/2021 at Unknown time   • CBD (cannabidiol) oral oil Take 1 mL by mouth Daily. For arthritis   Past Week at Unknown time   • metFORMIN (GLUCOPHAGE) 1000 MG tablet Take 1,000 mg by mouth 2 (Two) Times a Day With Meals.   3/11/2021 at Unknown time   • SITagliptin (JANUVIA) 100 MG tablet Take 100 mg by mouth Daily.   3/11/2021 at Unknown time   • glimepiride (AMARYL) 1 MG tablet Take 2 mg by mouth Daily.   3/10/2021   • hydroCHLOROthiazide (HYDRODIURIL) 12.5 MG tablet Take 12.5 mg by mouth Daily.   3/10/2021   •  lisinopril (PRINIVIL,ZESTRIL) 40 MG tablet Take 40 mg by mouth Daily.   3/10/2021   • nitroglycerin (NITROSTAT) 0.4 MG SL tablet Place 1 tablet under the tongue As Needed.   Unknown at Unknown time   • Triamcinolone Acetonide (NASACORT) 55 MCG/ACT nasal inhaler 2 sprays into the nostril(s) as directed by provider Daily.   More than a month at Unknown time       History  Past Medical History:   Diagnosis Date   • Arthritis    • Coronary artery disease 2/14/2012   • Diabetes mellitus (CMS/HCC)    • Elevated cholesterol    • Hyperlipidemia    • Hypertension      Past Surgical History:   Procedure Laterality Date   • TONSILECTOMY, ADENOIDECTOMY, BILATERAL MYRINGOTOMY AND TUBES  1952       Family History  Family History   Problem Relation Age of Onset   • Diabetes Mother    • Heart disease Father    • Diabetes Sister    • Diabetes Brother    • Hypertension Brother    • Heart disease Brother        Social History   reports that he has never smoked. He has never used smokeless tobacco. Drug use questions deferred to the physician. He reports that he does not drink alcohol.    Allergies  Shellfish-derived products    Objective     Vital Signs   Vital Signs (last 24 hours)       03/13 0700  -  03/14 0659 03/14 0700  -  03/14 1421   Most Recent    Temp (°F) 97.8 -  97.9       97.8 (36.6)    Heart Rate 44 -  71    48 -  56     48    Resp   18       18    /38 -  178/69    139/111 -  184/59     158/52    SpO2 (%) 92 -  100    94 -  100     94          Physical Exam:  Physical Exam  Vitals and nursing note reviewed.   Constitutional:       Appearance: He is well-developed.   HENT:      Head: Normocephalic and atraumatic.   Eyes:      Pupils: Pupils are equal, round, and reactive to light.   Cardiovascular:      Rate and Rhythm: Normal rate and regular rhythm.      Heart sounds: Normal heart sounds.   Pulmonary:      Effort: Pulmonary effort is normal. No respiratory distress.      Breath sounds: Normal breath sounds. No  wheezing or rales.   Abdominal:      General: Bowel sounds are normal. There is no distension.      Palpations: Abdomen is soft. There is no mass.      Tenderness: There is no abdominal tenderness. There is no guarding or rebound.   Musculoskeletal:         General: No deformity. Normal range of motion.      Cervical back: Normal range of motion and neck supple.   Skin:     General: Skin is warm.      Findings: No erythema or rash.   Neurological:      Mental Status: He is alert and oriented to person, place, and time.      Cranial Nerves: No cranial nerve deficit.         Microbiology  Microbiology Results (last 10 days)     Procedure Component Value - Date/Time    MRSA Screen, PCR (Inpatient) - Swab, Nares [[id number]]  (Normal) Collected: 03/14/21 0543    Lab Status: Final result Specimen: Swab from Nares Updated: 03/14/21 0731     MRSA PCR No MRSA Detected    COVID PRE-OP / PRE-PROCEDURE SCREENING ORDER (NO ISOLATION) - Swab, Nasopharynx [[id number]]  (Abnormal) Collected: 03/14/21 0543    Lab Status: Final result Specimen: Swab from Nasopharynx Updated: 03/14/21 0659    Narrative:      The following orders were created for panel order COVID PRE-OP / PRE-PROCEDURE SCREENING ORDER (NO ISOLATION) - Swab, Nasopharynx.  Procedure                               Abnormality         Status                     ---------                               -----------         ------                     COVID-19,CEPHEID,COR/SUHAS...[[id number]]  Abnormal            Final result                 Please view results for these tests on the individual orders.    COVID-19,CEPHEID,COR/SUHAS/PAD IN-HOUSE(OR EMERGENT/ADD-ON),NP SWAB IN TRANSPORT MEDIA 3-4 HR TAT, RT-PCR - Swab, Nasopharynx [[id number]]  (Abnormal) Collected: 03/14/21 0543    Lab Status: Final result Specimen: Swab from Nasopharynx Updated: 03/14/21 0659     COVID19 Detected    Narrative:      Fact sheet for providers: https://www.fda.gov/media/261562/download     Fact sheet for  patients: https://www.fda.gov/media/914242/download          Laboratory  Results from last 7 days   Lab Units 03/13/21  0405   WBC 10*3/mm3 12.30*   HEMOGLOBIN g/dL 13.8   HEMATOCRIT % 38.9   PLATELETS 10*3/mm3 224     Results from last 7 days   Lab Units 03/13/21  0405   SODIUM mmol/L 139   POTASSIUM mmol/L 3.7   CHLORIDE mmol/L 104   CO2 mmol/L 25.0   BUN mg/dL 20   CREATININE mg/dL 1.03   GLUCOSE mg/dL 194*   CALCIUM mg/dL 8.5*     Results from last 7 days   Lab Units 03/13/21  0405   SODIUM mmol/L 139   POTASSIUM mmol/L 3.7   CHLORIDE mmol/L 104   CO2 mmol/L 25.0   BUN mg/dL 20   CREATININE mg/dL 1.03   GLUCOSE mg/dL 194*   CALCIUM mg/dL 8.5*                   Radiology  Imaging Results (Last 72 Hours)     Procedure Component Value Units Date/Time    XR Chest 1 View [[id number]] Collected: 03/14/21 1007     Updated: 03/14/21 1010    Narrative:      DATE OF EXAM:  3/14/2021 9:39 AM     PROCEDURE:  XR CHEST 1 VW-     INDICATIONS:  Presurgical evaluation, coronary artery disease, hypertension, diabetes  mellitus.      COMPARISON:  03/12/2021.     TECHNIQUE:   Single radiographic view of the chest was obtained.     FINDINGS:  The heart size is normal. The pulmonary vascular markings are normal.  The lungs and pleural spaces are clear of active disease.  There is  degenerative spondylosis of the thoracic spine.       Impression:      No active disease.     Electronically Signed By-Xavi Juares MD On:3/14/2021 10:08 AM  This report was finalized on [id number] by  Xavi Juares MD.          Cardiology      Results Review:  I have reviewed all clinical data, test, lab, and imaging results.       Schedule Meds  aspirin, 325 mg, Oral, Daily  atorvastatin, 20 mg, Oral, Nightly  [START ON 3/15/2021] ceFAZolin, 2 g, Intravenous, Once  chlorhexidine, 15 mL, Mouth/Throat, BID  glipizide, 5 mg, Oral, QAM AC  insulin lispro, 0-9 Units, Subcutaneous, TID AC  linagliptin, 5 mg, Oral, Daily  lisinopril, 20 mg, Oral, Q24H  [START ON  3/15/2021] metoprolol tartrate, 12.5 mg, Oral, Once  mupirocin, 1 application, Each Nare, Q12H  sodium chloride, 10 mL, Intravenous, Q12H  sodium chloride, 3 mL, Intravenous, Q12H        Infusion Meds  [START ON 3/15/2021] insulin, 0-50 Units/hr  niCARdipine, 5-15 mg/hr, Last Rate: Stopped (03/13/21 0154)  [START ON 3/15/2021] sodium chloride, 30 mL/hr        PRN Meds  •  acetaminophen  •  ALPRAZolam  •  Chlorhexidine Gluconate Cloth  •  dextrose  •  dextrose  •  diphenhydrAMINE  •  glucagon (human recombinant)  •  insulin lispro **AND** insulin lispro  •  [START ON 3/15/2021] insulin  •  nitroglycerin  •  sodium chloride  •  sodium chloride  •  sodium chloride  •  [START ON 3/15/2021] sodium chloride      Assessment/Plan       Assessment    Persistent positive COVID-19 screen.  This most likely secondary to previous infection and patient is probably having shedding of inactive virus.  The patient is currently asymptomatic.  His first positive Covid screen was on December 25, 2020    Coronary artery disease with multiple vessel disease.  Patient is scheduled for CABG on March 15, 2021    Plan    The positive screen for Covid indicate persistent positivity as a result of prior infection.  I do not think the patient is contagious or having viable virus  No need for isolation  The patient can undergo CABG or any other necessary procedure with no specific isolation restriction  Recommend cefazolin perioperatively since patient is MRSA screen negative    Not much to add from infectious disease point.  Please call for any question and we will sign off today.  Vinod Moffett MD  03/14/21  14:21 EDT

## 2021-03-14 NOTE — PROGRESS NOTES
Referring Provider: Paxton Vasquez MD    Reason for follow-up:  Severe coronary artery disease  Chest pain  Positive COVID-19     Patient Care Team:  Provider, No Known as PCP - Huseyin Neal MD as Consulting Physician (Cardiology)    Subjective .      ROS    Since I have last seen him yesterday, the patient has been without any chest discomfort ,shortness of breath, palpitations, dizziness or syncope.  Denies having any headache ,abdominal pain ,nausea, vomiting , diarrhea constipation, loss of weight or loss of appetite.  Denies having any excessive bruising ,hematuria or blood in the stool.    Review of all systems negative except as indicated    History  Past Medical History:   Diagnosis Date   • Arthritis    • Coronary artery disease 2/14/2012   • Diabetes mellitus (CMS/HCC)    • Elevated cholesterol    • Hyperlipidemia    • Hypertension        Past Surgical History:   Procedure Laterality Date   • TONSILECTOMY, ADENOIDECTOMY, BILATERAL MYRINGOTOMY AND TUBES  1952       Family History   Problem Relation Age of Onset   • Diabetes Mother    • Heart disease Father    • Diabetes Sister    • Diabetes Brother    • Hypertension Brother    • Heart disease Brother        Social History     Tobacco Use   • Smoking status: Never Smoker   • Smokeless tobacco: Never Used   Vaping Use   • Vaping Use: Never used   Substance Use Topics   • Alcohol use: No   • Drug use: Defer        Medications Prior to Admission   Medication Sig Dispense Refill Last Dose   • atorvastatin (LIPITOR) 20 MG tablet Take 20 mg by mouth Daily.   3/11/2021 at Unknown time   • CBD (cannabidiol) oral oil Take 1 mL by mouth Daily. For arthritis   Past Week at Unknown time   • metFORMIN (GLUCOPHAGE) 1000 MG tablet Take 1,000 mg by mouth 2 (Two) Times a Day With Meals.   3/11/2021 at Unknown time   • SITagliptin (JANUVIA) 100 MG tablet Take 100 mg by mouth Daily.   3/11/2021 at Unknown time   • glimepiride (AMARYL) 1 MG tablet Take 2 mg by  "mouth Daily.   3/10/2021   • hydroCHLOROthiazide (HYDRODIURIL) 12.5 MG tablet Take 12.5 mg by mouth Daily.   3/10/2021   • lisinopril (PRINIVIL,ZESTRIL) 40 MG tablet Take 40 mg by mouth Daily.   3/10/2021   • nitroglycerin (NITROSTAT) 0.4 MG SL tablet Place 1 tablet under the tongue As Needed.   Unknown at Unknown time   • Triamcinolone Acetonide (NASACORT) 55 MCG/ACT nasal inhaler 2 sprays into the nostril(s) as directed by provider Daily.   More than a month at Unknown time       Allergies  Shellfish-derived products    Scheduled Meds:aspirin, 325 mg, Oral, Daily  atorvastatin, 20 mg, Oral, Nightly  [START ON 3/15/2021] ceFAZolin, 2 g, Intravenous, Once  chlorhexidine, 15 mL, Mouth/Throat, BID  glipizide, 5 mg, Oral, QAM AC  insulin lispro, 0-9 Units, Subcutaneous, TID AC  linagliptin, 5 mg, Oral, Daily  lisinopril, 20 mg, Oral, Q24H  [START ON 3/15/2021] metoprolol tartrate, 12.5 mg, Oral, Once  mupirocin, 1 application, Each Nare, Q12H  sodium chloride, 10 mL, Intravenous, Q12H  sodium chloride, 3 mL, Intravenous, Q12H      Continuous Infusions:[START ON 3/15/2021] insulin, 0-50 Units/hr  niCARdipine, 5-15 mg/hr, Last Rate: Stopped (03/13/21 0154)  [START ON 3/15/2021] sodium chloride, 30 mL/hr      PRN Meds:.•  acetaminophen  •  ALPRAZolam  •  Chlorhexidine Gluconate Cloth  •  dextrose  •  dextrose  •  diphenhydrAMINE  •  glucagon (human recombinant)  •  insulin lispro **AND** insulin lispro  •  [START ON 3/15/2021] insulin  •  nitroglycerin  •  sodium chloride  •  sodium chloride  •  sodium chloride  •  [START ON 3/15/2021] sodium chloride    Objective     VITAL SIGNS  Vitals:    03/14/21 0400 03/14/21 0409 03/14/21 0500 03/14/21 0600   BP: 157/56  (!) 117/38 152/66   Pulse: (!) 49  (!) 46 51   Resp:       Temp:  97.8 °F (36.6 °C)     TempSrc:  Oral     SpO2: 100%  94% 95%   Weight:  76.1 kg (167 lb 12.3 oz)     Height:           Flowsheet Rows      First Filed Value   Admission Height  176.5 cm (69.5\") " Documented at 03/12/2021 0653   Admission Weight  78.8 kg (173 lb 11.6 oz) Documented at 03/12/2021 0653            Intake/Output Summary (Last 24 hours) at 3/14/2021 0744  Last data filed at 3/13/2021 1811  Gross per 24 hour   Intake 840 ml   Output 750 ml   Net 90 ml        TELEMETRY: Sinus rhythm    Physical Exam:  The patient is alert, oriented and in no distress.  Vital signs as noted above.  Head and neck revealed no carotid bruits or jugular venous distention.  No thyromegaly or lymphadenopathy is present  Lungs clear.  No wheezing.  Breath sounds are normal bilaterally.  Heart normal first and second heart sounds.  No murmur. No precordial rub is present.  No gallop is present.  Abdomen soft and nontender.  No organomegaly is present.  Extremities with good peripheral pulses without any pedal edema.  Skin warm and dry.  Musculoskeletal system is grossly normal  CNS grossly normal      Results Review:   I reviewed the patient's new clinical results.  Lab Results (last 24 hours)     Procedure Component Value Units Date/Time    MRSA Screen, PCR (Inpatient) - Swab, Nares [[id number]]  (Normal) Collected: 03/14/21 0543    Specimen: Swab from Nares Updated: 03/14/21 0731     MRSA PCR No MRSA Detected    COVID PRE-OP / PRE-PROCEDURE SCREENING ORDER (NO ISOLATION) - Swab, Nasopharynx [[id number]]  (Abnormal) Collected: 03/14/21 0543    Specimen: Swab from Nasopharynx Updated: 03/14/21 0659    Narrative:      The following orders were created for panel order COVID PRE-OP / PRE-PROCEDURE SCREENING ORDER (NO ISOLATION) - Swab, Nasopharynx.  Procedure                               Abnormality         Status                     ---------                               -----------         ------                     COVID-19,CEPHEID,COR/SUHAS...[[id number]]  Abnormal            Final result                 Please view results for these tests on the individual orders.    COVID-19,CEPHEID,COR/SUHAS/PAD IN-HOUSE(OR  EMERGENT/ADD-ON),NP SWAB IN TRANSPORT MEDIA 3-4 HR TAT, RT-PCR - Swab, Nasopharynx [[id number]]  (Abnormal) Collected: 03/14/21 0543    Specimen: Swab from Nasopharynx Updated: 03/14/21 0659     COVID19 Detected    Narrative:      Fact sheet for providers: https://www.fda.gov/media/229616/download     Fact sheet for patients: https://www.fda.gov/media/406665/download    POC Glucose Once [[id number]]  (Abnormal) Collected: 03/13/21 2007    Specimen: Blood Updated: 03/13/21 2009     Glucose 231 mg/dL      Comment: Serial Number: [id number]:  [id number]       POC Glucose Once [[id number]]  (Abnormal) Collected: 03/13/21 1642    Specimen: Blood Updated: 03/13/21 1643     Glucose 192 mg/dL      Comment: Serial Number: [id number]:  [id number]       POC Glucose Once [[id number]]  (Abnormal) Collected: 03/13/21 1221    Specimen: Blood Updated: 03/13/21 1222     Glucose 145 mg/dL      Comment: Serial Number: [id number]:  [id number]             Imaging Results (Last 24 Hours)     ** No results found for the last 24 hours. **      LAB RESULTS (LAST 7 DAYS)    CBC  Results from last 7 days   Lab Units 03/13/21  0405 03/12/21  0615   WBC 10*3/mm3 12.30* 12.00*   RBC 10*6/mm3 4.34 4.57   HEMOGLOBIN g/dL 13.8 14.5   HEMATOCRIT % 38.9 41.0   MCV fL 89.7 89.9   PLATELETS 10*3/mm3 224 262       BMP  Results from last 7 days   Lab Units 03/13/21  0405 03/12/21  0615   SODIUM mmol/L 139 134*   POTASSIUM mmol/L 3.7 4.6   CHLORIDE mmol/L 104 98   CO2 mmol/L 25.0 26.0   BUN mg/dL 20 18   CREATININE mg/dL 1.03 1.16   GLUCOSE mg/dL 194* 378*   MAGNESIUM mg/dL 1.7  --        CMP   Results from last 7 days   Lab Units 03/13/21  0405 03/12/21  0615   SODIUM mmol/L 139 134*   POTASSIUM mmol/L 3.7 4.6   CHLORIDE mmol/L 104 98   CO2 mmol/L 25.0 26.0   BUN mg/dL 20 18   CREATININE mg/dL 1.03 1.16   GLUCOSE mg/dL 194* 378*   ALBUMIN g/dL 3.70  --    BILIRUBIN mg/dL 0.9  --    ALK PHOS U/L 72  --    AST (SGOT) U/L 14  --    ALT (SGPT)  U/L 16  --          BNP        TROPONIN        CoAg  Results from last 7 days   Lab Units 03/13/21  0405 03/12/21  0615   INR  1.03 1.02   APTT seconds 23.6*  --        Creatinine Clearance  Estimated Creatinine Clearance: 67.7 mL/min (by C-G formula based on SCr of 1.03 mg/dL).    ABG        Radiology  XR Chest 2 View    Result Date: 3/12/2021  No acute cardiac pulmonary findings.  Electronically Signed By-Smiley York MD On:3/12/2021 8:07 AM This report was finalized on [id number] by  Smiley York MD.              EKG      I personally viewed and interpreted the patient's EKG/Telemetry data:    ECHOCARDIOGRAM:    Results for orders placed during the hospital encounter of 02/11/21    Adult Transthoracic Echo Complete W/ Cont if Necessary Per Protocol    Interpretation Summary  · Estimated left ventricular EF = 60% Left ventricular systolic function is normal.    Indications  Chest pain    Technically satisfactory study.  Mitral valve is structurally normal.  Tricuspid valve is structurally normal.  Aortic valve is structurally normal.  Pulmonic valve could not be well visualized.  No evidence for mitral tricuspid or aortic regurgitation is seen by Doppler study.  Left atrium is mildly enlarged.  Right atrium is normal in size.  Left ventricle is normal in size and contractility with ejection fraction of 60%.  Right ventricle is normal in size.  Atrial septum is intact.  Aorta is normal.  No pericardial effusion or intracardiac thrombus is seen.    Impression  Mild left atrial enlargement.  Structurally and functionally normal cardiac valves.  Left ventricular size and contractility is normal with ejection fraction of 60%.          STRESS MYOVIEW:    Cardiolite (Tc-99m Sestamibi) stress test    CARDIAC CATHETERIZATION:            OTHER:         Assessment/Plan     Principal Problem:    Coronary artery disease involving native coronary artery of native heart without angina pectoris  Active Problems:    Chest  discomfort    Essential hypertension    Diabetes mellitus due to underlying condition without complication, without long-term current use of insulin (CMS/[facility])    Dyslipidemia    Chronic coronary artery disease    Abnormal nuclear stress test    Coronary artery disease      ]]]]]]]]]]]]]]]]]]]  Impression  ========  -Chest discomfort-exertional burning sensation in the chest.  More with carrying.  Relieved by rest.  Suggestive of angina pectoris.    Cardiac catheterization 3/12/2021 revealed left ventricle size and contractility is normal with ejection fraction of 60%.  No mitral regurgitation is present.  Left subclavian artery and left internal mammary arteries are normal.     Left main coronary artery normal.  Left anterior descending artery has diffuse calcification with ostial 60% proximal 90 and mid segment 90% disease.  Circumflex coronary artery provided a large marginal branch.  Circumflex coronary artery has 60% disease proximal to the origin of marginal branch.  Circumflex coronary artery distal to the marginal branches small in caliber and has diffuse 60 to 70% disease.  Right coronary artery is a very large and dominant vessel that has ostial 99% disease and mid segment 90% disease.  There appears to be a clot.    Cardiac cath 12/13/2014 revealed 40 to 50% mid LAD ostial 60 to 70% RCA (FFR did not reveal any significant disease)  History of left heart cath     Echocardiogram-normal 2/23/2021  Lexiscan Cardiolite test 2/23/2021-abnormal with apical ischemia.     -Hypertension dyslipidemia diabetes.  CKD 3.  Recent BUN 35 creatinine 1.27-12/27/2020     -History of significant sinus bradycardia that has improved in the past with discontinuation of Bystolic.     -History of nonsustained ventricular tachycardia with exercise in the past.     -Status post tonsillectomy adenoidectomy     -Family history of coronary artery disease     -Non-smoker     -Allergic to shellfish and  crab.  ==============  Plan  =========  Patient has symptoms that are concerning for angina pectoris.  Patient had coronary artery disease at cardiac cath in 2014 as described above.  Recent EKG showed sinus rhythm nonspecific ST-T wave changes.  Ischemic cardiac work-up.  Stress Cardiolite test-abnormal with apical ischemia  Echocardiogram-normal as above    Cardiac catheterization 3/12/2021 revealed  Left ventricle size and contractility is normal with ejection fraction of 60%.  No mitral regurgitation is present.  Left subclavian artery and left internal mammary arteries are normal.     Left main coronary artery normal.  Left anterior descending artery has diffuse calcification with ostial 60% proximal 90 and mid segment 90% disease.  Circumflex coronary artery provided a large marginal branch.  Circumflex coronary artery has 60% disease proximal to the origin of marginal branch.  Circumflex coronary artery distal to the marginal branches small in caliber and has diffuse 60 to 70% disease.  Right coronary artery is a very large and dominant vessel that has ostial 99% disease and mid segment 90% disease.  There appears to be a clot.      Patient has triple-vessel coronary artery disease and normal left ventricle function.    Nasal swab positive for COVID-19.  Patient had COVID-19 infection in December.  Likely inactive RNA virus.  Wait for infectious disease evaluation and recommendations.    CABG tomorrow unless infectious disease wants to wait because of positive COVID-19.    Have discussed with patient regarding present plans and expectations etc. from surgery.    Patient had significant hypertension yesterday which is better today.    Further plan will depend on patient's progress  ]]]]]]]]]]]]]             Huseyin Do MD  03/14/21  07:44 EDT

## 2021-03-14 NOTE — PROGRESS NOTES
His Covid test was positive.  I suspect this is spilling  dead RNA virus.  He had Covid back in December.  Ask infectious disease to weigh in.  His chest x-ray in February was normal but has not had one yet here.  Plan to proceed with CABG tomorrow unless something changes.  I made Dr. Vasquez aware.

## 2021-03-15 ENCOUNTER — APPOINTMENT (OUTPATIENT)
Dept: GENERAL RADIOLOGY | Facility: HOSPITAL | Age: 76
End: 2021-03-15

## 2021-03-15 ENCOUNTER — ANESTHESIA (OUTPATIENT)
Dept: PERIOP | Facility: HOSPITAL | Age: 76
End: 2021-03-15

## 2021-03-15 LAB
ACT BLD: 103 SECONDS (ref 89–137)
ACT BLD: 120 SECONDS (ref 89–137)
ACT BLD: 301 SECONDS (ref 89–137)
ACT BLD: 412 SECONDS (ref 89–137)
ALBUMIN SERPL-MCNC: 3.9 G/DL (ref 3.5–5.2)
ANION GAP SERPL CALCULATED.3IONS-SCNC: 10 MMOL/L (ref 5–15)
APTT PPP: 25.8 SECONDS (ref 24–31)
APTT PPP: 26.7 SECONDS (ref 24–31)
ARTERIAL PATENCY WRIST A: ABNORMAL
ATMOSPHERIC PRESS: ABNORMAL MM[HG]
BASE DEFICIT: ABNORMAL
BASE EXCESS BLDA CALC-SCNC: -0.4 MMOL/L (ref 0–3)
BASE EXCESS BLDA CALC-SCNC: -1.3 MMOL/L (ref 0–3)
BASE EXCESS BLDA CALC-SCNC: -1.4 MMOL/L (ref 0–3)
BASE EXCESS BLDA CALC-SCNC: 0.1 MMOL/L (ref 0–3)
BASE EXCESS BLDA CALC-SCNC: 2 MMOL/L (ref 0–3)
BASE EXCESS BLDA CALC-SCNC: 3 MMOL/L (ref 0–3)
BASE EXCESS BLDA CALC-SCNC: 4 MMOL/L (ref 0–3)
BASE EXCESS BLDA CALC-SCNC: <0 MMOL/L (ref 0–3)
BASE EXCESS BLDV CALC-SCNC: -0.9 MMOL/L (ref -2–2)
BASE EXCESS BLDV CALC-SCNC: ABNORMAL MMOL/L
BASOPHILS # BLD AUTO: 0 10*3/MM3 (ref 0–0.2)
BASOPHILS NFR BLD AUTO: 0.3 % (ref 0–1.5)
BDY SITE: ABNORMAL
BH BB BLOOD EXPIRATION DATE: NORMAL
BH BB BLOOD EXPIRATION DATE: NORMAL
BH BB BLOOD TYPE BARCODE: 5100
BH BB BLOOD TYPE BARCODE: 5100
BH BB DISPENSE STATUS: NORMAL
BH BB DISPENSE STATUS: NORMAL
BH BB PRODUCT CODE: NORMAL
BH BB PRODUCT CODE: NORMAL
BH BB UNIT NUMBER: NORMAL
BH BB UNIT NUMBER: NORMAL
BUN SERPL-MCNC: 23 MG/DL (ref 8–23)
BUN/CREAT SERPL: 22.8 (ref 7–25)
CA-I BLDA-SCNC: 0.96 MMOL/L (ref 1.15–1.33)
CA-I BLDA-SCNC: 0.99 MMOL/L (ref 1.12–1.32)
CA-I BLDA-SCNC: 1.03 MMOL/L (ref 1.12–1.32)
CA-I BLDA-SCNC: 1.04 MMOL/L (ref 1.12–1.32)
CA-I BLDA-SCNC: 1.15 MMOL/L (ref 1.15–1.33)
CA-I BLDA-SCNC: 1.22 MMOL/L (ref 1.12–1.32)
CA-I BLDA-SCNC: 1.25 MMOL/L (ref 1.15–1.33)
CA-I BLDA-SCNC: 1.26 MMOL/L (ref 1.15–1.33)
CA-I BLDA-SCNC: 1.31 MMOL/L (ref 1.15–1.33)
CA-I BLDA-SCNC: 1.54 MMOL/L (ref 1.12–1.32)
CA-I SERPL ISE-MCNC: 1.32 MMOL/L (ref 1.2–1.3)
CALCIUM SPEC-SCNC: 8.7 MG/DL (ref 8.6–10.5)
CHLORIDE SERPL-SCNC: 108 MMOL/L (ref 98–107)
CLOSE TME COLL+ADP + EPINEP PNL BLD: 94 % (ref 86–100)
CO2 BLDA-SCNC: 24.3 MMOL/L (ref 22–29)
CO2 BLDA-SCNC: 25.2 MMOL/L (ref 22–29)
CO2 BLDA-SCNC: 26.4 MMOL/L (ref 22–29)
CO2 BLDA-SCNC: 26.4 MMOL/L (ref 22–29)
CO2 BLDA-SCNC: 26.5 MMOL/L (ref 22–29)
CO2 BLDA-SCNC: 28 MMOL/L (ref 23–27)
CO2 BLDA-SCNC: 30 MMOL/L (ref 23–27)
CO2 BLDA-SCNC: 30 MMOL/L (ref 23–27)
CO2 BLDA-SCNC: 32 MMOL/L (ref 23–27)
CO2 CONTENT VENOUS: ABNORMAL
CO2 SERPL-SCNC: 23 MMOL/L (ref 22–29)
CREAT SERPL-MCNC: 1.01 MG/DL (ref 0.76–1.27)
CROSSMATCH INTERPRETATION: NORMAL
CROSSMATCH INTERPRETATION: NORMAL
DEPRECATED RDW RBC AUTO: 43.3 FL (ref 37–54)
DEPRECATED RDW RBC AUTO: 43.8 FL (ref 37–54)
EOSINOPHIL # BLD AUTO: 0.2 10*3/MM3 (ref 0–0.4)
EOSINOPHIL NFR BLD AUTO: 1.3 % (ref 0.3–6.2)
ERYTHROCYTE [DISTWIDTH] IN BLOOD BY AUTOMATED COUNT: 13.7 % (ref 12.3–15.4)
ERYTHROCYTE [DISTWIDTH] IN BLOOD BY AUTOMATED COUNT: 13.9 % (ref 12.3–15.4)
FIBRINOGEN PPP-MCNC: 124 MG/DL (ref 210–450)
GFR SERPL CREATININE-BSD FRML MDRD: 72 ML/MIN/1.73
GLUCOSE BLDC GLUCOMTR-MCNC: 108 MG/DL (ref 70–105)
GLUCOSE BLDC GLUCOMTR-MCNC: 125 MG/DL (ref 70–105)
GLUCOSE BLDC GLUCOMTR-MCNC: 125 MG/DL (ref 70–105)
GLUCOSE BLDC GLUCOMTR-MCNC: 126 MG/DL (ref 74–100)
GLUCOSE BLDC GLUCOMTR-MCNC: 126 MG/DL (ref 74–100)
GLUCOSE BLDC GLUCOMTR-MCNC: 129 MG/DL (ref 74–100)
GLUCOSE BLDC GLUCOMTR-MCNC: 129 MG/DL (ref 74–100)
GLUCOSE BLDC GLUCOMTR-MCNC: 143 MG/DL (ref 70–105)
GLUCOSE BLDC GLUCOMTR-MCNC: 146 MG/DL (ref 70–105)
GLUCOSE BLDC GLUCOMTR-MCNC: 152 MG/DL (ref 74–100)
GLUCOSE BLDC GLUCOMTR-MCNC: 152 MG/DL (ref 74–100)
GLUCOSE BLDC GLUCOMTR-MCNC: 154 MG/DL (ref 70–105)
GLUCOSE BLDC GLUCOMTR-MCNC: 157 MG/DL (ref 70–105)
GLUCOSE BLDC GLUCOMTR-MCNC: 176 MG/DL (ref 70–105)
GLUCOSE BLDC GLUCOMTR-MCNC: 177 MG/DL (ref 70–105)
GLUCOSE BLDC GLUCOMTR-MCNC: 177 MG/DL (ref 70–105)
GLUCOSE BLDC GLUCOMTR-MCNC: 185 MG/DL (ref 70–105)
GLUCOSE BLDC GLUCOMTR-MCNC: 84 MG/DL (ref 74–100)
GLUCOSE BLDC GLUCOMTR-MCNC: 84 MG/DL (ref 74–100)
GLUCOSE BLDC GLUCOMTR-MCNC: 89 MG/DL (ref 70–105)
GLUCOSE BLDC GLUCOMTR-MCNC: 95 MG/DL (ref 70–105)
GLUCOSE BLDC GLUCOMTR-MCNC: 99 MG/DL (ref 74–100)
GLUCOSE BLDC GLUCOMTR-MCNC: 99 MG/DL (ref 74–100)
GLUCOSE SERPL-MCNC: 119 MG/DL (ref 65–99)
HCO3 BLDA-SCNC: 23.2 MMOL/L (ref 21–28)
HCO3 BLDA-SCNC: 23.9 MMOL/L (ref 21–28)
HCO3 BLDA-SCNC: 25.1 MMOL/L (ref 21–28)
HCO3 BLDA-SCNC: 25.3 MMOL/L (ref 21–28)
HCO3 BLDA-SCNC: 25.9 MMOL/L (ref 22–26)
HCO3 BLDA-SCNC: 28.4 MMOL/L (ref 22–26)
HCO3 BLDA-SCNC: 28.7 MMOL/L (ref 22–26)
HCO3 BLDA-SCNC: 29.9 MMOL/L (ref 22–26)
HCO3 BLDV-SCNC: 24.6 MMOL/L (ref 23–28)
HCO3 BLDV-SCNC: 25 MMOL/L (ref 22–26)
HCT VFR BLD AUTO: 27.9 % (ref 37.5–51)
HCT VFR BLD AUTO: 28.6 % (ref 37.5–51)
HCT VFR BLDA CALC: 27 % (ref 38–51)
HCT VFR BLDA CALC: 28 % (ref 38–51)
HCT VFR BLDA CALC: 29 % (ref 38–51)
HCT VFR BLDA CALC: 30 % (ref 38–51)
HCT VFR BLDA CALC: 30 % (ref 38–51)
HCT VFR BLDA CALC: 31 % (ref 38–51)
HCT VFR BLDA CALC: 32 % (ref 38–51)
HCT VFR BLDA CALC: 32 % (ref 38–51)
HCT VFR BLDA CALC: 33 % (ref 38–51)
HCT VFR BLDA CALC: 38 % (ref 38–51)
HEMODILUTION: NO
HEMODILUTION: NO
HEMODILUTION: YES
HEMODILUTION: YES
HGB BLD-MCNC: 9.5 G/DL (ref 13–17.7)
HGB BLD-MCNC: 9.6 G/DL (ref 13–17.7)
HGB BLDA-MCNC: 10.3 G/DL (ref 12–17)
HGB BLDA-MCNC: 10.3 G/DL (ref 12–17)
HGB BLDA-MCNC: 10.5 G/DL (ref 12–17)
HGB BLDA-MCNC: 10.9 G/DL (ref 12–17)
HGB BLDA-MCNC: 11 G/DL (ref 12–17)
HGB BLDA-MCNC: 11.3 G/DL (ref 12–17)
HGB BLDA-MCNC: 12.9 G/DL (ref 12–17)
HGB BLDA-MCNC: 9.2 G/DL (ref 12–17)
HGB BLDA-MCNC: 9.5 G/DL (ref 12–17)
HGB BLDA-MCNC: 9.9 G/DL (ref 12–17)
INHALED O2 CONCENTRATION: 40 %
INHALED O2 CONCENTRATION: 40 %
INHALED O2 CONCENTRATION: 60 %
INHALED O2 CONCENTRATION: 70 %
INHALED O2 CONCENTRATION: 70 %
INR PPP: 1.17 (ref 0.93–1.1)
INR PPP: 1.63 (ref 0.93–1.1)
LYMPHOCYTES # BLD AUTO: 1.3 10*3/MM3 (ref 0.7–3.1)
LYMPHOCYTES NFR BLD AUTO: 10 % (ref 19.6–45.3)
MCH RBC QN AUTO: 30.7 PG (ref 26.6–33)
MCH RBC QN AUTO: 30.9 PG (ref 26.6–33)
MCHC RBC AUTO-ENTMCNC: 33.7 G/DL (ref 31.5–35.7)
MCHC RBC AUTO-ENTMCNC: 34 G/DL (ref 31.5–35.7)
MCV RBC AUTO: 91.1 FL (ref 79–97)
MCV RBC AUTO: 91.1 FL (ref 79–97)
MODALITY: ABNORMAL
MONOCYTES # BLD AUTO: 0.6 10*3/MM3 (ref 0.1–0.9)
MONOCYTES NFR BLD AUTO: 5.1 % (ref 5–12)
NEUTROPHILS NFR BLD AUTO: 10.5 10*3/MM3 (ref 1.7–7)
NEUTROPHILS NFR BLD AUTO: 83.3 % (ref 42.7–76)
NRBC BLD AUTO-RTO: 0 /100 WBC (ref 0–0.2)
PCO2 BLDA: 37.1 MM HG (ref 35–48)
PCO2 BLDA: 41.7 MM HG (ref 35–48)
PCO2 BLDA: 42.1 MM HG (ref 35–48)
PCO2 BLDA: 43.6 MM HG (ref 35–48)
PCO2 BLDA: 53.9 MM HG (ref 35–45)
PCO2 BLDA: 53.9 MM HG (ref 35–45)
PCO2 BLDA: 54.9 MM HG (ref 35–45)
PCO2 BLDA: 56.4 MM HG (ref 35–45)
PCO2 BLDV: 46.3 MM HG (ref 42–51)
PCO2 BLDV: 54.4 MM HG (ref 41–51)
PEEP RESPIRATORY: 5 CM[H2O]
PEEP RESPIRATORY: 5 CM[H2O]
PEEP RESPIRATORY: 6 CM[H2O]
PEEP RESPIRATORY: 8 CM[H2O]
PEEP RESPIRATORY: 8 CM[H2O]
PH BLDA: 7.28 PH UNITS (ref 7.35–7.45)
PH BLDA: 7.31 PH UNITS (ref 7.35–7.45)
PH BLDA: 7.33 PH UNITS (ref 7.35–7.45)
PH BLDA: 7.35 PH UNITS (ref 7.35–7.45)
PH BLDA: 7.37 PH UNITS (ref 7.35–7.45)
PH BLDA: 7.37 PH UNITS (ref 7.35–7.45)
PH BLDA: 7.39 PH UNITS (ref 7.35–7.45)
PH BLDA: 7.4 PH UNITS (ref 7.35–7.45)
PH BLDV: 7.26 PH UNITS (ref 7.31–7.41)
PH BLDV: 7.34 PH UNITS (ref 7.32–7.43)
PHOSPHATE SERPL-MCNC: 2.7 MG/DL (ref 2.5–4.5)
PLATELET # BLD AUTO: 158 10*3/MM3 (ref 140–450)
PLATELET # BLD AUTO: 167 10*3/MM3 (ref 140–450)
PMV BLD AUTO: 8 FL (ref 6–12)
PMV BLD AUTO: 8 FL (ref 6–12)
PO2 BLDA: 105.8 MM HG (ref 83–108)
PO2 BLDA: 117.6 MM HG (ref 83–108)
PO2 BLDA: 140.3 MM HG (ref 83–108)
PO2 BLDA: 310.6 MM HG (ref 83–108)
PO2 BLDA: 427 MM HG (ref 80–105)
PO2 BLDA: 448 MM HG (ref 80–105)
PO2 BLDA: 467 MM HG (ref 80–105)
PO2 BLDA: 519 MM HG (ref 80–105)
PO2 BLDV: 40 MM HG (ref 40–42)
PO2 BLDV: 51 MM HG (ref 35–42)
POTASSIUM BLDA-SCNC: 3.6 MMOL/L (ref 3.5–4.5)
POTASSIUM BLDA-SCNC: 3.7 MMOL/L (ref 3.5–4.9)
POTASSIUM BLDA-SCNC: 3.9 MMOL/L (ref 3.5–4.5)
POTASSIUM BLDA-SCNC: 3.9 MMOL/L (ref 3.5–4.9)
POTASSIUM BLDA-SCNC: 4 MMOL/L (ref 3.5–4.5)
POTASSIUM BLDA-SCNC: 4 MMOL/L (ref 3.5–4.9)
POTASSIUM BLDA-SCNC: 4.1 MMOL/L (ref 3.5–4.5)
POTASSIUM BLDA-SCNC: 4.1 MMOL/L (ref 3.5–4.9)
POTASSIUM BLDA-SCNC: 4.1 MMOL/L (ref 3.5–4.9)
POTASSIUM BLDA-SCNC: 4.5 MMOL/L (ref 3.5–4.5)
POTASSIUM SERPL-SCNC: 4.1 MMOL/L (ref 3.5–5.2)
PROTHROMBIN TIME: 12.8 SECONDS (ref 9.6–11.7)
PROTHROMBIN TIME: 17.5 SECONDS (ref 9.6–11.7)
PSV: 10 CMH2O
RBC # BLD AUTO: 3.07 10*6/MM3 (ref 4.14–5.8)
RBC # BLD AUTO: 3.14 10*6/MM3 (ref 4.14–5.8)
RESPIRATORY RATE: 12
SAO2 % BLDCOA: 100 % (ref 95–98)
SAO2 % BLDCOA: 97.9 % (ref 94–98)
SAO2 % BLDCOA: 98.5 % (ref 94–98)
SAO2 % BLDCOA: 99.2 % (ref 94–98)
SAO2 % BLDCOA: 99.9 % (ref 94–98)
SAO2 % BLDCOV: 26 % (ref 95–99)
SAO2 % BLDCOV: 71.4 % (ref 95–99)
SODIUM BLD-SCNC: 135 MMOL/L (ref 138–146)
SODIUM BLD-SCNC: 137 MMOL/L (ref 138–146)
SODIUM BLD-SCNC: 138 MMOL/L (ref 138–146)
SODIUM BLD-SCNC: 138 MMOL/L (ref 138–146)
SODIUM BLD-SCNC: 139 MMOL/L (ref 138–146)
SODIUM BLD-SCNC: 140 MMOL/L (ref 138–146)
SODIUM BLD-SCNC: 142 MMOL/L (ref 138–146)
SODIUM BLD-SCNC: 142 MMOL/L (ref 138–146)
SODIUM BLD-SCNC: 143 MMOL/L (ref 138–146)
SODIUM BLD-SCNC: 143 MMOL/L (ref 138–146)
SODIUM SERPL-SCNC: 141 MMOL/L (ref 136–145)
UNIT  ABO: NORMAL
UNIT  ABO: NORMAL
UNIT  RH: NORMAL
UNIT  RH: NORMAL
VENTILATOR MODE: ABNORMAL
VT ON VENT VENT: 700 ML
WBC # BLD AUTO: 11.1 10*3/MM3 (ref 3.4–10.8)
WBC # BLD AUTO: 12.6 10*3/MM3 (ref 3.4–10.8)

## 2021-03-15 PROCEDURE — 4A133B3 MONITORING OF ARTERIAL PRESSURE, PULMONARY, PERCUTANEOUS APPROACH: ICD-10-PCS | Performed by: ANESTHESIOLOGY

## 2021-03-15 PROCEDURE — 25010000002 ALBUMIN HUMAN 5% PER 50 ML

## 2021-03-15 PROCEDURE — 85014 HEMATOCRIT: CPT

## 2021-03-15 PROCEDURE — 85730 THROMBOPLASTIN TIME PARTIAL: CPT | Performed by: THORACIC SURGERY (CARDIOTHORACIC VASCULAR SURGERY)

## 2021-03-15 PROCEDURE — 33518 CABG ARTERY-VEIN TWO: CPT | Performed by: PHYSICIAN ASSISTANT

## 2021-03-15 PROCEDURE — C1889 IMPLANT/INSERT DEVICE, NOC: HCPCS | Performed by: THORACIC SURGERY (CARDIOTHORACIC VASCULAR SURGERY)

## 2021-03-15 PROCEDURE — 80051 ELECTROLYTE PANEL: CPT

## 2021-03-15 PROCEDURE — 33533 CABG ARTERIAL SINGLE: CPT | Performed by: THORACIC SURGERY (CARDIOTHORACIC VASCULAR SURGERY)

## 2021-03-15 PROCEDURE — 33518 CABG ARTERY-VEIN TWO: CPT | Performed by: THORACIC SURGERY (CARDIOTHORACIC VASCULAR SURGERY)

## 2021-03-15 PROCEDURE — 99233 SBSQ HOSP IP/OBS HIGH 50: CPT | Performed by: INTERNAL MEDICINE

## 2021-03-15 PROCEDURE — 82947 ASSAY GLUCOSE BLOOD QUANT: CPT

## 2021-03-15 PROCEDURE — 94799 UNLISTED PULMONARY SVC/PX: CPT

## 2021-03-15 PROCEDURE — C1751 CATH, INF, PER/CENT/MIDLINE: HCPCS | Performed by: ANESTHESIOLOGY

## 2021-03-15 PROCEDURE — 93318 ECHO TRANSESOPHAGEAL INTRAOP: CPT | Performed by: ANESTHESIOLOGY

## 2021-03-15 PROCEDURE — A4648 IMPLANTABLE TISSUE MARKER: HCPCS | Performed by: THORACIC SURGERY (CARDIOTHORACIC VASCULAR SURGERY)

## 2021-03-15 PROCEDURE — 85610 PROTHROMBIN TIME: CPT | Performed by: NURSE PRACTITIONER

## 2021-03-15 PROCEDURE — 25010000002 MAGNESIUM SULFATE IN D5W 1G/100ML (PREMIX) 1-5 GM/100ML-% SOLUTION: Performed by: NURSE PRACTITIONER

## 2021-03-15 PROCEDURE — C1729 CATH, DRAINAGE: HCPCS | Performed by: THORACIC SURGERY (CARDIOTHORACIC VASCULAR SURGERY)

## 2021-03-15 PROCEDURE — 021109W BYPASS CORONARY ARTERY, TWO ARTERIES FROM AORTA WITH AUTOLOGOUS VENOUS TISSUE, OPEN APPROACH: ICD-10-PCS | Performed by: THORACIC SURGERY (CARDIOTHORACIC VASCULAR SURGERY)

## 2021-03-15 PROCEDURE — 63710000001 INSULIN REGULAR HUMAN PER 5 UNITS: Performed by: ANESTHESIOLOGY

## 2021-03-15 PROCEDURE — 93010 ELECTROCARDIOGRAM REPORT: CPT | Performed by: INTERNAL MEDICINE

## 2021-03-15 PROCEDURE — 02HP32Z INSERTION OF MONITORING DEVICE INTO PULMONARY TRUNK, PERCUTANEOUS APPROACH: ICD-10-PCS | Performed by: ANESTHESIOLOGY

## 2021-03-15 PROCEDURE — 84295 ASSAY OF SERUM SODIUM: CPT

## 2021-03-15 PROCEDURE — 85730 THROMBOPLASTIN TIME PARTIAL: CPT | Performed by: NURSE PRACTITIONER

## 2021-03-15 PROCEDURE — 82962 GLUCOSE BLOOD TEST: CPT

## 2021-03-15 PROCEDURE — 94002 VENT MGMT INPAT INIT DAY: CPT

## 2021-03-15 PROCEDURE — 93005 ELECTROCARDIOGRAM TRACING: CPT | Performed by: NURSE PRACTITIONER

## 2021-03-15 PROCEDURE — 25010000002 ALBUMIN HUMAN 5% PER 50 ML: Performed by: NURSE PRACTITIONER

## 2021-03-15 PROCEDURE — 25010000002 HEPARIN (PORCINE) PER 1000 UNITS: Performed by: THORACIC SURGERY (CARDIOTHORACIC VASCULAR SURGERY)

## 2021-03-15 PROCEDURE — 25010000002 MIDAZOLAM PER 1 MG: Performed by: ANESTHESIOLOGY

## 2021-03-15 PROCEDURE — 85384 FIBRINOGEN ACTIVITY: CPT | Performed by: THORACIC SURGERY (CARDIOTHORACIC VASCULAR SURGERY)

## 2021-03-15 PROCEDURE — 85027 COMPLETE CBC AUTOMATED: CPT | Performed by: THORACIC SURGERY (CARDIOTHORACIC VASCULAR SURGERY)

## 2021-03-15 PROCEDURE — 02100Z9 BYPASS CORONARY ARTERY, ONE ARTERY FROM LEFT INTERNAL MAMMARY, OPEN APPROACH: ICD-10-PCS | Performed by: THORACIC SURGERY (CARDIOTHORACIC VASCULAR SURGERY)

## 2021-03-15 PROCEDURE — 5A1221Z PERFORMANCE OF CARDIAC OUTPUT, CONTINUOUS: ICD-10-PCS | Performed by: THORACIC SURGERY (CARDIOTHORACIC VASCULAR SURGERY)

## 2021-03-15 PROCEDURE — 82330 ASSAY OF CALCIUM: CPT

## 2021-03-15 PROCEDURE — 85576 BLOOD PLATELET AGGREGATION: CPT | Performed by: THORACIC SURGERY (CARDIOTHORACIC VASCULAR SURGERY)

## 2021-03-15 PROCEDURE — 25010000002 FENTANYL CITRATE (PF) 250 MCG/5ML SOLUTION: Performed by: ANESTHESIOLOGY

## 2021-03-15 PROCEDURE — 25010000002 CEFAZOLIN PER 500 MG: Performed by: NURSE PRACTITIONER

## 2021-03-15 PROCEDURE — 25010000002 MORPHINE PER 10 MG: Performed by: NURSE PRACTITIONER

## 2021-03-15 PROCEDURE — 25010000002 HEPARIN (PORCINE) PER 1000 UNITS: Performed by: ANESTHESIOLOGY

## 2021-03-15 PROCEDURE — 63710000001 INSULIN LISPRO (HUMAN) PER 5 UNITS: Performed by: NURSE PRACTITIONER

## 2021-03-15 PROCEDURE — 33508 ENDOSCOPIC VEIN HARVEST: CPT | Performed by: PHYSICIAN ASSISTANT

## 2021-03-15 PROCEDURE — C1713 ANCHOR/SCREW BN/BN,TIS/BN: HCPCS | Performed by: THORACIC SURGERY (CARDIOTHORACIC VASCULAR SURGERY)

## 2021-03-15 PROCEDURE — 82330 ASSAY OF CALCIUM: CPT | Performed by: NURSE PRACTITIONER

## 2021-03-15 PROCEDURE — 25010000002 MAGNESIUM SULFATE 2 GM/50ML SOLUTION: Performed by: ANESTHESIOLOGY

## 2021-03-15 PROCEDURE — 71045 X-RAY EXAM CHEST 1 VIEW: CPT

## 2021-03-15 PROCEDURE — 25010000002 PAPAVERINE PER 60 MG: Performed by: THORACIC SURGERY (CARDIOTHORACIC VASCULAR SURGERY)

## 2021-03-15 PROCEDURE — P9041 ALBUMIN (HUMAN),5%, 50ML: HCPCS | Performed by: NURSE PRACTITIONER

## 2021-03-15 PROCEDURE — 82803 BLOOD GASES ANY COMBINATION: CPT

## 2021-03-15 PROCEDURE — 80069 RENAL FUNCTION PANEL: CPT | Performed by: NURSE PRACTITIONER

## 2021-03-15 PROCEDURE — 33508 ENDOSCOPIC VEIN HARVEST: CPT | Performed by: THORACIC SURGERY (CARDIOTHORACIC VASCULAR SURGERY)

## 2021-03-15 PROCEDURE — B24BZZ4 ULTRASONOGRAPHY OF HEART WITH AORTA, TRANSESOPHAGEAL: ICD-10-PCS | Performed by: ANESTHESIOLOGY

## 2021-03-15 PROCEDURE — 33533 CABG ARTERIAL SINGLE: CPT | Performed by: PHYSICIAN ASSISTANT

## 2021-03-15 PROCEDURE — 85347 COAGULATION TIME ACTIVATED: CPT

## 2021-03-15 PROCEDURE — 84132 ASSAY OF SERUM POTASSIUM: CPT

## 2021-03-15 PROCEDURE — 85018 HEMOGLOBIN: CPT

## 2021-03-15 PROCEDURE — P9041 ALBUMIN (HUMAN),5%, 50ML: HCPCS

## 2021-03-15 PROCEDURE — 4A1239Z MONITORING OF CARDIAC OUTPUT, PERCUTANEOUS APPROACH: ICD-10-PCS | Performed by: ANESTHESIOLOGY

## 2021-03-15 PROCEDURE — 85610 PROTHROMBIN TIME: CPT | Performed by: THORACIC SURGERY (CARDIOTHORACIC VASCULAR SURGERY)

## 2021-03-15 PROCEDURE — 85025 COMPLETE CBC W/AUTO DIFF WBC: CPT | Performed by: NURSE PRACTITIONER

## 2021-03-15 PROCEDURE — 06BP4ZZ EXCISION OF RIGHT SAPHENOUS VEIN, PERCUTANEOUS ENDOSCOPIC APPROACH: ICD-10-PCS | Performed by: THORACIC SURGERY (CARDIOTHORACIC VASCULAR SURGERY)

## 2021-03-15 PROCEDURE — 25010000002 AMIODARONE PER 30 MG: Performed by: ANESTHESIOLOGY

## 2021-03-15 DEVICE — SS SUTURE, 3 PER SLEEVE
Type: IMPLANTABLE DEVICE | Site: STERNUM | Status: FUNCTIONAL
Brand: MYO/WIRE II

## 2021-03-15 DEVICE — DEV CONTRL TISS STRATAFIXSPIRALMNCRYL PLSPS2 REV3/0 15CM: Type: IMPLANTABLE DEVICE | Site: LEG | Status: FUNCTIONAL

## 2021-03-15 DEVICE — SS SUTURE, 6 PER SLEEVE
Type: IMPLANTABLE DEVICE | Site: STERNUM | Status: FUNCTIONAL
Brand: MYO/WIRE II

## 2021-03-15 DEVICE — DEV CONTRL TISS STRATAFIX SPIRAL MNCRYL UD 3/0 PLS 30CM: Type: IMPLANTABLE DEVICE | Site: CHEST | Status: FUNCTIONAL

## 2021-03-15 DEVICE — WAX,BONE,NATURAL
Type: IMPLANTABLE DEVICE | Site: STERNUM | Status: FUNCTIONAL
Brand: MEDLINE INDUSTRIES

## 2021-03-15 RX ORDER — MORPHINE SULFATE 4 MG/ML
1 INJECTION, SOLUTION INTRAMUSCULAR; INTRAVENOUS EVERY 4 HOURS PRN
Status: DISCONTINUED | OUTPATIENT
Start: 2021-03-15 | End: 2021-03-16

## 2021-03-15 RX ORDER — ROCURONIUM BROMIDE 10 MG/ML
INJECTION, SOLUTION INTRAVENOUS AS NEEDED
Status: DISCONTINUED | OUTPATIENT
Start: 2021-03-15 | End: 2021-03-15 | Stop reason: SURG

## 2021-03-15 RX ORDER — ATORVASTATIN CALCIUM 40 MG/1
40 TABLET, FILM COATED ORAL NIGHTLY
Status: DISCONTINUED | OUTPATIENT
Start: 2021-03-16 | End: 2021-03-18 | Stop reason: HOSPADM

## 2021-03-15 RX ORDER — ACETAMINOPHEN 160 MG/5ML
650 SOLUTION ORAL EVERY 4 HOURS PRN
Status: DISCONTINUED | OUTPATIENT
Start: 2021-03-16 | End: 2021-03-18 | Stop reason: HOSPADM

## 2021-03-15 RX ORDER — NITROGLYCERIN 10 MG/100ML
INJECTION INTRAVENOUS CONTINUOUS PRN
Status: DISCONTINUED | OUTPATIENT
Start: 2021-03-15 | End: 2021-03-15 | Stop reason: SDUPTHER

## 2021-03-15 RX ORDER — DEXMEDETOMIDINE HYDROCHLORIDE 4 UG/ML
.2-1.5 INJECTION, SOLUTION INTRAVENOUS
Status: DISCONTINUED | OUTPATIENT
Start: 2021-03-15 | End: 2021-03-16

## 2021-03-15 RX ORDER — ACETAMINOPHEN 650 MG/1
650 SUPPOSITORY RECTAL EVERY 6 HOURS
Status: ACTIVE | OUTPATIENT
Start: 2021-03-15 | End: 2021-03-16

## 2021-03-15 RX ORDER — MIDAZOLAM HYDROCHLORIDE 1 MG/ML
INJECTION INTRAMUSCULAR; INTRAVENOUS AS NEEDED
Status: DISCONTINUED | OUTPATIENT
Start: 2021-03-15 | End: 2021-03-15 | Stop reason: SURG

## 2021-03-15 RX ORDER — SODIUM CHLORIDE 9 MG/ML
INJECTION, SOLUTION INTRAVENOUS CONTINUOUS PRN
Status: DISCONTINUED | OUTPATIENT
Start: 2021-03-15 | End: 2021-03-15 | Stop reason: SURG

## 2021-03-15 RX ORDER — ACETAMINOPHEN 325 MG/1
650 TABLET ORAL EVERY 6 HOURS
Status: DISPENSED | OUTPATIENT
Start: 2021-03-15 | End: 2021-03-16

## 2021-03-15 RX ORDER — ASPIRIN 81 MG/1
81 TABLET ORAL DAILY
Status: DISCONTINUED | OUTPATIENT
Start: 2021-03-16 | End: 2021-03-18 | Stop reason: HOSPADM

## 2021-03-15 RX ORDER — NOREPINEPHRINE BIT/0.9 % NACL 8 MG/250ML
.02-.06 INFUSION BOTTLE (ML) INTRAVENOUS CONTINUOUS PRN
Status: DISCONTINUED | OUTPATIENT
Start: 2021-03-15 | End: 2021-03-17

## 2021-03-15 RX ORDER — ALBUMIN, HUMAN INJ 5% 5 %
12.5 SOLUTION INTRAVENOUS AS NEEDED
Status: DISCONTINUED | OUTPATIENT
Start: 2021-03-15 | End: 2021-03-17

## 2021-03-15 RX ORDER — ASPIRIN 325 MG
325 TABLET ORAL ONCE
Status: COMPLETED | OUTPATIENT
Start: 2021-03-15 | End: 2021-03-15

## 2021-03-15 RX ORDER — POLYETHYLENE GLYCOL 3350 17 G/17G
17 POWDER, FOR SOLUTION ORAL 2 TIMES DAILY
Status: DISCONTINUED | OUTPATIENT
Start: 2021-03-16 | End: 2021-03-18 | Stop reason: HOSPADM

## 2021-03-15 RX ORDER — CHLORHEXIDINE GLUCONATE 0.12 MG/ML
15 RINSE ORAL EVERY 12 HOURS
Status: DISCONTINUED | OUTPATIENT
Start: 2021-03-15 | End: 2021-03-18 | Stop reason: HOSPADM

## 2021-03-15 RX ORDER — NITROGLYCERIN 20 MG/100ML
10-50 INJECTION INTRAVENOUS CONTINUOUS PRN
Status: DISCONTINUED | OUTPATIENT
Start: 2021-03-15 | End: 2021-03-17

## 2021-03-15 RX ORDER — PANTOPRAZOLE SODIUM 40 MG/10ML
40 INJECTION, POWDER, LYOPHILIZED, FOR SOLUTION INTRAVENOUS ONCE
Status: COMPLETED | OUTPATIENT
Start: 2021-03-15 | End: 2021-03-15

## 2021-03-15 RX ORDER — POTASSIUM CHLORIDE 7.45 MG/ML
10 INJECTION INTRAVENOUS
Status: DISCONTINUED | OUTPATIENT
Start: 2021-03-15 | End: 2021-03-18 | Stop reason: HOSPADM

## 2021-03-15 RX ORDER — ACETAMINOPHEN 160 MG/5ML
650 SOLUTION ORAL EVERY 6 HOURS
Status: DISPENSED | OUTPATIENT
Start: 2021-03-15 | End: 2021-03-16

## 2021-03-15 RX ORDER — ALBUMIN, HUMAN INJ 5% 5 %
SOLUTION INTRAVENOUS
Status: COMPLETED
Start: 2021-03-15 | End: 2021-03-15

## 2021-03-15 RX ORDER — NALOXONE HCL 0.4 MG/ML
0.4 VIAL (ML) INJECTION
Status: DISCONTINUED | OUTPATIENT
Start: 2021-03-15 | End: 2021-03-16

## 2021-03-15 RX ORDER — VECURONIUM BROMIDE 1 MG/ML
INJECTION, POWDER, LYOPHILIZED, FOR SOLUTION INTRAVENOUS AS NEEDED
Status: DISCONTINUED | OUTPATIENT
Start: 2021-03-15 | End: 2021-03-15 | Stop reason: SURG

## 2021-03-15 RX ORDER — POTASSIUM CHLORIDE 20 MEQ/1
20 TABLET, EXTENDED RELEASE ORAL AS NEEDED
Status: DISCONTINUED | OUTPATIENT
Start: 2021-03-16 | End: 2021-03-18 | Stop reason: HOSPADM

## 2021-03-15 RX ORDER — MAGNESIUM HYDROXIDE 1200 MG/15ML
LIQUID ORAL AS NEEDED
Status: DISCONTINUED | OUTPATIENT
Start: 2021-03-15 | End: 2021-03-15 | Stop reason: HOSPADM

## 2021-03-15 RX ORDER — SODIUM CHLORIDE 9 MG/ML
30 INJECTION, SOLUTION INTRAVENOUS CONTINUOUS
Status: DISPENSED | OUTPATIENT
Start: 2021-03-15 | End: 2021-03-18

## 2021-03-15 RX ORDER — PANTOPRAZOLE SODIUM 40 MG/1
40 TABLET, DELAYED RELEASE ORAL
Status: DISCONTINUED | OUTPATIENT
Start: 2021-03-16 | End: 2021-03-18 | Stop reason: HOSPADM

## 2021-03-15 RX ORDER — ACETAMINOPHEN 650 MG
TABLET, EXTENDED RELEASE ORAL AS NEEDED
Status: DISCONTINUED | OUTPATIENT
Start: 2021-03-15 | End: 2021-03-15 | Stop reason: HOSPADM

## 2021-03-15 RX ORDER — POTASSIUM CHLORIDE 1.5 G/1.77G
20 POWDER, FOR SOLUTION ORAL AS NEEDED
Status: DISCONTINUED | OUTPATIENT
Start: 2021-03-16 | End: 2021-03-18 | Stop reason: HOSPADM

## 2021-03-15 RX ORDER — MAGNESIUM SULFATE 1 G/100ML
1 INJECTION INTRAVENOUS EVERY 8 HOURS
Status: COMPLETED | OUTPATIENT
Start: 2021-03-15 | End: 2021-03-16

## 2021-03-15 RX ORDER — HEPARIN SODIUM 1000 [USP'U]/ML
INJECTION, SOLUTION INTRAVENOUS; SUBCUTANEOUS AS NEEDED
Status: DISCONTINUED | OUTPATIENT
Start: 2021-03-15 | End: 2021-03-15 | Stop reason: SURG

## 2021-03-15 RX ORDER — SENNA PLUS 8.6 MG/1
2 TABLET ORAL NIGHTLY
Status: DISCONTINUED | OUTPATIENT
Start: 2021-03-16 | End: 2021-03-18 | Stop reason: HOSPADM

## 2021-03-15 RX ORDER — ACETAMINOPHEN 325 MG/1
650 TABLET ORAL EVERY 4 HOURS PRN
Status: DISCONTINUED | OUTPATIENT
Start: 2021-03-16 | End: 2021-03-18 | Stop reason: HOSPADM

## 2021-03-15 RX ORDER — FENTANYL CITRATE 50 UG/ML
INJECTION, SOLUTION INTRAMUSCULAR; INTRAVENOUS AS NEEDED
Status: DISCONTINUED | OUTPATIENT
Start: 2021-03-15 | End: 2021-03-15 | Stop reason: SURG

## 2021-03-15 RX ORDER — METOPROLOL TARTRATE 5 MG/5ML
INJECTION INTRAVENOUS AS NEEDED
Status: DISCONTINUED | OUTPATIENT
Start: 2021-03-15 | End: 2021-03-15 | Stop reason: SURG

## 2021-03-15 RX ORDER — ONDANSETRON 2 MG/ML
4 INJECTION INTRAMUSCULAR; INTRAVENOUS EVERY 6 HOURS PRN
Status: DISCONTINUED | OUTPATIENT
Start: 2021-03-15 | End: 2021-03-18 | Stop reason: HOSPADM

## 2021-03-15 RX ORDER — XYLITOL/YERBA SANTA
2 AEROSOL, SPRAY WITH PUMP (ML) MUCOUS MEMBRANE EVERY 4 HOURS PRN
Status: DISCONTINUED | OUTPATIENT
Start: 2021-03-15 | End: 2021-03-18 | Stop reason: HOSPADM

## 2021-03-15 RX ORDER — AMIODARONE HCL/D5W 450 MG/250
0.5 PLASTIC BAG, INJECTION (ML) INTRAVENOUS CONTINUOUS
Status: DISCONTINUED | OUTPATIENT
Start: 2021-03-15 | End: 2021-03-15 | Stop reason: HOSPADM

## 2021-03-15 RX ORDER — HYDROCODONE BITARTRATE AND ACETAMINOPHEN 5; 325 MG/1; MG/1
1 TABLET ORAL EVERY 4 HOURS PRN
Status: DISCONTINUED | OUTPATIENT
Start: 2021-03-15 | End: 2021-03-18 | Stop reason: HOSPADM

## 2021-03-15 RX ORDER — AMINOCAPROIC ACID 250 MG/ML
INJECTION, SOLUTION INTRAVENOUS AS NEEDED
Status: DISCONTINUED | OUTPATIENT
Start: 2021-03-15 | End: 2021-03-15 | Stop reason: SURG

## 2021-03-15 RX ORDER — MAGNESIUM SULFATE HEPTAHYDRATE 40 MG/ML
INJECTION, SOLUTION INTRAVENOUS AS NEEDED
Status: DISCONTINUED | OUTPATIENT
Start: 2021-03-15 | End: 2021-03-15 | Stop reason: SURG

## 2021-03-15 RX ORDER — ACETAMINOPHEN 650 MG/1
650 SUPPOSITORY RECTAL EVERY 4 HOURS PRN
Status: DISCONTINUED | OUTPATIENT
Start: 2021-03-16 | End: 2021-03-18 | Stop reason: HOSPADM

## 2021-03-15 RX ORDER — NICARDIPINE HYDROCHLORIDE 2.5 MG/ML
INJECTION INTRAVENOUS AS NEEDED
Status: DISCONTINUED | OUTPATIENT
Start: 2021-03-15 | End: 2021-03-15 | Stop reason: SURG

## 2021-03-15 RX ORDER — POTASSIUM CHLORIDE 7.45 MG/ML
INJECTION INTRAVENOUS
Status: DISCONTINUED
Start: 2021-03-15 | End: 2021-03-15 | Stop reason: WASHOUT

## 2021-03-15 RX ORDER — DOCUSATE SODIUM 100 MG/1
100 CAPSULE, LIQUID FILLED ORAL 2 TIMES DAILY
Status: DISCONTINUED | OUTPATIENT
Start: 2021-03-16 | End: 2021-03-18 | Stop reason: HOSPADM

## 2021-03-15 RX ADMIN — CEFAZOLIN SODIUM 2 G: 10 INJECTION, POWDER, FOR SOLUTION INTRAVENOUS at 21:03

## 2021-03-15 RX ADMIN — NICARDIPINE HYDROCHLORIDE 0.5 MG: 2.5 INJECTION INTRAVENOUS at 14:44

## 2021-03-15 RX ADMIN — NICARDIPINE HYDROCHLORIDE 0.5 MG: 2.5 INJECTION INTRAVENOUS at 14:43

## 2021-03-15 RX ADMIN — MAGNESIUM SULFATE HEPTAHYDRATE 1 G: 1 INJECTION, SOLUTION INTRAVENOUS at 21:04

## 2021-03-15 RX ADMIN — ALBUMIN HUMAN 12.5 G: 0.05 INJECTION, SOLUTION INTRAVENOUS at 21:03

## 2021-03-15 RX ADMIN — CHLORHEXIDINE GLUCONATE 0.12% ORAL RINSE 15 ML: 1.2 LIQUID ORAL at 05:00

## 2021-03-15 RX ADMIN — AMINOCAPROIC ACID 10 G: 250 INJECTION, SOLUTION INTRAVENOUS at 12:30

## 2021-03-15 RX ADMIN — FENTANYL CITRATE 250 MCG: 0.05 INJECTION, SOLUTION INTRAMUSCULAR; INTRAVENOUS at 12:07

## 2021-03-15 RX ADMIN — LINAGLIPTIN 5 MG: 5 TABLET, FILM COATED ORAL at 08:05

## 2021-03-15 RX ADMIN — AMINOCAPROIC ACID 10 G: 250 INJECTION, SOLUTION INTRAVENOUS at 14:23

## 2021-03-15 RX ADMIN — ACETAMINOPHEN ORAL SOLUTION 649.6 MG: 650 SOLUTION ORAL at 23:29

## 2021-03-15 RX ADMIN — SODIUM CHLORIDE 30 ML/HR: 9 INJECTION, SOLUTION INTRAVENOUS at 15:57

## 2021-03-15 RX ADMIN — DEXMEDETOMIDINE HYDROCHLORIDE 0.4 MCG/KG/HR: 100 INJECTION, SOLUTION INTRAVENOUS at 11:57

## 2021-03-15 RX ADMIN — MAGNESIUM SULFATE HEPTAHYDRATE 2 G: 40 INJECTION, SOLUTION INTRAVENOUS at 14:03

## 2021-03-15 RX ADMIN — MIDAZOLAM 4 MG: 1 INJECTION INTRAMUSCULAR; INTRAVENOUS at 11:32

## 2021-03-15 RX ADMIN — SODIUM CHLORIDE: 0.9 INJECTION, SOLUTION INTRAVENOUS at 11:30

## 2021-03-15 RX ADMIN — SODIUM CHLORIDE 5 MG/HR: 9 INJECTION, SOLUTION INTRAVENOUS at 18:39

## 2021-03-15 RX ADMIN — FENTANYL CITRATE 250 MCG: 0.05 INJECTION, SOLUTION INTRAMUSCULAR; INTRAVENOUS at 14:28

## 2021-03-15 RX ADMIN — SODIUM CHLORIDE: 0.9 INJECTION, SOLUTION INTRAVENOUS at 13:08

## 2021-03-15 RX ADMIN — Medication 0.5 MG/MIN: at 14:09

## 2021-03-15 RX ADMIN — ALPRAZOLAM 0.25 MG: 0.25 TABLET ORAL at 08:05

## 2021-03-15 RX ADMIN — HEPARIN SODIUM 23000 UNITS: 1000 INJECTION, SOLUTION INTRAVENOUS; SUBCUTANEOUS at 12:58

## 2021-03-15 RX ADMIN — ALBUMIN HUMAN 12.5 G: 0.05 INJECTION, SOLUTION INTRAVENOUS at 18:07

## 2021-03-15 RX ADMIN — MORPHINE SULFATE 1 MG: 4 INJECTION INTRAVENOUS at 18:07

## 2021-03-15 RX ADMIN — CEFAZOLIN SODIUM 2 G: 1 INJECTION, POWDER, FOR SOLUTION INTRAMUSCULAR; INTRAVENOUS at 14:19

## 2021-03-15 RX ADMIN — DEXMEDETOMIDINE HYDROCHLORIDE 0.5 MCG/KG/HR: 100 INJECTION, SOLUTION INTRAVENOUS at 15:56

## 2021-03-15 RX ADMIN — FENTANYL CITRATE 250 MCG: 0.05 INJECTION, SOLUTION INTRAMUSCULAR; INTRAVENOUS at 14:19

## 2021-03-15 RX ADMIN — NICARDIPINE HYDROCHLORIDE 1 MG: 2.5 INJECTION INTRAVENOUS at 12:32

## 2021-03-15 RX ADMIN — FENTANYL CITRATE 500 MCG: 0.05 INJECTION, SOLUTION INTRAMUSCULAR; INTRAVENOUS at 11:54

## 2021-03-15 RX ADMIN — VECURONIUM BROMIDE 10 MG: 10 INJECTION, POWDER, LYOPHILIZED, FOR SOLUTION INTRAVENOUS at 11:54

## 2021-03-15 RX ADMIN — MORPHINE SULFATE 1 MG: 4 INJECTION INTRAVENOUS at 20:29

## 2021-03-15 RX ADMIN — SODIUM CHLORIDE 5 MG/HR: 9 INJECTION, SOLUTION INTRAVENOUS at 16:01

## 2021-03-15 RX ADMIN — PANTOPRAZOLE SODIUM 40 MG: 40 INJECTION, POWDER, FOR SOLUTION INTRAVENOUS at 18:40

## 2021-03-15 RX ADMIN — FENTANYL CITRATE 250 MCG: 0.05 INJECTION, SOLUTION INTRAMUSCULAR; INTRAVENOUS at 14:24

## 2021-03-15 RX ADMIN — CHLORHEXIDINE GLUCONATE 0.12% ORAL RINSE 15 ML: 1.2 LIQUID ORAL at 20:16

## 2021-03-15 RX ADMIN — NITROGLYCERIN 25 MCG/MIN: 10 INJECTION INTRAVENOUS at 12:01

## 2021-03-15 RX ADMIN — SODIUM CHLORIDE 2 UNITS/HR: 900 INJECTION INTRAVENOUS at 12:05

## 2021-03-15 RX ADMIN — METOPROLOL TARTRATE 2 MG: 5 INJECTION INTRAVENOUS at 12:33

## 2021-03-15 RX ADMIN — NICARDIPINE HYDROCHLORIDE 1 MG: 2.5 INJECTION INTRAVENOUS at 12:37

## 2021-03-15 RX ADMIN — ACETAMINOPHEN ORAL SOLUTION 649.6 MG: 650 SOLUTION ORAL at 18:41

## 2021-03-15 RX ADMIN — SODIUM CHLORIDE 7.5 MG/HR: 9 INJECTION, SOLUTION INTRAVENOUS at 01:20

## 2021-03-15 RX ADMIN — Medication 10 ML: at 08:05

## 2021-03-15 RX ADMIN — MUPIROCIN 1 APPLICATION: 20 OINTMENT TOPICAL at 05:00

## 2021-03-15 RX ADMIN — VECURONIUM BROMIDE 5 MG: 10 INJECTION, POWDER, LYOPHILIZED, FOR SOLUTION INTRAVENOUS at 14:17

## 2021-03-15 RX ADMIN — CEFAZOLIN SODIUM 2 G: 1 INJECTION, POWDER, FOR SOLUTION INTRAMUSCULAR; INTRAVENOUS at 11:54

## 2021-03-15 RX ADMIN — MIDAZOLAM 2 MG: 1 INJECTION INTRAMUSCULAR; INTRAVENOUS at 11:31

## 2021-03-15 RX ADMIN — ROCURONIUM BROMIDE 50 MG: 10 INJECTION INTRAVENOUS at 11:54

## 2021-03-15 RX ADMIN — MUPIROCIN 1 APPLICATION: 20 OINTMENT TOPICAL at 20:16

## 2021-03-15 RX ADMIN — Medication 3 ML: at 08:06

## 2021-03-15 RX ADMIN — INSULIN LISPRO 2 UNITS: 100 INJECTION, SOLUTION INTRAVENOUS; SUBCUTANEOUS at 08:05

## 2021-03-15 RX ADMIN — ASPIRIN 325 MG ORAL TABLET 325 MG: 325 PILL ORAL at 20:16

## 2021-03-15 RX ADMIN — MORPHINE SULFATE 1 MG: 4 INJECTION INTRAVENOUS at 23:29

## 2021-03-15 NOTE — ANESTHESIA PROCEDURE NOTES
Arterial Line      Patient reassessed immediately prior to procedure    Patient location during procedure: OR  Start time: 3/15/2021 11:35 AM  Stop Time:3/15/2021 11:40 AM       Line placed for hemodynamic monitoring.  Performed By   Anesthesiologist: Tayo Garcia MD  Preanesthetic Checklist  Completed: patient identified, IV checked, site marked, risks and benefits discussed, surgical consent, monitors and equipment checked, pre-op evaluation and timeout performed  Arterial Line Prep   Sterile Tech: cap, gloves, gown, mask and sterile barriers  Prep: ChloraPrep  Patient monitoring: blood pressure monitoring, continuous pulse oximetry and EKG  Arterial Line Procedure   Laterality:left  Location:  radial artery  Catheter size: 20 G   Guidance: landmark technique and palpation technique  Number of attempts: 1  Successful placement: yes  Post Assessment   Dressing Type: secured with tape and wrist guard applied.   Complications no  Circ/Move/Sens Assessment: normal.   Patient Tolerance: patient tolerated the procedure well with no apparent complications

## 2021-03-15 NOTE — PROGRESS NOTES
Referring Provider: Paxton Vasquez MD    Reason for follow-up:  Severe coronary artery disease  Chest pain  Positive COVID-19     Patient Care Team:  Provider, No Known as PCP - Huseyin Neal MD as Consulting Physician (Cardiology)    Subjective .      ROS    Since I have last seen him yesterday, the patient has been without any chest discomfort ,shortness of breath, palpitations, dizziness or syncope.  Denies having any headache ,abdominal pain ,nausea, vomiting , diarrhea constipation, loss of weight or loss of appetite.  Denies having any excessive bruising ,hematuria or blood in the stool.    Review of all systems negative except as indicated    History  Past Medical History:   Diagnosis Date   • Arthritis    • Coronary artery disease 2/14/2012   • Diabetes mellitus (CMS/HCC)    • Elevated cholesterol    • Hyperlipidemia    • Hypertension        Past Surgical History:   Procedure Laterality Date   • TONSILECTOMY, ADENOIDECTOMY, BILATERAL MYRINGOTOMY AND TUBES  1952       Family History   Problem Relation Age of Onset   • Diabetes Mother    • Heart disease Father    • Diabetes Sister    • Diabetes Brother    • Hypertension Brother    • Heart disease Brother        Social History     Tobacco Use   • Smoking status: Never Smoker   • Smokeless tobacco: Never Used   Vaping Use   • Vaping Use: Never used   Substance Use Topics   • Alcohol use: No   • Drug use: Defer        Medications Prior to Admission   Medication Sig Dispense Refill Last Dose   • atorvastatin (LIPITOR) 20 MG tablet Take 20 mg by mouth Daily.   3/11/2021 at Unknown time   • CBD (cannabidiol) oral oil Take 1 mL by mouth Daily. For arthritis   Past Week at Unknown time   • metFORMIN (GLUCOPHAGE) 1000 MG tablet Take 1,000 mg by mouth 2 (Two) Times a Day With Meals.   3/11/2021 at Unknown time   • SITagliptin (JANUVIA) 100 MG tablet Take 100 mg by mouth Daily.   3/11/2021 at Unknown time   • glimepiride (AMARYL) 1 MG tablet Take 2 mg by  "mouth Daily.   3/10/2021   • hydroCHLOROthiazide (HYDRODIURIL) 12.5 MG tablet Take 12.5 mg by mouth Daily.   3/10/2021   • lisinopril (PRINIVIL,ZESTRIL) 40 MG tablet Take 40 mg by mouth Daily.   3/10/2021   • nitroglycerin (NITROSTAT) 0.4 MG SL tablet Place 1 tablet under the tongue As Needed.   Unknown at Unknown time   • Triamcinolone Acetonide (NASACORT) 55 MCG/ACT nasal inhaler 2 sprays into the nostril(s) as directed by provider Daily.   More than a month at Unknown time       Allergies  Shellfish-derived products    Scheduled Meds:aspirin, 325 mg, Oral, Daily  atorvastatin, 20 mg, Oral, Nightly  ceFAZolin, 2 g, Intravenous, Once  chlorhexidine, 15 mL, Mouth/Throat, BID  glipizide, 5 mg, Oral, QAM AC  insulin lispro, 0-9 Units, Subcutaneous, TID AC  linagliptin, 5 mg, Oral, Daily  lisinopril, 20 mg, Oral, Q24H  metoprolol tartrate, 12.5 mg, Oral, Once  mupirocin, 1 application, Each Nare, Q12H  sodium chloride, 10 mL, Intravenous, Q12H  sodium chloride, 3 mL, Intravenous, Q12H      Continuous Infusions:insulin, 0-50 Units/hr  niCARdipine, 5-15 mg/hr, Last Rate: 2.5 mg/hr (03/15/21 0258)  sodium chloride, 30 mL/hr      PRN Meds:.•  acetaminophen  •  ALPRAZolam  •  Chlorhexidine Gluconate Cloth  •  dextrose  •  dextrose  •  diphenhydrAMINE  •  glucagon (human recombinant)  •  insulin lispro **AND** insulin lispro  •  insulin  •  nitroglycerin  •  sodium chloride  •  sodium chloride  •  sodium chloride  •  sodium chloride    Objective     VITAL SIGNS  Vitals:    03/14/21 2338 03/15/21 0000 03/15/21 0244 03/15/21 0400   BP: 125/49  114/40    Pulse: 61  53    Resp:       Temp:  97.7 °F (36.5 °C)  97.6 °F (36.4 °C)   TempSrc:  Oral     SpO2: 97%      Weight:    78.2 kg (172 lb 6.4 oz)   Height:           Flowsheet Rows      First Filed Value   Admission Height  176.5 cm (69.5\") Documented at 03/12/2021 0653   Admission Weight  78.8 kg (173 lb 11.6 oz) Documented at 03/12/2021 0653            Intake/Output Summary " (Last 24 hours) at 3/15/2021 0629  Last data filed at 3/14/2021 2214  Gross per 24 hour   Intake --   Output 500 ml   Net -500 ml        TELEMETRY: Sinus rhythm    Physical Exam:  The patient is alert, oriented and in no distress.  Vital signs as noted above.  Head and neck revealed no carotid bruits or jugular venous distention.  No thyromegaly or lymphadenopathy is present  Lungs clear.  No wheezing.  Breath sounds are normal bilaterally.  Heart normal first and second heart sounds.  No murmur. No precordial rub is present.  No gallop is present.  Abdomen soft and nontender.  No organomegaly is present.  Extremities with good peripheral pulses without any pedal edema.  Skin warm and dry.  Musculoskeletal system is grossly normal  CNS grossly normal      Results Review:   I reviewed the patient's new clinical results.  Lab Results (last 24 hours)     Procedure Component Value Units Date/Time    POC Glucose Once [[id number]]  (Abnormal) Collected: 03/14/21 2021    Specimen: Blood Updated: 03/14/21 2023     Glucose 240 mg/dL      Comment: Serial Number: [id number]:  [id number]       POC Glucose Once [[id number]]  (Abnormal) Collected: 03/14/21 1504    Specimen: Blood Updated: 03/14/21 1507     Glucose 187 mg/dL      Comment: Serial Number: [id number]:  [id number]       POCT Electrolytes +HGB +HCT [[id number]]  (Abnormal) Collected: 03/14/21 1504    Specimen: Blood Updated: 03/14/21 1507     Sodium 137 mmol/L      POC Potassium 4.0 mmol/L      Ionized Calcium 0.96 mmol/L      Comment: Serial Number: [id number]:  [id number]        Glucose 187 mg/dL      Hematocrit 42 %      Hemoglobin 14.2 g/dL     Blood Gas, Arterial - [[id number]]  (Abnormal) Collected: 03/14/21 1504    Specimen: Arterial Blood Updated: 03/14/21 1507     Site Left Radial     Alejo's Test Positive     pH, Arterial 7.405 pH units      pCO2, Arterial 41.7 mm Hg      pO2, Arterial 75.3 mm Hg      HCO3, Arterial 26.1 mmol/L      Base Excess, Arterial 1.2  mmol/L      Comment: Serial Number: [id number]:  [id number]        O2 Saturation, Arterial 95.0 %      CO2 Content 27.4 mmol/L      Barometric Pressure for Blood Gas --     Comment: N/A        Modality Room Air     FIO2 21 %      Hemodilution Yes    POC Glucose Once [[id number]]  (Abnormal) Collected: 03/14/21 1203    Specimen: Blood Updated: 03/14/21 1204     Glucose 153 mg/dL      Comment: Serial Number: [id number]:  [id number]             Imaging Results (Last 24 Hours)     ** No results found for the last 24 hours. **      LAB RESULTS (LAST 7 DAYS)    CBC  Results from last 7 days   Lab Units 03/14/21  1504 03/13/21  0405 03/12/21  0615   WBC 10*3/mm3  --  12.30* 12.00*   RBC 10*6/mm3  --  4.34 4.57   HEMOGLOBIN g/dL  --  13.8 14.5   HEMOGLOBIN, POC g/dL 14.2  --   --    HEMATOCRIT %  --  38.9 41.0   HEMATOCRIT POC % 42  --   --    MCV fL  --  89.7 89.9   PLATELETS 10*3/mm3  --  224 262       BMP  Results from last 7 days   Lab Units 03/13/21  0405 03/12/21  0615   SODIUM mmol/L 139 134*   POTASSIUM mmol/L 3.7 4.6   CHLORIDE mmol/L 104 98   CO2 mmol/L 25.0 26.0   BUN mg/dL 20 18   CREATININE mg/dL 1.03 1.16   GLUCOSE mg/dL 194* 378*   MAGNESIUM mg/dL 1.7  --        CMP   Results from last 7 days   Lab Units 03/13/21  0405 03/12/21  0615   SODIUM mmol/L 139 134*   POTASSIUM mmol/L 3.7 4.6   CHLORIDE mmol/L 104 98   CO2 mmol/L 25.0 26.0   BUN mg/dL 20 18   CREATININE mg/dL 1.03 1.16   GLUCOSE mg/dL 194* 378*   ALBUMIN g/dL 3.70  --    BILIRUBIN mg/dL 0.9  --    ALK PHOS U/L 72  --    AST (SGOT) U/L 14  --    ALT (SGPT) U/L 16  --          BNP        TROPONIN        CoAg  Results from last 7 days   Lab Units 03/13/21  0405 03/12/21  0615   INR  1.03 1.02   APTT seconds 23.6*  --        Creatinine Clearance  Estimated Creatinine Clearance: 69.6 mL/min (by C-G formula based on SCr of 1.03 mg/dL).    ABG  Results from last 7 days   Lab Units 03/14/21  1504   PH, ARTERIAL pH units 7.405   PCO2, ARTERIAL mm Hg 41.7    PO2 ART mm Hg 75.3*   O2 SATURATION ART % 95.0   BASE EXCESS ART mmol/L 1.2       Radiology  XR Chest 1 View    Result Date: 3/14/2021  No active disease.  Electronically Signed By-Xavi Juares MD On:3/14/2021 10:08 AM This report was finalized on [id number] by  Xavi Juares MD.              EKG      I personally viewed and interpreted the patient's EKG/Telemetry data:    ECHOCARDIOGRAM:    Results for orders placed during the hospital encounter of 02/11/21    Adult Transthoracic Echo Complete W/ Cont if Necessary Per Protocol    Interpretation Summary  · Estimated left ventricular EF = 60% Left ventricular systolic function is normal.    Indications  Chest pain    Technically satisfactory study.  Mitral valve is structurally normal.  Tricuspid valve is structurally normal.  Aortic valve is structurally normal.  Pulmonic valve could not be well visualized.  No evidence for mitral tricuspid or aortic regurgitation is seen by Doppler study.  Left atrium is mildly enlarged.  Right atrium is normal in size.  Left ventricle is normal in size and contractility with ejection fraction of 60%.  Right ventricle is normal in size.  Atrial septum is intact.  Aorta is normal.  No pericardial effusion or intracardiac thrombus is seen.    Impression  Mild left atrial enlargement.  Structurally and functionally normal cardiac valves.  Left ventricular size and contractility is normal with ejection fraction of 60%.          STRESS MYOVIEW:    Cardiolite (Tc-99m Sestamibi) stress test    CARDIAC CATHETERIZATION:            OTHER:         Assessment/Plan     Principal Problem:    Coronary artery disease involving native coronary artery of native heart without angina pectoris  Active Problems:    Chest discomfort    Essential hypertension    Diabetes mellitus due to underlying condition without complication, without long-term current use of insulin (CMS/HCC)    Dyslipidemia    Chronic coronary artery disease    Abnormal nuclear  stress test    Coronary artery disease      ]]]]]]]]]]]]]]]]]]]  Impression  ========  -Chest discomfort-exertional burning sensation in the chest.  More with carrying.  Relieved by rest.  Suggestive of angina pectoris.    Cardiac catheterization 3/12/2021 revealed left ventricle size and contractility is normal with ejection fraction of 60%.  No mitral regurgitation is present.  Left subclavian artery and left internal mammary arteries are normal.     Left main coronary artery normal.  Left anterior descending artery has diffuse calcification with ostial 60% proximal 90 and mid segment 90% disease.  Circumflex coronary artery provided a large marginal branch.  Circumflex coronary artery has 60% disease proximal to the origin of marginal branch.  Circumflex coronary artery distal to the marginal branches small in caliber and has diffuse 60 to 70% disease.  Right coronary artery is a very large and dominant vessel that has ostial 99% disease and mid segment 90% disease.  There appears to be a clot.    Cardiac cath 12/13/2014 revealed 40 to 50% mid LAD ostial 60 to 70% RCA (FFR did not reveal any significant disease)  History of left heart cath     Echocardiogram-normal 2/23/2021  Lexiscan Cardiolite test 2/23/2021-abnormal with apical ischemia.     -Hypertension dyslipidemia diabetes.  CKD 3.  Recent BUN 35 creatinine 1.27-12/27/2020     -History of significant sinus bradycardia that has improved in the past with discontinuation of Bystolic.     -History of nonsustained ventricular tachycardia with exercise in the past.     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  ==============  Plan  =========  Patient has symptoms that are concerning for angina pectoris.  Patient had coronary artery disease at cardiac cath in 2014 as described above.  Recent EKG showed sinus rhythm nonspecific ST-T wave changes.  Ischemic cardiac  work-up.  Stress Cardiolite test-abnormal with apical ischemia  Echocardiogram-normal as above    Cardiac catheterization 3/12/2021 revealed  Left ventricle size and contractility is normal with ejection fraction of 60%.  No mitral regurgitation is present.  Left subclavian artery and left internal mammary arteries are normal.     Left main coronary artery normal.  Left anterior descending artery has diffuse calcification with ostial 60% proximal 90 and mid segment 90% disease.  Circumflex coronary artery provided a large marginal branch.  Circumflex coronary artery has 60% disease proximal to the origin of marginal branch.  Circumflex coronary artery distal to the marginal branches small in caliber and has diffuse 60 to 70% disease.  Right coronary artery is a very large and dominant vessel that has ostial 99% disease and mid segment 90% disease.  There appears to be a clot.      Patient has triple-vessel coronary artery disease and normal left ventricle function.    Nasal swab positive for COVID-19.  Patient had COVID-19 infection in December.  Likely inactive RNA virus.  Appreciated infectious disease consultation and recommendations    CABG today.    Have discussed with patient regarding present plans and expectations etc. from surgery.    Patient had significant hypertension yesterday which is better today.    Further plan will depend on patient's progress  ]]]]]]]]]]]]]             Huseyin Do MD  03/15/21  06:29 EDT

## 2021-03-15 NOTE — PLAN OF CARE
Goal Outcome Evaluation:        Outcome Summary: patient is prepped and ready for scheduled open heart surgery today.  Pt is nervous and has taken PRN xanax

## 2021-03-15 NOTE — ANESTHESIA PROCEDURE NOTES
Central Line      Patient reassessed immediately prior to procedure    Patient location during procedure: OR  Start time: 3/15/2021 11:54 AM  Stop Time:3/15/2021 11:58 AM  Indications: vascular access and MD/Surgeon request  Staff  Anesthesiologist: Tayo Garcia MD  Preanesthetic Checklist  Completed: patient identified, IV checked, site marked, risks and benefits discussed, surgical consent, monitors and equipment checked, pre-op evaluation and timeout performed  Central Line Prep  Sterile Tech:cap, gloves, gown, mask and sterile barriers  Prep: chloraprep  Patient monitoring: blood pressure monitoring, continuous pulse oximetry and EKG  Central Line Procedure  Laterality:right  Location:internal jugular  Catheter Type:Erath-Brian  Assessment  Post procedure:biopatch applied, line sutured and occlusive dressing applied  Assessement:blood return through all ports, free fluid flow and Mick Test  Complications:no  Patient Tolerance:patient tolerated the procedure well with no apparent complications

## 2021-03-15 NOTE — PROGRESS NOTES
Discharge Planning Assessment  [facility]     Patient Name: Tao Pozo  MRN: [id number]  Today's Date: 3/15/2021    Admit Date: 3/12/2021    Discharge Needs Assessment     Row Name 03/15/21 1700       Living Environment    Lives With  spouse    Name(s) of Who Lives With Patient  Rosalind    Current Living Arrangements  home/apartment/condo    Primary Care Provided by  self    Provides Primary Care For  no one    Family Caregiver if Needed  spouse    Family Caregiver Names  Rosalind    Quality of Family Relationships  supportive    Able to Return to Prior Arrangements  yes       Resource/Environmental Concerns    Resource/Environmental Concerns  none       Transition Planning    Patient/Family Anticipates Transition to  home with family    Patient/Family Anticipated Services at Transition  none    Transportation Anticipated  car, drives self;family or friend will provide       Discharge Needs Assessment    Readmission Within the Last 30 Days  no previous admission in last 30 days    Equipment Currently Used at Home  none    Concerns Comments  Spoke to spouse.    Discharge Coordination/Progress  DC Plan: Return with spouse, CABG 3/15.        Discharge Plan     Row Name 03/15/21 1705       Plan    Plan  DC Plan: Return with spouse, CABG 3/15.    Plan Comments  Pharmacy [facility]. Pt lives in [facility]        Continued Care and Services - Admitted Since 3/12/2021    Coordination has not been started for this encounter.         Demographic Summary     Row Name 03/15/21 1659       General Information    Admission Type  inpatient    Arrived From  emergency department    Required Notices Provided  Important Message from Medicare    Referral Source  admission list    Reason for Consult  discharge planning    Preferred Language  English     Used During This Interaction  no    General Information Comments  Spoke to spouse in room per phone. Pt was in OR earlier today.        Functional Status     Row Name  03/15/21 1659       Functional Status    Usual Activity Tolerance  excellent    Current Activity Tolerance  fair       Functional Status, IADL    Medications  independent    Meal Preparation  independent    Housekeeping  independent    Laundry  independent    Shopping  independent       Mental Status    General Appearance WDL  WDL       Mental Status Summary    Recent Changes in Mental Status/Cognitive Functioning  no changes        Phone communication or documentation only - no physical contact with patient or family.       Patient Forms     Row Name 03/15/21 0582       Patient Forms    Important Message from Medicare ([facility])  Delivered 3/15/21 registration            Laurie Sullivan RN

## 2021-03-15 NOTE — ANESTHESIA PREPROCEDURE EVALUATION
Anesthesia Evaluation     Patient summary reviewed and Nursing notes reviewed   NPO Solid Status: > 6 hours  NPO Liquid Status: > 6 hours           Airway   Mallampati: II  TM distance: >3 FB  Neck ROM: full  No difficulty expected  Dental - normal exam     Pulmonary - negative pulmonary ROS and normal exam    breath sounds clear to auscultation  Cardiovascular - normal exam    ECG reviewed  Rhythm: regular  Rate: normal    (+) hypertension, CAD, hyperlipidemia,       Neuro/Psych- negative ROS  GI/Hepatic/Renal/Endo    (+)   diabetes mellitus,     Musculoskeletal     Abdominal  - normal exam    Abdomen: soft.  Bowel sounds: normal.   Substance History - negative use     OB/GYN negative ob/gyn ROS         Other   arthritis,                      Anesthesia Plan    ASA 4     general     intravenous induction   Postoperative Plan: Expected vent after surgery  Anesthetic plan, all risks, benefits, and alternatives have been provided, discussed and informed consent has been obtained with: patient.  Use of blood products discussed with patient .

## 2021-03-15 NOTE — ANESTHESIA PROCEDURE NOTES
Central Line      Patient reassessed immediately prior to procedure    Patient location during procedure: OR  Start time: 3/15/2021 11:45 AM  Stop Time:3/15/2021 11:54 AM  Indications: vascular access and MD/Surgeon request  Staff  Anesthesiologist: Tayo Garcia MD  Preanesthetic Checklist  Completed: patient identified, IV checked, site marked, risks and benefits discussed, surgical consent, monitors and equipment checked, pre-op evaluation and timeout performed  Central Line Prep  Sterile Tech:cap, gloves, gown, mask and sterile barriers  Prep: chloraprep  Patient monitoring: blood pressure monitoring, continuous pulse oximetry and EKG  Central Line Procedure  Laterality:right  Location:internal jugular  Catheter Type:triple lumen and Cordis  Catheter Size:8.5 Fr  Guidance:ultrasound guided  PROCEDURE NOTE/ULTRASOUND INTERPRETATION.  Using ultrasound guidance the potential vascular sites for insertion of the catheter were visualized to determine the patency of the vessel to be used for vascular access.  After selecting the appropriate site for insertion, the needle was visualized under ultrasound being inserted into the internal jugular vein, followed by ultrasound confirmation of wire and catheter placement. There were no abnormalities seen on ultrasound; an image was taken; and the patient tolerated the procedure with no complications. Images: still images obtained, printed/placed on chart  Assessment  Post procedure:biopatch applied, line sutured and occlusive dressing applied  Assessement:blood return through all ports, free fluid flow and Mick Test  Complications:no  Patient Tolerance:patient tolerated the procedure well with no apparent complications

## 2021-03-15 NOTE — ANESTHESIA PROCEDURE NOTES
Airway  Urgency: elective    Date/Time: 3/15/2021 11:56 AM  Airway not difficult    General Information and Staff    Patient location during procedure: OR  Anesthesiologist: Tayo Garcia MD    Indications and Patient Condition  Indications for airway management: airway protection    Preoxygenated: yes  MILS maintained throughout  Mask difficulty assessment: 1 - vent by mask    Final Airway Details  Final airway type: endotracheal airway      Successful airway: ETT  Cuffed: yes   Successful intubation technique: direct laryngoscopy  Endotracheal tube insertion site: oral  Blade: Ismael  Blade size: 4  ETT size (mm): 7.5  Cormack-Lehane Classification: grade IIb - view of arytenoids or posterior of glottis only  Placement verified by: chest auscultation and capnometry   Measured from: gums  ETT/EBT to gums (cm): 23  Number of attempts at approach: 1  Assessment: lips, teeth, and gum same as pre-op and atraumatic intubation

## 2021-03-15 NOTE — ANESTHESIA POSTPROCEDURE EVALUATION
Patient: Tao Pozo    Procedure Summary     Date: 03/15/21 Room / Location:  [facility] /  [facility]    Anesthesia Start: 1130 Anesthesia Stop: 1528    Procedure: CORONARY ARTERY BYPASS GRAFTING (N/A Chest) Diagnosis:       Coronary artery disease involving native coronary artery of native heart without angina pectoris      (Coronary artery disease involving native coronary artery of native heart without angina pectoris [I25.10])    Surgeons: Paxton Vasquez MD Provider: Tayo Garcia MD    Anesthesia Type: general ASA Status: 4          Anesthesia Type: general    Vitals  Vitals Value Taken Time   BP     Temp     Pulse 80 03/15/21 1533   Resp     SpO2 100 % 03/15/21 1534   Vitals shown include unvalidated device data.        Post Anesthesia Care and Evaluation    Patient location during evaluation: ICU  Patient participation: complete - patient cannot participate  Level of consciousness: obtunded/minimal responses  Pain score: 0  Pain management: adequate  Airway patency: patent  Anesthetic complications: No anesthetic complications  PONV Status: none  Cardiovascular status: acceptable and hemodynamically unstable  Respiratory status: acceptable and intubated  Hydration status: acceptable    Comments: Patient seen and examined postoperatively; vital signs stable; SpO2 greater than or equal to 90%; cardiopulmonary status stable; nausea/vomiting adequately controlled; pain adequately controlled; no apparent anesthesia complications.

## 2021-03-15 NOTE — ANESTHESIA PROCEDURE NOTES
Preanesthesia Checklist:  Patient identified, IV assessed, risks and benefits discussed, monitors and equipment assessed, procedure being performed at surgeon's request and anesthesia consent obtained.    General Procedure Information  LETICIA Placed for monitoring purposes only -- This is not a diagnostic LETICIA          Anesthesia Information      Echocardiogram Comments:       Post bypass exam... no air or valvular issues or hypokinetic ernst.

## 2021-03-16 ENCOUNTER — APPOINTMENT (OUTPATIENT)
Dept: GENERAL RADIOLOGY | Facility: HOSPITAL | Age: 76
End: 2021-03-16

## 2021-03-16 LAB
ALBUMIN SERPL-MCNC: 4.2 G/DL (ref 3.5–5.2)
ANION GAP SERPL CALCULATED.3IONS-SCNC: 7 MMOL/L (ref 5–15)
ARTERIAL PATENCY WRIST A: ABNORMAL
BASE DEFICIT: -0.4 MEQ/LITER
BASE EXCESS BLDA CALC-SCNC: -0.6 MMOL/L (ref 0–3)
BDY SITE: ABNORMAL
BUN SERPL-MCNC: 24 MG/DL (ref 8–23)
BUN/CREAT SERPL: 24 (ref 7–25)
CA-I SERPL ISE-MCNC: 1.24 MMOL/L (ref 1.2–1.3)
CALCIUM SPEC-SCNC: 8.8 MG/DL (ref 8.6–10.5)
CHLORIDE SERPL-SCNC: 110 MMOL/L (ref 98–107)
CO2 BLDA-SCNC: 26.8 MMOL/L (ref 22–29)
CO2 SERPL-SCNC: 24 MMOL/L (ref 22–29)
CREAT SERPL-MCNC: 1 MG/DL (ref 0.76–1.27)
DEPRECATED RDW RBC AUTO: 43.8 FL (ref 37–54)
ERYTHROCYTE [DISTWIDTH] IN BLOOD BY AUTOMATED COUNT: 13.9 % (ref 12.3–15.4)
GFR SERPL CREATININE-BSD FRML MDRD: 73 ML/MIN/1.73
GLUCOSE BLDC GLUCOMTR-MCNC: 105 MG/DL (ref 70–105)
GLUCOSE BLDC GLUCOMTR-MCNC: 113 MG/DL (ref 70–105)
GLUCOSE BLDC GLUCOMTR-MCNC: 115 MG/DL (ref 70–105)
GLUCOSE BLDC GLUCOMTR-MCNC: 118 MG/DL (ref 70–105)
GLUCOSE BLDC GLUCOMTR-MCNC: 122 MG/DL (ref 70–105)
GLUCOSE BLDC GLUCOMTR-MCNC: 126 MG/DL (ref 70–105)
GLUCOSE BLDC GLUCOMTR-MCNC: 129 MG/DL (ref 74–100)
GLUCOSE BLDC GLUCOMTR-MCNC: 132 MG/DL (ref 70–105)
GLUCOSE BLDC GLUCOMTR-MCNC: 132 MG/DL (ref 70–105)
GLUCOSE BLDC GLUCOMTR-MCNC: 134 MG/DL (ref 70–105)
GLUCOSE BLDC GLUCOMTR-MCNC: 135 MG/DL (ref 70–105)
GLUCOSE BLDC GLUCOMTR-MCNC: 140 MG/DL (ref 70–105)
GLUCOSE BLDC GLUCOMTR-MCNC: 147 MG/DL (ref 70–105)
GLUCOSE BLDC GLUCOMTR-MCNC: 158 MG/DL (ref 70–105)
GLUCOSE BLDC GLUCOMTR-MCNC: 164 MG/DL (ref 70–105)
GLUCOSE BLDC GLUCOMTR-MCNC: 196 MG/DL (ref 70–105)
GLUCOSE BLDC GLUCOMTR-MCNC: 203 MG/DL (ref 70–105)
GLUCOSE BLDC GLUCOMTR-MCNC: 245 MG/DL (ref 70–105)
GLUCOSE SERPL-MCNC: 125 MG/DL (ref 65–99)
HBA1C MFR BLD: 7.49 % (ref 4.8–5.6)
HCO3 MIXED: 25.4 MMOL/L (ref 21–29)
HCT VFR BLD AUTO: 30.9 % (ref 37.5–51)
HEMODILUTION: NO
HGB BLD-MCNC: 10.9 G/DL (ref 13–17.7)
INHALED O2 CONCENTRATION: <21 %
INR PPP: 1.09 (ref 0.93–1.1)
MAGNESIUM SERPL-MCNC: 2.2 MG/DL (ref 1.6–2.4)
MCH RBC QN AUTO: 32 PG (ref 26.6–33)
MCHC RBC AUTO-ENTMCNC: 35.1 G/DL (ref 31.5–35.7)
MCV RBC AUTO: 91.3 FL (ref 79–97)
MODALITY: ABNORMAL
O2 SATURATION MIXED: 65.5 %
PCO2 MIXED: 46.9 MMHG (ref 35–51)
PH MIXED: 7.34 PH UNITS (ref 7.32–7.45)
PHOSPHATE SERPL-MCNC: 4.3 MG/DL (ref 2.5–4.5)
PLATELET # BLD AUTO: 152 10*3/MM3 (ref 140–450)
PMV BLD AUTO: 8.5 FL (ref 6–12)
PO2 MIXED: 36.3 MMHG
POTASSIUM SERPL-SCNC: 4.2 MMOL/L (ref 3.5–5.2)
PROTHROMBIN TIME: 11.9 SECONDS (ref 9.6–11.7)
RBC # BLD AUTO: 3.39 10*6/MM3 (ref 4.14–5.8)
SARS-COV-2 AB SERPL QL IA: POSITIVE
SODIUM SERPL-SCNC: 141 MMOL/L (ref 136–145)
WBC # BLD AUTO: 10.3 10*3/MM3 (ref 3.4–10.8)

## 2021-03-16 PROCEDURE — 25010000002 MORPHINE PER 10 MG: Performed by: NURSE PRACTITIONER

## 2021-03-16 PROCEDURE — 97165 OT EVAL LOW COMPLEX 30 MIN: CPT

## 2021-03-16 PROCEDURE — 85610 PROTHROMBIN TIME: CPT | Performed by: NURSE PRACTITIONER

## 2021-03-16 PROCEDURE — 71045 X-RAY EXAM CHEST 1 VIEW: CPT

## 2021-03-16 PROCEDURE — 85027 COMPLETE CBC AUTOMATED: CPT | Performed by: NURSE PRACTITIONER

## 2021-03-16 PROCEDURE — 93010 ELECTROCARDIOGRAM REPORT: CPT | Performed by: INTERNAL MEDICINE

## 2021-03-16 PROCEDURE — 97116 GAIT TRAINING THERAPY: CPT

## 2021-03-16 PROCEDURE — 25010000002 HYDRALAZINE PER 20 MG: Performed by: THORACIC SURGERY (CARDIOTHORACIC VASCULAR SURGERY)

## 2021-03-16 PROCEDURE — 82962 GLUCOSE BLOOD TEST: CPT

## 2021-03-16 PROCEDURE — 99233 SBSQ HOSP IP/OBS HIGH 50: CPT | Performed by: INTERNAL MEDICINE

## 2021-03-16 PROCEDURE — 82803 BLOOD GASES ANY COMBINATION: CPT

## 2021-03-16 PROCEDURE — 63710000001 INSULIN REGULAR HUMAN PER 5 UNITS: Performed by: NURSE PRACTITIONER

## 2021-03-16 PROCEDURE — 25010000002 MAGNESIUM SULFATE IN D5W 1G/100ML (PREMIX) 1-5 GM/100ML-% SOLUTION: Performed by: NURSE PRACTITIONER

## 2021-03-16 PROCEDURE — 83735 ASSAY OF MAGNESIUM: CPT | Performed by: NURSE PRACTITIONER

## 2021-03-16 PROCEDURE — 25010000002 FUROSEMIDE PER 20 MG: Performed by: NURSE PRACTITIONER

## 2021-03-16 PROCEDURE — 97530 THERAPEUTIC ACTIVITIES: CPT

## 2021-03-16 PROCEDURE — 99024 POSTOP FOLLOW-UP VISIT: CPT | Performed by: NURSE PRACTITIONER

## 2021-03-16 PROCEDURE — 97161 PT EVAL LOW COMPLEX 20 MIN: CPT

## 2021-03-16 PROCEDURE — 82330 ASSAY OF CALCIUM: CPT | Performed by: NURSE PRACTITIONER

## 2021-03-16 PROCEDURE — 80069 RENAL FUNCTION PANEL: CPT | Performed by: NURSE PRACTITIONER

## 2021-03-16 PROCEDURE — 93005 ELECTROCARDIOGRAM TRACING: CPT | Performed by: NURSE PRACTITIONER

## 2021-03-16 PROCEDURE — 25010000002 CEFAZOLIN PER 500 MG: Performed by: NURSE PRACTITIONER

## 2021-03-16 RX ORDER — NALOXONE HCL 0.4 MG/ML
0.4 VIAL (ML) INJECTION
Status: DISCONTINUED | OUTPATIENT
Start: 2021-03-16 | End: 2021-03-18 | Stop reason: HOSPADM

## 2021-03-16 RX ORDER — HYDRALAZINE HYDROCHLORIDE 20 MG/ML
20 INJECTION INTRAMUSCULAR; INTRAVENOUS EVERY 4 HOURS PRN
Status: DISCONTINUED | OUTPATIENT
Start: 2021-03-16 | End: 2021-03-18 | Stop reason: HOSPADM

## 2021-03-16 RX ORDER — DEXTROSE MONOHYDRATE 25 G/50ML
25 INJECTION, SOLUTION INTRAVENOUS
Status: DISCONTINUED | OUTPATIENT
Start: 2021-03-18 | End: 2021-03-18 | Stop reason: HOSPADM

## 2021-03-16 RX ORDER — FUROSEMIDE 10 MG/ML
20 INJECTION INTRAMUSCULAR; INTRAVENOUS ONCE
Status: COMPLETED | OUTPATIENT
Start: 2021-03-16 | End: 2021-03-16

## 2021-03-16 RX ORDER — INSULIN LISPRO 100 [IU]/ML
0-9 INJECTION, SOLUTION INTRAVENOUS; SUBCUTANEOUS AS NEEDED
Status: DISCONTINUED | OUTPATIENT
Start: 2021-03-18 | End: 2021-03-18 | Stop reason: HOSPADM

## 2021-03-16 RX ORDER — INSULIN LISPRO 100 [IU]/ML
0-9 INJECTION, SOLUTION INTRAVENOUS; SUBCUTANEOUS
Status: DISCONTINUED | OUTPATIENT
Start: 2021-03-18 | End: 2021-03-18 | Stop reason: HOSPADM

## 2021-03-16 RX ORDER — NICOTINE POLACRILEX 4 MG
15 LOZENGE BUCCAL
Status: DISCONTINUED | OUTPATIENT
Start: 2021-03-18 | End: 2021-03-18 | Stop reason: HOSPADM

## 2021-03-16 RX ORDER — MORPHINE SULFATE 4 MG/ML
2 INJECTION, SOLUTION INTRAMUSCULAR; INTRAVENOUS
Status: DISCONTINUED | OUTPATIENT
Start: 2021-03-16 | End: 2021-03-18 | Stop reason: HOSPADM

## 2021-03-16 RX ADMIN — HYDROCODONE BITARTRATE AND ACETAMINOPHEN 1 TABLET: 5; 325 TABLET ORAL at 16:19

## 2021-03-16 RX ADMIN — MUPIROCIN 1 APPLICATION: 20 OINTMENT TOPICAL at 19:55

## 2021-03-16 RX ADMIN — METOPROLOL TARTRATE 12.5 MG: 25 TABLET, FILM COATED ORAL at 19:55

## 2021-03-16 RX ADMIN — DOCUSATE SODIUM 100 MG: 100 CAPSULE, LIQUID FILLED ORAL at 08:43

## 2021-03-16 RX ADMIN — ATORVASTATIN CALCIUM 40 MG: 40 TABLET, FILM COATED ORAL at 19:58

## 2021-03-16 RX ADMIN — HYDRALAZINE HYDROCHLORIDE 20 MG: 20 INJECTION INTRAMUSCULAR; INTRAVENOUS at 11:14

## 2021-03-16 RX ADMIN — HYDROCODONE BITARTRATE AND ACETAMINOPHEN 1 TABLET: 5; 325 TABLET ORAL at 08:33

## 2021-03-16 RX ADMIN — MORPHINE SULFATE 2 MG: 4 INJECTION INTRAVENOUS at 12:02

## 2021-03-16 RX ADMIN — POLYETHYLENE GLYCOL 3350 17 G: 17 POWDER, FOR SOLUTION ORAL at 08:43

## 2021-03-16 RX ADMIN — HYDROCODONE BITARTRATE AND ACETAMINOPHEN 1 TABLET: 5; 325 TABLET ORAL at 19:54

## 2021-03-16 RX ADMIN — MORPHINE SULFATE 2 MG: 4 INJECTION INTRAVENOUS at 16:00

## 2021-03-16 RX ADMIN — MORPHINE SULFATE 1 MG: 4 INJECTION INTRAVENOUS at 03:06

## 2021-03-16 RX ADMIN — MORPHINE SULFATE 2 MG: 4 INJECTION INTRAVENOUS at 19:55

## 2021-03-16 RX ADMIN — PANTOPRAZOLE SODIUM 40 MG: 40 TABLET, DELAYED RELEASE ORAL at 06:00

## 2021-03-16 RX ADMIN — MORPHINE SULFATE 1 MG: 4 INJECTION INTRAVENOUS at 08:38

## 2021-03-16 RX ADMIN — FUROSEMIDE 20 MG: 10 INJECTION, SOLUTION INTRAMUSCULAR; INTRAVENOUS at 12:02

## 2021-03-16 RX ADMIN — SENNOSIDES 2 TABLET: 8.6 TABLET, FILM COATED ORAL at 19:59

## 2021-03-16 RX ADMIN — ACETAMINOPHEN 650 MG: 325 TABLET ORAL at 12:01

## 2021-03-16 RX ADMIN — SODIUM CHLORIDE 3 UNITS/HR: 9 INJECTION, SOLUTION INTRAVENOUS at 19:54

## 2021-03-16 RX ADMIN — CHLORHEXIDINE GLUCONATE 0.12% ORAL RINSE 15 ML: 1.2 LIQUID ORAL at 19:58

## 2021-03-16 RX ADMIN — SODIUM CHLORIDE 5 MG/HR: 9 INJECTION, SOLUTION INTRAVENOUS at 08:34

## 2021-03-16 RX ADMIN — POLYETHYLENE GLYCOL 3350 17 G: 17 POWDER, FOR SOLUTION ORAL at 19:56

## 2021-03-16 RX ADMIN — SODIUM CHLORIDE 5 MG/HR: 9 INJECTION, SOLUTION INTRAVENOUS at 03:37

## 2021-03-16 RX ADMIN — CEFAZOLIN SODIUM 2 G: 10 INJECTION, POWDER, FOR SOLUTION INTRAVENOUS at 13:48

## 2021-03-16 RX ADMIN — ACETAMINOPHEN ORAL SOLUTION 649.6 MG: 650 SOLUTION ORAL at 05:47

## 2021-03-16 RX ADMIN — METOPROLOL TARTRATE 12.5 MG: 25 TABLET, FILM COATED ORAL at 12:02

## 2021-03-16 RX ADMIN — MAGNESIUM SULFATE HEPTAHYDRATE 1 G: 1 INJECTION, SOLUTION INTRAVENOUS at 05:48

## 2021-03-16 RX ADMIN — CEFAZOLIN SODIUM 2 G: 10 INJECTION, POWDER, FOR SOLUTION INTRAVENOUS at 05:48

## 2021-03-16 RX ADMIN — MAGNESIUM SULFATE HEPTAHYDRATE 1 G: 1 INJECTION, SOLUTION INTRAVENOUS at 13:48

## 2021-03-16 RX ADMIN — ASPIRIN 81 MG: 81 TABLET, COATED ORAL at 08:41

## 2021-03-16 RX ADMIN — CHLORHEXIDINE GLUCONATE 0.12% ORAL RINSE 15 ML: 1.2 LIQUID ORAL at 08:41

## 2021-03-16 RX ADMIN — DOCUSATE SODIUM 100 MG: 100 CAPSULE, LIQUID FILLED ORAL at 19:58

## 2021-03-16 RX ADMIN — CEFAZOLIN SODIUM 2 G: 10 INJECTION, POWDER, FOR SOLUTION INTRAVENOUS at 22:06

## 2021-03-16 RX ADMIN — MUPIROCIN 1 APPLICATION: 20 OINTMENT TOPICAL at 08:43

## 2021-03-16 NOTE — PLAN OF CARE
Goal Outcome Evaluation:  Plan of Care Reviewed With: patient     Outcome Summary: 75 y/o male admitted on 3/12 due to chest discomfort now s/p CABG on 3/15. PMH includes HTN, dm, CAD, COVID in Dec. At time of eval , pt with post op lines including arterial line, chest tube, mosley, O2 at 4l/nc. Pt c/o pain mostly chest tube site. Min A to come to standing with cues for sternal precautions. Pt able to ambulate in room , about 80 ft using rw, gait slow but steady. Pt should continue to progress and be able to d/c home. PPe gloves, mask with shield.

## 2021-03-16 NOTE — THERAPY EVALUATION
Patient Name: Tao Pozo  : 1945    MRN: [id number]                              Today's Date: 3/16/2021       Admit Date: 3/12/2021    Visit Dx:     ICD-10-CM ICD-9-CM   1. Coronary artery disease involving native coronary artery of native heart without angina pectoris  I25.10 414.01   2. Chest discomfort  R07.89 786.59   3. Essential hypertension  I10 401.9   4. Diabetes mellitus due to underlying condition without complication, without long-term current use of insulin (CMS/HCC)  E08.9 249.00   5. Dyslipidemia  E78.5 272.4   6. Chronic coronary artery disease  I25.10 414.00   7. Abnormal nuclear stress test  R94.39 794.39     Patient Active Problem List   Diagnosis   • COVID-19 virus detected   • Coronary artery disease   • History of left heart catheterization (LHC)   • Hyperlipidemia   • Hypertension   • Sinus bradycardia   • Chest discomfort   • Essential hypertension   • Diabetes mellitus due to underlying condition without complication, without long-term current use of insulin (CMS/HCC)   • Dyslipidemia   • Chronic coronary artery disease   • Abnormal nuclear stress test   • Coronary artery disease involving native coronary artery of native heart without angina pectoris     Past Medical History:   Diagnosis Date   • Arthritis    • Coronary artery disease 2012   • Diabetes mellitus (CMS/[facility])    • Elevated cholesterol    • Hyperlipidemia    • Hypertension      Past Surgical History:   Procedure Laterality Date   • CARDIAC CATHETERIZATION N/A 3/12/2021    Procedure: Left Heart Cath with Coronary Angiography;  Surgeon: Huseyin Do MD;  Location: [facility] INVASIVE LOCATION;  Service: Cardiovascular;  Laterality: N/A;   • TONSILECTOMY, ADENOIDECTOMY, BILATERAL MYRINGOTOMY AND TUBES       General Information     Row Name 21 1349          Physical Therapy Time and Intention    Document Type  evaluation  -CR     Mode of Treatment  physical therapy  -CR     Row Name 21 1349           General Information    Patient Profile Reviewed  yes  -CR     Prior Level of Function  independent:;all household mobility;community mobility;ADL's;driving  -CR     Existing Precautions/Restrictions  sternal  -CR     Barriers to Rehab  medically complex  -CR     Row Name 03/16/21 1349          Home Main Entrance    Number of Stairs, Main Entrance  four  -CR     Stair Railings, Main Entrance  railings safe and in good condition  -CR     Row Name 03/16/21 1349          Stairs Within Home, Primary    Number of Stairs, Within Home, Primary  none  -CR     Row Name 03/16/21 1349          Cognition    Orientation Status (Cognition)  oriented x 4  -CR     Row Name 03/16/21 1349          Safety Issues, Functional Mobility    Impairments Affecting Function (Mobility)  pain;endurance/activity tolerance  -CR       User Key  (r) = Recorded By, (t) = Taken By, (c) = Cosigned By    Initials Name Provider Type    CR Reyes, Carmela, PT Physical Therapist        Mobility     Row Name 03/16/21 1349          Bed Mobility    Comment (Bed Mobility)  up in chair  -CR     Row Name 03/16/21 1349          Bed-Chair Transfer    Bed-Chair [place] (Transfers)  contact guard;verbal cues  -CR     Assistive Device (Bed-Chair Transfers)  walker, front-wheeled  -CR     Row Name 03/16/21 1349          Sit-Stand Transfer    Sit-Stand [place] (Transfers)  minimum assist (75% patient effort);2 person assist;1 person to manage equipment;verbal cues  -CR     Assistive Device (Sit-Stand Transfers)  walker, front-wheeled  -CR     Row Name 03/16/21 1349          Gait/Stairs (Locomotion)    [place] Level (Gait)  minimum assist (75% patient effort);verbal cues  -CR     Assistive Device (Gait)  walker, front-wheeled  -CR     Distance in Feet (Gait)  80  -CR     Deviations/Abnormal Patterns (Gait)  gait speed decreased  -CR     Comment (Gait/Stairs)  cardiac level 3  -CR       User Key  (r) = Recorded By, (t) = Taken By, (c) = Cosigned By     Initials Name Provider Type    CR Reyes, Carmela, PT Physical Therapist        Obj/Interventions     Row Name 03/16/21 1350          Range of Motion Comprehensive    General Range of Motion  bilateral lower extremity ROM WFL  -CR     Row Name 03/16/21 1350          Strength Comprehensive (MMT)    Comment, General Manual Muscle Testing (MMT) Assessment  BLE grossly wfl  -CR     Row Name 03/16/21 1350          Balance    Balance Assessment  sitting static balance;standing static balance;standing dynamic balance  -CR     Static Sitting Balance  WNL;sitting in chair  -CR     Static Standing Balance  WFL  -CR     Dynamic Standing Balance  mild impairment;supported  -CR     Row Name 03/16/21 1350          Sensory Assessment (Somatosensory)    Sensory Assessment (Somatosensory)  LE sensation intact  -CR       User Key  (r) = Recorded By, (t) = Taken By, (c) = Cosigned By    Initials Name Provider Type    CR Reyes, Carmela, PT Physical Therapist        Goals/Plan     Row Name 03/16/21 1351          Bed Mobility Goal 1 (PT)    Activity/Assistive Device (Bed Mobility Goal 1, PT)  sit to supine/supine to sit  -CR     [place] Level/Cues Needed (Bed Mobility Goal 1, PT)  standby assist  -CR     Time Frame (Bed Mobility Goal 1, PT)  1 week  -CR     Row Name 03/16/21 5922          Transfer Goal 1 (PT)    Activity/Assistive Device (Transfer Goal 1, PT)  transfers, all  -CR     [place] Level/Cues Needed (Transfer Goal 1, PT)  standby assist  -CR     Time Frame (Transfer Goal 1, PT)  1 week  -CR     Row Name 03/16/21 0180          Gait Training Goal 1 (PT)    Activity/Assistive Device (Gait Training Goal 1, PT)  gait (walking locomotion);increase endurance/gait distance;improve balance and speed  -CR     [place] Level (Gait Training Goal 1, PT)  standby assist  -CR     Distance (Gait Training Goal 1, PT)  300  -CR     Time Frame (Gait Training Goal 1, PT)  1 week  -CR     Row Name 03/16/21 7467          Stairs Goal 1  (PT)    Activity/Assistive Device (Stairs Goal 1, PT)  stairs, all skills  -CR     [place] Level/Cues Needed (Stairs Goal 1, PT)  standby assist  -CR     Number of Stairs (Stairs Goal 1, PT)  1 flight  -CR     Time Frame (Stairs Goal 1, PT)  1 week  -CR     Row Name 03/16/21 4530          Patient Education Goal (PT)    Activity (Patient Education Goal, PT)  HEP and sternal precautions  -CR     [place]/Cues/Accuracy (Memory Goal 2, PT)  demonstrates adequately  -CR     Time Frame (Patient Education Goal, PT)  1 week  -CR       User Key  (r) = Recorded By, (t) = Taken By, (c) = Cosigned By    Initials Name Provider Type    CR Reyes, Carmela, PT Physical Therapist        Clinical Impression     Row Name 03/16/21 1351          Pain    Additional Documentation  Pain Scale: Numbers Pre/Post-Treatment (Group)  -CR     Row Name 03/16/21 1351          Pain Scale: Numbers Pre/Post-Treatment    Pretreatment Pain Rating  5/10  -CR     Posttreatment Pain Rating  5/10  -CR     Pain Location - Orientation  incisional  -CR     Pain Location  chest  -CR     Row Name 03/16/21 1354          Plan of Care Review    Plan of Care Reviewed With  patient  -CR     Outcome Summary  77 y/o male admitted on 3/12 due to chest discomfort now s/p CABG on 3/15. PMH includes HTN, dm, CAD, COVID in Dec. At time of eval , pt with post op lines including arterial line, chest tube, mosley, O2 at 4l/nc. Pt c/o pain mostly chest tube site. Min A to come to standing with cues for sternal precautions. Pt able to ambulate in room , about 80 ft using rw, gait slow but steady. Pt should continue to progress and be able to d/c home. PPe gloves, mask with shield.  -CR     Row Name 03/16/21 1354          Therapy Assessment/Plan (PT)    Patient/Family Therapy Goals Statement (PT)  home  -CR     Rehab Potential (PT)  good, to achieve stated therapy goals  -CR     Criteria for Skilled Interventions Met (PT)  skilled treatment is necessary;yes  -CR      Predicted Duration of Therapy Intervention (PT)  dc  -CR     Row Name 03/16/21 1351          Vital Signs    Pre SpO2 (%)  97  -CR     O2 Delivery Pre Treatment  supplemental O2  -CR     Intra SpO2 (%)  97  -CR     O2 Delivery Intra Treatment  supplemental O2  -CR     Post SpO2 (%)  98  -CR     O2 Delivery Post Treatment  supplemental O2  -CR     Row Name 03/16/21 1351          Positioning and Restraints    Pre-Treatment Position  sitting in chair/recliner  -CR     Post Treatment Position  chair  -CR     In Chair  notified nsg;reclined;call light within reach  -CR       User Key  (r) = Recorded By, (t) = Taken By, (c) = Cosigned By    Initials Name Provider Type    CR Reyes, Carmela, PT Physical Therapist        Outcome Measures     Row Name 03/16/21 1355          How much help from another person do you currently need...    Turning from your back to your side while in flat bed without using bedrails?  3  -CR     Moving from lying on back to sitting on the side of a flat bed without bedrails?  3  -CR     Moving to and from a bed to a chair (including a wheelchair)?  3  -CR     Standing up from a chair using your arms (e.g., wheelchair, bedside chair)?  3  -CR     Climbing 3-5 steps with a railing?  3  -CR     To walk in hospital room?  3  -CR     AM-PAC 6 Clicks Score (PT)  18  -CR     Row Name 03/16/21 1355          Functional Assessment    Outcome Measure Options  AM-PAC 6 Clicks Basic Mobility (PT)  -CR       User Key  (r) = Recorded By, (t) = Taken By, (c) = Cosigned By    Initials Name Provider Type    CR Reyes, Carmela, PT Physical Therapist        Physical Therapy Education                 Title: PT OT SLP Therapies (In Progress)     Topic: Physical Therapy (In Progress)     Point: Mobility training (Done)     Learning Progress Summary           Patient Acceptance, E, VU by CR at 3/16/2021 1355                   Point: Home exercise program (Not Started)     Learner Progress:  Not documented in this visit.           Point: Body mechanics (Not Started)     Learner Progress:  Not documented in this visit.          Point: Precautions (Done)     Learning Progress Summary           Patient Acceptance, E, VU by CR at 3/16/2021 1355                               User Key     Initials Effective Dates Name Provider Type Discipline    CR 03/01/19 -  Reyes, Carmela, PT Physical Therapist PT              PT Recommendation and Plan  Planned Therapy Interventions (PT): bed mobility training, gait training, home exercise program, patient/family education, strengthening, stair training, transfer training  Plan of Care Reviewed With: patient  Outcome Summary: 75 y/o male admitted on 3/12 due to chest discomfort now s/p CABG on 3/15. PMH includes HTN, dm, CAD, COVID in Dec. At time of eval , pt with post op lines including arterial line, chest tube, mosley, O2 at 4l/nc. Pt c/o pain mostly chest tube site. Min A to come to standing with cues for sternal precautions. Pt able to ambulate in room , about 80 ft using rw, gait slow but steady. Pt should continue to progress and be able to d/c home. PPe gloves, mask with shield.     Time Calculation:   PT Charges     Row Name 03/16/21 1355             Time Calculation    Start Time  0921  -CR      Stop Time  0956  -CR      Time Calculation (min)  35 min  -CR      PT Received On  03/16/21  -CR      PT - Next Appointment  03/17/21  -CR      PT Goal Re-Cert Due Date  03/30/21  -CR         Time Calculation- PT    Total Timed Code Minutes- PT  11 minute(s)  -CR        User Key  (r) = Recorded By, (t) = Taken By, (c) = Cosigned By    Initials Name Provider Type    CR Reyes, Carmela, PT Physical Therapist        Therapy Charges for Today     Code Description Service Date Service Provider Modifiers Qty    42435867864 HC GAIT TRAINING EA 15 MIN 3/16/2021 Reyes, Carmela, PT GP 1    64631081533 HC PT EVAL LOW COMPLEXITY 3 3/16/2021 Reyes, Carmela, PT GP 1          PT G-Codes  Outcome Measure Options: AM-PAC  6 Clicks Basic Mobility (PT)  AM-PAC 6 Clicks Score (PT): 18    Carmela Reyes, PT  3/16/2021

## 2021-03-16 NOTE — THERAPY EVALUATION
Patient Name: Tao Pozo  : 1945    MRN: [id number]                              Today's Date: 3/16/2021       Admit Date: 3/12/2021    Visit Dx:     ICD-10-CM ICD-9-CM   1. Coronary artery disease involving native coronary artery of native heart without angina pectoris  I25.10 414.01   2. Chest discomfort  R07.89 786.59   3. Essential hypertension  I10 401.9   4. Diabetes mellitus due to underlying condition without complication, without long-term current use of insulin (CMS/HCC)  E08.9 249.00   5. Dyslipidemia  E78.5 272.4   6. Chronic coronary artery disease  I25.10 414.00   7. Abnormal nuclear stress test  R94.39 794.39     Patient Active Problem List   Diagnosis   • COVID-19 virus detected   • Coronary artery disease   • History of left heart catheterization (LHC)   • Hyperlipidemia   • Hypertension   • Sinus bradycardia   • Chest discomfort   • Essential hypertension   • Diabetes mellitus due to underlying condition without complication, without long-term current use of insulin (CMS/HCC)   • Dyslipidemia   • Chronic coronary artery disease   • Abnormal nuclear stress test   • Coronary artery disease involving native coronary artery of native heart without angina pectoris     Past Medical History:   Diagnosis Date   • Arthritis    • Coronary artery disease 2012   • Diabetes mellitus (CMS/[facility])    • Elevated cholesterol    • Hyperlipidemia    • Hypertension      Past Surgical History:   Procedure Laterality Date   • CARDIAC CATHETERIZATION N/A 3/12/2021    Procedure: Left Heart Cath with Coronary Angiography;  Surgeon: Huseyin Do MD;  Location: [facility] INVASIVE LOCATION;  Service: Cardiovascular;  Laterality: N/A;   • TONSILECTOMY, ADENOIDECTOMY, BILATERAL MYRINGOTOMY AND TUBES       General Information     Row Name 21 1731          OT Time and Intention    Document Type  evaluation  -MP     Mode of Treatment  occupational therapy  -MP     Row Name 21 1731           General Information    Patient Profile Reviewed  yes  -MP     Prior Level of Function  independent:;ADL's  -MP     Existing Precautions/Restrictions  sternal  -MP     Row Name 03/16/21 1731          Living Environment    Lives With  spouse  -MP     Row Name 03/16/21 1731          Home Main Entrance    Number of Stairs, Main Entrance  four  -MP     Row Name 03/16/21 1731          Cognition    Orientation Status (Cognition)  oriented x 4  -MP     Row Name 03/16/21 1731          Safety Issues, Functional Mobility    Impairments Affecting Function (Mobility)  strength;range of motion (ROM)  -MP       User Key  (r) = Recorded By, (t) = Taken By, (c) = Cosigned By    Initials Name Provider Type    Fabián Gabriel OT Occupational Therapist          Mobility/ADL's     Row Name 03/16/21 1733          Transfers    Sit-Stand [place] (Transfers)  minimum assist (75% patient effort);1 person assist  -[facility] Name 03/16/21 1733          Sit-Stand Transfer    Assistive Device (Sit-Stand Transfers)  walker, front-wheeled  -     Row Name 03/16/21 1733          Functional Mobility    Functional Mobility- Ind. Level  contact guard assist;1 person;1 person + 1 person to manage equipment  -     Row Name 03/16/21 1733          Activities of Daily Living    BADL Assessment/Intervention  lower body dressing  -     Row Name 03/16/21 1733          Lower Body Dressing Assessment/Training    [place] Level (Lower Body Dressing)  don;doff;socks;maximum assist (25% patient effort)  -MP       User Key  (r) = Recorded By, (t) = Taken By, (c) = Cosigned By    Initials Name Provider Type    aFbián Gabriel OT Occupational Therapist        Obj/Interventions     Row Name 03/16/21 1733          Range of Motion Comprehensive    Comment, General Range of Motion  BUE limited 90* flexion shoulder  -MP     Row Name 03/16/21 1733          Strength Comprehensive (MMT)    Comment, General Manual Muscle Testing (MMT) Assessment   GARIMA 3/5  -MP     Row Name 03/16/21 1733          Balance    Balance Interventions  sitting;standing;sit to stand;supported;static;dynamic  -MP       User Key  (r) = Recorded By, (t) = Taken By, (c) = Cosigned By    Initials Name Provider Type    Fabián Gabriel OT Occupational Therapist        Goals/Plan     Row Name 03/16/21 1736          Bed Mobility Goal 1 (OT)    Activity/Assistive Device (Bed Mobility Goal 1, OT)  bed mobility activities, all  -MP     [place] Level/Cues Needed (Bed Mobility Goal 1, OT)  independent  -MP     Time Frame (Bed Mobility Goal 1, OT)  long term goal (LTG);2 weeks  -MP     Row Name 03/16/21 1736          Transfer Goal 1 (OT)    Activity/Assistive Device (Transfer Goal 1, OT)  transfers, all  -MP     [place] Level/Cues Needed (Transfer Goal 1, OT)  independent  -MP     Time Frame (Transfer Goal 1, OT)  long term goal (LTG);2 weeks  -MP     Row Name 03/16/21 1736          Bathing Goal 1 (OT)    Activity/Device (Bathing Goal 1, OT)  bathing skills, all  -MP     [place] Level/Cues Needed (Bathing Goal 1, OT)  independent  -MP     Time Frame (Bathing Goal 1, OT)  long term goal (LTG);2 weeks  -MP       User Key  (r) = Recorded By, (t) = Taken By, (c) = Cosigned By    Initials Name Provider Type    Fabián Gabriel OT Occupational Therapist        Clinical Impression     Row Name 03/16/21 1733          Pain Scale: Numbers Pre/Post-Treatment    Pretreatment Pain Rating  2/10  -MP     Posttreatment Pain Rating  2/10  -MP     Pain Location - Orientation  incisional  -MP     Pain Location  chest  -MP     Row Name 03/16/21 1733          Plan of Care Review    Plan of Care Reviewed With  patient  -MP     Progress  no change  -MP     Outcome Summary  Pt. is 75 y/o male POD # 1 CABG. Pt. reports I/ADL independence at baseline and livesa at home w/ spouse. Pt. completes functional transfers this date for sit to stand from low seated surface and min VC's to maintain sternal  precautions. Increased assist provided for lowerbody ADL secondary to multiple lines this date w/ limited AROM and trunk flexion. Recommend d/c home w/ 24 hour support/assist, will follow up w/ pt. 1-3x per week at [facility].  -MP     Row Name 03/16/21 1733          Therapy Assessment/Plan (OT)    Rehab Potential (OT)  good, to achieve stated therapy goals  -MP     Criteria for Skilled Therapeutic Interventions Met (OT)  yes  -MP     Therapy Frequency (OT)  3 times/wk  -MP     Row Name 03/16/21 1733          Therapy Plan Review/Discharge Plan (OT)    Anticipated Discharge Disposition (OT)  home with assist  -MP     Row Name 03/16/21 1733          Vital Signs    Pre Patient Position  Sitting  -MP     Intra Patient Position  Standing  -MP     Post Patient Position  Sitting  -MP     Row Name 03/16/21 1733          Positioning and Restraints    Pre-Treatment Position  sitting in chair/recliner  -MP     Post Treatment Position  chair  -MP     In Chair  sitting;call light within reach;encouraged to call for assist  -MP       User Key  (r) = Recorded By, (t) = Taken By, (c) = Cosigned By    Initials Name Provider Type    Fabián Gabriel, OT Occupational Therapist        Outcome Measures    No documentation.       Occupational Therapy Education                 Title: PT OT SLP Therapies (In Progress)     Topic: Occupational Therapy (In Progress)     Point: ADL training (Not Started)     Description:   Instruct learner(s) on proper safety adaptation and remediation techniques during self care or transfers.   Instruct in proper use of assistive devices.              Learner Progress:  Not documented in this visit.          Point: Home exercise program (Not Started)     Description:   Instruct learner(s) on appropriate technique for monitoring, assisting and/or progressing therapeutic exercises/activities.              Learner Progress:  Not documented in this visit.          Point: Precautions (Done)     Description:    Instruct learner(s) on prescribed precautions during self-care and functional transfers.              Learning Progress Summary           Patient Acceptance, E,TB, VU by  at 3/16/2021 1737                   Point: Body mechanics (Done)     Description:   Instruct learner(s) on proper positioning and spine alignment during self-care, functional mobility activities and/or exercises.              Learning Progress Summary           Patient Acceptance, E,TB, VU by  at 3/16/2021 1737                               User Key     Initials Effective Dates Name Provider Type [facility] 03/01/19 -  Fabián Lara OT Occupational Therapist OT              OT Recommendation and Plan  Therapy Frequency (OT): 3 times/wk  Plan of Care Review  Plan of Care Reviewed With: patient  Progress: no change  Outcome Summary: Pt. is 77 y/o male POD # 1 CABG. Pt. reports I/ADL independence at baseline and livesa at home w/ spouse. Pt. completes functional transfers this date for sit to stand from low seated surface and min VC's to maintain sternal precautions. Increased assist provided for lowerbody ADL secondary to multiple lines this date w/ limited AROM and trunk flexion. Recommend d/c home w/ 24 hour support/assist, will follow up w/ pt. 1[facility]3x per [facility] at [facility].     Time Calculation:   Time Calculation- OT     Row Name 03/16/21 1737             Time Calculation- OT    OT Start Time  1450  -      OT Stop Time  1515  -      OT Time Calculation (min)  25 min  -      Total Timed Code Minutes- OT  10 minute(s)  -      OT Received On  03/16/21  -      OT - Next Appointment  03/18/21  -      OT Goal Re-Cert Due Date  03/30/21  -        User Key  (r) = Recorded By, (t) = Taken By, (c) = Cosigned By    Initials Name Provider Type     Fabián Lara OT Occupational Therapist        Therapy Charges for Today     Code Description Service Date Service Provider Modifiers Qty    66973171591 HC OT EVAL LOW COMPLEXITY 3  3/16/2021 Fabián Lara, OT GO 1    95388690380 HC OT THERAPEUTIC ACT EA 15 MIN 3/16/2021 Fabián Lara OT GO 1               Fabián Lara OT  3/16/2021

## 2021-03-16 NOTE — PROGRESS NOTES
Continued Stay Note  [facility]     Patient Name: Tao Pozo  MRN: [id number]  Today's Date: 3/16/2021    Admit Date: 3/12/2021    Discharge Plan     Row Name 03/16/21 1138       Plan    Plan  Pending PT/OT sonya at this time. Patient is POD#1 CABG.    Plan Comments  Barriers to discharge: POD#1 CABG, remains with cordis, chest tube, and pacer wires at this time.          Expected Discharge Date and Time     Expected Discharge Date Expected Discharge Time    Mar 19, 2021           Met with patient in room wearing PPE: mask, face shield/goggles, gloves, gown.      Maintained distance greater than six feet and spent less than 15 minutes in the room.            Anna Naegele RN Case Manager  [id number] [place]  [address]   [phone]  office  [phone]  fax  [email or web address]

## 2021-03-16 NOTE — PLAN OF CARE
Goal Outcome Evaluation:  Making good progress today. [place], Chest tubes, [place] and [place] lines removed. Wires remain. Pt has regained organic Heart Rhythm in 60's. Mobilizing well. Needs work on IS.

## 2021-03-16 NOTE — PROGRESS NOTES
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  Surgical soreness     ROS    Since I have last seen him yesterday, the patient has been without any chest discomfort ,shortness of breath, palpitations, dizziness or syncope.  Denies having any headache ,abdominal pain ,nausea, vomiting , diarrhea constipation, loss of weight or loss of appetite.  Denies having any excessive bruising ,hematuria or blood in the stool.    Review of all systems negative except as indicated    History  Past Medical History:   Diagnosis Date   • Arthritis    • Coronary artery disease 2/14/2012   • Diabetes mellitus (CMS/Formerly [facility])    • Elevated cholesterol    • Hyperlipidemia    • Hypertension        Past Surgical History:   Procedure Laterality Date   • CARDIAC CATHETERIZATION N/A 3/12/2021    Procedure: Left Heart Cath with Coronary Angiography;  Surgeon: Huseyin Do MD;  Location: [facility] CATH INVASIVE LOCATION;  Service: Cardiovascular;  Laterality: N/A;   • TONSILECTOMY, ADENOIDECTOMY, BILATERAL MYRINGOTOMY AND TUBES  1950       Family History   Problem Relation Age of Onset   • Diabetes Mother    • Heart disease Father    • Diabetes Sister    • Diabetes Brother    • Hypertension Brother    • Heart disease Brother        Social History     Tobacco Use   • Smoking status: Never Smoker   • Smokeless tobacco: Never Used   Vaping Use   • Vaping Use: Never used   Substance Use Topics   • Alcohol use: No   • Drug use: Defer        Medications Prior to Admission   Medication Sig Dispense Refill Last Dose   • atorvastatin (LIPITOR) 20 MG tablet Take 20 mg by mouth Daily.   3/11/2021 at Unknown time   • CBD (cannabidiol) oral oil Take 1 mL by mouth Daily. For arthritis   Past Week at Unknown time   •  metFORMIN (GLUCOPHAGE) 1000 MG tablet Take 1,000 mg by mouth 2 (Two) Times a Day With Meals.   3/11/2021 at Unknown time   • SITagliptin (JANUVIA) 100 MG tablet Take 100 mg by mouth Daily.   3/11/2021 at Unknown time   • glimepiride (AMARYL) 1 MG tablet Take 2 mg by mouth Daily.   3/10/2021   • hydroCHLOROthiazide (HYDRODIURIL) 12.5 MG tablet Take 12.5 mg by mouth Daily.   3/10/2021   • lisinopril (PRINIVIL,ZESTRIL) 40 MG tablet Take 40 mg by mouth Daily.   3/10/2021   • nitroglycerin (NITROSTAT) 0.4 MG SL tablet Place 1 tablet under the tongue As Needed.   Unknown at Unknown time   • Triamcinolone Acetonide (NASACORT) 55 MCG/ACT nasal inhaler 2 sprays into the nostril(s) as directed by provider Daily.   More than a month at Unknown time       Allergies  Shellfish-derived products    Scheduled Meds:acetaminophen, 650 mg, Oral, Q6H   Or  acetaminophen, 650 mg, Oral, Q6H   Or  acetaminophen, 650 mg, Rectal, Q6H  aspirin, 81 mg, Oral, Daily  atorvastatin, 40 mg, Oral, Nightly  ceFAZolin, 2 g, Intravenous, Q8H  chlorhexidine, 15 mL, Mouth/Throat, Q12H  docusate sodium, 100 mg, Oral, BID  [START ON 3/17/2021] enoxaparin, 40 mg, Subcutaneous, Daily  magnesium sulfate, 1 g, Intravenous, Q8H  mupirocin, 1 application, Each Nare, BID  pantoprazole, 40 mg, Oral, Q AM  polyethylene glycol, 17 g, Oral, BID  senna, 2 tablet, Oral, Nightly      Continuous Infusions:dexmedetomidine, 0.2-1.5 mcg/kg/hr, Last Rate: Stopped (03/15/21 2000)  insulin, 0-50 Units/hr, Last Rate: 1.6 Units/hr (03/16/21 0600)  niCARdipine, 5-15 mg/hr, Last Rate: 6 mg/hr (03/16/21 0430)  nitroglycerin, 10-50 mcg/min, Last Rate: 30 mcg/min (03/16/21 0500)  norepinephrine, 0.02-0.06 mcg/kg/min  sodium chloride, 30 mL/hr, Last Rate: 30 mL/hr (03/15/21 4594)      PRN Meds:.acetaminophen **OR** acetaminophen **OR** acetaminophen  •  albumin human  •  HYDROcodone-acetaminophen  •  insulin  •  Morphine **AND** naloxone  •  Mouth Kote  •  niCARdipine  •   "nitroglycerin  •  norepinephrine  •  ondansetron  •  potassium chloride **OR** potassium chloride  •  potassium chloride **OR** potassium chloride    Objective     VITAL SIGNS  Vitals:    03/16/21 0430 03/16/21 0500 03/16/21 0530 03/16/21 0600   BP:  110/56     BP Location:       Patient Position:       Pulse: 80 80 80 80   Resp:       Temp:       TempSrc:       SpO2: 97% 97% 98% 97%   Weight:       Height:           Flowsheet Rows      First Filed Value   Admission Height  176.5 cm (69.5\") Documented at 03/12/2021 0653   Admission Weight  78.8 kg (173 lb 11.6 oz) Documented at 03/12/2021 0653            Intake/Output Summary (Last 24 hours) at 3/16/2021 0611  Last data filed at 3/16/2021 0600  Gross per 24 hour   Intake 2573 ml   Output 2060 ml   Net 513 ml        TELEMETRY: Sinus rhythm    Physical Exam:  The patient is alert, oriented and in no distress.  Patient is extubated.  Vital signs as noted above.  Head and neck revealed no carotid bruits or jugular venous distention.  No thyromegaly or lymphadenopathy is present  Lungs clear.  No wheezing.  Breath sounds are normal bilaterally.  Heart normal first and second heart sounds.  No murmur. No precordial rub is present.  No gallop is present.  Abdomen soft and nontender.  No organomegaly is present.  Extremities with good peripheral pulses without any pedal edema.  Skin warm and dry.  Visible incisions are looking well.  Musculoskeletal system is grossly normal  CNS grossly normal      Results Review:   I reviewed the patient's new clinical results.  Lab Results (last 24 hours)     Procedure Component Value Units Date/Time    POC Glucose Once [[id number]]  (Abnormal) Collected: 03/16/21 0552    Specimen: Blood Updated: 03/16/21 0553     Glucose 140 mg/dL      Comment: Serial Number: [id number]:  [id number]       POC Glucose Once [[id number]]  (Abnormal) Collected: 03/16/21 0457    Specimen: Blood Updated: 03/16/21 0459     Glucose 164 mg/dL      Comment: " Serial Number: [id number]:  [id number]       POC Glucose Once [[id number]]  (Abnormal) Collected: 03/15/21 2100    Specimen: Blood Updated: 03/16/21 0413     Glucose 126 mg/dL      Comment: Serial Number: [id number]:  [id number]       POC Glucose Once [[id number]]  (Abnormal) Collected: 03/16/21 0406    Specimen: Blood Updated: 03/16/21 0407     Glucose 122 mg/dL      Comment: Serial Number: [id number]:  [id number]       CBC (No Diff) [[id number]]  (Abnormal) Collected: 03/16/21 0308    Specimen: Blood Updated: 03/16/21 0402     WBC 10.30 10*3/mm3      RBC 3.39 10*6/mm3      Hemoglobin 10.9 g/dL      Hematocrit 30.9 %      MCV 91.3 fL      MCH 32.0 pg      MCHC 35.1 g/dL      RDW 13.9 %      RDW-SD 43.8 fl      MPV 8.5 fL      Platelets 152 10*3/mm3     Renal Function Panel [[id number]]  (Abnormal) Collected: 03/16/21 0308    Specimen: Blood Updated: 03/16/21 0352     Glucose 125 mg/dL      BUN 24 mg/dL      Creatinine 1.00 mg/dL      Sodium 141 mmol/L      Potassium 4.2 mmol/L      Chloride 110 mmol/L      CO2 24.0 mmol/L      Calcium 8.8 mg/dL      Albumin 4.20 g/dL      Phosphorus 4.3 mg/dL      Anion Gap 7.0 mmol/L      BUN/Creatinine Ratio 24.0     eGFR Non African Amer 73 mL/min/1.73     Narrative:      GFR Normal >60  Chronic Kidney Disease <60  Kidney Failure <15      Magnesium [[id number]]  (Normal) Collected: 03/16/21 0308    Specimen: Blood Updated: 03/16/21 0351     Magnesium 2.2 mg/dL     Protime-INR [[id number]]  (Abnormal) Collected: 03/16/21 0308    Specimen: Blood Updated: 03/16/21 0340     Protime 11.9 Seconds      INR 1.09    Calcium, Ionized [[id number]]  (Normal) Collected: 03/16/21 0308    Specimen: Blood Updated: 03/16/21 0335     Ionized Calcium 1.24 mmol/L     POC Glucose Once [[id number]]  (Abnormal) Collected: 03/16/21 0318    Specimen: Blood Updated: 03/16/21 0320     Glucose 129 mg/dL      Comment: Serial Number: [id number]:  [id number]       Blood Gas, Mixed [[id number]]   (Abnormal) Collected: 03/16/21 0318    Specimen: Blood Updated: 03/16/21 0320     pH, mixed 7.34 pH units      PCO2 MIXED 46.9 mmHg      PO2 MIXED 36.3 mmHg      HCO3 MIXED 25.4 mmol/L      CO2 Content 26.8 mmol/L      Base Excess, Arterial -0.6 mmol/L      Comment: Serial Number: [id number]:  [id number]        O2 SATURATION MIXED 65.5 %      Hemodilution No     Site Arterial Line     Alejo's Test N/A     Modality Cannula     FIO2 <21 %      Base Deficit -0.4 mEq/liter     SARS-CoV-2 Antibodies (Roche) [[id number]] Collected: 03/14/21 0934    Specimen: Blood Updated: 03/16/21 0207     SARS-COV-2 ANTIBODIES Positive     Comment: Results suggest recent or prior infection with SARS-CoV-2. Correlation  with epidemiologic risk factors and other clinical and laboratory  findings is recommended. Serologic results should not be used as the  sole basis to diagnose or exclude recent SARS-CoV-2 infection. False  positive results infrequently occur due to prior infection with other  human Coronaviruses.  This assay will not detect antibodies induced by the currently  available SARS-CoV-2 vaccines. The current vaccines elicit  antibodies specific to the viral spike protein. [employer] offers  two test codes that detect viral spike-specific antibodies:  142699 SARS-CoV-2 Semi-Quantitative Total Antibody, Jarad and  756060 SARS-CoV-2 Antibody, IgG, Jarad (Qualitative).  Positive results with this SARS-CoV-2 Antibodies, Nucleocapsid  assay suggest recent or previous natural infection with  SARS-CoV-2.  Plasma with COVID-19 antibodies is being evaluated as a possible  treatment for patients with an active COVID-19 infection.  Individuals who have a positive COVID-19 antibody test result are  likely candidates for blood plasma donation. To learn more, visit  [email or web address].       Narrative:      Test(s) 164069-SARS-CoV-2 Ab, Nucleocapsid  has not been FDA cleared or approved. This test has  been authorized by FDA under an Emergency  Use Authorization  (EUA). This test is only authorized for the duration of the  declaration that circumstances exist justifying the authorization  of emergency use of in vitro diagnostics for detection and/or  diagnosis of COVID-19 under Section 564(b)(1) of the Act, 21  U.S.C. 360bbb-3(b)(1), unless the authorization is terminated or  revoked sooner. This test has been authorized only for detecting  the presence of antibodies against SARS-CoV-2, not for any other  viruses or pathogens.  Performed at:  [address] [place] [address]  [id number]  : Brien Henry PhD, Phone:  [phone]    POC Glucose Once [[id number]]  (Abnormal) Collected: 03/16/21 0156    Specimen: Blood Updated: 03/16/21 0157     Glucose 132 mg/dL      Comment: Serial Number: [id number]:  [id number]       POC Glucose Once [[id number]]  (Abnormal) Collected: 03/16/21 0051    Specimen: Blood Updated: 03/16/21 0052     Glucose 134 mg/dL      Comment: Serial Number: [id number]:  [id number]       POC Glucose Once [[id number]]  (Abnormal) Collected: 03/16/21 0006    Specimen: Blood Updated: 03/16/21 0051     Glucose 132 mg/dL      Comment: Serial Number: [id number]:  [id number]       POC Glucose Once [[id number]]  (Abnormal) Collected: 03/15/21 2203    Specimen: Blood Updated: 03/16/21 0051     Glucose 135 mg/dL      Comment: Serial Number: [id number]:  [id number]       POC Glucose Once [[id number]]  (Abnormal) Collected: 03/15/21 2257    Specimen: Blood Updated: 03/15/21 2259     Glucose 129 mg/dL      Comment: Serial Number: [id number]:  [id number]       POCT Electrolytes +HGB +HCT [[id number]]  (Abnormal) Collected: 03/15/21 2257    Specimen: Blood Updated: 03/15/21 2259     Sodium 142 mmol/L      POC Potassium 4.1 mmol/L      Ionized Calcium 1.15 mmol/L      Comment: Serial Number: [id number]:  [id number]        Glucose 129 mg/dL      Hematocrit 32 %      Hemoglobin 10.9 g/dL     Blood Gas, Arterial  - [[id number]]  (Abnormal) Collected: 03/15/21 2257    Specimen: Arterial Blood Updated: 03/15/21 2259     Site Arterial Line     Alejo's Test N/A     pH, Arterial 7.368 pH units      pCO2, Arterial 43.6 mm Hg      pO2, Arterial 105.8 mm Hg      HCO3, Arterial 25.1 mmol/L      Base Excess, Arterial -0.4 mmol/L      Comment: Serial Number: [id number]:  [id number]        O2 Saturation, Arterial 97.9 %      CO2 Content 26.4 mmol/L      Barometric Pressure for Blood Gas --     Comment: N/A        Modality Adult Vent     FIO2 40 %      Ventilator Mode ;CPAP/PS     PEEP 5     PSV 10 cmH2O      Hemodilution Yes    POC Glucose Once [[id number]]  (Abnormal) Collected: 03/15/21 2014    Specimen: Blood Updated: 03/15/21 2016     Glucose 152 mg/dL      Comment: Serial Number: [id number]:  [id number]       POCT Electrolytes +HGB +HCT [[id number]]  (Abnormal) Collected: 03/15/21 2014    Specimen: Blood Updated: 03/15/21 2016     Sodium 139 mmol/L      POC Potassium 4.5 mmol/L      Ionized Calcium 0.96 mmol/L      Comment: Serial Number: [id number]:  [id number]        Glucose 152 mg/dL      Hematocrit 30 %      Hemoglobin 10.3 g/dL     Blood Gas, Arterial - [[id number]]  (Abnormal) Collected: 03/15/21 2014    Specimen: Arterial Blood Updated: 03/15/21 2016     Site Arterial Line     Alejo's Test N/A     pH, Arterial 7.367 pH units      pCO2, Arterial 41.7 mm Hg      pO2, Arterial 117.6 mm Hg      HCO3, Arterial 23.9 mmol/L      Base Excess, Arterial -1.4 mmol/L      Comment: Serial Number: [id number]:  [id number]        O2 Saturation, Arterial 98.5 %      CO2 Content 25.2 mmol/L      Barometric Pressure for Blood Gas --     Comment: N/A        Modality Adult Vent     FIO2 40 %      Ventilator Mode ;AC     Set Tidal Volume 700     PEEP 5     Hemodilution Yes     Respiratory Rate 12    POC Glucose Once [[id number]]  (Abnormal) Collected: 03/15/21 2003    Specimen: Blood Updated: 03/15/21 2005     Glucose 154 mg/dL      Comment: Serial  Number: [id number]:  [id number]       POC Glucose Once [[id number]]  (Abnormal) Collected: 03/15/21 1931    Specimen: Blood Updated: 03/15/21 2005     Glucose 146 mg/dL      Comment: Serial Number: [id number]:  [id number]       POC Glucose Once [[id number]]  (Abnormal) Collected: 03/15/21 1856    Specimen: Blood Updated: 03/15/21 1857     Glucose 143 mg/dL      Comment: Serial Number: [id number]:  [id number]       POC Glucose Once [[id number]]  (Abnormal) Collected: 03/15/21 1736    Specimen: Blood Updated: 03/15/21 1740     Glucose 126 mg/dL      Comment: Serial Number: [id number]:  [id number]       POCT Electrolytes +HGB +HCT [[id number]]  (Abnormal) Collected: 03/15/21 1736    Specimen: Blood Updated: 03/15/21 1740     Sodium 142 mmol/L      POC Potassium 3.9 mmol/L      Ionized Calcium 1.25 mmol/L      Comment: Serial Number: [id number]:  [id number]        Glucose 126 mg/dL      Hematocrit 32 %      Hemoglobin 11.0 g/dL     Blood Gas, Arterial - [[id number]]  (Abnormal) Collected: 03/15/21 1736    Specimen: Arterial Blood Updated: 03/15/21 1740     Site Arterial Line     Alejo's Test N/A     pH, Arterial 7.403 pH units      pCO2, Arterial 37.1 mm Hg      pO2, Arterial 140.3 mm Hg      HCO3, Arterial 23.2 mmol/L      Base Excess, Arterial -1.3 mmol/L      Comment: Serial Number: [id number]:  [id number]        O2 Saturation, Arterial 99.2 %      CO2 Content 24.3 mmol/L      Barometric Pressure for Blood Gas --     Comment: N/A        Modality Adult Vent     FIO2 60 %      Ventilator Mode ;AC     Set Tidal Volume 700     PEEP 6     Hemodilution No     Respiratory Rate 12    POC Glucose Once [[id number]]  (Abnormal) Collected: 03/15/21 1725    Specimen: Blood Updated: 03/15/21 1726     Glucose 125 mg/dL      Comment: Serial Number: [id number]:  [id number]       POC Glucose Once [[id number]]  (Normal) Collected: 03/15/21 1638    Specimen: Blood Updated: 03/15/21 1726     Glucose 89 mg/dL      Comment:  Serial Number: [id number]:  [id number]       POC Glucose Once [[id number]]  (Normal) Collected: 03/15/21 1647    Specimen: Blood Updated: 03/15/21 1650     Glucose 99 mg/dL      Comment: Serial Number: [id number]:  [id number]       POCT Electrolytes +HGB +HCT [[id number]]  (Abnormal) Collected: 03/15/21 1647    Specimen: Blood Updated: 03/15/21 1650     Sodium 143 mmol/L      POC Potassium 3.6 mmol/L      Ionized Calcium 1.26 mmol/L      Comment: Serial Number: [id number]:  [id number]        Glucose 99 mg/dL      Hematocrit 30 %      Hemoglobin 10.3 g/dL     Blood Gas, Venous - [[id number]]  (Abnormal) Collected: 03/15/21 1647    Specimen: Venous Blood Updated: 03/15/21 1650     Site CentralLine     pH, Venous 7.341 pH Units      pCO2, Venous 46.3 mm Hg      pO2, Venous 40.0 mm Hg      HCO3, Venous 25.0 mmol/L      Base Excess, Venous -0.9 mmol/L      Comment: Serial Number: [id number]:  [id number]        O2 Saturation, Venous 71.4 %      CO2 Content 26.4 mmol/L      Barometric Pressure for Blood Gas --     Comment: N/A        Modality Adult Vent     FIO2 70 %      Ventilator Mode ;AC     Set Tidal Volume 700     PEEP 8    POC Glucose Once [[id number]]  (Normal) Collected: 03/15/21 1643    Specimen: Blood Updated: 03/15/21 1646     Glucose 84 mg/dL      Comment: Serial Number: [id number]:  [id number]       POCT Electrolytes +HGB +HCT [[id number]]  (Abnormal) Collected: 03/15/21 1643    Specimen: Blood Updated: 03/15/21 1646     Sodium 143 mmol/L      POC Potassium 4.0 mmol/L      Ionized Calcium 1.31 mmol/L      Comment: Serial Number: [id number]:  [id number]        Glucose 84 mg/dL      Hematocrit 33 %      Hemoglobin 11.3 g/dL     Blood Gas, Arterial - [[id number]]  (Abnormal) Collected: 03/15/21 1643    Specimen: Arterial Blood Updated: 03/15/21 1646     Site Arterial Line     Alejo's Test N/A     pH, Arterial 7.386 pH units      pCO2, Arterial 42.1 mm Hg      pO2, Arterial 310.6 mm Hg      HCO3, Arterial 25.3 mmol/L       Base Excess, Arterial 0.1 mmol/L      Comment: Serial Number: [id number]:  [id number]        O2 Saturation, Arterial 99.9 %      CO2 Content 26.5 mmol/L      Barometric Pressure for Blood Gas --     Comment: N/A        Modality Adult Vent     FIO2 70 %      Ventilator Mode ;AC     Set Tidal Volume 700     PEEP 8     Hemodilution No     Respiratory Rate 12    Renal Function Panel [[id number]]  (Abnormal) Collected: 03/15/21 1545    Specimen: Blood Updated: 03/15/21 1620     Glucose 119 mg/dL      BUN 23 mg/dL      Creatinine 1.01 mg/dL      Sodium 141 mmol/L      Potassium 4.1 mmol/L      Chloride 108 mmol/L      CO2 23.0 mmol/L      Calcium 8.7 mg/dL      Albumin 3.90 g/dL      Phosphorus 2.7 mg/dL      Anion Gap 10.0 mmol/L      BUN/Creatinine Ratio 22.8     eGFR Non African Amer 72 mL/min/1.73     Narrative:      GFR Normal >60  Chronic Kidney Disease <60  Kidney Failure <15      Calcium, Ionized [[id number]]  (Abnormal) Collected: 03/15/21 1545    Specimen: Blood Updated: 03/15/21 1614     Ionized Calcium 1.32 mmol/L     aPTT [[id number]]  (Normal) Collected: 03/15/21 1545    Specimen: Blood Updated: 03/15/21 1611     PTT 25.8 seconds     Protime-INR [[id number]]  (Abnormal) Collected: 03/15/21 1545    Specimen: Blood Updated: 03/15/21 1611     Protime 12.8 Seconds      Comment: Result checked         INR 1.17    CBC & Differential [[id number]]  (Abnormal) Collected: 03/15/21 1546    Specimen: Blood Updated: 03/15/21 1558    Narrative:      The following orders were created for panel order CBC & Differential.  Procedure                               Abnormality         Status                     ---------                               -----------         ------                     CBC Auto Differential[[id number]]        Abnormal            Final result                 Please view results for these tests on the individual orders.    CBC Auto Differential [[id number]]  (Abnormal) Collected: 03/15/21 1546    Specimen:  Blood Updated: 03/15/21 1558     WBC 12.60 10*3/mm3      RBC 3.14 10*6/mm3      Hemoglobin 9.6 g/dL      Hematocrit 28.6 %      MCV 91.1 fL      MCH 30.7 pg      MCHC 33.7 g/dL      RDW 13.7 %      RDW-SD 43.3 fl      MPV 8.0 fL      Platelets 167 10*3/mm3      Neutrophil % 83.3 %      Lymphocyte % 10.0 %      Monocyte % 5.1 %      Eosinophil % 1.3 %      Basophil % 0.3 %      Neutrophils, Absolute 10.50 10*3/mm3      Lymphocytes, Absolute 1.30 10*3/mm3      Monocytes, Absolute 0.60 10*3/mm3      Eosinophils, Absolute 0.20 10*3/mm3      Basophils, Absolute 0.00 10*3/mm3      nRBC 0.0 /100 WBC     POC Glucose Once [[id number]]  (Normal) Collected: 03/15/21 1552    Specimen: Blood Updated: 03/15/21 1553     Glucose 95 mg/dL      Comment: Serial Number: [id number]:  [id number]       aPTT [[id number]]  (Normal) Collected: 03/15/21 1333    Specimen: Blood, Arterial Line Updated: 03/15/21 1504     PTT 26.7 seconds     Fibrinogen [[id number]]  (Abnormal) Collected: 03/15/21 1333    Specimen: Blood, Arterial Line Updated: 03/15/21 1504     Fibrinogen 124 mg/dL     Protime-INR [[id number]]  (Abnormal) Collected: 03/15/21 1333    Specimen: Blood, Arterial Line Updated: 03/15/21 1504     Protime 17.5 Seconds      Comment: Result checked         INR 1.63    POC Chem 8, arterial (ISTAT) [[id number]]  (Abnormal) Collected: 03/15/21 1429    Specimen: Arterial Blood Updated: 03/15/21 1456     Ionized Calcium 1.54 mmol/L      pH, Arterial 7.310 pH units      pCO2, Arterial 56.4 mm Hg      pO2, Arterial 427.0 mm Hg      HCO3, Arterial 28.4 mmol/L      Base Excess, Arterial 2.0 mmol/L      Base Deficit --     O2 Saturation, Arterial 100.0 %      Glucose 157 mg/dL      Comment: Serial Number: [id number]:  [id number]        Sodium 137 mmol/L      POC Potassium 4.0 mmol/L      Hematocrit 28 %      Hemoglobin 9.5 g/dL      CO2 Content 30 mmol/L     POC Activated Clotting Time [[id number]]  (Abnormal) Collected: 03/15/21 1346    Specimen:  Arterial Blood Updated: 03/15/21 1456     Activated Clotting Time  301 Seconds      Comment: Serial Number: [id number]:  [id number]       POC Activated Clotting Time [[id number]]  (Abnormal) Collected: 03/15/21 1303    Specimen: Arterial Blood Updated: 03/15/21 1456     Activated Clotting Time  412 Seconds      Comment: Serial Number: [id number]:  [id number]       POC Activated Clotting Time [[id number]]  (Normal) Collected: 03/15/21 1220    Specimen: Arterial Blood Updated: 03/15/21 1455     Activated Clotting Time  120 Seconds      Comment: Serial Number: [id number]:  [id number]       Platelet Function ADP [[id number]]  (Normal) Collected: 03/15/21 1333    Specimen: Blood, Arterial Line Updated: 03/15/21 1438     ADP Aggregation, % Platelet 94 %     POC Activated Clotting Time [[id number]]  (Normal) Collected: 03/15/21 1428    Specimen: Arterial Blood Updated: 03/15/21 1436     Activated Clotting Time  103 Seconds      Comment: Serial Number: [id number]:  [id number]       POC Chem 8, arterial (ISTAT) [[id number]]  (Abnormal) Collected: 03/15/21 1411    Specimen: Arterial Blood Updated: 03/15/21 1435     Ionized Calcium 1.03 mmol/L      pH, Arterial 7.350 pH units      pCO2, Arterial 53.9 mm Hg      pO2, Arterial 448.0 mm Hg      HCO3, Arterial 29.9 mmol/L      Base Excess, Arterial 4.0 mmol/L      Base Deficit --     O2 Saturation, Arterial 100.0 %      Glucose 176 mg/dL      Comment: Serial Number: [id number]:  [id number]        Sodium 138 mmol/L      POC Potassium 4.1 mmol/L      Hematocrit 31 %      Hemoglobin 10.5 g/dL      CO2 Content 32 mmol/L     CG8 Venous, ISTAT [[id number]]  (Abnormal) Collected: 03/15/21 1352    Specimen: Venous Blood Updated: 03/15/21 1435     Glucose 185 mg/dL      Comment: Serial Number: [id number]:  [id number]        Sodium 138 mmol/L      POC Potassium 3.9 mmol/L      Ionized Calcium 1.04 mmol/L      Hematocrit 29 %      Hemoglobin 9.9 g/dL      pH, Venous 7.260 pH Units      pCO2, Venous 54.4  mm Hg      CO2 CONTENT  --     HCO3, Venous 24.6 mmol/L      Base Excess, Venous --     Base Deficit --     O2 Saturation, Venous 26.0 %      pO2, Venous 51.0 mm Hg     CBC (No Diff) [[id number]]  (Abnormal) Collected: 03/15/21 1333    Specimen: Blood, Arterial Line Updated: 03/15/21 1435     WBC 11.10 10*3/mm3      RBC 3.07 10*6/mm3      Hemoglobin 9.5 g/dL      Hematocrit 27.9 %      MCV 91.1 fL      MCH 30.9 pg      MCHC 34.0 g/dL      RDW 13.9 %      RDW-SD 43.8 fl      MPV 8.0 fL      Platelets 158 10*3/mm3     POC Chem 8, arterial (ISTAT) [[id number]]  (Abnormal) Collected: 03/15/21 1347    Specimen: Arterial Blood Updated: 03/15/21 1435     Ionized Calcium 0.99 mmol/L      pH, Arterial 7.280 pH units      pCO2, Arterial 54.9 mm Hg      pO2, Arterial 467.0 mm Hg      HCO3, Arterial 25.9 mmol/L      Base Excess, Arterial <0.0 mmol/L      Base Deficit --     O2 Saturation, Arterial 100.0 %      Glucose 177 mg/dL      Comment: Serial Number: [id number]:  [id number]        Sodium 135 mmol/L      POC Potassium 4.1 mmol/L      Hematocrit 27 %      Hemoglobin 9.2 g/dL      CO2 Content 28 mmol/L     POC Chem 8, arterial (ISTAT) [[id number]]  (Abnormal) Collected: 03/15/21 1221    Specimen: Arterial Blood Updated: 03/15/21 1435     Ionized Calcium 1.22 mmol/L      pH, Arterial 7.330 pH units      pCO2, Arterial 53.9 mm Hg      pO2, Arterial 519.0 mm Hg      HCO3, Arterial 28.7 mmol/L      Base Excess, Arterial 3.0 mmol/L      Base Deficit --     O2 Saturation, Arterial 100.0 %      Glucose 125 mg/dL      Comment: Serial Number: [id number]:  [id number]        Sodium 140 mmol/L      POC Potassium 3.7 mmol/L      Hematocrit 38 %      Hemoglobin 12.9 g/dL      CO2 Content 30 mmol/L     POC Glucose Once [[id number]]  (Abnormal) Collected: 03/15/21 1041    Specimen: Blood Updated: 03/15/21 1042     Glucose 108 mg/dL      Comment: Serial Number: [id number]:  [id number]             Imaging Results (Last 24 Hours)     Procedure  Component Value Units Date/Time    XR Chest 1 View [[id number]] Resulted: 03/16/21 0505     Updated: 03/16/21 0516    XR Chest 1 View [[id number]] Collected: 03/15/21 1624     Updated: 03/15/21 1647    Narrative:      Examination: XR CHEST 1 VW-     Date of Exam: 3/15/2021 3:50 PM     Indication: Post-Op Check Line & Tube Placement;  I25.10-Atherosclerotic heart disease of native coronary artery without  angina pectoris; R07.89-Other chest pain; I10-Essential (primary)  hypertension; E08.9-Diabetes mellitus due to underlying condition  without complications; E78.5-Hyperlipidemia, unspecified;  I25.10-Atherosclerotic heart disease of native coronary artery without  angina pectoris; R.     Comparison: Radiograph 02/14/2021, 03/12/2021     Technique: 1 view of the chest      Findings:  An endotracheal tube is present with the tip in the midtrachea. An  enteric tube is present within the stomach. A left basilar chest tube is  present. No evidence of pneumothorax. There is a right internal jugular  Summit-Brian catheter with the tip in the main pulmonary outflow. The lung  volumes are low. Mild bibasilar atelectatic changes are present. No  significant pleural effusion. The cardiac and mediastinal silhouette  appear within normal limits. Median sternotomy wires are present. No  acute osseous abnormality identified.       Impression:      Mild bibasilar atelectasis. No pneumothorax. Appropriately placed  life-support devices as above.     Electronically Signed By-Nellie Valerio MD On:3/15/2021 4:25 PM  This report was finalized on [id number] by  Nellie Valerio MD.      LAB RESULTS (LAST 7 DAYS)    CBC  Results from last 7 days   Lab Units 03/16/21  0308 03/15/21  2257 03/15/21  2014 03/15/21  1736 03/15/21  1647 03/15/21  1643 03/15/21  1546 03/15/21  1333 03/13/21  0405 03/12/21  0615 03/12/21  0615   WBC 10*3/mm3 10.30  --   --   --   --   --  12.60* 11.10* 12.30*  --  12.00*   RBC 10*6/mm3 3.39*  --   --   --   --   --   3.14* 3.07* 4.34  --  4.57   HEMOGLOBIN g/dL 10.9*  --   --   --   --   --  9.6* 9.5* 13.8  --  14.5   HEMOGLOBIN, POC g/dL  --  10.9* 10.3* 11.0* 10.3* 11.3*  --   --   --    < >  --    HEMATOCRIT % 30.9*  --   --   --   --   --  28.6* 27.9* 38.9  --  41.0   HEMATOCRIT POC %  --  32* 30* 32* 30* 33*  --   --   --    < >  --    MCV fL 91.3  --   --   --   --   --  91.1 91.1 89.7  --  89.9   PLATELETS 10*3/mm3 152  --   --   --   --   --  167 158 224  --  262    < > = values in this interval not displayed.       BMP  Results from last 7 days   Lab Units 03/16/21  0308 03/15/21  1545 03/13/21  0405 03/12/21  0615   SODIUM mmol/L 141 141 139 134*   POTASSIUM mmol/L 4.2 4.1 3.7 4.6   CHLORIDE mmol/L 110* 108* 104 98   CO2 mmol/L 24.0 23.0 25.0 26.0   BUN mg/dL 24* 23 20 18   CREATININE mg/dL 1.00 1.01 1.03 1.16   GLUCOSE mg/dL 125* 119* 194* 378*   MAGNESIUM mg/dL 2.2  --  1.7  --    PHOSPHORUS mg/dL 4.3 2.7  --   --        CMP   Results from last 7 days   Lab Units 03/16/21  0308 03/15/21  1545 03/13/21  0405 03/12/21  0615   SODIUM mmol/L 141 141 139 134*   POTASSIUM mmol/L 4.2 4.1 3.7 4.6   CHLORIDE mmol/L 110* 108* 104 98   CO2 mmol/L 24.0 23.0 25.0 26.0   BUN mg/dL 24* 23 20 18   CREATININE mg/dL 1.00 1.01 1.03 1.16   GLUCOSE mg/dL 125* 119* 194* 378*   ALBUMIN g/dL 4.20 3.90 3.70  --    BILIRUBIN mg/dL  --   --  0.9  --    ALK PHOS U/L  --   --  72  --    AST (SGOT) U/L  --   --  14  --    ALT (SGPT) U/L  --   --  16  --          BNP        TROPONIN        CoAg  Results from last 7 days   Lab Units 03/16/21  0308 03/15/21  1545 03/15/21  1333 03/13/21  0405 03/12/21  0615   INR  1.09 1.17* 1.63* 1.03 1.02   APTT seconds  --  25.8 26.7 23.6*  --        Creatinine Clearance  Estimated Creatinine Clearance: 74 mL/min (by C-G formula based on SCr of 1 mg/dL).    ABG  Results from last 7 days   Lab Units 03/16/21  0318 03/15/21  2257 03/15/21  2014 03/15/21  1736 03/15/21  1643 03/15/21  1429 03/15/21  1411  03/15/21  1347   PH, ARTERIAL pH units  --  7.368 7.367 7.403 7.386 7.310* 7.350 7.280*   PCO2, ARTERIAL mm Hg  --  43.6 41.7 37.1 42.1 56.4* 53.9* 54.9*   PO2 ART mm Hg  --  105.8 117.6* 140.3* 310.6* 427.0* 448.0* 467.0*   O2 SATURATION ART %  --  97.9 98.5* 99.2* 99.9* 100.0* 100.0* 100.0*   BASE EXCESS ART mmol/L -0.6* -0.4* -1.4* -1.3* 0.1 2.0 4.0* <0.0*       Radiology  XR Chest 1 View    Result Date: 3/15/2021  Mild bibasilar atelectasis. No pneumothorax. Appropriately placed life-support devices as above.  Electronically Signed By-Nellie Valerio MD On:3/15/2021 4:25 PM This report was finalized on [id number] by  Nellie Valerio MD.    XR Chest 1 View    Result Date: 3/14/2021  No active disease.  Electronically Signed By-Xavi Juares MD On:3/14/2021 10:08 AM This report was finalized on [id number] by  Xavi Juares MD.              EKG      I personally viewed and interpreted the patient's EKG/Telemetry data:    ECHOCARDIOGRAM:    Results for orders placed in visit on 03/15/21    Emergent/Open-Heart Anesthesia LETICIA    Narrative  Preanesthesia Checklist:  Patient identified, IV assessed, risks and benefits discussed, monitors and equipment assessed, procedure being performed at surgeon's request and anesthesia consent obtained.    General Procedure Information  LETICIA Placed for monitoring purposes only -- This is not a diagnostic LETICIA          Anesthesia Information      Echocardiogram Comments:  Post bypass exam... no air or valvular issues or hypokinetic ernst.          STRESS MYOVIEW:    Cardiolite (Tc-99m Sestamibi) stress test    CARDIAC CATHETERIZATION:            OTHER:         Assessment/Plan     Principal Problem:    Coronary artery disease involving native coronary artery of native heart without angina pectoris  Active Problems:    Chest discomfort    Essential hypertension    Diabetes mellitus due to underlying condition without complication, without long-term current use of insulin (CMS/[facility])     Dyslipidemia    Chronic coronary artery disease    Abnormal nuclear stress test    Coronary artery disease      ]]]]]]]]]]]]]]]]]]]  Impression  ========    -Status post urgent CABG x 3 with a LIMA to the mid LAD and reverse individual saphenous vein graft to the distal RCA into the medial marginal-    -Preoperative chest discomfort-exertional burning sensation in the chest.  More with carrying.  Relieved by rest.  Suggestive of angina pectoris.    Cardiac catheterization 3/12/2021 revealed left ventricle size and contractility is normal with ejection fraction of 60%.  No mitral regurgitation is present.  Left subclavian artery and left internal mammary arteries are normal.     Left main coronary artery normal.  Left anterior descending artery has diffuse calcification with ostial 60% proximal 90 and mid segment 90% disease.  Circumflex coronary artery provided a large marginal branch.  Circumflex coronary artery has 60% disease proximal to the origin of marginal branch.  Circumflex coronary artery distal to the marginal branches small in caliber and has diffuse 60 to 70% disease.  Right coronary artery is a very large and dominant vessel that has ostial 99% disease and mid segment 90% disease.  There appears to be a clot.    Cardiac cath 12/13/2014 revealed 40 to 50% mid LAD ostial 60 to 70% RCA (FFR did not reveal any significant disease)  History of left heart cath     Echocardiogram-normal 2/23/2021  Lexiscan Cardiolite test 2/23/2021-abnormal with apical ischemia.     -Hypertension dyslipidemia diabetes.  CKD 3.  Recent BUN 35 creatinine 1.27-12/27/2020     -History of significant sinus bradycardia that has improved in the past with discontinuation of Bystolic.     -History of nonsustained ventricular tachycardia with exercise in the past.     -Status post tonsillectomy adenoidectomy     -Family history of coronary artery disease     -Non-smoker     -Allergic to shellfish and  crab.  ==============  Plan  =========   status post CABG 3/15/2021    Respiratory status-patient is extubated.  Chest tube in place.    Rhythm-pacemaker rhythm for hemodynamics.    Recent positive COVID-19 nasal swab.  Thought to be noninfectious.  Infectious disease has seen.    Further plan will depend on patient's progress  ]]]]]]]]]]]]]             Huseyin Do MD  03/16/21  06:11 EDT

## 2021-03-16 NOTE — OP NOTE
Operative Note    Date of Dictation: 03/16/21    Date of Procedure: 3/15/21    Referring Physician: Huseyin Do MD    Preoperative diagnosis:  1.  Multivessel coronary artery disease  2.  Angina pectoris    Postoperative diagnosis:   Same    Procedure:   1.  Urgent CABG x 3 with a LIMA to the mid LAD and reverse individual saphenous vein graft to the distal RCA into the medial marginal  2. EVH of the right legs    Surgeon: Paxton Vasquez MD     Assistants: JEROD Peterson Assistant: Pascale Bishop PA was responsible for performing the following activities: Retraction, Suction, Irrigation, Suturing, Closing, Placing Dressing, Harvesting of Vessels, Bypass Grafting and All aspects of the case and their skilled assistance was necessary for the success of this case.assist    Anesthesia: General endotracheal anesthesia and LETICIA    Findings:  The saphenous vein was harvested endoscopically form the right  leg. The vein had a diameter of 3.5 mm and was of good quality. The coronaries had diameters between 1.75 and 2 mm.    Estimated Blood Loss: Approximately 500 cc, most of it recovered with a Cell Saver device and cardiotomy suckers and was retransfused to the patient    STS Data:    The patient was explained the risks (STS risk score calculated), benefits and alternatives of surgery and agreed to proceed. The antibiotics and b blockers were given in the STS required window.        Description of the procedure:     The patient was placed supine on the operative table. General anesthesia was given and lines placed. The patient was prepped and draped using the usual sterile technique. A median sternotomy was performed with a scalpel and the layers carried down to the sternum using the electrocautery. The sternum was split in the midline using a vertical oscillating saw. Hemostasis was achieved. The LIMA was harvested as a pedicle and prepared with papaverine. It was of good quality. 300 units/kg of IV  heparin was given to an ACT over 400. A Favaloro retractor was placed and the mediastinum exposed, the pericardium was opened and the edges tacked to the wound. Cannulation sutures were placed in the ascending aorta and right atrium. Small cannulas were placed and aorto right atrial cardiopulmonary bypass was started. Cardioplegia cannulas were placed and then the ascending aorta was clamped. One liter of cold blood cardioplegia was given in an antegrade fashion to achieved diastolic arrest and further doses every 15 minutes thereafter. Constructions of grafts were done in the sequence distal - proximal between the reversed saphenous vein graft and each coronary targets. Grafts were constructed to the OM1 and Distal RCA coronary arteries and plegia was given between graft sequences after de-airing the aortic root and tying the proximal anastomosis. The LIMA was anastomosed to the mid LAD using 7.0 prolene continuous suture with a small needle, then the warm dose of cardioplegia was given and then the aortic clamp removed as well as the LIMA bulldog clamp. All anastomoses were checked and had good flow and morphology. The left pleural space was suctioned and the lungs ventilated. The heart was paced till regular atrial rhythm resumed. I allowed the heart to eject and once hemodynamics were acceptable, then the CPB was discontinued and the venous and cardioplegia cannulas removed. The matching dose of protamine was given and the aortic cannula removed as well. AV temporary wires and pleural and mediastinal chest tubes were placed and the wound sprayed with platelet rich plasma.  The sternum was closed with single and double wires and soft tissue in layers of reabsorbable material. The wounds were covered with sterile dressings.       Specimen removed: None    CPB time: 41 minutes    Aortic clamp time: 33 minutes    Complications:  none           Disposition: Cardiovascular recovery room           Condition: Critical  but stable.

## 2021-03-16 NOTE — PLAN OF CARE
Goal Outcome Evaluation:  Plan of Care Reviewed With: patient  Progress: no change  Outcome Summary: Pt. is 77 y/o male POD # 1 CABG. Pt. reports I/ADL independence at baseline and livesa at home w/ spouse. Pt. completes functional transfers this date for sit to stand from low seated surface and min VC's to maintain sternal precautions. Increased assist provided for lowerbody ADL secondary to multiple lines this date w/ limited AROM and trunk flexion. Recommend d/c home w/ 24 hour support/assist, will follow up w/ pt. 1-3x per week at [facility].    PPE: gloves, mask, face shield

## 2021-03-16 NOTE — PROGRESS NOTES
S/P POD# 1 CABG--Pagni  EF 60% (echo)    Subjective:  Patient extubated overnight. Currently up to chair preparing to work with PT.    Drips:  Nitro, Cardene 4  CVP 11    Intake/Output Summary (Last 24 hours) at 3/16/2021 1105  Last data filed at 3/16/2021 0600  Gross per 24 hour   Intake 2573 ml   Output 2060 ml   Net 513 ml     Temp:  [97.6 °F (36.4 °C)-99.5 °F (37.5 °C)] 97.6 °F (36.4 °C)  Heart Rate:  [39-80] 70  Resp:  [12-16] 16  BP: ()/(43-56) 127/43  Arterial Line BP: (100-143)/(39-63) 142/48  FiO2 (%):  [40 %-70 %] 40 %  /8    Results from last 7 days   Lab Units 03/16/21  0308 03/15/21  2257 03/15/21  1546 03/15/21  1545 03/15/21  1333 03/15/21  1333   WBC 10*3/mm3 10.30  --  12.60*  --   --  11.10*   HEMOGLOBIN g/dL 10.9*  --  9.6*  --   --  9.5*   HEMOGLOBIN, POC g/dL  --  10.9*  --   --    < >  --    HEMATOCRIT % 30.9*  --  28.6*  --   --  27.9*   HEMATOCRIT POC %  --  32*  --   --    < >  --    PLATELETS 10*3/mm3 152  --  167  --   --  158   INR  1.09  --   --  1.17*  --  1.63*    < > = values in this interval not displayed.     Results from last 7 days   Lab Units 03/16/21  0308   CREATININE mg/dL 1.00   POTASSIUM mmol/L 4.2   SODIUM mmol/L 141   MAGNESIUM mg/dL 2.2   PHOSPHORUS mg/dL 4.3     ica 1.24    Physical Exam:  Neuro intact, nad, up to chair  Tele:  AAI 70, NSR 60's underlying  Diminished bases, 97% 4L NC  dsg c/d/i, No drainage  Benign abd, No BM  No edema    Assessment/Plan:  Principal Problem:    Coronary artery disease involving native coronary artery of native heart without angina pectoris  Active Problems:    Coronary artery disease    Chest discomfort    Essential hypertension    Diabetes mellitus due to underlying condition without complication, without long-term current use of insulin (CMS/HCC)    Dyslipidemia    Chronic coronary artery disease    Abnormal nuclear stress test    Progressive CAD s/p CABG-- aspirin, statin, add BB  HTN--on Cardene and Nitro  DM type  2--Insulin gtt  Dyslipidemia--statin  CKD stage 2--Cr 1  Hx NSVT with stress testing  COVID-19 infection--Dec 2020  Fatty liver infiltration--per CT chest    Routine care  De-escalate  Mobilize  Add BB and PRN Hydralazine for hypertension  Wean Cardene to off and dc alejandro  Re-evaluate chest tubes after working with PT  Gentle diuresis  Patient will need Plavix at discharge per Dr. Pedro VALDIVIA, APRN  3/16/2021  11:05 EDT

## 2021-03-16 NOTE — PLAN OF CARE
Goal Outcome Evaluation:  patient extubated at 2300 without complication to 4l/min NC  O2 sat 97-99% on that current setting does well on flutter valve but only 500 on IS  good cough effort  100% AV paced and captured intrinsic rhythm sinus rhythm with 1st degree avb in the upper 60's low 70's  Hemodynamics stable good UOP  BP being managed with IV nitro and cardene  only 120cc of sesrosangious chest tube drainage out 11-7.  Up to the chair with 2 person assist

## 2021-03-17 ENCOUNTER — APPOINTMENT (OUTPATIENT)
Dept: GENERAL RADIOLOGY | Facility: HOSPITAL | Age: 76
End: 2021-03-17

## 2021-03-17 LAB
ANION GAP SERPL CALCULATED.3IONS-SCNC: 12 MMOL/L (ref 5–15)
BUN SERPL-MCNC: 24 MG/DL (ref 8–23)
BUN/CREAT SERPL: 24 (ref 7–25)
CALCIUM SPEC-SCNC: 8.8 MG/DL (ref 8.6–10.5)
CHLORIDE SERPL-SCNC: 105 MMOL/L (ref 98–107)
CO2 SERPL-SCNC: 23 MMOL/L (ref 22–29)
CREAT SERPL-MCNC: 1 MG/DL (ref 0.76–1.27)
DEPRECATED RDW RBC AUTO: 46.4 FL (ref 37–54)
ERYTHROCYTE [DISTWIDTH] IN BLOOD BY AUTOMATED COUNT: 14.2 % (ref 12.3–15.4)
GFR SERPL CREATININE-BSD FRML MDRD: 73 ML/MIN/1.73
GLUCOSE BLDC GLUCOMTR-MCNC: 110 MG/DL (ref 70–105)
GLUCOSE BLDC GLUCOMTR-MCNC: 110 MG/DL (ref 70–105)
GLUCOSE BLDC GLUCOMTR-MCNC: 114 MG/DL (ref 70–105)
GLUCOSE BLDC GLUCOMTR-MCNC: 115 MG/DL (ref 70–105)
GLUCOSE BLDC GLUCOMTR-MCNC: 115 MG/DL (ref 70–105)
GLUCOSE BLDC GLUCOMTR-MCNC: 116 MG/DL (ref 70–105)
GLUCOSE BLDC GLUCOMTR-MCNC: 117 MG/DL (ref 70–105)
GLUCOSE BLDC GLUCOMTR-MCNC: 121 MG/DL (ref 70–105)
GLUCOSE BLDC GLUCOMTR-MCNC: 126 MG/DL (ref 70–105)
GLUCOSE BLDC GLUCOMTR-MCNC: 128 MG/DL (ref 70–105)
GLUCOSE BLDC GLUCOMTR-MCNC: 136 MG/DL (ref 70–105)
GLUCOSE BLDC GLUCOMTR-MCNC: 187 MG/DL (ref 70–105)
GLUCOSE BLDC GLUCOMTR-MCNC: 205 MG/DL (ref 70–105)
GLUCOSE BLDC GLUCOMTR-MCNC: 246 MG/DL (ref 70–105)
GLUCOSE BLDC GLUCOMTR-MCNC: 96 MG/DL (ref 70–105)
GLUCOSE SERPL-MCNC: 128 MG/DL (ref 65–99)
HCT VFR BLD AUTO: 33.8 % (ref 37.5–51)
HGB BLD-MCNC: 11.4 G/DL (ref 13–17.7)
MCH RBC QN AUTO: 31.4 PG (ref 26.6–33)
MCHC RBC AUTO-ENTMCNC: 33.7 G/DL (ref 31.5–35.7)
MCV RBC AUTO: 93.3 FL (ref 79–97)
PLATELET # BLD AUTO: 161 10*3/MM3 (ref 140–450)
PMV BLD AUTO: 8.3 FL (ref 6–12)
POTASSIUM SERPL-SCNC: 3.9 MMOL/L (ref 3.5–5.2)
RBC # BLD AUTO: 3.62 10*6/MM3 (ref 4.14–5.8)
SODIUM SERPL-SCNC: 140 MMOL/L (ref 136–145)
WBC # BLD AUTO: 11.6 10*3/MM3 (ref 3.4–10.8)

## 2021-03-17 PROCEDURE — 99024 POSTOP FOLLOW-UP VISIT: CPT | Performed by: NURSE PRACTITIONER

## 2021-03-17 PROCEDURE — 25010000002 MORPHINE PER 10 MG: Performed by: NURSE PRACTITIONER

## 2021-03-17 PROCEDURE — 25010000002 HYDRALAZINE PER 20 MG: Performed by: THORACIC SURGERY (CARDIOTHORACIC VASCULAR SURGERY)

## 2021-03-17 PROCEDURE — 99232 SBSQ HOSP IP/OBS MODERATE 35: CPT | Performed by: INTERNAL MEDICINE

## 2021-03-17 PROCEDURE — 25010000002 CEFAZOLIN PER 500 MG: Performed by: NURSE PRACTITIONER

## 2021-03-17 PROCEDURE — 82962 GLUCOSE BLOOD TEST: CPT

## 2021-03-17 PROCEDURE — 97116 GAIT TRAINING THERAPY: CPT

## 2021-03-17 PROCEDURE — 25010000002 ONDANSETRON PER 1 MG: Performed by: NURSE PRACTITIONER

## 2021-03-17 PROCEDURE — 93005 ELECTROCARDIOGRAM TRACING: CPT | Performed by: NURSE PRACTITIONER

## 2021-03-17 PROCEDURE — 80048 BASIC METABOLIC PNL TOTAL CA: CPT | Performed by: NURSE PRACTITIONER

## 2021-03-17 PROCEDURE — 85027 COMPLETE CBC AUTOMATED: CPT | Performed by: NURSE PRACTITIONER

## 2021-03-17 PROCEDURE — 25010000002 ENOXAPARIN PER 10 MG: Performed by: NURSE PRACTITIONER

## 2021-03-17 PROCEDURE — 71045 X-RAY EXAM CHEST 1 VIEW: CPT

## 2021-03-17 RX ORDER — FUROSEMIDE 40 MG/1
40 TABLET ORAL DAILY
Status: DISCONTINUED | OUTPATIENT
Start: 2021-03-17 | End: 2021-03-18 | Stop reason: HOSPADM

## 2021-03-17 RX ORDER — POTASSIUM CHLORIDE 20 MEQ/1
20 TABLET, EXTENDED RELEASE ORAL DAILY
Status: DISCONTINUED | OUTPATIENT
Start: 2021-03-17 | End: 2021-03-18 | Stop reason: HOSPADM

## 2021-03-17 RX ADMIN — CHLORHEXIDINE GLUCONATE 0.12% ORAL RINSE 15 ML: 1.2 LIQUID ORAL at 08:02

## 2021-03-17 RX ADMIN — METOPROLOL TARTRATE 25 MG: 25 TABLET, FILM COATED ORAL at 20:11

## 2021-03-17 RX ADMIN — SENNOSIDES 2 TABLET: 8.6 TABLET, FILM COATED ORAL at 20:09

## 2021-03-17 RX ADMIN — PANTOPRAZOLE SODIUM 40 MG: 40 TABLET, DELAYED RELEASE ORAL at 05:58

## 2021-03-17 RX ADMIN — ONDANSETRON 4 MG: 2 INJECTION INTRAMUSCULAR; INTRAVENOUS at 20:08

## 2021-03-17 RX ADMIN — POTASSIUM CHLORIDE 20 MEQ: 1500 TABLET, EXTENDED RELEASE ORAL at 11:45

## 2021-03-17 RX ADMIN — HYDROCODONE BITARTRATE AND ACETAMINOPHEN 1 TABLET: 5; 325 TABLET ORAL at 14:25

## 2021-03-17 RX ADMIN — MUPIROCIN 1 APPLICATION: 20 OINTMENT TOPICAL at 20:12

## 2021-03-17 RX ADMIN — MUPIROCIN 1 APPLICATION: 20 OINTMENT TOPICAL at 08:02

## 2021-03-17 RX ADMIN — ACETAMINOPHEN 650 MG: 325 TABLET ORAL at 14:25

## 2021-03-17 RX ADMIN — FUROSEMIDE 40 MG: 40 TABLET ORAL at 11:45

## 2021-03-17 RX ADMIN — HYDRALAZINE HYDROCHLORIDE 20 MG: 20 INJECTION INTRAMUSCULAR; INTRAVENOUS at 01:49

## 2021-03-17 RX ADMIN — ASPIRIN 81 MG: 81 TABLET, COATED ORAL at 08:02

## 2021-03-17 RX ADMIN — DOCUSATE SODIUM 100 MG: 100 CAPSULE, LIQUID FILLED ORAL at 20:09

## 2021-03-17 RX ADMIN — DOCUSATE SODIUM 100 MG: 100 CAPSULE, LIQUID FILLED ORAL at 08:03

## 2021-03-17 RX ADMIN — POLYETHYLENE GLYCOL 3350 17 G: 17 POWDER, FOR SOLUTION ORAL at 20:09

## 2021-03-17 RX ADMIN — ATORVASTATIN CALCIUM 40 MG: 40 TABLET, FILM COATED ORAL at 20:10

## 2021-03-17 RX ADMIN — ENOXAPARIN SODIUM 40 MG: 40 INJECTION SUBCUTANEOUS at 20:08

## 2021-03-17 RX ADMIN — POLYETHYLENE GLYCOL 3350 17 G: 17 POWDER, FOR SOLUTION ORAL at 08:02

## 2021-03-17 RX ADMIN — HYDROCODONE BITARTRATE AND ACETAMINOPHEN 1 TABLET: 5; 325 TABLET ORAL at 02:25

## 2021-03-17 RX ADMIN — HYDROCODONE BITARTRATE AND ACETAMINOPHEN 1 TABLET: 5; 325 TABLET ORAL at 20:12

## 2021-03-17 RX ADMIN — ONDANSETRON 4 MG: 2 INJECTION INTRAMUSCULAR; INTRAVENOUS at 13:23

## 2021-03-17 RX ADMIN — MORPHINE SULFATE 2 MG: 4 INJECTION INTRAVENOUS at 01:50

## 2021-03-17 RX ADMIN — CHLORHEXIDINE GLUCONATE 0.12% ORAL RINSE 15 ML: 1.2 LIQUID ORAL at 20:09

## 2021-03-17 RX ADMIN — CEFAZOLIN SODIUM 2 G: 10 INJECTION, POWDER, FOR SOLUTION INTRAVENOUS at 05:58

## 2021-03-17 RX ADMIN — METOPROLOL TARTRATE 12.5 MG: 25 TABLET, FILM COATED ORAL at 08:03

## 2021-03-17 NOTE — PLAN OF CARE
Goal Outcome Evaluation:  Plan of Care Reviewed With: patient  Progress: improving  Outcome Summary: Patient is POD 2, all lines with the exception of wires were removed yesterday. CXR post CT removal showed left pleural effusion but no pneumothorax. On room air. He is NSR with occasional PVCs. Blood pressure to be maintained below 140 systolic. Off cardene and nitro gtts. Betablocker started yesterday. Needs to be started on plavix before discharge. Up with one assist. Lives at home with spouse.

## 2021-03-17 NOTE — PROGRESS NOTES
Continued Stay Note  [facility]     Patient Name: Tao Pozo  MRN: [id number]  Today's Date: 3/17/2021    Admit Date: 3/12/2021    Discharge Plan     Row Name 03/17/21 1524       Plan    Plan  Anticipate home with assist.    Patient/Family in Agreement with Plan  yes    Plan Comments  Barriers to discharge: POD # 2 CABG. Patient still has pacer wires present. Not medically ready for discharge.          Expected Discharge Date and Time     Expected Discharge Date Expected Discharge Time    Mar 19, 2021             Met with patient in room wearing PPE: mask, face shield/goggles, gloves, gown.      Maintained distance greater than six feet and spent less than 15 minutes in the room.        Anna Nae[place] [address]   [phone]  office  [phone]  fax  [email or web address]

## 2021-03-17 NOTE — PLAN OF CARE
Goal Outcome Evaluation:    Pt POD #2 progressing well.  AV wires dc'd today.  Anticipating discharge Thursday or Friday.

## 2021-03-17 NOTE — PROGRESS NOTES
S/P POD# 2 CABG--Zacni  EF 60% (echo)    Subjective:  No events overright. Patient complains of mild incisional pain.    Drips: Insulin    Intake/Output Summary (Last 24 hours) at 3/17/2021 1020  Last data filed at 3/17/2021 0900  Gross per 24 hour   Intake 1969.57 ml   Output 1460 ml   Net 509.57 ml     Temp:  [97.5 °F (36.4 °C)-98.8 °F (37.1 °C)] 98.4 °F (36.9 °C)  Heart Rate:  [56-82] 68  BP: (105-151)/(42-57) 140/49  Arterial Line BP: (130-146)/(43-61) 142/61    Results from last 7 days   Lab Units 03/17/21  0256 03/16/21  0308 03/15/21  1546 03/15/21  1545 03/15/21  1347 03/15/21  1333   WBC 10*3/mm3 11.60* 10.30  --   --    < > 11.10*   HEMOGLOBIN g/dL 11.4* 10.9*  --   --    < > 9.5*   HEMOGLOBIN, POC   --   --    < >  --   --   --    HEMATOCRIT % 33.8* 30.9*  --   --    < > 27.9*   HEMATOCRIT POC   --   --    < >  --   --   --    PLATELETS 10*3/mm3 161 152  --   --    < > 158   INR   --  1.09  --  1.17*  --  1.63*    < > = values in this interval not displayed.     Results from last 7 days   Lab Units 03/17/21  0256 03/16/21  0308   CREATININE mg/dL 1.00 1.00   POTASSIUM mmol/L 3.9 4.2   SODIUM mmol/L 140 141   MAGNESIUM mg/dL  --  2.2   PHOSPHORUS mg/dL  --  4.3       Physical Exam:  Neuro intact, nad, up to chair  Tele: SR 60's  Diminished bases, 100% room air  dsg c/d/i, No drainage  Benign abd, No BM  No edema    Assessment/Plan:  Principal Problem:    Coronary artery disease involving native coronary artery of native heart without angina pectoris  Active Problems:    Coronary artery disease    Chest discomfort    Essential hypertension    Diabetes mellitus due to underlying condition without complication, without long-term current use of insulin (CMS/[facility])    Dyslipidemia    Chronic coronary artery disease    Abnormal nuclear stress test    Progressive CAD s/p CABG-- aspirin, statin,BB  HTN-- BB  DM type 2--Insulin gtt  Dyslipidemia--statin  CKD stage 2--Cr 1  Hx NSVT with stress testing  COVID-19  infection--Dec 2020  Fatty liver infiltration--per CT chest    Routine care  Mobilize  Increase beta blocker dosage  Add PO Lasix  Patient will need Plavix at discharge per Dr. Pedro VALDIVIA, APRN  3/17/2021  10:20 EDT

## 2021-03-17 NOTE — THERAPY TREATMENT NOTE
Subjective: Pt agreeable to therapeutic plan of care.    Objective:   Phase 1 Cardiac Rehab Initiated    Sitting tolerance: >10min  Standing tolerance: 5-10min    Precautions:   Mid-sternal incision; avoid scapular retraction and depression.   Cardiovascular impairment post-sx; encourage energy conservation strategies.    MET level equivalent: 2.0-3.0 (Unlimited sitting, ambulation on level ground <2mph, light housework)      Bed mobility - CGA  Transfers - CGA  Ambulation - 200 feet CGA and with rolling walker    Pain: 2 VAS  Education: Provided education on importance of mobility and skilled verbal / tactile cueing throughout intervention.     Assessment: Tao Pozo presents with functional mobility impairments which indicate the need for skilled intervention. Tolerating session today without incident. Will continue to follow and progress as tolerated. Patient making good progress, tolerated inc distance in ambulation this session. Still using rw but able to ambulate short distance without a.d. Will need to do stairs on next session.     Plan/Recommendations:   Pt would benefit from home21 at discharge from facility and requires no DME at discharge.   Pt desires Home at discharge. Pt cooperative; agreeable to therapeutic recommendations and plan of care.     Basic Mobility 6-click:  Rollin = Total, A lot = 2, A little = 3; 4 = None  Supine>Sit:   1 = Total, A lot = 2, A little = 3; 4 = None   Sit>Stand with arms:  1 = Total, A lot = 2, A little = 3; 4 = None  Bed>Chair:   1 = Total, A lot = 2, A little = 3; 4 = None  Ambulate in room:  1 = Total, A lot = 2, A little = 3; 4 = None  3-5 Steps with railin = Total, A lot = 2, A little = 3; 4 = None  Score: 21    Post-Tx Position: Supine with HOB Elevated and Call light and personal items within reach  PPE: gloves, surgical mask, eyewear protection

## 2021-03-17 NOTE — PROGRESS NOTES
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  Surgical soreness     ROS    Since I have last seen him yesterday, the patient has been without any chest discomfort ,shortness of breath, palpitations, dizziness or syncope.  Denies having any headache ,abdominal pain ,nausea, vomiting , diarrhea constipation, loss of weight or loss of appetite.  Denies having any excessive bruising ,hematuria or blood in the stool.    Review of all systems negative except as indicated    History  Past Medical History:   Diagnosis Date   • Arthritis    • Coronary artery disease 2/14/2012   • Diabetes mellitus (CMS/[facility])    • Elevated cholesterol    • Hyperlipidemia    • Hypertension        Past Surgical History:   Procedure Laterality Date   • CARDIAC CATHETERIZATION N/A 3/12/2021    Procedure: Left Heart Cath with Coronary Angiography;  Surgeon: Huseyin Do MD;  Location: [facility] CATH INVASIVE LOCATION;  Service: Cardiovascular;  Laterality: N/A;   • TONSILECTOMY, ADENOIDECTOMY, BILATERAL MYRINGOTOMY AND TUBES  1950       Family History   Problem Relation Age of Onset   • Diabetes Mother    • Heart disease Father    • Diabetes Sister    • Diabetes Brother    • Hypertension Brother    • Heart disease Brother        Social History     Tobacco Use   • Smoking status: Never Smoker   • Smokeless tobacco: Never Used   Vaping Use   • Vaping Use: Never used   Substance Use Topics   • Alcohol use: No   • Drug use: Defer        Medications Prior to Admission   Medication Sig Dispense Refill Last Dose   • atorvastatin (LIPITOR) 20 MG tablet Take 20 mg by mouth Daily.   3/11/2021 at Unknown time   • CBD (cannabidiol) oral oil Take 1 mL by mouth Daily. For arthritis   Past Week at Unknown time   •  metFORMIN (GLUCOPHAGE) 1000 MG tablet Take 1,000 mg by mouth 2 (Two) Times a Day With Meals.   3/11/2021 at Unknown time   • SITagliptin (JANUVIA) 100 MG tablet Take 100 mg by mouth Daily.   3/11/2021 at Unknown time   • glimepiride (AMARYL) 1 MG tablet Take 2 mg by mouth Daily.   3/10/2021   • hydroCHLOROthiazide (HYDRODIURIL) 12.5 MG tablet Take 12.5 mg by mouth Daily.   3/10/2021   • lisinopril (PRINIVIL,ZESTRIL) 40 MG tablet Take 40 mg by mouth Daily.   3/10/2021   • nitroglycerin (NITROSTAT) 0.4 MG SL tablet Place 1 tablet under the tongue As Needed.   Unknown at Unknown time   • Triamcinolone Acetonide (NASACORT) 55 MCG/ACT nasal inhaler 2 sprays into the nostril(s) as directed by provider Daily.   More than a month at Unknown time       Allergies  Shellfish-derived products    Scheduled Meds:aspirin, 81 mg, Oral, Daily  atorvastatin, 40 mg, Oral, Nightly  ceFAZolin, 2 g, Intravenous, Q8H  chlorhexidine, 15 mL, Mouth/Throat, Q12H  docusate sodium, 100 mg, Oral, BID  enoxaparin, 40 mg, Subcutaneous, Daily  [START ON 3/18/2021] insulin lispro, 0-9 Units, Subcutaneous, TID AC  metoprolol tartrate, 12.5 mg, Oral, Q12H  mupirocin, 1 application, Each Nare, BID  pantoprazole, 40 mg, Oral, Q AM  polyethylene glycol, 17 g, Oral, BID  senna, 2 tablet, Oral, Nightly      Continuous Infusions:insulin, 0-50 Units/hr, Last Rate: 1.4 Units/hr (03/17/21 7156)  niCARdipine, 5-15 mg/hr, Last Rate: 5 mg/hr (03/16/21 5865)  nitroglycerin, 10-50 mcg/min, Last Rate: 20 mcg/min (03/16/21 7360)  norepinephrine, 0.02-0.06 mcg/kg/min  sodium chloride, 30 mL/hr, Last Rate: 30 mL/hr (03/15/21 7569)      PRN Meds:.•  acetaminophen **OR** acetaminophen **OR** acetaminophen  •  albumin human  •  [START ON 3/18/2021] dextrose  •  [START ON 3/18/2021] dextrose  •  [START ON 3/18/2021] glucagon (human recombinant)  •  hydrALAZINE  •  HYDROcodone-acetaminophen  •  [START ON 3/18/2021] insulin lispro **AND** [START ON 3/18/2021] insulin  "lispro  •  insulin  •  Morphine **AND** naloxone  •  Mouth Kote  •  niCARdipine  •  nitroglycerin  •  norepinephrine  •  ondansetron  •  potassium chloride **OR** potassium chloride  •  potassium chloride **OR** potassium chloride    Objective     VITAL SIGNS  Vitals:    03/17/21 0245 03/17/21 0400 03/17/21 0404 03/17/21 0451   BP: 139/42  145/46 136/48   Pulse: 68  67 66   Resp:       Temp:  98.2 °F (36.8 °C)     TempSrc:  Oral     SpO2: 92%  92% 92%   Weight:       Height:           Flowsheet Rows      First Filed Value   Admission Height  176.5 cm (69.5\") Documented at 03/12/2021 0653   Admission Weight  78.8 kg (173 lb 11.6 oz) Documented at 03/12/2021 0653            Intake/Output Summary (Last 24 hours) at 3/17/2021 0559  Last data filed at 3/17/2021 0558  Gross per 24 hour   Intake 1729.57 ml   Output 1065 ml   Net 664.57 ml        TELEMETRY: Sinus rhythm    Physical Exam:  The patient is alert, oriented and in no distress.  Patient is extubated.  Vital signs as noted above.  Head and neck revealed no carotid bruits or jugular venous distention.  No thyromegaly or lymphadenopathy is present  Lungs clear.  No wheezing.  Breath sounds are normal bilaterally.  Heart normal first and second heart sounds.  No murmur. No precordial rub is present.  No gallop is present.  Abdomen soft and nontender.  No organomegaly is present.  Extremities with good peripheral pulses without any pedal edema.  Skin warm and dry.  Visible incisions are looking well.  Musculoskeletal system is grossly normal  CNS grossly normal      Results Review:   I reviewed the patient's new clinical results.  Lab Results (last 24 hours)     Procedure Component Value Units Date/Time    POC Glucose Once [[id number]]  (Abnormal) Collected: 03/17/21 0556    Specimen: Blood Updated: 03/17/21 0557     Glucose 116 mg/dL      Comment: Serial Number: [id number]:  [id number]       Basic Metabolic Panel [[id number]]  (Abnormal) Collected: 03/17/21 0256 "    Specimen: Blood Updated: 03/17/21 0355     Glucose 128 mg/dL      BUN 24 mg/dL      Creatinine 1.00 mg/dL      Sodium 140 mmol/L      Potassium 3.9 mmol/L      Chloride 105 mmol/L      CO2 23.0 mmol/L      Calcium 8.8 mg/dL      eGFR Non African Amer 73 mL/min/1.73      BUN/Creatinine Ratio 24.0     Anion Gap 12.0 mmol/L     Narrative:      GFR Normal >60  Chronic Kidney Disease <60  Kidney Failure <15      POC Glucose Once [[id number]]  (Abnormal) Collected: 03/17/21 0349    Specimen: Blood Updated: 03/17/21 0350     Glucose 128 mg/dL      Comment: Serial Number: [id number]:  [id number]       CBC (No Diff) [[id number]]  (Abnormal) Collected: 03/17/21 0256    Specimen: Blood Updated: 03/17/21 0327     WBC 11.60 10*3/mm3      RBC 3.62 10*6/mm3      Hemoglobin 11.4 g/dL      Hematocrit 33.8 %      MCV 93.3 fL      MCH 31.4 pg      MCHC 33.7 g/dL      RDW 14.2 %      RDW-SD 46.4 fl      MPV 8.3 fL      Platelets 161 10*3/mm3     POC Glucose Once [[id number]]  (Abnormal) Collected: 03/17/21 0150    Specimen: Blood Updated: 03/17/21 0150     Glucose 117 mg/dL      Comment: Serial Number: [id number]:  [id number]       POC Glucose Once [[id number]]  (Abnormal) Collected: 03/16/21 2323    Specimen: Blood Updated: 03/16/21 2325     Glucose 118 mg/dL      Comment: Serial Number: [id number]:  [id number]       POC Glucose Once [[id number]]  (Abnormal) Collected: 03/16/21 2235    Specimen: Blood Updated: 03/16/21 2237     Glucose 115 mg/dL      Comment: Serial Number: [id number]:  [id number]       POC Glucose Once [[id number]]  (Abnormal) Collected: 03/16/21 2148    Specimen: Blood Updated: 03/16/21 2149     Glucose 113 mg/dL      Comment: Serial Number: [id number]:  [id number]       POC Glucose Once [[id number]]  (Normal) Collected: 03/16/21 2046    Specimen: Blood Updated: 03/16/21 2048     Glucose 105 mg/dL      Comment: Serial Number: [id number]:  [id number]       POC Glucose Once [[id number]]   (Abnormal) Collected: 03/16/21 1926    Specimen: Blood Updated: 03/16/21 1927     Glucose 158 mg/dL      Comment: Serial Number: [id number]:  [id number]       POC Glucose Once [[id number]]  (Abnormal) Collected: 03/16/21 1743    Specimen: Blood Updated: 03/16/21 1745     Glucose 196 mg/dL      Comment: Serial Number: [id number]:  [id number]       POC Glucose Once [[id number]]  (Abnormal) Collected: 03/16/21 1312    Specimen: Blood Updated: 03/16/21 1313     Glucose 147 mg/dL      Comment: Serial Number: [id number]:  [id number]       POC Glucose Once [[id number]]  (Abnormal) Collected: 03/16/21 1134    Specimen: Blood Updated: 03/16/21 1146     Glucose 203 mg/dL      Comment: Serial Number: [id number]:  [id number]       Hemoglobin A1c [[id number]]  (Abnormal) Collected: 03/13/21 0405    Specimen: Blood Updated: 03/16/21 1124     Hemoglobin A1C 7.49 %     Narrative:      Hemoglobin A1C Ranges:    Increased Risk for Diabetes  5.7% to 6.4%  Diabetes                     >= 6.5%  Diabetic Goal                < 7.0%          Imaging Results (Last 24 Hours)     Procedure Component Value Units Date/Time    XR Chest 1 View [[id number]] Collected: 03/16/21 1932     Updated: 03/16/21 1934    Narrative:      Examination: XR CHEST 1 VW-     Date of Exam: 3/16/2021 6:10 PM     Indication: removalof chest tubes; I25.10-Atherosclerotic heart disease  of native coronary artery without angina pectoris; R07.89-Other chest  pain; I10-Essential (primary) hypertension; E08.9-Diabetes mellitus due  to underlying condition without complications; E78.5-Hyperlipidemia,  unspecified; I25.10-Atherosclerotic heart disease of native coronary  artery without angina pectoris; R94.39-Abnormal result of o.     Comparison: Radiograph 03/16/2021, 03/15/2021     Technique: 1 view of the chest      Findings:  There are no airspace consolidations. There is a small left-sided  pleural effusion. The Rudolph-Brian catheter has been removed..  No  pneumothorax. The heart size is enlarged. Median sternotomy wires are  present. The left basilar chest tube is been removed. The pulmonary  vasculature appears within normal limits. No acute osseous abnormality  identified.       Impression:      Small left-sided pleural effusion. No pneumothorax. Otherwise, no acute  process.     Electronically Signed By-Nellie Valerio MD On:3/16/2021 7:32 PM  This report was finalized on [id number] by  Nellie Valerio MD.      LAB RESULTS (LAST 7 DAYS)    CBC  Results from last 7 days   Lab Units 03/17/21  0256 03/16/21  0308 03/15/21  2257 03/15/21  2014 03/15/21  1736 03/15/21  1647 03/15/21  1643 03/15/21  1546 03/15/21  1333 03/13/21  0405 03/12/21  0615 03/12/21  0615   WBC 10*3/mm3 11.60* 10.30  --   --   --   --   --  12.60* 11.10* 12.30*  --  12.00*   RBC 10*6/mm3 3.62* 3.39*  --   --   --   --   --  3.14* 3.07* 4.34  --  4.57   HEMOGLOBIN g/dL 11.4* 10.9*  --   --   --   --   --  9.6* 9.5* 13.8  --  14.5   HEMOGLOBIN, POC g/dL  --   --  10.9* 10.3* 11.0* 10.3* 11.3*  --   --   --    < >  --    HEMATOCRIT % 33.8* 30.9*  --   --   --   --   --  28.6* 27.9* 38.9  --  41.0   HEMATOCRIT POC %  --   --  32* 30* 32* 30* 33*  --   --   --    < >  --    MCV fL 93.3 91.3  --   --   --   --   --  91.1 91.1 89.7  --  89.9   PLATELETS 10*3/mm3 161 152  --   --   --   --   --  167 158 224  --  262    < > = values in this interval not displayed.       BMP  Results from last 7 days   Lab Units 03/17/21  0256 03/16/21  0308 03/15/21  1545 03/13/21  0405 03/12/21  0615   SODIUM mmol/L 140 141 141 139 134*   POTASSIUM mmol/L 3.9 4.2 4.1 3.7 4.6   CHLORIDE mmol/L 105 110* 108* 104 98   CO2 mmol/L 23.0 24.0 23.0 25.0 26.0   BUN mg/dL 24* 24* 23 20 18   CREATININE mg/dL 1.00 1.00 1.01 1.03 1.16   GLUCOSE mg/dL 128* 125* 119* 194* 378*   MAGNESIUM mg/dL  --  2.2  --  1.7  --    PHOSPHORUS mg/dL  --  4.3 2.7  --   --        CMP   Results from last 7 days   Lab Units 03/17/21  0256  03/16/21  0308 03/15/21  1545 03/13/21  0405 03/12/21  0615   SODIUM mmol/L 140 141 141 139 134*   POTASSIUM mmol/L 3.9 4.2 4.1 3.7 4.6   CHLORIDE mmol/L 105 110* 108* 104 98   CO2 mmol/L 23.0 24.0 23.0 25.0 26.0   BUN mg/dL 24* 24* 23 20 18   CREATININE mg/dL 1.00 1.00 1.01 1.03 1.16   GLUCOSE mg/dL 128* 125* 119* 194* 378*   ALBUMIN g/dL  --  4.20 3.90 3.70  --    BILIRUBIN mg/dL  --   --   --  0.9  --    ALK PHOS U/L  --   --   --  72  --    AST (SGOT) U/L  --   --   --  14  --    ALT (SGPT) U/L  --   --   --  16  --          BNP        TROPONIN        CoAg  Results from last 7 days   Lab Units 03/16/21  0308 03/15/21  1545 03/15/21  1333 03/13/21  0405 03/12/21  0615   INR  1.09 1.17* 1.63* 1.03 1.02   APTT seconds  --  25.8 26.7 23.6*  --        Creatinine Clearance  Estimated Creatinine Clearance: 74 mL/min (by C-G formula based on SCr of 1 mg/dL).    ABG  Results from last 7 days   Lab Units 03/16/21  0318 03/15/21  2257 03/15/21  2014 03/15/21  1736 03/15/21  1643 03/15/21  1429 03/15/21  1411 03/15/21  1347   PH, ARTERIAL pH units  --  7.368 7.367 7.403 7.386 7.310* 7.350 7.280*   PCO2, ARTERIAL mm Hg  --  43.6 41.7 37.1 42.1 56.4* 53.9* 54.9*   PO2 ART mm Hg  --  105.8 117.6* 140.3* 310.6* 427.0* 448.0* 467.0*   O2 SATURATION ART %  --  97.9 98.5* 99.2* 99.9* 100.0* 100.0* 100.0*   BASE EXCESS ART mmol/L -0.6* -0.4* -1.4* -1.3* 0.1 2.0 4.0* <0.0*       Radiology  XR Chest 1 View    Result Date: 3/16/2021  Small left-sided pleural effusion. No pneumothorax. Otherwise, no acute process.  Electronically Signed By-Nellie Valerio MD On:3/16/2021 7:32 PM This report was finalized on [id number] by  Nellie Valerio MD.    XR Chest 1 View    Result Date: 3/16/2021  ET tube and NG tube have been removed. Other findings including mild left basilar greater than right basilar atelectasis with a small left effusion persists  Electronically Signed By-Albert Rios MD On:3/16/2021 7:38 AM This report was finalized on  [id number] by  Albert Rios MD.    XR Chest 1 View    Result Date: 3/15/2021  Mild bibasilar atelectasis. No pneumothorax. Appropriately placed life-support devices as above.  Electronically Signed By-Nellie Valerio MD On:3/15/2021 4:25 PM This report was finalized on [id number] by  Nellie Valerio MD.              EKG      I personally viewed and interpreted the patient's EKG/Telemetry data:    ECHOCARDIOGRAM:    Results for orders placed in visit on 03/15/21    Emergent/Open-Heart Anesthesia LETICIA    Narrative  Preanesthesia Checklist:  Patient identified, IV assessed, risks and benefits discussed, monitors and equipment assessed, procedure being performed at surgeon's request and anesthesia consent obtained.    General Procedure Information  LETICIA Placed for monitoring purposes only -- This is not a diagnostic LETICIA          Anesthesia Information      Echocardiogram Comments:  Post bypass exam... no air or valvular issues or hypokinetic ernst.          STRESS MYOVIEW:    Cardiolite (Tc-99m Sestamibi) stress test    CARDIAC CATHETERIZATION:            OTHER:         Assessment/Plan     Principal Problem:    Coronary artery disease involving native coronary artery of native heart without angina pectoris  Active Problems:    Chest discomfort    Essential hypertension    Diabetes mellitus due to underlying condition without complication, without long-term current use of insulin (CMS/[facility])    Dyslipidemia    Chronic coronary artery disease    Abnormal nuclear stress test    Coronary artery disease      ]]]]]]]]]]]]]]]]]]]  Impression  ========    -Status post urgent CABG x 3 with a LIMA to the mid LAD and reverse individual saphenous vein graft to the distal RCA into the medial marginal-    -Preoperative chest discomfort-exertional burning sensation in the chest.  More with carrying.  Relieved by rest.  Suggestive of angina pectoris.    Cardiac catheterization 3/12/2021 revealed left ventricle size and  contractility is normal with ejection fraction of 60%.  No mitral regurgitation is present.  Left subclavian artery and left internal mammary arteries are normal.     Left main coronary artery normal.  Left anterior descending artery has diffuse calcification with ostial 60% proximal 90 and mid segment 90% disease.  Circumflex coronary artery provided a large marginal branch.  Circumflex coronary artery has 60% disease proximal to the origin of marginal branch.  Circumflex coronary artery distal to the marginal branches small in caliber and has diffuse 60 to 70% disease.  Right coronary artery is a very large and dominant vessel that has ostial 99% disease and mid segment 90% disease.  There appears to be a clot.    Cardiac cath 12/13/2014 revealed 40 to 50% mid LAD ostial 60 to 70% RCA (FFR did not reveal any significant disease)  History of left heart cath     Echocardiogram-normal 2/23/2021  Lexiscan Cardiolite test 2/23/2021-abnormal with apical ischemia.     -Hypertension dyslipidemia diabetes.  CKD 3.  Recent BUN 35 creatinine 1.27-12/27/2020     -History of significant sinus bradycardia that has improved in the past with discontinuation of Bystolic.     -History of nonsustained ventricular tachycardia with exercise in the past.     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  ==============  Plan  =========   status post CABG 3/15/2021  Cardiovascular surgery would like to start Plavix at the time of discharge.    Respiratory status-patient is extubated.  Chest tube is out.    Rhythm-sinus rhythm    Recent positive COVID-19 nasal swab.  Thought to be noninfectious.  Infectious disease has seen.    Patient is on aspirin atorvastatin metoprolol 12.5 mg twice a day.    Further plan will depend on patient's progress  ]]]]]]]]]]]]]             Huseyin Do MD  03/17/21  05:59 EDT

## 2021-03-17 NOTE — PLAN OF CARE
Goal Outcome Evaluation:  Plan of Care Reviewed With: patient     Assessment: Tao Pozo presents with functional mobility impairments which indicate the need for skilled intervention. Tolerating session today without incident. Will continue to follow and progress as tolerated. Patient making good progress, tolerated inc distance in ambulation this session. Still using rw but able to ambulate short distance without a.d. Will need to do stairs on next session.

## 2021-03-18 ENCOUNTER — READMISSION MANAGEMENT (OUTPATIENT)
Dept: CALL CENTER | Facility: HOSPITAL | Age: 76
End: 2021-03-18

## 2021-03-18 VITALS
OXYGEN SATURATION: 96 % | BODY MASS INDEX: 26.02 KG/M2 | HEIGHT: 69 IN | HEART RATE: 75 BPM | DIASTOLIC BLOOD PRESSURE: 56 MMHG | TEMPERATURE: 98.2 F | WEIGHT: 175.71 LBS | RESPIRATION RATE: 16 BRPM | SYSTOLIC BLOOD PRESSURE: 127 MMHG

## 2021-03-18 PROBLEM — Z95.1 S/P CABG X 3: Status: ACTIVE | Noted: 2021-03-18

## 2021-03-18 LAB
ANION GAP SERPL CALCULATED.3IONS-SCNC: 10 MMOL/L (ref 5–15)
BUN SERPL-MCNC: 27 MG/DL (ref 8–23)
BUN/CREAT SERPL: 28.1 (ref 7–25)
CALCIUM SPEC-SCNC: 8.9 MG/DL (ref 8.6–10.5)
CHLORIDE SERPL-SCNC: 102 MMOL/L (ref 98–107)
CO2 SERPL-SCNC: 26 MMOL/L (ref 22–29)
CREAT SERPL-MCNC: 0.96 MG/DL (ref 0.76–1.27)
DEPRECATED RDW RBC AUTO: 45.9 FL (ref 37–54)
ERYTHROCYTE [DISTWIDTH] IN BLOOD BY AUTOMATED COUNT: 14.2 % (ref 12.3–15.4)
GFR SERPL CREATININE-BSD FRML MDRD: 77 ML/MIN/1.73
GLUCOSE BLDC GLUCOMTR-MCNC: 100 MG/DL (ref 70–105)
GLUCOSE BLDC GLUCOMTR-MCNC: 207 MG/DL (ref 70–105)
GLUCOSE BLDC GLUCOMTR-MCNC: 95 MG/DL (ref 70–105)
GLUCOSE BLDC GLUCOMTR-MCNC: 95 MG/DL (ref 70–105)
GLUCOSE BLDC GLUCOMTR-MCNC: 97 MG/DL (ref 70–105)
GLUCOSE SERPL-MCNC: 101 MG/DL (ref 65–99)
HCT VFR BLD AUTO: 32.7 % (ref 37.5–51)
HGB BLD-MCNC: 11.2 G/DL (ref 13–17.7)
MCH RBC QN AUTO: 31.6 PG (ref 26.6–33)
MCHC RBC AUTO-ENTMCNC: 34.4 G/DL (ref 31.5–35.7)
MCV RBC AUTO: 92 FL (ref 79–97)
PLATELET # BLD AUTO: 173 10*3/MM3 (ref 140–450)
PMV BLD AUTO: 8.2 FL (ref 6–12)
POTASSIUM SERPL-SCNC: 3.6 MMOL/L (ref 3.5–5.2)
POTASSIUM SERPL-SCNC: 4.3 MMOL/L (ref 3.5–5.2)
RBC # BLD AUTO: 3.55 10*6/MM3 (ref 4.14–5.8)
SODIUM SERPL-SCNC: 138 MMOL/L (ref 136–145)
WBC # BLD AUTO: 9.3 10*3/MM3 (ref 3.4–10.8)

## 2021-03-18 PROCEDURE — 85027 COMPLETE CBC AUTOMATED: CPT | Performed by: NURSE PRACTITIONER

## 2021-03-18 PROCEDURE — 63710000001 INSULIN LISPRO (HUMAN) PER 5 UNITS: Performed by: THORACIC SURGERY (CARDIOTHORACIC VASCULAR SURGERY)

## 2021-03-18 PROCEDURE — 80048 BASIC METABOLIC PNL TOTAL CA: CPT | Performed by: NURSE PRACTITIONER

## 2021-03-18 PROCEDURE — 82962 GLUCOSE BLOOD TEST: CPT

## 2021-03-18 PROCEDURE — 97116 GAIT TRAINING THERAPY: CPT

## 2021-03-18 PROCEDURE — 25010000002 HYDRALAZINE PER 20 MG: Performed by: THORACIC SURGERY (CARDIOTHORACIC VASCULAR SURGERY)

## 2021-03-18 PROCEDURE — 84132 ASSAY OF SERUM POTASSIUM: CPT | Performed by: THORACIC SURGERY (CARDIOTHORACIC VASCULAR SURGERY)

## 2021-03-18 PROCEDURE — 99232 SBSQ HOSP IP/OBS MODERATE 35: CPT | Performed by: INTERNAL MEDICINE

## 2021-03-18 PROCEDURE — 97535 SELF CARE MNGMENT TRAINING: CPT

## 2021-03-18 PROCEDURE — 97530 THERAPEUTIC ACTIVITIES: CPT

## 2021-03-18 RX ORDER — HYDROCODONE BITARTRATE AND ACETAMINOPHEN 5; 325 MG/1; MG/1
1 TABLET ORAL EVERY 4 HOURS PRN
Qty: 30 TABLET | Refills: 0 | Status: SHIPPED | OUTPATIENT
Start: 2021-03-18 | End: 2021-03-25

## 2021-03-18 RX ORDER — CLOPIDOGREL BISULFATE 75 MG/1
75 TABLET ORAL DAILY
Qty: 30 TABLET | Refills: 3 | Status: SHIPPED | OUTPATIENT
Start: 2021-03-18 | End: 2023-01-12

## 2021-03-18 RX ORDER — ASPIRIN 81 MG/1
81 TABLET ORAL DAILY
Qty: 30 TABLET | Refills: 3 | Status: SHIPPED | OUTPATIENT
Start: 2021-03-19

## 2021-03-18 RX ORDER — ATORVASTATIN CALCIUM 40 MG/1
40 TABLET, FILM COATED ORAL NIGHTLY
Qty: 30 TABLET | Refills: 3 | Status: SHIPPED | OUTPATIENT
Start: 2021-03-18 | End: 2023-02-28

## 2021-03-18 RX ORDER — FUROSEMIDE 40 MG/1
40 TABLET ORAL DAILY
Qty: 7 TABLET | Refills: 0 | Status: SHIPPED | OUTPATIENT
Start: 2021-03-19 | End: 2021-04-05

## 2021-03-18 RX ORDER — LACTULOSE 10 G/15ML
30 SOLUTION ORAL DAILY
Status: DISCONTINUED | OUTPATIENT
Start: 2021-03-18 | End: 2021-03-18 | Stop reason: HOSPADM

## 2021-03-18 RX ORDER — POTASSIUM CHLORIDE 20 MEQ/1
20 TABLET, EXTENDED RELEASE ORAL DAILY
Qty: 7 TABLET | Refills: 0 | Status: SHIPPED | OUTPATIENT
Start: 2021-03-19 | End: 2021-04-05

## 2021-03-18 RX ADMIN — LACTULOSE 30 G: 20 SOLUTION ORAL at 11:14

## 2021-03-18 RX ADMIN — METOPROLOL TARTRATE 25 MG: 25 TABLET, FILM COATED ORAL at 08:04

## 2021-03-18 RX ADMIN — INSULIN LISPRO 4 UNITS: 100 INJECTION, SOLUTION INTRAVENOUS; SUBCUTANEOUS at 11:14

## 2021-03-18 RX ADMIN — ASPIRIN 81 MG: 81 TABLET, COATED ORAL at 08:04

## 2021-03-18 RX ADMIN — POTASSIUM CHLORIDE 20 MEQ: 1500 TABLET, EXTENDED RELEASE ORAL at 08:04

## 2021-03-18 RX ADMIN — HYDROCODONE BITARTRATE AND ACETAMINOPHEN 1 TABLET: 5; 325 TABLET ORAL at 08:04

## 2021-03-18 RX ADMIN — POLYETHYLENE GLYCOL 3350 17 G: 17 POWDER, FOR SOLUTION ORAL at 08:04

## 2021-03-18 RX ADMIN — DOCUSATE SODIUM 100 MG: 100 CAPSULE, LIQUID FILLED ORAL at 08:04

## 2021-03-18 RX ADMIN — FUROSEMIDE 40 MG: 40 TABLET ORAL at 08:04

## 2021-03-18 RX ADMIN — POTASSIUM CHLORIDE 20 MEQ: 1500 TABLET, EXTENDED RELEASE ORAL at 06:37

## 2021-03-18 RX ADMIN — CHLORHEXIDINE GLUCONATE 0.12% ORAL RINSE 15 ML: 1.2 LIQUID ORAL at 08:04

## 2021-03-18 RX ADMIN — MUPIROCIN 1 APPLICATION: 20 OINTMENT TOPICAL at 08:04

## 2021-03-18 RX ADMIN — HYDRALAZINE HYDROCHLORIDE 20 MG: 20 INJECTION INTRAMUSCULAR; INTRAVENOUS at 06:36

## 2021-03-18 RX ADMIN — PANTOPRAZOLE SODIUM 40 MG: 40 TABLET, DELAYED RELEASE ORAL at 06:18

## 2021-03-18 NOTE — PLAN OF CARE
Goal Outcome Evaluation:  Plan of Care Reviewed With: patient  Progress: no change  Outcome Summary: Patient is currently POD 3. Remains on room air and has no continuous drips. Patient has yet to have a BM, lactulose given. Patient hopes to dc today or tomorrow. Will continue to monitor progress.

## 2021-03-18 NOTE — CONSULTS
Cardiac rehab evaluation s/p CABGx3. Patient sitting up in chair. Explained cardiac rehab program and benefits. Brochure, post op activity list, exercise brochure, healthy tips for men information given. Patient is interested. Verified phone number. Will call following discharge.

## 2021-03-18 NOTE — PROGRESS NOTES
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  Surgical soreness     ROS    Since I have last seen him yesterday, the patient has been without any chest discomfort ,shortness of breath, palpitations, dizziness or syncope.  Denies having any headache ,abdominal pain ,nausea, vomiting , diarrhea constipation, loss of weight or loss of appetite.  Denies having any excessive bruising ,hematuria or blood in the stool.    Review of all systems negative except as indicated    History  Past Medical History:   Diagnosis Date   • Arthritis    • Coronary artery disease 2/14/2012   • Diabetes mellitus (CMS/[facility])    • Elevated cholesterol    • Hyperlipidemia    • Hypertension        Past Surgical History:   Procedure Laterality Date   • CARDIAC CATHETERIZATION N/A 3/12/2021    Procedure: Left Heart Cath with Coronary Angiography;  Surgeon: Huseyin Do MD;  Location: [facility] CATH INVASIVE LOCATION;  Service: Cardiovascular;  Laterality: N/A;   • CORONARY ARTERY BYPASS GRAFT N/A 3/15/2021    Procedure: CORONARY ARTERY BYPASS GRAFTING;  Surgeon: Paxton Vasquez MD;  Location: [facility] CVOR;  Service: Cardiothoracic;  Laterality: N/A;   • TONSILECTOMY, ADENOIDECTOMY, BILATERAL MYRINGOTOMY AND TUBES  1950       Family History   Problem Relation Age of Onset   • Diabetes Mother    • Heart disease Father    • Diabetes Sister    • Diabetes Brother    • Hypertension Brother    • Heart disease Brother        Social History     Tobacco Use   • Smoking status: Never Smoker   • Smokeless tobacco: Never Used   Vaping Use   • Vaping Use: Never used   Substance Use Topics   • Alcohol use: No   • Drug use: Defer        Medications Prior to Admission   Medication Sig Dispense Refill Last Dose   •  atorvastatin (LIPITOR) 20 MG tablet Take 20 mg by mouth Daily.   3/11/2021 at Unknown time   • CBD (cannabidiol) oral oil Take 1 mL by mouth Daily. For arthritis   Past Week at Unknown time   • metFORMIN (GLUCOPHAGE) 1000 MG tablet Take 1,000 mg by mouth 2 (Two) Times a Day With Meals.   3/11/2021 at Unknown time   • SITagliptin (JANUVIA) 100 MG tablet Take 100 mg by mouth Daily.   3/11/2021 at Unknown time   • glimepiride (AMARYL) 1 MG tablet Take 2 mg by mouth Daily.   3/10/2021   • hydroCHLOROthiazide (HYDRODIURIL) 12.5 MG tablet Take 12.5 mg by mouth Daily.   3/10/2021   • lisinopril (PRINIVIL,ZESTRIL) 40 MG tablet Take 40 mg by mouth Daily.   3/10/2021   • nitroglycerin (NITROSTAT) 0.4 MG SL tablet Place 1 tablet under the tongue As Needed.   Unknown at Unknown time   • Triamcinolone Acetonide (NASACORT) 55 MCG/ACT nasal inhaler 2 sprays into the nostril(s) as directed by provider Daily.   More than a month at Unknown time       Allergies  Shellfish-derived products    Scheduled Meds:aspirin, 81 mg, Oral, Daily  atorvastatin, 40 mg, Oral, Nightly  chlorhexidine, 15 mL, Mouth/Throat, Q12H  docusate sodium, 100 mg, Oral, BID  enoxaparin, 40 mg, Subcutaneous, Daily  furosemide, 40 mg, Oral, Daily  insulin lispro, 0-9 Units, Subcutaneous, TID AC  metoprolol tartrate, 25 mg, Oral, Q12H  mupirocin, 1 application, Each Nare, BID  pantoprazole, 40 mg, Oral, Q AM  polyethylene glycol, 17 g, Oral, BID  potassium chloride, 20 mEq, Oral, Daily  senna, 2 tablet, Oral, Nightly      Continuous Infusions:insulin, 0-50 Units/hr, Last Rate: 0.8 Units/hr (03/18/21 0400)  sodium chloride, 30 mL/hr, Last Rate: 30 mL/hr (03/15/21 1557)      PRN Meds:.•  acetaminophen **OR** acetaminophen **OR** acetaminophen  •  dextrose  •  dextrose  •  glucagon (human recombinant)  •  hydrALAZINE  •  HYDROcodone-acetaminophen  •  insulin lispro **AND** insulin lispro  •  insulin  •  Morphine **AND** naloxone  •  Mouth Kote  •  ondansetron  •   "potassium chloride **OR** potassium chloride  •  potassium chloride **OR** potassium chloride    Objective     VITAL SIGNS  Vitals:    03/17/21 2355 03/18/21 0000 03/18/21 0240 03/18/21 0335   BP: 137/60  158/65 143/61   Pulse: 66  66 65   Resp:       Temp:  98.2 °F (36.8 °C)     TempSrc:  Oral     SpO2: 90%  100% 100%   Weight:       Height:           Flowsheet Rows      First Filed Value   Admission Height  176.5 cm (69.5\") Documented at 03/12/2021 0653   Admission Weight  78.8 kg (173 lb 11.6 oz) Documented at 03/12/2021 0653            Intake/Output Summary (Last 24 hours) at 3/18/2021 0609  Last data filed at 3/17/2021 2200  Gross per 24 hour   Intake 1354.42 ml   Output 2050 ml   Net -695.58 ml        TELEMETRY: Sinus rhythm    Physical Exam:  The patient is alert, oriented and in no distress.  Patient is extubated.  Vital signs as noted above.  Head and neck revealed no carotid bruits or jugular venous distention.  No thyromegaly or lymphadenopathy is present  Lungs clear.  No wheezing.  Breath sounds are normal bilaterally.  Heart normal first and second heart sounds.  No murmur. No precordial rub is present.  No gallop is present.  Abdomen soft and nontender.  No organomegaly is present.  Extremities with good peripheral pulses without any pedal edema.  Skin warm and dry.  Visible incisions are looking well.  Musculoskeletal system is grossly normal  CNS grossly normal      Results Review:   I reviewed the patient's new clinical results.  Lab Results (last 24 hours)     Procedure Component Value Units Date/Time    POC Glucose Once [[id number]]  (Normal) Collected: 03/18/21 0429    Specimen: Blood Updated: 03/18/21 0430     Glucose 100 mg/dL      Comment: Serial Number: [id number]:  [id number]       Basic Metabolic Panel [[id number]]  (Abnormal) Collected: 03/18/21 0256    Specimen: Blood Updated: 03/18/21 0408     Glucose 101 mg/dL      BUN 27 mg/dL      Creatinine 0.96 mg/dL      Sodium 138 mmol/L      " Potassium 3.6 mmol/L      Chloride 102 mmol/L      CO2 26.0 mmol/L      Calcium 8.9 mg/dL      eGFR Non African Amer 77 mL/min/1.73      BUN/Creatinine Ratio 28.1     Anion Gap 10.0 mmol/L     Narrative:      GFR Normal >60  Chronic Kidney Disease <60  Kidney Failure <15      CBC (No Diff) [[id number]]  (Abnormal) Collected: 03/18/21 0256    Specimen: Blood Updated: 03/18/21 0347     WBC 9.30 10*3/mm3      RBC 3.55 10*6/mm3      Hemoglobin 11.2 g/dL      Hematocrit 32.7 %      MCV 92.0 fL      MCH 31.6 pg      MCHC 34.4 g/dL      RDW 14.2 %      RDW-SD 45.9 fl      MPV 8.2 fL      Platelets 173 10*3/mm3     POC Glucose Once [[id number]]  (Normal) Collected: 03/18/21 0213    Specimen: Blood Updated: 03/18/21 0214     Glucose 97 mg/dL      Comment: Serial Number: [id number]:  [id number]       POC Glucose Once [[id number]]  (Abnormal) Collected: 03/17/21 2357    Specimen: Blood Updated: 03/17/21 2358     Glucose 110 mg/dL      Comment: Serial Number: [id number]:  [id number]       POC Glucose Once [[id number]]  (Normal) Collected: 03/17/21 2258    Specimen: Blood Updated: 03/17/21 2259     Glucose 96 mg/dL      Comment: Serial Number: [id number]:  [id number]       POC Glucose Once [[id number]]  (Abnormal) Collected: 03/17/21 2125    Specimen: Blood Updated: 03/17/21 2126     Glucose 114 mg/dL      Comment: Serial Number: [id number]:  [id number]       POC Glucose Once [[id number]]  (Abnormal) Collected: 03/17/21 1940    Specimen: Blood Updated: 03/17/21 1941     Glucose 115 mg/dL      Comment: Serial Number: [id number]:  [id number]       POC Glucose Once [[id number]]  (Abnormal) Collected: 03/17/21 1837    Specimen: Blood Updated: 03/17/21 1838     Glucose 136 mg/dL      Comment: Serial Number: [id number]:  [id number]       POC Glucose Once [[id number]]  (Abnormal) Collected: 03/17/21 1608    Specimen: Blood Updated: 03/17/21 1609     Glucose 126 mg/dL      Comment: Serial Number:  [id number]:  [id number]       POC Glucose Once [[id number]]  (Abnormal) Collected: 03/17/21 1432    Specimen: Blood Updated: 03/17/21 1433     Glucose 121 mg/dL      Comment: Serial Number: [id number]:  [id number]       POC Glucose Once [[id number]]  (Abnormal) Collected: 03/17/21 1323    Specimen: Blood Updated: 03/17/21 1327     Glucose 110 mg/dL      Comment: Serial Number: [id number]:  [id number]       POC Glucose Once [[id number]]  (Abnormal) Collected: 03/17/21 1107    Specimen: Blood Updated: 03/17/21 1109     Glucose 205 mg/dL      Comment: Serial Number: [id number]:  [id number]             Imaging Results (Last 24 Hours)     ** No results found for the last 24 hours. **      LAB RESULTS (LAST 7 DAYS)    CBC  Results from last 7 days   Lab Units 03/18/21  0256 03/17/21  0256 03/16/21  0308 03/15/21  2257 03/15/21  2014 03/15/21  1736 03/15/21  1647 03/15/21  1546 03/15/21  1333 03/13/21  0405 03/12/21  0615 03/12/21  0615   WBC 10*3/mm3 9.30 11.60* 10.30  --   --   --   --  12.60* 11.10* 12.30*  --  12.00*   RBC 10*6/mm3 3.55* 3.62* 3.39*  --   --   --   --  3.14* 3.07* 4.34  --  4.57   HEMOGLOBIN g/dL 11.2* 11.4* 10.9*  --   --   --   --  9.6* 9.5* 13.8  --  14.5   HEMOGLOBIN, POC g/dL  --   --   --  10.9* 10.3* 11.0* 10.3*  --   --   --    < >  --    HEMATOCRIT % 32.7* 33.8* 30.9*  --   --   --   --  28.6* 27.9* 38.9  --  41.0   HEMATOCRIT POC %  --   --   --  32* 30* 32* 30*  --   --   --    < >  --    MCV fL 92.0 93.3 91.3  --   --   --   --  91.1 91.1 89.7  --  89.9   PLATELETS 10*3/mm3 173 161 152  --   --   --   --  167 158 224  --  262    < > = values in this interval not displayed.       BMP  Results from last 7 days   Lab Units 03/18/21  0256 03/17/21  0256 03/16/21  0308 03/15/21  1545 03/13/21  0405 03/12/21  0615   SODIUM mmol/L 138 140 141 141 139 134*   POTASSIUM mmol/L 3.6 3.9 4.2 4.1 3.7 4.6   CHLORIDE mmol/L 102 105 110* 108* 104 98   CO2 mmol/L 26.0 23.0 24.0 23.0 25.0  26.0   BUN mg/dL 27* 24* 24* 23 20 18   CREATININE mg/dL 0.96 1.00 1.00 1.01 1.03 1.16   GLUCOSE mg/dL 101* 128* 125* 119* 194* 378*   MAGNESIUM mg/dL  --   --  2.2  --  1.7  --    PHOSPHORUS mg/dL  --   --  4.3 2.7  --   --        CMP   Results from last 7 days   Lab Units 03/18/21  0256 03/17/21  0256 03/16/21  0308 03/15/21  1545 03/13/21  0405 03/12/21  0615   SODIUM mmol/L 138 140 141 141 139 134*   POTASSIUM mmol/L 3.6 3.9 4.2 4.1 3.7 4.6   CHLORIDE mmol/L 102 105 110* 108* 104 98   CO2 mmol/L 26.0 23.0 24.0 23.0 25.0 26.0   BUN mg/dL 27* 24* 24* 23 20 18   CREATININE mg/dL 0.96 1.00 1.00 1.01 1.03 1.16   GLUCOSE mg/dL 101* 128* 125* 119* 194* 378*   ALBUMIN g/dL  --   --  4.20 3.90 3.70  --    BILIRUBIN mg/dL  --   --   --   --  0.9  --    ALK PHOS U/L  --   --   --   --  72  --    AST (SGOT) U/L  --   --   --   --  14  --    ALT (SGPT) U/L  --   --   --   --  16  --          BNP        TROPONIN        CoAg  Results from last 7 days   Lab Units 03/16/21  0308 03/15/21  1545 03/15/21  1333 03/13/21  0405 03/12/21  0615   INR  1.09 1.17* 1.63* 1.03 1.02   APTT seconds  --  25.8 26.7 23.6*  --        Creatinine Clearance  Estimated Creatinine Clearance: 77.2 mL/min (by C-G formula based on SCr of 0.96 mg/dL).    ABG  Results from last 7 days   Lab Units 03/16/21  0318 03/15/21  2257 03/15/21  2014 03/15/21  1736 03/15/21  1643 03/15/21  1429 03/15/21  1411 03/15/21  1347   PH, ARTERIAL pH units  --  7.368 7.367 7.403 7.386 7.310* 7.350 7.280*   PCO2, ARTERIAL mm Hg  --  43.6 41.7 37.1 42.1 56.4* 53.9* 54.9*   PO2 ART mm Hg  --  105.8 117.6* 140.3* 310.6* 427.0* 448.0* 467.0*   O2 SATURATION ART %  --  97.9 98.5* 99.2* 99.9* 100.0* 100.0* 100.0*   BASE EXCESS ART mmol/L -0.6* -0.4* -1.4* -1.3* 0.1 2.0 4.0* <0.0*       Radiology  XR Chest 1 View    Result Date: 3/17/2021  Suspected small left basilar pleural effusion. Left basilar airspace disease, favored to represent atelectasis. Stable cardiomegaly No  significant change from 1 day prior.  Electronically Signed By-Smiley York MD On:3/17/2021 8:29 AM This report was finalized on [id number] by  Smiley York MD.    XR Chest 1 View    Result Date: 3/16/2021  Small left-sided pleural effusion. No pneumothorax. Otherwise, no acute process.  Electronically Signed By-Nellie Valerio MD On:3/16/2021 7:32 PM This report was finalized on [id number] by  Nellie Valerio MD.              EKG      I personally viewed and interpreted the patient's EKG/Telemetry data:    ECHOCARDIOGRAM:    Results for orders placed in visit on 03/15/21    Emergent/Open-Heart Anesthesia LETICIA    Narrative  Preanesthesia Checklist:  Patient identified, IV assessed, risks and benefits discussed, monitors and equipment assessed, procedure being performed at surgeon's request and anesthesia consent obtained.    General Procedure Information  LETICIA Placed for monitoring purposes only -- This is not a diagnostic LETICIA          Anesthesia Information      Echocardiogram Comments:  Post bypass exam... no air or valvular issues or hypokinetic ernst.          STRESS MYOVIEW:    Cardiolite (Tc-99m Sestamibi) stress test    CARDIAC CATHETERIZATION:            OTHER:         Assessment/Plan     Principal Problem:    Coronary artery disease involving native coronary artery of native heart without angina pectoris  Active Problems:    Chest discomfort    Essential hypertension    Diabetes mellitus due to underlying condition without complication, without long-term current use of insulin (CMS/[facility])    Dyslipidemia    Chronic coronary artery disease    Abnormal nuclear stress test    Coronary artery disease      ]]]]]]]]]]]]]]]]]]]  Impression  ========    -Status post urgent CABG x 3 with a LIMA to the mid LAD and reverse individual saphenous vein graft to the distal RCA into the medial marginal-    -Preoperative chest discomfort-exertional burning sensation in the chest.  More with carrying.  Relieved by  rest.  Suggestive of angina pectoris.    Cardiac catheterization 3/12/2021 revealed left ventricle size and contractility is normal with ejection fraction of 60%.  No mitral regurgitation is present.  Left subclavian artery and left internal mammary arteries are normal.     Left main coronary artery normal.  Left anterior descending artery has diffuse calcification with ostial 60% proximal 90 and mid segment 90% disease.  Circumflex coronary artery provided a large marginal branch.  Circumflex coronary artery has 60% disease proximal to the origin of marginal branch.  Circumflex coronary artery distal to the marginal branches small in caliber and has diffuse 60 to 70% disease.  Right coronary artery is a very large and dominant vessel that has ostial 99% disease and mid segment 90% disease.  There appears to be a clot.    Cardiac cath 12/13/2014 revealed 40 to 50% mid LAD ostial 60 to 70% RCA (FFR did not reveal any significant disease)  History of left heart cath     Echocardiogram-normal 2/23/2021  Lexiscan Cardiolite test 2/23/2021-abnormal with apical ischemia.     -Hypertension dyslipidemia diabetes.  CKD 3.  Recent BUN 35 creatinine 1.27-12/27/2020     -History of significant sinus bradycardia that has improved in the past with discontinuation of Bystolic.     -History of nonsustained ventricular tachycardia with exercise in the past.     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  ==============  Plan  =========   status post CABG 3/15/2021  Cardiovascular surgery would like to start Plavix at the time of discharge.    Respiratory status-patient is extubated.  Chest tube is out.    Rhythm-sinus rhythm    Recent positive COVID-19 nasal swab.  Thought to be noninfectious.  Infectious disease has seen.    Patient is on aspirin atorvastatin metoprolol 12.5 mg twice a day.  Patient to be discharged home on  Plavix.    Further plan will depend on patient's progress  ]]]]]]]]]]]]]             Huseyin Do MD  03/18/21  06:09 EDT

## 2021-03-18 NOTE — PLAN OF CARE
Problem: Adult Inpatient Plan of Care  Goal: Plan of Care Review  Outcome: Ongoing, Progressing  Flowsheets (Taken 3/18/2021 0308)  Progress: improving  Plan of Care Reviewed With: patient  Outcome Summary: Patient is POD 3. All lines are removed. He is in NSR, BP changes from 120-150 range depending on exertion, and oxygen applied around midnight after patient sats dropped to 89 while sleeping. Plenty of bowel sounds, but no bowel movement so far.  Goal: Patient-Specific Goal (Individualized)  Outcome: Ongoing, Progressing  Goal: Absence of Hospital-Acquired Illness or Injury  Outcome: Ongoing, Progressing  Intervention: Identify and Manage Fall Risk  Recent Flowsheet Documentation  Taken 3/18/2021 0100 by Neha Eason, RN  Safety Promotion/Fall Prevention:   safety round/check completed   room organization consistent   nonskid shoes/slippers when out of bed   lighting adjusted   fall prevention program maintained   clutter free environment maintained   assistive device/personal items within reach  Taken 3/18/2021 0000 by Neha Eason, RN  Safety Promotion/Fall Prevention:   safety round/check completed   room organization consistent   nonskid shoes/slippers when out of bed   lighting adjusted   clutter free environment maintained   assistive device/personal items within reach  Taken 3/17/2021 2330 by Neha Eason, RN  Safety Promotion/Fall Prevention:   safety round/check completed   room organization consistent   nonskid shoes/slippers when out of bed   lighting adjusted   fall prevention program maintained   clutter free environment maintained   assistive device/personal items within reach  Taken 3/17/2021 2200 by Neha Eason, RN  Safety Promotion/Fall Prevention:   safety round/check completed   room organization consistent   nonskid shoes/slippers when out of bed   lighting adjusted   fall prevention program maintained   clutter free environment maintained   assistive device/personal items within reach  Taken  3/17/2021 2100 by Neha Eason RN  Safety Promotion/Fall Prevention:   safety round/check completed   room organization consistent   nonskid shoes/slippers when out of bed   lighting adjusted   clutter free environment maintained   assistive device/personal items within reach  Taken 3/17/2021 2000 by Neha Eason RN  Safety Promotion/Fall Prevention:   assistive device/personal items within reach   clutter free environment maintained   fall prevention program maintained   lighting adjusted   nonskid shoes/slippers when out of bed   room organization consistent   safety round/check completed  Taken 3/17/2021 1900 by Neha Eason RN  Safety Promotion/Fall Prevention:   safety round/check completed   room organization consistent   nonskid shoes/slippers when out of bed   lighting adjusted   fall prevention program maintained   clutter free environment maintained   assistive device/personal items within reach  Intervention: Prevent Skin Injury  Recent Flowsheet Documentation  Taken 3/18/2021 0100 by Neha Eason RN  Body Position: position changed independently  Taken 3/18/2021 0000 by Neha Eason RN  Body Position: position changed independently  Taken 3/17/2021 2330 by Neha Eason RN  Body Position: position changed independently  Taken 3/17/2021 2200 by Neha Eason RN  Body Position: position changed independently  Taken 3/17/2021 2100 by Neha Eason RN  Body Position: weight shift assistance provided  Taken 3/17/2021 2000 by Neha Eason RN  Body Position: position changed independently  Taken 3/17/2021 1900 by Neha Eason RN  Body Position: weight shift assistance provided  Intervention: Prevent and Manage VTE (venous thromboembolism) Risk  Recent Flowsheet Documentation  Taken 3/18/2021 0000 by Neha Eason RN  VTE Prevention/Management: (lovenox) patient refused intervention  Intervention: Prevent Infection  Recent Flowsheet Documentation  Taken 3/18/2021 0100 by Neha Eason RN  Infection Prevention:    rest/sleep promoted   single patient room provided  Taken 3/18/2021 0000 by Neha Eason RN  Infection Prevention:   rest/sleep promoted   single patient room provided  Taken 3/17/2021 2330 by Neha Eason RN  Infection Prevention:   rest/sleep promoted   single patient room provided  Taken 3/17/2021 2200 by Neha Eason RN  Infection Prevention:   rest/sleep promoted   single patient room provided  Taken 3/17/2021 2100 by Neha Eason RN  Infection Prevention:   rest/sleep promoted   single patient room provided  Taken 3/17/2021 2000 by Neha Eason RN  Infection Prevention:   rest/sleep promoted   single patient room provided   hand hygiene promoted  Taken 3/17/2021 1900 by Neha Eason RN  Infection Prevention:   rest/sleep promoted   single patient room provided  Goal: Optimal Comfort and Wellbeing  Outcome: Ongoing, Progressing  Intervention: Provide Person-Centered Care  Recent Flowsheet Documentation  Taken 3/18/2021 0000 by Neha Eason RN  Trust Relationship/Rapport:   care explained   choices provided   questions answered   questions encouraged   thoughts/feelings acknowledged  Taken 3/17/2021 2000 by Neha Eason RN  Trust Relationship/Rapport:   care explained   choices provided   questions answered   questions encouraged   thoughts/feelings acknowledged  Goal: Readiness for Transition of Care  Outcome: Ongoing, Progressing     Problem: Adjustment to Illness (Acute Coronary Syndrome)  Goal: Optimal Adaptation to Illness  Outcome: Ongoing, Progressing  Intervention: Support Adjustment to Life-Changing Event  Recent Flowsheet Documentation  Taken 3/18/2021 0000 by Neha Eason RN  Supportive Measures:   active listening utilized   verbalization of feelings encouraged  Family/Support System Care:   self-care encouraged   support provided  Taken 3/17/2021 2000 by Neha Eason RN  Supportive Measures:   active listening utilized   verbalization of feelings encouraged  Family/Support System Care:   self-care  encouraged   support provided     Problem: Arrhythmia/Dysrhythmia (Acute Coronary Syndrome)  Goal: Normalized Cardiac Rhythm  Outcome: Ongoing, Progressing  Intervention: Monitor and Manage Cardiac Rhythm Effects  Recent Flowsheet Documentation  Taken 3/18/2021 0000 by Neha Eason RN  VTE Prevention/Management: (lovenox) patient refused intervention  Taken 3/17/2021 2000 by Neha Eason RN  Dysrhythmia Management: (wires removed today) --     Problem: Cardiac-Related Pain (Acute Coronary Syndrome)  Goal: Absence of Cardiac-Related Pain  Outcome: Ongoing, Progressing     Problem: Hemodynamic Instability (Acute Coronary Syndrome)  Goal: Effective Cardiac Pump Function  Outcome: Ongoing, Progressing     Problem: Tissue Perfusion (Acute Coronary Syndrome)  Goal: Adequate Tissue Perfusion  Outcome: Ongoing, Progressing  Intervention: Optimize Cardiac Tissue Perfusion  Recent Flowsheet Documentation  Taken 3/18/2021 0100 by Neha Eason RN  Activity Management: activity encouraged  Taken 3/18/2021 0000 by Neha Eason RN  Activity Management: activity adjusted per tolerance  Airway/Ventilation Management: pulmonary hygiene promoted  Environmental Support:   calm environment promoted   rest periods encouraged  Taken 3/17/2021 2330 by Neha Eason RN  Activity Management: activity encouraged  Taken 3/17/2021 2200 by Neha Eason RN  Activity Management: activity adjusted per tolerance  Taken 3/17/2021 2100 by Neha Eason RN  Activity Management: activity encouraged  Taken 3/17/2021 2000 by Neha Eason RN  Activity Management: activity encouraged  Airway/Ventilation Management: pulmonary hygiene promoted  Environmental Support:   calm environment promoted   rest periods encouraged  Taken 3/17/2021 1900 by Neha Eason RN  Activity Management: ambulated to bathroom     Problem: Fall Injury Risk  Goal: Absence of Fall and Fall-Related Injury  Outcome: Ongoing, Progressing  Intervention: Identify and Manage Contributors to  Fall Injury Risk  Recent Flowsheet Documentation  Taken 3/18/2021 0100 by Neha Eason RN  Medication Review/Management:   medications reviewed   dosing adjusted   high-risk medications identified  Taken 3/18/2021 0000 by Neha Eason RN  Medication Review/Management: medications reviewed  Taken 3/17/2021 2330 by Neha Eason RN  Medication Review/Management: medications reviewed  Taken 3/17/2021 2200 by Neha Eason RN  Medication Review/Management:   medications reviewed   dosing adjusted   high-risk medications identified  Taken 3/17/2021 2100 by Neha Eason RN  Medication Review/Management:   medications reviewed   dosing adjusted   high-risk medications identified  Taken 3/17/2021 2000 by Neha Eason RN  Medication Review/Management: medications reviewed  Taken 3/17/2021 1900 by Neha Eason RN  Medication Review/Management:   medications reviewed   dosing adjusted   high-risk medications identified  Intervention: Promote Injury-Free Environment  Recent Flowsheet Documentation  Taken 3/18/2021 0100 by Neha Eason RN  Safety Promotion/Fall Prevention:   safety round/check completed   room organization consistent   nonskid shoes/slippers when out of bed   lighting adjusted   fall prevention program maintained   clutter free environment maintained   assistive device/personal items within reach  Taken 3/18/2021 0000 by Neha Eason RN  Safety Promotion/Fall Prevention:   safety round/check completed   room organization consistent   nonskid shoes/slippers when out of bed   lighting adjusted   clutter free environment maintained   assistive device/personal items within reach  Taken 3/17/2021 2330 by Neha Eason RN  Safety Promotion/Fall Prevention:   safety round/check completed   room organization consistent   nonskid shoes/slippers when out of bed   lighting adjusted   fall prevention program maintained   clutter free environment maintained   assistive device/personal items within reach  Taken 3/17/2021 2200 by  Eason, Neha, RN  Safety Promotion/Fall Prevention:   safety round/check completed   room organization consistent   nonskid shoes/slippers when out of bed   lighting adjusted   fall prevention program maintained   clutter free environment maintained   assistive device/personal items within reach  Taken 3/17/2021 2100 by Neha Eason RN  Safety Promotion/Fall Prevention:   safety round/check completed   room organization consistent   nonskid shoes/slippers when out of bed   lighting adjusted   clutter free environment maintained   assistive device/personal items within reach  Taken 3/17/2021 2000 by Neha Eason RN  Safety Promotion/Fall Prevention:   assistive device/personal items within reach   clutter free environment maintained   fall prevention program maintained   lighting adjusted   nonskid shoes/slippers when out of bed   room organization consistent   safety round/check completed  Taken 3/17/2021 1900 by Neha Eason RN  Safety Promotion/Fall Prevention:   safety round/check completed   room organization consistent   nonskid shoes/slippers when out of bed   lighting adjusted   fall prevention program maintained   clutter free environment maintained   assistive device/personal items within reach     Problem: Activity Intolerance (Cardiovascular Surgery)  Goal: Improved Activity Tolerance  Outcome: Ongoing, Progressing  Intervention: Optimize Tolerance for Activity  Recent Flowsheet Documentation  Taken 3/18/2021 0000 by Neha Eason RN  Environmental Support:   calm environment promoted   rest periods encouraged  Taken 3/17/2021 2000 by Neha Eason RN  Environmental Support:   calm environment promoted   rest periods encouraged     Problem: Adjustment to Surgery (Cardiovascular Surgery)  Goal: Optimal Coping with Heart Surgery  Outcome: Ongoing, Progressing  Intervention: Support Patient/Family Psychosocial Response to Major Surgery  Recent Flowsheet Documentation  Taken 3/18/2021 0000 by Neha Eason  RN  Supportive Measures:   active listening utilized   verbalization of feelings encouraged  Family/Support System Care:   self-care encouraged   support provided  Taken 3/17/2021 2000 by Neha Eason RN  Supportive Measures:   active listening utilized   verbalization of feelings encouraged  Family/Support System Care:   self-care encouraged   support provided     Problem: Bleeding (Cardiovascular Surgery)  Goal: Absence of Bleeding  Outcome: Ongoing, Progressing     Problem: Bowel Elimination Impaired (Cardiovascular Surgery)  Goal: Effective Bowel Elimination  Outcome: Ongoing, Progressing     Problem: Cardiac Function Impaired (Cardiovascular Surgery)  Goal: Effective Cardiac Function  Outcome: Ongoing, Progressing  Intervention: Optimize Cardiac Output and Blood Flow  Recent Flowsheet Documentation  Taken 3/17/2021 2000 by Neha Eason RN  Dysrhythmia Management: (wires removed today) --     Problem: Cerebral Tissue Perfusion Risk (Cardiovascular Surgery)  Goal: Effective Cerebral Perfusion  Outcome: Ongoing, Progressing  Intervention: Protect and Optimize Cerebral Perfusion  Recent Flowsheet Documentation  Taken 3/18/2021 0000 by Neha Eason RN  Sensory Stimulation Regulation: television on  Taken 3/17/2021 2000 by Neha Eason RN  Glycemic Management: blood glucose monitoring  Sensory Stimulation Regulation: television on     Problem: Fluid Imbalance (Cardiovascular Surgery)  Goal: Fluid Balance  Outcome: Ongoing, Progressing     Problem: Infection (Cardiovascular Surgery)  Goal: Absence of Infection Signs and Symptoms  Outcome: Ongoing, Progressing  Intervention: Prevent or Manage Infection  Recent Flowsheet Documentation  Taken 3/18/2021 0100 by Neha Eason RN  Infection Prevention:   rest/sleep promoted   single patient room provided  Taken 3/18/2021 0000 by Neha Eason RN  Infection Prevention:   rest/sleep promoted   single patient room provided  Taken 3/17/2021 2330 by Neha Eason RN  Infection  Prevention:   rest/sleep promoted   single patient room provided  Taken 3/17/2021 2200 by Neha Eason RN  Infection Prevention:   rest/sleep promoted   single patient room provided  Taken 3/17/2021 2100 by Neha Eason RN  Infection Prevention:   rest/sleep promoted   single patient room provided  Taken 3/17/2021 2000 by Neha Eason RN  Glycemic Management: blood glucose monitoring  Infection Prevention:   rest/sleep promoted   single patient room provided   hand hygiene promoted  Taken 3/17/2021 1900 by Neha Eason RN  Infection Prevention:   rest/sleep promoted   single patient room provided     Problem: Ongoing Anesthesia Effects (Cardiovascular Surgery)  Goal: Anesthesia/Sedation Recovery  Outcome: Ongoing, Progressing  Intervention: Optimize Anesthesia Recovery  Recent Flowsheet Documentation  Taken 3/18/2021 0100 by Neha Eason RN  Safety Promotion/Fall Prevention:   safety round/check completed   room organization consistent   nonskid shoes/slippers when out of bed   lighting adjusted   fall prevention program maintained   clutter free environment maintained   assistive device/personal items within reach  Taken 3/18/2021 0024 by Neha Eason RN  Patient Tolerance (IS): good  Number of Repetitions (IS): (flutter valve) --  Taken 3/18/2021 0000 by Neha Eason RN  Patient Tolerance (IS): fair  Safety Promotion/Fall Prevention:   safety round/check completed   room organization consistent   nonskid shoes/slippers when out of bed   lighting adjusted   clutter free environment maintained   assistive device/personal items within reach  Reorientation Measures: clock in view  Taken 3/17/2021 2330 by Neha Eason RN  Safety Promotion/Fall Prevention:   safety round/check completed   room organization consistent   nonskid shoes/slippers when out of bed   lighting adjusted   fall prevention program maintained   clutter free environment maintained   assistive device/personal items within reach  Taken 3/17/2021 2324  by Eason, Neha, RN  Patient Tolerance (IS): good  Administration (IS): self-administered  Level Incentive Spirometer (mL): (flutter valve level 5) --  Number of Repetitions (IS): 5  Taken 3/17/2021 2217 by Neha Eason RN  Patient Tolerance (IS): good  Administration (IS): self-administered  Level Incentive Spirometer (mL): (setting 5) --  Incentive Spirometer Predicted Level (mL): (using flutter valve setting 5) --  Number of Repetitions (IS): 5  Taken 3/17/2021 2200 by Neha Eason RN  Safety Promotion/Fall Prevention:   safety round/check completed   room organization consistent   nonskid shoes/slippers when out of bed   lighting adjusted   fall prevention program maintained   clutter free environment maintained   assistive device/personal items within reach  Taken 3/17/2021 2124 by Neha Eason RN  Patient Tolerance (IS): (also using flutter valve every hour while awake on setting 5) fair  Administration (IS): self-administered  Level Incentive Spirometer (mL): 500  Incentive Spirometer Predicted Level (mL): 2000  Number of Repetitions (IS): 3  Taken 3/17/2021 2100 by Neha Eason RN  Safety Promotion/Fall Prevention:   safety round/check completed   room organization consistent   nonskid shoes/slippers when out of bed   lighting adjusted   clutter free environment maintained   assistive device/personal items within reach  Taken 3/17/2021 2000 by Neha Eason RN  Patient Tolerance (IS): fair  Safety Promotion/Fall Prevention:   assistive device/personal items within reach   clutter free environment maintained   fall prevention program maintained   lighting adjusted   nonskid shoes/slippers when out of bed   room organization consistent   safety round/check completed  Reorientation Measures: clock in view  Level Incentive Spirometer (mL): 500  Incentive Spirometer Predicted Level (mL): 2000  Number of Repetitions (IS): 4  Taken 3/17/2021 1900 by Neha Eason RN  Safety Promotion/Fall Prevention:   safety  round/check completed   room organization consistent   nonskid shoes/slippers when out of bed   lighting adjusted   fall prevention program maintained   clutter free environment maintained   assistive device/personal items within reach     Problem: Pain (Cardiovascular Surgery)  Goal: Acceptable Pain Control  Outcome: Ongoing, Progressing     Problem: Postoperative Urinary Retention (Cardiovascular Surgery)  Goal: Effective Urinary Elimination  Outcome: Ongoing, Progressing     Problem: Respiratory Compromise (Cardiovascular Surgery)  Goal: Effective Oxygenation and Ventilation  Outcome: Ongoing, Progressing  Intervention: Promote Airway Secretion Clearance  Recent Flowsheet Documentation  Taken 3/17/2021 2000 by Neha Eason RN  Cough And Deep Breathing: done independently per patient  Intervention: Optimize Oxygenation and Ventilation  Recent Flowsheet Documentation  Taken 3/18/2021 0000 by Neha Eason, RN  Airway/Ventilation Management: pulmonary hygiene promoted  Taken 3/17/2021 2000 by Neha Eason RN  Airway/Ventilation Management: pulmonary hygiene promoted   Goal Outcome Evaluation:  Plan of Care Reviewed With: patient  Progress: improving  Outcome Summary: Patient is POD 3. All lines are removed. He is in NSR, BP changes from 120-150 range depending on exertion, and oxygen applied around midnight after patient sats dropped to 89 while sleeping. Plenty of bowel sounds, but no bowel movement so far.

## 2021-03-18 NOTE — PLAN OF CARE
Goal Outcome Evaluation:  Plan of Care Reviewed With: patient    Pt. Demonstrates improved ADL participation/independence this date w/ supervision/setup assist for total body bathing ADL activity, increased assist for donning/doffing socks and underwear secondary to decreased forward flexion this date. Recommend d/c home w/ family support/assist.

## 2021-03-18 NOTE — THERAPY DISCHARGE NOTE
Subjective: Pt agreeable to therapeutic plan of care.    Objective:   Phase 1 Cardiac Rehab Initiated    Sitting tolerance: >10min  Standing tolerance: >10min    Precautions:   Mid-sternal incision; avoid scapular retraction and depression.   Cardiovascular impairment post-sx; encourage energy conservation strategies.    Therapeutic Exercises: 10 reps UE and LE AROM in seated position.     MET level equivalent: 3.0-3.5 (Standing ADLs / showering, slow stair climbing up to 2 flights, unlimited ambulation on flat surfaces up to 15mins)      Bed mobility - SBA  Transfers - SBA  Ambulation - 300 feet SBA    Pain: 2 VAS  Education: Provided education on importance of mobility and skilled verbal / tactile cueing throughout intervention.     Assessment: Tao Pozo has achieved all goals including up/down steps. Pt now able to ambulate without using a.d.  Reviewed HEP. No further skilled Physical Therapy needs.     Plan/Recommendations:   Pt would benefit from Home with family assist at discharge from facility and requires no DME at discharge.   Pt desires Home at discharge. Pt cooperative; agreeable to therapeutic recommendations and plan of care.     Basic Mobility 6-click:  Rollin = Total, A lot = 2, A little = 3; 4 = None  Supine>Sit:   1 = Total, A lot = 2, A little = 3; 4 = None   Sit>Stand with arms:  1 = Total, A lot = 2, A little = 3; 4 = None  Bed>Chair:   1 = Total, A lot = 2, A little = 3; 4 = None  Ambulate in room:  1 = Total, A lot = 2, A little = 3; 4 = None  3-5 Steps with railin = Total, A lot = 2, A little = 3; 4 = None  Score: 23    Post-Tx Position: Up in Chair and Call light and personal items within reach  PPE: gloves, surgical mask, eyewear protection

## 2021-03-18 NOTE — PLAN OF CARE
Goal Outcome Evaluation:  Plan of Care Reviewed With: patient   Assessment: Tao Pozo has achieved all goals including up/down steps. Pt now able to ambulate without using a.d.  Reviewed HEP. No further skilled Physical Therapy needs.

## 2021-03-18 NOTE — SIGNIFICANT NOTE
03/18/21 1717   Discharge of Care   Discharge Mode wheel chair   Discharged Accompanied by spouse   Discharge Contact Information if Applicable Tao [phone]   Discharge Teaching Done  Yes   Learning Method Explanation;Teach Back;Written Materials   All discharge information given to patient and wife. All questions answered at this time. Patient took all belongings with him. Provided with unit phone number in case questions arise

## 2021-03-18 NOTE — THERAPY TREATMENT NOTE
Subjective: Pt agreeable to therapeutic plan of care.  Cognition: oriented to Person, Place, Time and Situation    Objective:     Bed Mobility: N/A or Not attempted.  Functional Transfers: Modified-Independent  Functional Ambulation: Modified-Independent    Lower Body Dressing: Min-A  ADL Position: supported sitting and supported standing      Bathing: Supervision and SBA  ADL Position: unsupported standing    Pain: 2 VAS  Education: Provided education on importance of mobility and skilled verbal / tactile cueing throughout intervention.     Assessment: Tao Pozo presents with ADL impairments below baseline abilities which indicate the need for continued skilled intervention while inpatient. Tolerating session today without incident. Will continue to follow and progress as tolerated.     Plan/Recommendations:   Pt would benefit from Home with family assist at discharge from facility.   Pt desires Home with family assist at discharge. Pt cooperative; agreeable to therapeutic recommendations and plan of care.     Modified Beatriz: N/A = No pre-op stroke/TIA    Post-Tx Position: Up in Chair  PPE: gloves, surgical mask, eyewear protection

## 2021-03-18 NOTE — DISCHARGE SUMMARY
Date of Admission: 3/12/2021  Date of Discharge: 3/18/2021    Discharge Diagnosis:   Progressive CAD s/p CABG  HTN  DM type 2  Dyslipidemia  CKD stage 2  Hx NSVT with stress testing  COVID-19 infection  Fatty liver infiltration    Presenting Problem/History of Present Illness  Chest discomfort [R07.89]  Essential hypertension [I10]  Diabetes mellitus due to underlying condition without complication, without long-term current use of insulin (CMS/[facility]) [E08.9]  Dyslipidemia [E78.5]  Chronic coronary artery disease [I25.10]  Abnormal nuclear stress test [R94.39]  Coronary artery disease involving native coronary artery of native heart without angina pectoris [I25.10]     Hospital Course  Patient is a 76 y.o. male presented for elective cardiac cath. Cath showed multivessel coronary artery disease. [facility] Cardiac Surgery was consulted and on 3/15/21 he underwent CABGx3 with Dr. Vasquez. He was transferred to the recovery unit in stable condition and extubated overnight. He recovered very well and suffered no postop complications. Dr. Vasquez requests patient be discharged on Plavix. Patient is being discharged to home with family. He will complete a home nocturnal oximetry.     Procedures Performed  Procedure(s):  3/15/21-- 1.  Urgent CABG x 3 with a LIMA to the mid LAD and reverse individual saphenous vein graft to the distal RCA into the medial marginal  2. EVH of the right legs       Consults:   Consults     Date and Time Order Name Status Description    3/15/2021  3:23 PM Inpatient Cardiology Consult      3/14/2021  7:29 AM Inpatient Infectious Diseases Consult Completed     3/12/2021  9:36 AM Inpatient Cardiothoracic Surgery Consult Completed           Pertinent Test Results:    Lab Results   Component Value Date    WBC 9.30 03/18/2021    HGB 11.2 (L) 03/18/2021    HCT 32.7 (L) 03/18/2021    MCV 92.0 03/18/2021     03/18/2021      Lab Results   Component Value Date    GLUCOSE 101 (H) 03/18/2021    CALCIUM 8.9  03/18/2021     03/18/2021    K 4.3 03/18/2021    CO2 26.0 03/18/2021     03/18/2021    BUN 27 (H) 03/18/2021    CREATININE 0.96 03/18/2021    EGFRIFNONA 77 03/18/2021    BCR 28.1 (H) 03/18/2021    ANIONGAP 10.0 03/18/2021     Lab Results   Component Value Date    INR 1.09 03/16/2021    PROTIME 11.9 (H) 03/16/2021         Condition on Discharge: Stable for discharge to home with family    Vital Signs  Temp:  [98.1 °F (36.7 °C)-98.5 °F (36.9 °C)] 98.2 °F (36.8 °C)  Heart Rate:  [61-81] 72  BP: (127-158)/(44-65) 127/56      Discharge Disposition  Home or Self Care    Discharge Medications     Discharge Medications      New Medications      Instructions Start Date   aspirin 81 MG EC tablet   81 mg, Oral, Daily   Start Date: March 19, 2021     clopidogrel 75 MG tablet  Commonly known as: PLAVIX   75 mg, Oral, Daily      furosemide 40 MG tablet  Commonly known as: LASIX   40 mg, Oral, Daily   Start Date: March 19, 2021     HYDROcodone-acetaminophen 5-325 MG per tablet  Commonly known as: NORCO   1 tablet, Oral, Every 4 Hours PRN      metoprolol tartrate 25 MG tablet  Commonly known as: LOPRESSOR   25 mg, Oral, Every 12 Hours Scheduled      potassium chloride 20 MEQ CR tablet  Commonly known as: K-DUR,KLOR-CON   20 mEq, Oral, Daily   Start Date: March 19, 2021        Changes to Medications      Instructions Start Date   atorvastatin 40 MG tablet  Commonly known as: LIPITOR  What changed:   · medication strength  · how much to take  · when to take this   40 mg, Oral, Nightly         Continue These Medications      Instructions Start Date   CBD oral oil  Commonly known as: cannabidiol   1 mL, Oral, Daily, For arthritis       glimepiride 1 MG tablet  Commonly known as: AMARYL   2 mg, Oral, Daily      metFORMIN 1000 MG tablet  Commonly known as: GLUCOPHAGE   1,000 mg, Oral, 2 Times Daily With Meals      SITagliptin 100 MG tablet  Commonly known as: JANUVIA   100 mg, Oral, Daily      Triamcinolone Acetonide 55  MCG/ACT nasal inhaler  Commonly known as: NASACORT   2 sprays, Nasal, Daily         Stop These Medications    hydroCHLOROthiazide 12.5 MG tablet  Commonly known as: HYDRODIURIL     lisinopril 40 MG tablet  Commonly known as: PRINIVIL,ZESTRIL     nitroglycerin 0.4 MG SL tablet  Commonly known as: NITROSTAT            Discharge Diet:   Healthy Heart Diet    Activity at Discharge:   As tolerated, no lifting > 10 pounds x 6 weeks    Follow-up Appointments  Future Appointments   Date Time Provider Department Center   4/5/2021 11:15 AM Casandra Gustafson APRN [facility] None   4/26/2021 11:15 AM Casandra Gustafson APRN [facility] None     Additional Instructions for the Follow-ups that You Need to Schedule     Discharge Follow-up with PCP   As directed       Currently Documented PCP:    Provider, No Known    PCP Phone Number:    None     Follow Up Details: in one month         Discharge Follow-up with Specialty: Cardiology; 2 Weeks   As directed      Specialty: Cardiology    Follow Up: 2 Weeks    Follow Up Details: Follow up with cardiologist Dr. Do in 2 weeks.         Discharge Follow-up with Specified Provider: Cardiac Surgery; 3 Weeks   As directed      To: Cardiac Surgery    Follow Up: 3 Weeks    Follow Up Details: [facility] 4/5/21 at 11:15am         Discharge Follow-up with Specified Provider: Cardiac Surgery; 6 Weeks   As directed      To: Cardiac Surgery    Follow Up: 6 Weeks    Follow Up Details: Follow up with [facility] on 4/26/21 at 11:15am.         Call MD With Problems / Concerns   Mar 19, 2021      Instructions: Call for temp >101 or surgical wound drainage    Order Comments: Instructions: Call for temp >101 or surgical wound drainage                Test Results Pending at Discharge    STSinfomation:  Patient discharged on aspirin, statin, and beta blocker.       ESTRELLA AVALOS  03/18/21  14:43 EDT

## 2021-03-18 NOTE — PROGRESS NOTES
Continued Stay Note  [facility]     Patient Name: Tao Pozo  MRN: [id number]  Today's Date: 3/18/2021    Admit Date: 3/12/2021    Discharge Plan     Row Name 03/18/21 1309       Plan    Plan  Anticipate home with assist    Plan Comments  Anticipate discharge today or tomorrow.          Expected Discharge Date and Time     Expected Discharge Date Expected Discharge Time    Mar 18, 2021             Met with patient in room wearing PPE: mask, face shield/goggles, gloves, gown.      Maintained distance greater than six feet and spent less than 15 minutes in the room.          Anna Nae[place]le [place] [address]   [phone]  office  [phone]  fax  [email or web address]

## 2021-03-19 ENCOUNTER — READMISSION MANAGEMENT (OUTPATIENT)
Dept: CALL CENTER | Facility: HOSPITAL | Age: 76
End: 2021-03-19

## 2021-03-19 LAB — QT INTERVAL: 424 MS

## 2021-03-19 NOTE — OUTREACH NOTE
Prep Survey      Responses   [facility] facility patient discharged from?  [place]   Is LACE score < 7 ?  No   Emergency Room discharge w/ pulse ox?  No   Eligibility  [facility]   Date of Admission  03/12/21   Date of Discharge  03/18/21   Discharge Disposition  Home or Self Care   Discharge diagnosis  CORONARY ARTERY BYPASS GRAFTING   Does the patient have one of the following disease processes/diagnoses(primary or secondary)?  Cardiothoracic surgery   Does the patient have Home health ordered?  No   Is there a DME ordered?  No   Prep survey completed?  Yes          Veronica Dhillon RN

## 2021-03-19 NOTE — DISCHARGE PLACEMENT REQUEST
"Kurtis Klein (76 y.o. Male)     Date of Birth Social Security Number Address Home Phone MRN    1945  [address] [place] [address] [phone] [id number]    Denominational Marital Status          Hinduism        Admission Date Admission Type Admitting Provider Attending Provider Department, Room/Bed    3/12/21 Elective Huseyin Do MD  [facility] CARDIOVASCULAR CARE UNIT, 2201/1    Discharge Date Discharge Disposition Discharge Destination        3/18/2021 Home or Self Care              Attending Provider: (none)   Allergies: Shellfish-derived Products    Isolation: None   Infection: COVID (History) (03/12/21)   Code Status: Prior    Ht: 176.5 cm (69.49\")   Wt: 79.7 kg (175 lb 11.3 oz)    Admission Cmt: None   Principal Problem: Coronary artery disease involving native coronary artery of native heart without angina pectoris [I25.10] More...                 Active Insurance as of 3/12/2021     Primary Coverage     Payor Plan Insurance Group Employer/Plan Group    MEDICARE MEDICARE A & B      Payor Plan Address Payor Plan Phone Number Payor Plan Fax Number Effective Dates    [address] [phone]  1/1/2012 - None Entered    [facility] [id number]       Subscriber Name Subscriber Birth Date Member ID       KURTIS KLEIN 1945 [id number]           Secondary Coverage     Payor Plan Insurance Group Employer/Plan Group    [employer]                Payor Plan Address Payor Plan Phone Number Payor Plan Fax Number Effective Dates    [address] [address]   8/1/2014 - None Entered    [facility] [id number]       Subscriber Name Subscriber Birth Date Member ID       KURTIS KLEIN 1945 [id number]                 Emergency Contacts      (Rel.) Home Phone Work Phone Mobile Phone    Rosalind Klein (Spouse) [phone] -- [phone]              "

## 2021-03-19 NOTE — PROGRESS NOTES
Case Management Discharge Note      Final Note: Home with assist patient denied any needs.         Selected Continued Care - Discharged on 3/18/2021 Admission date: 3/12/2021 - Discharge disposition: Home or Self Care                 Final Discharge Disposition Code: 01 - home or self-care

## 2021-03-19 NOTE — OUTREACH NOTE
CT Surgery Week 1 Survey      Responses   [facility] patient discharged from?  [facility]   Does the patient have one of the following disease processes/diagnoses(primary or secondary)?  Cardiothoracic surgery   Week 1 attempt successful?  No   Unsuccessful attempts  Attempt 1            Joanna Pathak RN

## 2021-03-20 LAB
QT INTERVAL: 376 MS
QT INTERVAL: 447 MS

## 2021-03-22 ENCOUNTER — READMISSION MANAGEMENT (OUTPATIENT)
Dept: CALL CENTER | Facility: HOSPITAL | Age: 76
End: 2021-03-22

## 2021-03-22 NOTE — OUTREACH NOTE
CT Surgery Week 1 Survey      Responses   [facility] patient discharged from?  [facility]   Does the patient have one of the following disease processes/diagnoses(primary or secondary)?  Cardiothoracic surgery   Week 1 attempt successful?  No   Unsuccessful attempts  Attempt 2            Joanna Pathak RN

## 2021-03-24 ENCOUNTER — READMISSION MANAGEMENT (OUTPATIENT)
Dept: CALL CENTER | Facility: HOSPITAL | Age: 76
End: 2021-03-24

## 2021-03-24 NOTE — OUTREACH NOTE
CT Surgery Week 1 Survey      Responses   [facility] patient discharged from?  [facility]   Does the patient have one of the following disease processes/diagnoses(primary or secondary)?  Cardiothoracic surgery   Week 1 attempt successful?  Yes   Call start time  1139   Call end time  1145   Discharge diagnosis  CORONARY ARTERY BYPASS GRAFTING   Is patient permission given to speak with other caregiver?  Yes   List who call center can speak with  wife   Person spoke with today (if not patient) and relationship  wife   Medication alerts for this patient  using pill organizer   Meds reviewed with patient/caregiver?  Yes   Is the patient having any side effects they believe may be caused by any medication additions or changes?  No   Does the patient have all medications related to this admission filled (includes all antibiotics, pain medications, cardiac medications, etc.)  Yes   Is the patient taking all medications as directed (includes completed medication regime)?  Yes   Does the patient have a primary care provider?   No   Does the patient have an appointment scheduled with their C/T surgeon?  Yes   Comments regarding PCP  They have the info about the available PCP   Has the patient kept scheduled appointments due by today?  N/A   Psychosocial issues?  No   Comments  Using the [employer] IS.Tried to sleep in bed last night, caused pain in chest today,going back to recliner tonight. Daily wts are stable. Decreased appetite. Low energy.No issues voiding.Incisions are healing well.Afebrile    Did the patient receive a copy of their discharge instructions?  Yes   Nursing interventions  Reviewed instructions with patient   What is the patient's perception of their health status since discharge?  Improving   Nursing interventions  Nurse provided patient education   Is the patient/caregiver able to teach back normal signs of recovery?  Nausea and lack of appetite   Nursing interventions  Reassured on normal signs of  recovery   Is the patient /caregiver able to teach back basic post-op care?  Continue use of incentive spirometry at least 1 week post discharge, Practice 'cough and deep breath', Take showers only when approved by MD-sponge bathe until then, Do not remove steri-strips, Lifting as instructed by MD in discharge instructions   Is the patient/caregiver able to teach back signs and symptoms of incisional infection?  Increased redness, swelling or pain at the incisonal site, Increased drainage or bleeding, Fever   Is the patient/caregiver able to teach back steps to recovery at home?  Rest and rebuild strength, gradually increase activity, Set small, achievable goals for return to baseline health   If the patient is a current smoker, are they able to teach back resources for cessation?  Not a smoker   Is the patient/caregiver able to teach back the hierarchy of who to call/visit for symptoms/problems? PCP, Specialist, Home health nurse, Urgent Care, ED, 911  Yes   Week 1 call completed?  Yes            Joanna Pathak RN

## 2021-03-29 LAB — QT INTERVAL: 408 MS

## 2021-03-31 ENCOUNTER — READMISSION MANAGEMENT (OUTPATIENT)
Dept: CALL CENTER | Facility: HOSPITAL | Age: 76
End: 2021-03-31

## 2021-03-31 NOTE — OUTREACH NOTE
CT Surgery Week 2 Survey      Responses   [facility] patient discharged from?  [facility]   Does the patient have one of the following disease processes/diagnoses(primary or secondary)?  Cardiothoracic surgery   Week 2 attempt successful?  Yes   Call start time  1458   Call end time  1504   Meds reviewed with patient/caregiver?  Yes   Is the patient having any side effects they believe may be caused by any medication additions or changes?  No   Does the patient have all medications related to this admission filled (includes all antibiotics, pain medications, cardiac medications, etc.)  Yes   Is the patient taking all medications as directed (includes completed medication regime)?  Yes   Comments regarding appointments  Dr Hoyos tomorrow appt. 04/01/21. States will find a PCP-Dr Dia.   Does the patient have an appointment scheduled with their C/T surgeon?  Yes   Has the patient kept scheduled appointments due by today?  N/A   Has home health visited the patient within 72 hours of discharge?  N/A   Psychosocial issues?  No   What is the patient's perception of their health status since discharge?  Improving   Nursing interventions  Nurse provided patient education   Nursing interventions  Reassured on normal signs of recovery   Is the patient /caregiver able to teach back the importance of cardiac rehab?  Yes   Nursing interventions  Provided education on importance of cardiac rehab [States will be doing cardiac rehab.]   Is the patient/caregiver able to teach back the hierarchy of who to call/visit for symptoms/problems? PCP, Specialist, Home health nurse, Urgent Care, ED, 911  Yes   Week 2 call completed?  Yes   Wrap up additional comments  States is feeling better-incisions healing without s/s of infection. States still slight SOA on increased activity. States weight steady at 180 lb. Denies any needs today.          Shira Beverly RN

## 2021-04-01 ENCOUNTER — OFFICE VISIT (OUTPATIENT)
Dept: CARDIOLOGY | Facility: CLINIC | Age: 76
End: 2021-04-01

## 2021-04-01 VITALS
HEIGHT: 71 IN | OXYGEN SATURATION: 97 % | DIASTOLIC BLOOD PRESSURE: 79 MMHG | HEART RATE: 51 BPM | WEIGHT: 172 LBS | BODY MASS INDEX: 24.08 KG/M2 | SYSTOLIC BLOOD PRESSURE: 138 MMHG | TEMPERATURE: 96.8 F

## 2021-04-01 DIAGNOSIS — E08.9 DIABETES MELLITUS DUE TO UNDERLYING CONDITION WITHOUT COMPLICATION, WITHOUT LONG-TERM CURRENT USE OF INSULIN (HCC): ICD-10-CM

## 2021-04-01 DIAGNOSIS — Z95.1 HX OF CABG: ICD-10-CM

## 2021-04-01 DIAGNOSIS — E78.5 DYSLIPIDEMIA: ICD-10-CM

## 2021-04-01 DIAGNOSIS — I25.10 CHRONIC CORONARY ARTERY DISEASE: ICD-10-CM

## 2021-04-01 DIAGNOSIS — Z95.1 S/P CABG (CORONARY ARTERY BYPASS GRAFT): Primary | ICD-10-CM

## 2021-04-01 DIAGNOSIS — I10 ESSENTIAL HYPERTENSION: ICD-10-CM

## 2021-04-01 PROCEDURE — 93000 ELECTROCARDIOGRAM COMPLETE: CPT | Performed by: INTERNAL MEDICINE

## 2021-04-01 PROCEDURE — 99214 OFFICE O/P EST MOD 30 MIN: CPT | Performed by: INTERNAL MEDICINE

## 2021-04-01 RX ORDER — LISINOPRIL 40 MG/1
TABLET ORAL
COMMUNITY
Start: 2021-03-31 | End: 2021-04-26

## 2021-04-01 RX ORDER — ATORVASTATIN CALCIUM 20 MG/1
TABLET, FILM COATED ORAL
COMMUNITY
Start: 2021-03-31 | End: 2021-04-05

## 2021-04-05 ENCOUNTER — OFFICE VISIT (OUTPATIENT)
Dept: CARDIAC SURGERY | Facility: CLINIC | Age: 76
End: 2021-04-05

## 2021-04-05 VITALS
DIASTOLIC BLOOD PRESSURE: 63 MMHG | SYSTOLIC BLOOD PRESSURE: 129 MMHG | TEMPERATURE: 97.8 F | HEIGHT: 70 IN | HEART RATE: 66 BPM | BODY MASS INDEX: 24.34 KG/M2 | WEIGHT: 170 LBS | OXYGEN SATURATION: 98 % | RESPIRATION RATE: 20 BRPM

## 2021-04-05 DIAGNOSIS — Z95.1 S/P CABG X 3: Primary | ICD-10-CM

## 2021-04-05 PROCEDURE — 99024 POSTOP FOLLOW-UP VISIT: CPT | Performed by: NURSE PRACTITIONER

## 2021-04-05 RX ORDER — HYDROCODONE BITARTRATE AND ACETAMINOPHEN 5; 325 MG/1; MG/1
1 TABLET ORAL EVERY 6 HOURS PRN
Qty: 10 TABLET | Refills: 0 | Status: SHIPPED | OUTPATIENT
Start: 2021-04-05 | End: 2021-04-26

## 2021-04-05 NOTE — PROGRESS NOTES
"CARDIOVASCULAR SURGERY FOLLOW-UP PROGRESS NOTE  Chief Complaint: Post-op Follow Up        HPI:   Dear Dr. Villaseñor, No Known and colleagues:    It was nice to see Tao Pozo in follow up today after cardiac surgery.  As you know, he is a 76 y.o. male with MV CAD who underwent CABG at [facility] by Dr. Vasquez on 3/15/2021. He did well postoperatively and continues to do well. He comes in today complaining of feeling his sternum shift while drying his hair this morning.  Additionally, he also states his right inner thigh is still sore.  There is no bruising or hematoma noted.  His sternum is stable and he hasn't felt it since this morning.  He is noted to have a small proximal scabbed area and distal scabbed area with a stitich catching his shirt.  This scab was easily removed and stitch clipped.  His activity level has been good.  He has seen Dr. Do and has a stress test scheduled to begin cardiac rehab.  He is down to a few Norco and is asking for a refill.  [facility] report ran--#10 tabs, no refills escribed for him.  He is alone for his appt.    Physical Exam:         /63 (BP Location: Right arm, Patient Position: Sitting, Cuff Size: Adult)   Pulse 66   Temp 97.8 °F (36.6 °C) (Oral)   Resp 20   Ht 177.8 cm (70\")   Wt 77.1 kg (170 lb)   SpO2 98%   BMI 24.39 kg/m²   Heart:  regular rate and rhythm, S1, S2 normal, no murmur, click, rub or gallop, regular rate and rhythm  Lungs:  clear to auscultation bilaterally  Extremities:  no edema  Incision(s):  mid chest healing well, right leg healing well, sternum stable    Assessment/Plan:     S/P CABG. Overall, he is doing well.  He will begin cardiac rehab after his stress test.  He is asking if he can start driving again.  He can if he is not taking pain meds.    No significant post-op complications    OK to drive if not taking narcotic pain medicine  OK to begin cardiac rehab  Follow-up as scheduled with cardiology--Dr. oD  Follow-up as scheduled with " PCP  Follow-up with CT surgery--April 26th    No restrictions of activity.    Current outpatient and discharge medications have been reconciled for the patient.  Reviewed by: ESTRELLA Decker    Thank you for allowing me to participate in the care of your patient.    Regards,  ESTRELLA De La Vega

## 2021-04-06 ENCOUNTER — HOSPITAL ENCOUNTER (OUTPATIENT)
Dept: CARDIOLOGY | Facility: HOSPITAL | Age: 76
Discharge: HOME | End: 2021-04-06
Admitting: INTERNAL MEDICINE

## 2021-04-06 DIAGNOSIS — Z95.1 S/P CABG (CORONARY ARTERY BYPASS GRAFT): ICD-10-CM

## 2021-04-06 LAB
BH CV STRESS BP STAGE 1: NORMAL
BH CV STRESS DURATION MIN STAGE 1: 3
BH CV STRESS DURATION SEC STAGE 1: 0
BH CV STRESS GRADE STAGE 1: 10
BH CV STRESS HR STAGE 1: 85
BH CV STRESS METS STAGE 1: 5
BH CV STRESS PROTOCOL 1: NORMAL
BH CV STRESS RECOVERY BP: NORMAL MMHG
BH CV STRESS RECOVERY HR: 79 BPM
BH CV STRESS SPEED STAGE 1: 1.7
BH CV STRESS STAGE 1: 1
MAXIMAL PREDICTED HEART RATE: 146 BPM
STRESS BASELINE BP: NORMAL MMHG
STRESS BASELINE HR: 58 BPM
STRESS POST EXERCISE DUR MIN: 2 MIN
STRESS POST EXERCISE DUR SEC: 42 SEC
STRESS TARGET HR: 124 BPM

## 2021-04-06 PROCEDURE — 93017 CV STRESS TEST TRACING ONLY: CPT

## 2021-04-06 PROCEDURE — 93018 CV STRESS TEST I&R ONLY: CPT | Performed by: INTERNAL MEDICINE

## 2021-04-07 ENCOUNTER — READMISSION MANAGEMENT (OUTPATIENT)
Dept: CALL CENTER | Facility: HOSPITAL | Age: 76
End: 2021-04-07

## 2021-04-07 NOTE — OUTREACH NOTE
CT Surgery Week 3 Survey      Responses   [facility] patient discharged from?  [facility]   Does the patient have one of the following disease processes/diagnoses(primary or secondary)?  Cardiothoracic surgery   Week 3 attempt successful?  Yes   Call start time  1100   Call end time  1116   Discharge diagnosis  CORONARY ARTERY BYPASS GRAFTING   Meds reviewed with patient/caregiver?  Yes   Is the patient having any side effects they believe may be caused by any medication additions or changes?  No   Does the patient have all medications related to this admission filled (includes all antibiotics, pain medications, cardiac medications, etc.)  Yes   Is the patient taking all medications as directed (includes completed medication regime)?  Yes   Does the patient have a primary care provider?   No   Does the patient have an appointment scheduled with their C/T surgeon?  Yes   Comments regarding PCP  They have the info about the available PCP   Has the patient kept scheduled appointments due by today?  Yes   Has home health visited the patient within 72 hours of discharge?  N/A   Psychosocial issues?  No   Did the patient receive a copy of their discharge instructions?  Yes   Nursing interventions  Reviewed instructions with patient   What is the patient's perception of their health status since discharge?  Improving   Nursing interventions  Nurse provided patient education   Is the patient/caregiver able to teach back normal signs of recovery?  Nausea and lack of appetite   Nursing interventions  Reassured on normal signs of recovery   Is the patient /caregiver able to teach back basic post-op care?  Continue use of incentive spirometry at least 1 week post discharge, Practice 'cough and deep breath', Take showers only when approved by MD-sponge bathe until then, Do not remove steri-strips, Lifting as instructed by MD in discharge instructions   Is the patient/caregiver able to teach back signs and symptoms of incisional  infection?  Increased redness, swelling or pain at the incisonal site, Increased drainage or bleeding, Fever   Is the patient /caregiver able to teach back the importance of cardiac rehab?  Yes   If the patient is a current smoker, are they able to teach back resources for cessation?  Not a smoker   Is the patient/caregiver able to teach back the hierarchy of who to call/visit for symptoms/problems? PCP, Specialist, Home health nurse, Urgent Care, ED, 911  Yes   Week 3 call completed?  Yes          Jarocho Davis RN

## 2021-04-15 ENCOUNTER — READMISSION MANAGEMENT (OUTPATIENT)
Dept: CALL CENTER | Facility: HOSPITAL | Age: 76
End: 2021-04-15

## 2021-04-15 NOTE — OUTREACH NOTE
CT Surgery Week 4 Survey      Responses   [facility] patient discharged from?  [facility]   Does the patient have one of the following disease processes/diagnoses(primary or secondary)?  Cardiothoracic surgery   Week 4 attempt successful?  Yes   Call start time  0941   Call end time  0943   Discharge diagnosis  CORONARY ARTERY BYPASS GRAFTING   Meds reviewed with patient/caregiver?  Yes   Is the patient taking all medications as directed (includes completed medication regime)?  Yes   Has the patient kept scheduled appointments due by today?  Yes   Is the patient still receiving Home Health Services?  N/A   Psychosocial issues?  No   What is the patient's perception of their health status since discharge?  Improving   Is the patient/caregiver able to teach back steps to recovery at home?  Set small, achievable goals for return to baseline health   Is the patient /caregiver able to teach back the importance of cardiac rehab?  Yes   Nursing interventions  Provided education on importance of cardiac rehab [Starts cardiac rehab today]   Is the patient/caregiver able to teach back the hierarchy of who to call/visit for symptoms/problems? PCP, Specialist, Home health nurse, Urgent Care, ED, 911  Yes   Additional teach back comments  Pt doing great, feeling good and incisions have healed well   Week 4 Call Completed?  Yes   Would the patient like one additional call?  No   Graduated  Yes   Is the patient interested in additional calls from an ambulatory ?  NOTE:  applies to high risk patients requiring additional follow-up.  No   Did the patient feel the follow up calls were helpful during their recovery period?  Yes   Was the number of calls appropriate?  Yes          Laureen Lovelace RN

## 2021-04-26 ENCOUNTER — OFFICE VISIT (OUTPATIENT)
Dept: CARDIAC SURGERY | Facility: CLINIC | Age: 76
End: 2021-04-26

## 2021-04-26 ENCOUNTER — TELEPHONE (OUTPATIENT)
Dept: CARDIAC SURGERY | Facility: CLINIC | Age: 76
End: 2021-04-26

## 2021-04-26 VITALS
WEIGHT: 171 LBS | RESPIRATION RATE: 20 BRPM | DIASTOLIC BLOOD PRESSURE: 70 MMHG | HEIGHT: 71 IN | HEART RATE: 78 BPM | SYSTOLIC BLOOD PRESSURE: 147 MMHG | BODY MASS INDEX: 23.94 KG/M2 | OXYGEN SATURATION: 99 % | TEMPERATURE: 97.7 F

## 2021-04-26 DIAGNOSIS — R07.89 CHEST DISCOMFORT: ICD-10-CM

## 2021-04-26 DIAGNOSIS — I10 ESSENTIAL HYPERTENSION: ICD-10-CM

## 2021-04-26 DIAGNOSIS — Z95.1 S/P CABG X 3: Primary | ICD-10-CM

## 2021-04-26 PROCEDURE — 99024 POSTOP FOLLOW-UP VISIT: CPT | Performed by: NURSE PRACTITIONER

## 2021-04-26 RX ORDER — LISINOPRIL 10 MG/1
10 TABLET ORAL DAILY
Qty: 30 TABLET | Refills: 1 | Status: SHIPPED | OUTPATIENT
Start: 2021-04-26 | End: 2022-05-09 | Stop reason: DRUGHIGH

## 2021-04-26 RX ORDER — HYDROCHLOROTHIAZIDE 12.5 MG/1
12.5 TABLET ORAL DAILY
COMMUNITY
Start: 2021-04-07 | End: 2021-04-26

## 2021-04-26 RX ORDER — HYDROCHLOROTHIAZIDE 12.5 MG/1
12.5 TABLET ORAL DAILY
Qty: 30 TABLET | Refills: 1
Start: 2021-04-26 | End: 2023-01-12

## 2021-04-26 NOTE — TELEPHONE ENCOUNTER
----- Message from ESTRELLA Decker sent at 4/26/2021 12:27 PM EDT -----  Regarding: Labs  I restarted his home lisinopril he is doing cardiac rehab and edmund  Can we call him and see where he would like to get some labs drawn--BMP in 7 to 10 days would be ideal.  He does not have a PCP so I would turn this over to Dr. Do after I know his kidneys are okay.    Thank you    
Discussed with , will have labs completed at [facility].   
no

## 2021-04-26 NOTE — PROGRESS NOTES
"CARDIOVASCULAR SURGERY FOLLOW-UP PROGRESS NOTE  Chief Complaint: Post-op Follow Up        HPI:   Dear Dr. Do and colleagues:    It was nice to see Tao Pozo in follow up today after cardiac surgery.  As you know, he is a 76 y.o. male with MV CAD and HTN who underwent CABG at [facility] by Dr. Vasquez on 3/15/2021. He did well postoperatively and continues to do well. He comes in today concerned about his BP.  His BP today is 147/70 and he reports it has been higher at home.  Preop he had been on lisinopril and HCTZ.  He is unsure the dose of his lisinopril preop.  His activity level has been good.  He is doing cardiac rehab and [place].  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    Physical Exam:         /70 (BP Location: Left arm, Patient Position: Sitting, Cuff Size: Adult)   Pulse 78   Temp 97.7 °F (36.5 °C) (Oral)   Resp 20   Ht 179.1 cm (70.5\")   Wt 77.6 kg (171 lb)   SpO2 99%   BMI 24.19 kg/m²   Heart:  regular rate and rhythm  Lungs:  clear to auscultation bilaterally  Extremities:  no edema  Incision(s):  mid chest healing well, right leg healing well, sternum stable    Assessment/Plan:     S/P CABG. Overall, he is doing well.  I restarted his HCTZ and sent a prescription for lisinopril 10 mg to his local pharmacy.  Told him it was okay to start mowing slowly to stop if he had any acute pain.  It's okay for him to return to work and he will have to be the  of how long his days are.  He will continue his current medicine regimen.    No significant post-op complications    OK to drive if not taking narcotic pain medicine  Follow-up as scheduled with cardiology--Dr. Do, as scheduled  Follow-up with CT surgery prn    No restrictions of activity.      Thank you for allowing me to participate in the care of your patient.    Regards,  Rosibel Tripathi, APRN      "

## 2021-05-12 ENCOUNTER — TELEPHONE (OUTPATIENT)
Dept: CARDIOLOGY | Facility: CLINIC | Age: 76
End: 2021-05-12

## 2021-05-12 ENCOUNTER — TELEPHONE (OUTPATIENT)
Dept: CARDIAC SURGERY | Facility: CLINIC | Age: 76
End: 2021-05-12

## 2021-05-12 NOTE — TELEPHONE ENCOUNTER
Spoke with , he has not had labs done. He states he will go Friday 5/14/21 to have labs completed at [facility].    None known

## 2021-05-12 NOTE — TELEPHONE ENCOUNTER
PATIENT CALLED FROM CARDIAC REHAB IN [place]. EKG SHOWED BIGEMINY PVC.  ONCE HE STOPPED THE EXERCISING , IT STOPPED.  THEY ARE SENDING OVER REPORT.

## 2021-05-14 ENCOUNTER — CLINICAL SUPPORT (OUTPATIENT)
Dept: CARDIOLOGY | Facility: CLINIC | Age: 76
End: 2021-05-14

## 2021-05-14 DIAGNOSIS — Z95.1 S/P CABG (CORONARY ARTERY BYPASS GRAFT): Primary | ICD-10-CM

## 2021-05-14 PROCEDURE — 93000 ELECTROCARDIOGRAM COMPLETE: CPT | Performed by: INTERNAL MEDICINE

## 2021-05-14 NOTE — PROGRESS NOTES
ECG 12 Lead    Date/Time: 5/14/2021 12:58 PM  Performed by: Huseyin Do MD  Authorized by: Huseyin Do MD   Comparison: compared with previous ECG   Similar to previous ECG  Comparison to previous ECG: Sinus bradycardia 43/min nonspecific ST-T wave changes no ectopy no change from previous EKG.

## 2021-05-14 NOTE — TELEPHONE ENCOUNTER
Patient came into office. Did EKG on patient per Dr. Do. Sent Dr. Do ekg tracing, and reports from cardiac rehab. Per Dr. Do, ekg is normal. Reports from Cardiac Rehab is normal, showing PVCS but nothing to be concerned about. Advised information to patient and wife in office. Patient and wife understood.   Patient has f/u appt 6/3/21 at 2:10 pm.

## 2021-06-03 ENCOUNTER — OFFICE VISIT (OUTPATIENT)
Dept: CARDIOLOGY | Facility: CLINIC | Age: 76
End: 2021-06-03

## 2021-06-03 VITALS
OXYGEN SATURATION: 98 % | BODY MASS INDEX: 24.91 KG/M2 | WEIGHT: 174 LBS | HEART RATE: 68 BPM | HEIGHT: 70 IN | DIASTOLIC BLOOD PRESSURE: 70 MMHG | SYSTOLIC BLOOD PRESSURE: 133 MMHG

## 2021-06-03 DIAGNOSIS — Z95.1 S/P CABG (CORONARY ARTERY BYPASS GRAFT): Primary | ICD-10-CM

## 2021-06-03 DIAGNOSIS — E78.5 DYSLIPIDEMIA: ICD-10-CM

## 2021-06-03 DIAGNOSIS — I10 ESSENTIAL HYPERTENSION: ICD-10-CM

## 2021-06-03 DIAGNOSIS — E08.9 DIABETES MELLITUS DUE TO UNDERLYING CONDITION WITHOUT COMPLICATION, WITHOUT LONG-TERM CURRENT USE OF INSULIN (HCC): ICD-10-CM

## 2021-06-03 PROCEDURE — 99214 OFFICE O/P EST MOD 30 MIN: CPT | Performed by: INTERNAL MEDICINE

## 2021-06-03 RX ORDER — AMOXICILLIN 500 MG/1
1000 CAPSULE ORAL 2 TIMES DAILY
COMMUNITY
End: 2021-10-19

## 2021-06-03 RX ORDER — HYDROCODONE BITARTRATE AND ACETAMINOPHEN 5; 325 MG/1; MG/1
1 TABLET ORAL EVERY 6 HOURS PRN
COMMUNITY
End: 2021-10-19

## 2021-06-03 NOTE — PROGRESS NOTES
Encounter Date:06/03/2021  Last seen 4/1/2021      Patient ID: Tao Pozo is a 76 y.o. male.    Chief Complaint:  CABG  Hypertension  Dyslipidemia  Diabetes  Renal dysfunction     History of Present Illness  Patient has returned to work.  Patient has completed cardiac rehab.  Since I have last seen, the patient has been without any chest discomfort ,shortness of breath, palpitations, dizziness or syncope.  Denies having any headache ,abdominal pain ,nausea, vomiting , diarrhea constipation, loss of weight or loss of appetite.  Denies having any excessive bruising ,hematuria or blood in the stool.    Review of all systems negative except as indicated.    Reviewed ROS.  Assessment and Plan         ]]]]]]]]]]]]]]]]]]]  Impression  ========  -Status post CABG x 3 with a LIMA to the mid LAD and reverse individual saphenous vein graft to the distal RCA into the medial marginal-     -Preoperative chest discomfort-exertional burning sensation in the chest.  More with carrying.  Relieved by rest.  Suggestive of angina pectoris.     Cardiac catheterization 3/12/2021 revealed left ventricle size and contractility is normal with ejection fraction of 60%.  No mitral regurgitation is present.  Left subclavian artery and left internal mammary arteries are normal.  Left main coronary artery normal.  Left anterior descending artery has diffuse calcification with ostial 60% proximal 90 and mid segment 90% disease.  Circumflex coronary artery provided a large marginal branch.  Circumflex coronary artery has 60% disease proximal to the origin of marginal branch.  Circumflex coronary artery distal to the marginal branches small in caliber and has diffuse 60 to 70% disease.  Right coronary artery is a very large and dominant vessel that has ostial 99% disease and mid segment 90% disease.  There appears to be a clot.      Echocardiogram-normal 2/23/2021  Lexiscan Cardiolite test 2/23/2021-abnormal with apical  ischemia.     -Hypertension dyslipidemia diabetes.  CKD 3.  Recent BUN 35 creatinine 1.27-12/27/2020     -History of significant sinus bradycardia that has improved in the past with discontinuation of Bystolic.     -History of nonsustained ventricular tachycardia with exercise in the past.     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  ==============  Plan  =========   status post CABG 3/15/2021  Patient is not having any angina pectoris or congestive heart failure.  Discontinue Plavix.    Hypertension-stable 138/79  Continue metoprolol.  Patient is off lisinopril.  Discontinue hydrochlorothiazide.    Rhythm-sinus rhythm     History of positive COVID-19 nasal swab.  (Prior to surgery)     Dyslipidemia-continue atorvastatin    Medications were reviewed and updated.  Patient is on aspirin atorvastatin metoprolol 25 mg twice a day and her diabetes medications.  Discontinue hydrochlorothiazide Plavix.  Patient is off lisinopril.    Follow-up in the office in 3 months with EKG.    Further plan will depend on patient's progress  ]]]]]]]]]]]]]                   Diagnosis Plan   1. S/P CABG (coronary artery bypass graft)     2. Dyslipidemia     3. Essential hypertension     4. Diabetes mellitus due to underlying condition without complication, without long-term current use of insulin (CMS/[facility])     LAB RESULTS (LAST 7 DAYS)    CBC        BMP        CMP         BNP        TROPONIN        CoAg        Creatinine Clearance  CrCl cannot be calculated (Patient's most recent lab result is older than the maximum 30 days allowed.).    ABG        Radiology  No radiology results for the last day                The following portions of the patient's history were reviewed and updated as appropriate: allergies, current medications, past family history, past medical history, past social history, past surgical history and problem  list.    Review of Systems   Constitutional: Negative for malaise/fatigue.   Cardiovascular: Negative for chest pain, leg swelling, palpitations and syncope.   Respiratory: Negative for shortness of breath.    Skin: Negative for rash.   Gastrointestinal: Negative for nausea and vomiting.   Neurological: Negative for dizziness, light-headedness and numbness.         Current Outpatient Medications:   •  amoxicillin (AMOXIL) 500 MG capsule, Take 1,000 mg by mouth 2 (Two) Times a Day., Disp: , Rfl:   •  aspirin 81 MG EC tablet, Take 1 tablet by mouth Daily., Disp: 30 tablet, Rfl: 3  •  atorvastatin (LIPITOR) 40 MG tablet, Take 1 tablet by mouth Every Night., Disp: 30 tablet, Rfl: 3  •  CBD (cannabidiol) oral oil, Take 1 mL by mouth Daily. For arthritis, Disp: , Rfl:   •  clopidogrel (PLAVIX) 75 MG tablet, Take 1 tablet by mouth Daily., Disp: 30 tablet, Rfl: 3  •  glimepiride (AMARYL) 1 MG tablet, Take 2 mg by mouth Daily., Disp: , Rfl:   •  hydroCHLOROthiazide (HYDRODIURIL) 12.5 MG tablet, Take 1 tablet by mouth Daily., Disp: 30 tablet, Rfl: 1  •  HYDROcodone-acetaminophen (NORCO) 5-325 MG per tablet, Take 1 tablet by mouth Every 6 (Six) Hours As Needed., Disp: , Rfl:   •  metFORMIN (GLUCOPHAGE) 1000 MG tablet, Take 1,000 mg by mouth 2 (Two) Times a Day With Meals., Disp: , Rfl:   •  metoprolol tartrate (LOPRESSOR) 25 MG tablet, Take 1 tablet by mouth Every 12 (Twelve) Hours., Disp: 60 tablet, Rfl: 3  •  SITagliptin (JANUVIA) 100 MG tablet, Take 100 mg by mouth Daily., Disp: , Rfl:   •  Triamcinolone Acetonide (NASACORT) 55 MCG/ACT nasal inhaler, 2 sprays into the nostril(s) as directed by provider Daily., Disp: , Rfl:   •  lisinopril (PRINIVIL,ZESTRIL) 10 MG tablet, Take 1 tablet by mouth Daily., Disp: 30 tablet, Rfl: 1    Allergies   Allergen Reactions   • Shellfish-Derived Products Anaphylaxis       Family History   Problem Relation Age of Onset   • Diabetes Mother    • Heart disease Father    • Diabetes Sister    •  "Diabetes Brother    • Hypertension Brother    • Heart disease Brother        Past Surgical History:   Procedure Laterality Date   • CARDIAC CATHETERIZATION N/A 3/12/2021    Procedure: Left Heart Cath with Coronary Angiography;  Surgeon: Huseyin Do MD;  Location: [facility] CATH INVASIVE LOCATION;  Service: Cardiovascular;  Laterality: N/A;   • CORONARY ARTERY BYPASS GRAFT N/A 3/15/2021    Procedure: CORONARY ARTERY BYPASS GRAFTING;  Surgeon: Paxton Vasquez MD;  Location: [facility] CVOR;  Service: Cardiothoracic;  Laterality: N/A;   • TONSILECTOMY, ADENOIDECTOMY, BILATERAL MYRINGOTOMY AND TUBES  1952       Past Medical History:   Diagnosis Date   • Arthritis    • Coronary artery disease 2/14/2012   • Diabetes mellitus (CMS/[facility])    • Elevated cholesterol    • Hyperlipidemia    • Hypertension        Family History   Problem Relation Age of Onset   • Diabetes Mother    • Heart disease Father    • Diabetes Sister    • Diabetes Brother    • Hypertension Brother    • Heart disease Brother        Social History     Socioeconomic History   • Marital status:      Spouse name: Not on file   • Number of children: Not on file   • Years of education: Not on file   • Highest education level: Not on file   Tobacco Use   • Smoking status: Never Smoker   • Smokeless tobacco: Never Used   Vaping Use   • Vaping Use: Never used   Substance and Sexual Activity   • Alcohol use: No   • Drug use: Defer   • Sexual activity: Defer         Procedures      Objective:       Physical Exam    /70 (BP Location: Left arm, Patient Position: Sitting, Cuff Size: Large Adult)   Pulse 68   Ht 177.8 cm (70\")   Wt 78.9 kg (174 lb)   SpO2 98%   BMI 24.97 kg/m²   The patient is alert, oriented and in no distress.    Vital signs as noted above.    Head and neck revealed no carotid bruits or jugular venous distension.  No thyromegaly or lymphadenopathy is present.    Lungs clear.  No wheezing.  Breath sounds are normal " bilaterally.    Heart normal first and second heart sounds.  No murmur..  No pericardial rub is present.  No gallop is present.    Abdomen soft and nontender.  No organomegaly is present.    Extremities revealed good peripheral pulses without any pedal edema.    Skin warm and dry.  Incisions are looking well.    Musculoskeletal system is grossly normal.    CNS grossly normal.    Reviewed and unchanged from last visit.

## 2021-10-08 RX ORDER — LISINOPRIL 10 MG/1
TABLET ORAL
Qty: 60 TABLET | OUTPATIENT
Start: 2021-10-08

## 2021-10-19 ENCOUNTER — OFFICE VISIT (OUTPATIENT)
Dept: CARDIOLOGY | Facility: CLINIC | Age: 76
End: 2021-10-19

## 2021-10-19 VITALS
HEIGHT: 70 IN | SYSTOLIC BLOOD PRESSURE: 181 MMHG | DIASTOLIC BLOOD PRESSURE: 84 MMHG | OXYGEN SATURATION: 98 % | HEART RATE: 54 BPM | BODY MASS INDEX: 25.34 KG/M2 | WEIGHT: 177 LBS

## 2021-10-19 DIAGNOSIS — R07.89 CHEST DISCOMFORT: ICD-10-CM

## 2021-10-19 DIAGNOSIS — I25.10 CHRONIC CORONARY ARTERY DISEASE: ICD-10-CM

## 2021-10-19 DIAGNOSIS — E08.9 DIABETES MELLITUS DUE TO UNDERLYING CONDITION WITHOUT COMPLICATION, WITHOUT LONG-TERM CURRENT USE OF INSULIN (HCC): ICD-10-CM

## 2021-10-19 DIAGNOSIS — E78.5 DYSLIPIDEMIA: ICD-10-CM

## 2021-10-19 DIAGNOSIS — Z95.1 S/P CABG (CORONARY ARTERY BYPASS GRAFT): Primary | ICD-10-CM

## 2021-10-19 DIAGNOSIS — I10 ESSENTIAL HYPERTENSION: ICD-10-CM

## 2021-10-19 PROCEDURE — 93000 ELECTROCARDIOGRAM COMPLETE: CPT | Performed by: INTERNAL MEDICINE

## 2021-10-19 PROCEDURE — 99214 OFFICE O/P EST MOD 30 MIN: CPT | Performed by: INTERNAL MEDICINE

## 2021-10-19 NOTE — PROGRESS NOTES
Encounter Date:10/19/2021  Last seen 6/3/2021      Patient ID: Tao Pozo is a 76 y.o. male.    Chief Complaint:  CABG  Hypertension  Dyslipidemia  Diabetes  Renal dysfunction     History of Present Illness  Since I have last seen, the patient has been without any chest discomfort ,shortness of breath, palpitations, dizziness or syncope.  Denies having any headache ,abdominal pain ,nausea, vomiting , diarrhea constipation, loss of weight or loss of appetite.  Denies having any excessive bruising ,hematuria or blood in the stool.    Review of all systems negative except as indicated.    Reviewed ROS.      Assessment and Plan         ]]]]]]]]]]]]]]]]]]]  Impression  ========  -Status post CABG x 3 with a LIMA to the mid LAD and reverse individual saphenous vein graft to the distal RCA into the medial marginal-     -Preoperative chest discomfort-exertional burning sensation in the chest.  More with carrying.  Relieved by rest.  Suggestive of angina pectoris.     Cardiac catheterization 3/12/2021 revealed left ventricle size and contractility is normal with ejection fraction of 60%.  No mitral regurgitation is present.  Left subclavian artery and left internal mammary arteries are normal.  Left main coronary artery normal.  Left anterior descending artery has diffuse calcification with ostial 60% proximal 90 and mid segment 90% disease.  Circumflex coronary artery provided a large marginal branch.  Circumflex coronary artery has 60% disease proximal to the origin of marginal branch.  Circumflex coronary artery distal to the marginal branches small in caliber and has diffuse 60 to 70% disease.  Right coronary artery is a very large and dominant vessel that has ostial 99% disease and mid segment 90% disease.  There appears to be a clot.      Echocardiogram-normal 2/23/2021  Lexiscan Cardiolite test 2/23/2021-abnormal with apical ischemia.     -Hypertension dyslipidemia diabetes.  CKD 3.  Recent BUN 35  creatinine 1.27-12/27/2020     -History of significant sinus bradycardia that has improved in the past with discontinuation of Bystolic.     -History of nonsustained ventricular tachycardia with exercise in the past.     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  ==============  Plan  =========   status post CABG 3/15/2021  Patient is not having any angina pectoris or congestive heart failure.  Patient is off Plavix.  EKG showed sinus bradycardia-10/19/2021    Hypertension-not well controlled  180/80  Continue metoprolol 25 mg twice a day.  Start lisinopril 10 mg a day  Close follow-up for blood pressure at home.     Rhythm-sinus rhythm     History of positive COVID-19 nasal swab.  (Prior to surgery)     Dyslipidemia-continue atorvastatin     Medications were reviewed and updated.  Patient is on aspirin atorvastatin metoprolol 25 mg twice a day and his diabetes medications.  Started lisinopril 10 mg a day.  Patient is off hydrochlorothiazide.    Follow-up in the office in 6 months with EKG.     Further plan will depend on patient's progress  ]]]]]]]]]]]]]                        Diagnosis Plan   1. S/P CABG (coronary artery bypass graft)  ECG 12 Lead   2. Dyslipidemia     3. Essential hypertension     4. Diabetes mellitus due to underlying condition without complication, without long-term current use of insulin ([facility])     5. Chronic coronary artery disease     6. Chest discomfort     LAB RESULTS (LAST 7 DAYS)    CBC        BMP        CMP         BNP        TROPONIN        CoAg        Creatinine Clearance  CrCl cannot be calculated (Patient's most recent lab result is older than the maximum 30 days allowed.).    ABG        Radiology  No radiology results for the last day                The following portions of the patient's history were reviewed and updated as appropriate: allergies, current medications, past family  history, past medical history, past social history, past surgical history and problem list.    Review of Systems   Constitutional: Negative for fever and malaise/fatigue.   HENT: Negative for ear pain and nosebleeds.    Eyes: Negative for blurred vision and double vision.   Cardiovascular: Positive for chest pain. Negative for dyspnea on exertion and palpitations.   Respiratory: Negative for cough and shortness of breath.    Skin: Negative for rash.   Musculoskeletal: Negative for joint pain.   Gastrointestinal: Negative for abdominal pain, nausea and vomiting.   Neurological: Negative for focal weakness and headaches.   Psychiatric/Behavioral: Negative for depression. The patient is not nervous/anxious.    All other systems reviewed and are negative.        Current Outpatient Medications:   •  aspirin 81 MG EC tablet, Take 1 tablet by mouth Daily., Disp: 30 tablet, Rfl: 3  •  atorvastatin (LIPITOR) 40 MG tablet, Take 1 tablet by mouth Every Night., Disp: 30 tablet, Rfl: 3  •  CBD (cannabidiol) oral oil, Take 1 mL by mouth Daily. For arthritis, Disp: , Rfl:   •  clopidogrel (PLAVIX) 75 MG tablet, Take 1 tablet by mouth Daily., Disp: 30 tablet, Rfl: 3  •  glimepiride (AMARYL) 1 MG tablet, Take 2 mg by mouth Daily., Disp: , Rfl:   •  hydroCHLOROthiazide (HYDRODIURIL) 12.5 MG tablet, Take 1 tablet by mouth Daily., Disp: 30 tablet, Rfl: 1  •  lisinopril (PRINIVIL,ZESTRIL) 10 MG tablet, Take 1 tablet by mouth Daily., Disp: 30 tablet, Rfl: 1  •  metFORMIN (GLUCOPHAGE) 1000 MG tablet, Take 1,000 mg by mouth 2 (Two) Times a Day With Meals., Disp: , Rfl:   •  metoprolol tartrate (LOPRESSOR) 25 MG tablet, Take 1 tablet by mouth Every 12 (Twelve) Hours., Disp: 60 tablet, Rfl: 3  •  SITagliptin (JANUVIA) 100 MG tablet, Take 100 mg by mouth Daily., Disp: , Rfl:     Allergies   Allergen Reactions   • Shellfish-Derived Products Anaphylaxis       Family History   Problem Relation Age of Onset   • Diabetes Mother    • Heart disease  "Father    • Diabetes Sister    • Diabetes Brother    • Hypertension Brother    • Heart disease Brother        Past Surgical History:   Procedure Laterality Date   • CARDIAC CATHETERIZATION N/A 3/12/2021    Procedure: Left Heart Cath with Coronary Angiography;  Surgeon: Huseyin Do MD;  Location: [facility] CATH INVASIVE LOCATION;  Service: Cardiovascular;  Laterality: N/A;   • CORONARY ARTERY BYPASS GRAFT N/A 3/15/2021    Procedure: CORONARY ARTERY BYPASS GRAFTING;  Surgeon: Paxton Vasquez MD;  Location: [facility] CVOR;  Service: Cardiothoracic;  Laterality: N/A;   • TONSILECTOMY, ADENOIDECTOMY, BILATERAL MYRINGOTOMY AND TUBES  1952       Past Medical History:   Diagnosis Date   • Arthritis    • Coronary artery disease 2/14/2012   • Diabetes mellitus (HCC)    • Elevated cholesterol    • Hyperlipidemia    • Hypertension        Family History   Problem Relation Age of Onset   • Diabetes Mother    • Heart disease Father    • Diabetes Sister    • Diabetes Brother    • Hypertension Brother    • Heart disease Brother        Social History     Socioeconomic History   • Marital status:    Tobacco Use   • Smoking status: Never Smoker   • Smokeless tobacco: Never Used   Vaping Use   • Vaping Use: Never used   Substance and Sexual Activity   • Alcohol use: No   • Drug use: Defer   • Sexual activity: Defer           ECG 12 Lead    Date/Time: 10/19/2021 1:29 PM  Performed by: Huseyin Do MD  Authorized by: Huseyin Do MD   Comparison: compared with previous ECG   Similar to previous ECG  Comparison to previous ECG: Sinus bradycardia 55/min small inferior Q waves nonspecific ST-T wave changes normal axis normal intervals no significant change from 5/14/2021                Objective:       Physical Exam    BP (!) 181/84   Pulse 54   Ht 177.8 cm (70\")   Wt 80.3 kg (177 lb)   SpO2 98%   BMI 25.40 kg/m²   The patient is alert, oriented and in no distress.    Vital signs as noted above.    Head and neck " revealed no carotid bruits or jugular venous distension.  No thyromegaly or lymphadenopathy is present.    Lungs clear.  No wheezing.  Breath sounds are normal bilaterally.    Heart normal first and second heart sounds.  No murmur..  No pericardial rub is present.  No gallop is present.    Abdomen soft and nontender.  No organomegaly is present.    Extremities revealed good peripheral pulses without any pedal edema.    Skin warm and dry.    Musculoskeletal system is grossly normal.    CNS grossly normal.    Reviewed and unchanged from last visit.

## 2022-05-09 ENCOUNTER — OFFICE VISIT (OUTPATIENT)
Dept: CARDIOLOGY | Facility: CLINIC | Age: 77
End: 2022-05-09

## 2022-05-09 VITALS
HEART RATE: 112 BPM | DIASTOLIC BLOOD PRESSURE: 70 MMHG | OXYGEN SATURATION: 96 % | SYSTOLIC BLOOD PRESSURE: 156 MMHG | BODY MASS INDEX: 25.59 KG/M2 | WEIGHT: 178.75 LBS | HEIGHT: 70 IN

## 2022-05-09 DIAGNOSIS — Z95.1 S/P CABG (CORONARY ARTERY BYPASS GRAFT): Primary | ICD-10-CM

## 2022-05-09 DIAGNOSIS — E08.9 DIABETES MELLITUS DUE TO UNDERLYING CONDITION WITHOUT COMPLICATION, WITHOUT LONG-TERM CURRENT USE OF INSULIN: ICD-10-CM

## 2022-05-09 DIAGNOSIS — I10 ESSENTIAL HYPERTENSION: ICD-10-CM

## 2022-05-09 DIAGNOSIS — E78.5 DYSLIPIDEMIA: ICD-10-CM

## 2022-05-09 DIAGNOSIS — I25.10 CHRONIC CORONARY ARTERY DISEASE: ICD-10-CM

## 2022-05-09 PROCEDURE — 93000 ELECTROCARDIOGRAM COMPLETE: CPT | Performed by: INTERNAL MEDICINE

## 2022-05-09 PROCEDURE — 99214 OFFICE O/P EST MOD 30 MIN: CPT | Performed by: INTERNAL MEDICINE

## 2022-05-09 RX ORDER — LISINOPRIL 20 MG/1
40 TABLET ORAL 2 TIMES DAILY
COMMUNITY
Start: 2022-03-11

## 2022-05-09 NOTE — PROGRESS NOTES
Encounter Date:05/09/2022    Last seen 10/19/2021      Patient ID: Tao Pozo is a 77 y.o. male.    Chief Complaint:  CABG  Hypertension  Dyslipidemia  Diabetes  Renal dysfunction     History of Present Illness  Patient has occasional left precordial dull aching sensation.  No other associated aggravating or elevating factors.    Since I have last seen, the patient has been without any shortness of breath, palpitations, dizziness or syncope.  Denies having any headache ,abdominal pain ,nausea, vomiting , diarrhea constipation, loss of weight or loss of appetite.  Denies having any excessive bruising ,hematuria or blood in the stool.    Review of all systems negative except as indicated.    Reviewed ROS.       Assessment and Plan         ]]]]]]]]]]]]]]]]]]]  Impression  ========  -Status post CABG x 3 with a LIMA to the mid LAD and reverse individual saphenous vein graft to the distal RCA into the medial marginal-  3/15/2021     -Preoperative chest discomfort-exertional burning sensation in the chest.  More with carrying.  Relieved by rest.  Suggestive of angina pectoris.     Cardiac catheterization 3/12/2021 revealed left ventricle size and contractility is normal with ejection fraction of 60%.  No mitral regurgitation is present.  Left subclavian artery and left internal mammary arteries are normal.  Left main coronary artery normal.  Left anterior descending artery has diffuse calcification with ostial 60% proximal 90 and mid segment 90% disease.  Circumflex coronary artery provided a large marginal branch.  Circumflex coronary artery has 60% disease proximal to the origin of marginal branch.  Circumflex coronary artery distal to the marginal branches small in caliber and has diffuse 60 to 70% disease.  Right coronary artery is a very large and dominant vessel that has ostial 99% disease and mid segment 90% disease.  There appears to be a clot.      [facility]23[facility]  Cardiolite test 2/23/2021-abnormal with apical ischemia.     -Hypertension dyslipidemia diabetes.  CKD 3.  Recent BUN 35 creatinine 1.27-12/27/2020     -History of significant sinus bradycardia that has improved in the past with discontinuation of Bystolic.    - History of COVID-19 positive (prior to surgery)     -History of nonsustained ventricular tachycardia with exercise in the past.     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  ==============  Plan  =========   status post CABG 3/15/2021  Left precordial chest discomfort-atypical.  Patient is not having any angina pectoris or congestive heart failure.  Patient is off Plavix.  EKG showed sinus bradycardia- 5/9/2022     Hypertension- 156/70.  Patient apparently has not been taking metoprolol.  Patient was asked to restart metoprolol 25 mg once a day and continue lisinopril 10 mg a day.     Rhythm-sinus rhythm      Dyslipidemia-continue atorvastatin     Medications were reviewed and updated.  Patient is on aspirin atorvastatin metoprolol 25 mg once a day and his diabetes medications.  Continue lisinopril 10 mg a day.  Patient is off hydrochlorothiazide.     Follow-up in the office in 6 months with EKG.     Further plan will depend on patient's progress  ]]]]]]]]]]]]]                              Diagnosis Plan   1. S/P CABG (coronary artery bypass graft)  ECG 12 Lead   2. Dyslipidemia  ECG 12 Lead   3. Essential hypertension  ECG 12 Lead   4. Diabetes mellitus due to underlying condition without complication, without long-term current use of insulin ([facility])  ECG 12 Lead   5. Chronic coronary artery disease  ECG 12 Lead   LAB RESULTS (LAST 7 DAYS)    CBC        BMP        CMP         BNP        TROPONIN        CoAg        Creatinine Clearance  CrCl cannot be calculated (Patient's most recent lab result is older than the maximum 30 days allowed.).    ABG         Radiology  No radiology results for the last day                The following portions of the patient's history were reviewed and updated as appropriate: allergies, current medications, past family history, past medical history, past social history, past surgical history and problem list.    Review of Systems   Constitutional: Negative for fever and malaise/fatigue.   Cardiovascular: Positive for chest pain and palpitations. Negative for dyspnea on exertion.   Respiratory: Negative for cough and shortness of breath.    Skin: Negative for rash.   Gastrointestinal: Negative for abdominal pain, nausea and vomiting.   Neurological: Positive for numbness. Negative for focal weakness and headaches.   All other systems reviewed and are negative.        Current Outpatient Medications:   •  aspirin 81 MG EC tablet, Take 1 tablet by mouth Daily., Disp: 30 tablet, Rfl: 3  •  atorvastatin (LIPITOR) 40 MG tablet, Take 1 tablet by mouth Every Night., Disp: 30 tablet, Rfl: 3  •  CBD (cannabidiol) oral oil, Take 1 mL by mouth Daily. For arthritis, Disp: , Rfl:   •  glimepiride (AMARYL) 1 MG tablet, Take 2 mg by mouth Daily., Disp: , Rfl:   •  lisinopril (PRINIVIL,ZESTRIL) 20 MG tablet, Take 20 mg by mouth Daily., Disp: , Rfl:   •  metFORMIN (GLUCOPHAGE) 1000 MG tablet, Take 1,000 mg by mouth 2 (Two) Times a Day With Meals., Disp: , Rfl:   •  SITagliptin (JANUVIA) 100 MG tablet, Take 100 mg by mouth Daily., Disp: , Rfl:   •  clopidogrel (PLAVIX) 75 MG tablet, Take 1 tablet by mouth Daily., Disp: 30 tablet, Rfl: 3  •  hydroCHLOROthiazide (HYDRODIURIL) 12.5 MG tablet, Take 1 tablet by mouth Daily., Disp: 30 tablet, Rfl: 1  •  metoprolol tartrate (LOPRESSOR) 25 MG tablet, Take 1 tablet by mouth Every 12 (Twelve) Hours., Disp: 60 tablet, Rfl: 3    Allergies   Allergen Reactions   • Shellfish-Derived Products Anaphylaxis       Family History   Problem Relation Age of Onset   • Diabetes Mother    • Heart disease Father    • Diabetes  "Sister    • Diabetes Brother    • Hypertension Brother    • Heart disease Brother        Past Surgical History:   Procedure Laterality Date   • CARDIAC CATHETERIZATION N/A 3/12/2021    Procedure: Left Heart Cath with Coronary Angiography;  Surgeon: Huseyin Do MD;  Location: [facility] CATH INVASIVE LOCATION;  Service: Cardiovascular;  Laterality: N/A;   • CORONARY ARTERY BYPASS GRAFT N/A 3/15/2021    Procedure: CORONARY ARTERY BYPASS GRAFTING;  Surgeon: Paxton Vasquez MD;  Location: [facility] CVOR;  Service: Cardiothoracic;  Laterality: N/A;   • TONSILECTOMY, ADENOIDECTOMY, BILATERAL MYRINGOTOMY AND TUBES  1952       Past Medical History:   Diagnosis Date   • Arthritis    • Coronary artery disease 2/14/2012   • Diabetes mellitus (HCC)    • Elevated cholesterol    • Hyperlipidemia    • Hypertension        Family History   Problem Relation Age of Onset   • Diabetes Mother    • Heart disease Father    • Diabetes Sister    • Diabetes Brother    • Hypertension Brother    • Heart disease Brother        Social History     Socioeconomic History   • Marital status:    Tobacco Use   • Smoking status: Never Smoker   • Smokeless tobacco: Never Used   Vaping Use   • Vaping Use: Never used   Substance and Sexual Activity   • Alcohol use: No   • Drug use: Defer   • Sexual activity: Defer           ECG 12 Lead    Date/Time: 5/9/2022 11:50 AM  Performed by: Huseyin Do MD  Authorized by: Huseyin Do MD   Comparison: compared with previous ECG   Similar to previous ECG  Comparison to previous ECG: Sinus bradycardia 49/min nonspecific ST-T wave changes normal axis normal intervals no ectopy no significant change from 10/19/2021                Objective:       Physical Exam    /70 (BP Location: Left arm, Patient Position: Sitting, Cuff Size: Adult)   Pulse 112   Ht 177.8 cm (70\")   Wt 81.1 kg (178 lb 12 oz)   SpO2 96%   BMI 25.65 kg/m²   The patient is alert, oriented and in no distress.    Vital signs " as noted above.    Head and neck revealed no carotid bruits or jugular venous distension.  No thyromegaly or lymphadenopathy is present.    Lungs clear.  No wheezing.  Breath sounds are normal bilaterally.    Heart normal first and second heart sounds.  No murmur..  No pericardial rub is present.  No gallop is present.    Abdomen soft and nontender.  No organomegaly is present.    Extremities revealed good peripheral pulses without any pedal edema.    Skin warm and dry.    Musculoskeletal system is grossly normal.    CNS grossly normal.    Reviewed and updated

## 2022-09-27 ENCOUNTER — OFFICE VISIT (OUTPATIENT)
Dept: CARDIOLOGY | Facility: CLINIC | Age: 77
End: 2022-09-27

## 2022-09-27 VITALS
SYSTOLIC BLOOD PRESSURE: 182 MMHG | BODY MASS INDEX: 25.91 KG/M2 | OXYGEN SATURATION: 97 % | HEIGHT: 70 IN | WEIGHT: 181 LBS | HEART RATE: 54 BPM | DIASTOLIC BLOOD PRESSURE: 69 MMHG

## 2022-09-27 DIAGNOSIS — E08.9 DIABETES MELLITUS DUE TO UNDERLYING CONDITION WITHOUT COMPLICATION, WITHOUT LONG-TERM CURRENT USE OF INSULIN: ICD-10-CM

## 2022-09-27 DIAGNOSIS — I10 ESSENTIAL HYPERTENSION: ICD-10-CM

## 2022-09-27 DIAGNOSIS — I25.10 CHRONIC CORONARY ARTERY DISEASE: ICD-10-CM

## 2022-09-27 DIAGNOSIS — R07.89 CHEST DISCOMFORT: ICD-10-CM

## 2022-09-27 DIAGNOSIS — Z95.1 HX OF CABG: ICD-10-CM

## 2022-09-27 DIAGNOSIS — R94.39 ABNORMAL NUCLEAR STRESS TEST: ICD-10-CM

## 2022-09-27 DIAGNOSIS — Z95.1 S/P CABG (CORONARY ARTERY BYPASS GRAFT): Primary | ICD-10-CM

## 2022-09-27 DIAGNOSIS — E78.5 DYSLIPIDEMIA: ICD-10-CM

## 2022-09-27 PROCEDURE — 93000 ELECTROCARDIOGRAM COMPLETE: CPT | Performed by: INTERNAL MEDICINE

## 2022-09-27 PROCEDURE — 99214 OFFICE O/P EST MOD 30 MIN: CPT | Performed by: INTERNAL MEDICINE

## 2022-09-27 NOTE — PROGRESS NOTES
Encounter Date:09/27/2022  Last seen 5/9/2022      Patient ID: Tao Pozo is a 77 y.o. male.    Chief Complaint:  CABG  Hypertension  Dyslipidemia  Diabetes  Renal dysfunction     History of Present Illness  Having off-and-on chest discomfort substernal heaviness and tightness sensation with exertion mainly with lifting.     Since I have last seen, the patient has been without any shortness of breath, palpitations, dizziness or syncope.  Denies having any headache ,abdominal pain ,nausea, vomiting , diarrhea constipation, loss of weight or loss of appetite.  Denies having any excessive bruising ,hematuria or blood in the stool.     Review of all systems negative except as indicated.     Reviewed ROS.       Assessment and Plan         ]]]]]]]]]]]]]]]]]]]  Impression  ========  - Chest discomfort-exertional    -Status post CABG x 3 with a LIMA to the mid LAD and reverse individual saphenous vein graft to the distal RCA into the medial marginal-  3/15/2021     -Preoperative chest discomfort-exertional burning sensation in the chest.  More with carrying.  Relieved by rest.  Suggestive of angina pectoris.     Cardiac catheterization 3/12/2021 revealed left ventricle size and contractility is normal with ejection fraction of 60%.  No mitral regurgitation is present.  Left subclavian artery and left internal mammary arteries are normal.  Left main coronary artery normal.  Left anterior descending artery has diffuse calcification with ostial 60% proximal 90 and mid segment 90% disease.  Circumflex coronary artery provided a large marginal branch.  Circumflex coronary artery has 60% disease proximal to the origin of marginal branch.  Circumflex coronary artery distal to the marginal branches small in caliber and has diffuse 60 to 70% disease.  Right coronary artery is a very large and dominant vessel that has ostial 99% disease and mid segment 90% disease.  There appears to be a clot.      Echocardiogram-normal  2/23/2021  Lexiscan Cardiolite test 2/23/2021-abnormal with apical ischemia.     -Hypertension dyslipidemia diabetes.  CKD 3.  Recent BUN 35 creatinine 1.27-12/27/2020     -History of significant sinus bradycardia that has improved in the past with discontinuation of Bystolic.     - History of COVID-19 positive (prior to surgery)     -History of nonsustained ventricular tachycardia with exercise in the past.     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  ==============  Plan  =========  Chest discomfort-exertional  Possible angina pectoris.     status post CABG 3/15/2021  Patient is off Plavix.  EKG showed sinus bradycardia- 9/27/2022     Hypertension-  182/69  Patient's blood pressure has been running mostly normal-up-and-down.  Patient is taking lisinopril 20 mg twice a day and metoprolol 25 mg a day.    Dyslipidemia-continue atorvastatin     Medications were reviewed and updated.    Stress Cardiolite test  Echocardiogram    Follow-up in the office on the same day.    Further plan will depend on patient's progress  ]]]]]]]]]]]]]               Diagnosis Plan   1. S/P CABG (coronary artery bypass graft)  Adult Transthoracic Echo Complete W/ Cont if Necessary Per Protocol    Stress Test With Myocardial Perfusion One Day   2. Dyslipidemia  Adult Transthoracic Echo Complete W/ Cont if Necessary Per Protocol    Stress Test With Myocardial Perfusion One Day   3. Essential hypertension  Adult Transthoracic Echo Complete W/ Cont if Necessary Per Protocol    Stress Test With Myocardial Perfusion One Day   4. Chronic coronary artery disease  Adult Transthoracic Echo Complete W/ Cont if Necessary Per Protocol    Stress Test With Myocardial Perfusion One Day   5. Diabetes mellitus due to underlying condition without complication, without long-term current use of insulin (HCC)  Adult Transthoracic Echo Complete W/ Cont if  Necessary Per Protocol    Stress Test With Myocardial Perfusion One Day   6. Abnormal nuclear stress test  Adult Transthoracic Echo Complete W/ Cont if Necessary Per Protocol    Stress Test With Myocardial Perfusion One Day   7. Chest discomfort  Adult Transthoracic Echo Complete W/ Cont if Necessary Per Protocol    Stress Test With Myocardial Perfusion One Day   8. Hx of CABG  Adult Transthoracic Echo Complete W/ Cont if Necessary Per Protocol    Stress Test With Myocardial Perfusion One Day   LAB RESULTS (LAST 7 DAYS)    CBC        BMP        CMP         BNP        TROPONIN        CoAg        Creatinine Clearance  CrCl cannot be calculated (Patient's most recent lab result is older than the maximum 30 days allowed.).    ABG        Radiology  No radiology results for the last day                The following portions of the patient's history were reviewed and updated as appropriate: allergies, current medications, past family history, past medical history, past social history, past surgical history and problem list.    Review of Systems   Constitutional: Negative for malaise/fatigue.   Cardiovascular: Positive for chest pain. Negative for leg swelling, palpitations and syncope.   Respiratory: Negative for shortness of breath.    Skin: Negative for rash.   Gastrointestinal: Negative for nausea and vomiting.   Neurological: Negative for dizziness, light-headedness and numbness.   All other systems reviewed and are negative.        Current Outpatient Medications:   •  aspirin 81 MG EC tablet, Take 1 tablet by mouth Daily., Disp: 30 tablet, Rfl: 3  •  atorvastatin (LIPITOR) 40 MG tablet, Take 1 tablet by mouth Every Night., Disp: 30 tablet, Rfl: 3  •  CBD (cannabidiol) oral oil, Take 1 mL by mouth Daily. For arthritis, Disp: , Rfl:   •  clopidogrel (PLAVIX) 75 MG tablet, Take 1 tablet by mouth Daily., Disp: 30 tablet, Rfl: 3  •  glimepiride (AMARYL) 1 MG tablet, Take 2 mg by mouth Daily., Disp: , Rfl:   •   hydroCHLOROthiazide (HYDRODIURIL) 12.5 MG tablet, Take 1 tablet by mouth Daily., Disp: 30 tablet, Rfl: 1  •  lisinopril (PRINIVIL,ZESTRIL) 20 MG tablet, Take 20 mg by mouth Daily., Disp: , Rfl:   •  metFORMIN (GLUCOPHAGE) 1000 MG tablet, Take 1,000 mg by mouth 2 (Two) Times a Day With Meals., Disp: , Rfl:   •  metoprolol tartrate (LOPRESSOR) 25 MG tablet, Take 1 tablet by mouth Every 12 (Twelve) Hours., Disp: 60 tablet, Rfl: 3  •  SITagliptin (JANUVIA) 100 MG tablet, Take 100 mg by mouth Daily., Disp: , Rfl:     Allergies   Allergen Reactions   • Shellfish-Derived Products Anaphylaxis       Family History   Problem Relation Age of Onset   • Diabetes Mother    • Heart disease Father    • Diabetes Sister    • Diabetes Brother    • Hypertension Brother    • Heart disease Brother        Past Surgical History:   Procedure Laterality Date   • CARDIAC CATHETERIZATION N/A 3/12/2021    Procedure: Left Heart Cath with Coronary Angiography;  Surgeon: Huseyin Do MD;  Location: [facility] CATH INVASIVE LOCATION;  Service: Cardiovascular;  Laterality: N/A;   • CORONARY ARTERY BYPASS GRAFT N/A 3/15/2021    Procedure: CORONARY ARTERY BYPASS GRAFTING;  Surgeon: Paxton Vasquez MD;  [facility];  Service: Cardiothoracic;  Laterality: N/A;   • TONSILECTOMY, ADENOIDECTOMY, BILATERAL MYRINGOTOMY AND TUBES  1952       Past Medical History:   Diagnosis Date   • Arthritis    • Coronary artery disease 2/14/2012   • Diabetes mellitus (HCC)    • Elevated cholesterol    • Hyperlipidemia    • Hypertension        Family History   Problem Relation Age of Onset   • Diabetes Mother    • Heart disease Father    • Diabetes Sister    • Diabetes Brother    • Hypertension Brother    • Heart disease Brother        Social History     Socioeconomic History   • Marital status:    Tobacco Use   • Smoking status: Never Smoker   • Smokeless tobacco: Never Used   Vaping Use   • Vaping Use: Never used   Substance and Sexual Activity   •  "Alcohol use: No   • Drug use: Defer   • Sexual activity: Defer           ECG 12 Lead    Date/Time: 9/27/2022 10:29 AM  Performed by: Huseyin Do MD  Authorized by: Huseyin Do MD   Comparison: compared with previous ECG   Similar to previous ECG  Comparison to previous ECG: Sinus bradycardia 50/min nonspecific ST-T wave changes normal axis normal intervals no ectopy no change from previous EKG.                Objective:       Physical Exam    BP (!) 182/69 (BP Location: Left arm, Patient Position: Sitting, Cuff Size: Adult) Comment (Cuff Size): long  Pulse 54   Ht 177.8 cm (70\")   Wt 82.1 kg (181 lb)   SpO2 97%   BMI 25.97 kg/m²   The patient is alert, oriented and in no distress.    Vital signs as noted above.    Head and neck revealed no carotid bruits or jugular venous distension.  No thyromegaly or lymphadenopathy is present.    Lungs clear.  No wheezing.  Breath sounds are normal bilaterally.    Heart normal first and second heart sounds.  No murmur..  No pericardial rub is present.  No gallop is present.    Abdomen soft and nontender.  No organomegaly is present.    Extremities revealed good peripheral pulses without any pedal edema.    Skin warm and dry.    Musculoskeletal system is grossly normal.    CNS grossly normal.    Reviewed and updated.    Reviewed and updated        "

## 2022-10-18 ENCOUNTER — APPOINTMENT (OUTPATIENT)
Dept: CARDIOLOGY | Facility: HOSPITAL | Age: 77
End: 2022-10-18

## 2022-10-24 ENCOUNTER — HOSPITAL ENCOUNTER (OUTPATIENT)
Dept: CARDIOLOGY | Facility: HOSPITAL | Age: 77
Discharge: HOME | End: 2022-10-24

## 2022-10-24 ENCOUNTER — OFFICE VISIT (OUTPATIENT)
Dept: CARDIOLOGY | Facility: CLINIC | Age: 77
End: 2022-10-24

## 2022-10-24 ENCOUNTER — HOSPITAL ENCOUNTER (OUTPATIENT)
Dept: CARDIOLOGY | Facility: HOSPITAL | Age: 77
Discharge: HOME | End: 2022-10-24
Admitting: INTERNAL MEDICINE

## 2022-10-24 VITALS
BODY MASS INDEX: 25.2 KG/M2 | WEIGHT: 176 LBS | SYSTOLIC BLOOD PRESSURE: 124 MMHG | DIASTOLIC BLOOD PRESSURE: 80 MMHG | HEIGHT: 70 IN | HEART RATE: 46 BPM

## 2022-10-24 VITALS — BODY MASS INDEX: 25.77 KG/M2 | WEIGHT: 180 LBS | HEIGHT: 70 IN

## 2022-10-24 DIAGNOSIS — I25.10 CHRONIC CORONARY ARTERY DISEASE: ICD-10-CM

## 2022-10-24 DIAGNOSIS — R07.89 CHEST DISCOMFORT: ICD-10-CM

## 2022-10-24 DIAGNOSIS — E08.9 DIABETES MELLITUS DUE TO UNDERLYING CONDITION WITHOUT COMPLICATION, WITHOUT LONG-TERM CURRENT USE OF INSULIN: ICD-10-CM

## 2022-10-24 DIAGNOSIS — I10 ESSENTIAL HYPERTENSION: ICD-10-CM

## 2022-10-24 DIAGNOSIS — Z95.1 S/P CABG (CORONARY ARTERY BYPASS GRAFT): ICD-10-CM

## 2022-10-24 DIAGNOSIS — R94.39 ABNORMAL NUCLEAR STRESS TEST: ICD-10-CM

## 2022-10-24 DIAGNOSIS — Z95.1 HX OF CABG: ICD-10-CM

## 2022-10-24 DIAGNOSIS — E78.5 DYSLIPIDEMIA: ICD-10-CM

## 2022-10-24 DIAGNOSIS — Z95.1 S/P CABG (CORONARY ARTERY BYPASS GRAFT): Primary | ICD-10-CM

## 2022-10-24 LAB
BH CV ECHO MEAS - ACS: 1.87 CM
BH CV ECHO MEAS - AO MAX PG: 7.6 MMHG
BH CV ECHO MEAS - AO MEAN PG: 3.6 MMHG
BH CV ECHO MEAS - AO ROOT DIAM: 3 CM
BH CV ECHO MEAS - AO V2 MAX: 137.9 CM/SEC
BH CV ECHO MEAS - AO V2 VTI: 29.1 CM
BH CV ECHO MEAS - AVA(I,D): 1.94 CM2
BH CV ECHO MEAS - EDV(CUBED): 165.9 ML
BH CV ECHO MEAS - EDV(MOD-SP4): 99.2 ML
BH CV ECHO MEAS - EF(MOD-SP4): 58.3 %
BH CV ECHO MEAS - ESV(CUBED): 24.3 ML
BH CV ECHO MEAS - ESV(MOD-SP4): 41.3 ML
BH CV ECHO MEAS - FS: 47.3 %
BH CV ECHO MEAS - IVS/LVPW: 0.9 CM
BH CV ECHO MEAS - IVSD: 0.81 CM
BH CV ECHO MEAS - LA DIMENSION: 4.9 CM
BH CV ECHO MEAS - LV MASS(C)D: 174.6 GRAMS
BH CV ECHO MEAS - LV MAX PG: 4 MMHG
BH CV ECHO MEAS - LV MEAN PG: 1.76 MMHG
BH CV ECHO MEAS - LV V1 MAX: 100.2 CM/SEC
BH CV ECHO MEAS - LV V1 VTI: 22.5 CM
BH CV ECHO MEAS - LVIDD: 5.5 CM
BH CV ECHO MEAS - LVIDS: 2.9 CM
BH CV ECHO MEAS - LVOT AREA: 2.5 CM2
BH CV ECHO MEAS - LVOT DIAM: 1.79 CM
BH CV ECHO MEAS - LVPWD: 0.91 CM
BH CV ECHO MEAS - MV A MAX VEL: 69.6 CM/SEC
BH CV ECHO MEAS - MV DEC SLOPE: 390.3 CM/SEC2
BH CV ECHO MEAS - MV DEC TIME: 0.23 MSEC
BH CV ECHO MEAS - MV E MAX VEL: 88 CM/SEC
BH CV ECHO MEAS - MV E/A: 1.26
BH CV ECHO MEAS - MV MAX PG: 3.1 MMHG
BH CV ECHO MEAS - MV MEAN PG: 1.12 MMHG
BH CV ECHO MEAS - MV V2 VTI: 34.5 CM
BH CV ECHO MEAS - MVA(VTI): 1.64 CM2
BH CV ECHO MEAS - PA ACC TIME: 0.17 SEC
BH CV ECHO MEAS - PA PR(ACCEL): 2.18 MMHG
BH CV ECHO MEAS - PA V2 MAX: 97.3 CM/SEC
BH CV ECHO MEAS - RAP SYSTOLE: 3 MMHG
BH CV ECHO MEAS - RV MAX PG: 2.8 MMHG
BH CV ECHO MEAS - RV V1 MAX: 83.5 CM/SEC
BH CV ECHO MEAS - RV V1 VTI: 17.6 CM
BH CV ECHO MEAS - RVDD: 2.43 CM
BH CV ECHO MEAS - RVSP: 26.5 MMHG
BH CV ECHO MEAS - SV(LVOT): 56.5 ML
BH CV ECHO MEAS - SV(MOD-SP4): 57.8 ML
BH CV ECHO MEAS - TR MAX PG: 23.5 MMHG
BH CV ECHO MEAS - TR MAX VEL: 242.3 CM/SEC
BH CV REST NUCLEAR ISOTOPE DOSE: 11 MCI
BH CV STRESS COMMENTS STAGE 1: NORMAL
BH CV STRESS DOSE REGADENOSON STAGE 1: 0.4
BH CV STRESS DURATION MIN STAGE 1: 0
BH CV STRESS DURATION SEC STAGE 1: 10
BH CV STRESS NUCLEAR ISOTOPE DOSE: 33 MCI
BH CV STRESS PROTOCOL 1: NORMAL
BH CV STRESS RECOVERY BP: NORMAL MMHG
BH CV STRESS RECOVERY HR: 103 BPM
BH CV STRESS STAGE 1: 1
MAXIMAL PREDICTED HEART RATE: 145 BPM
MAXIMAL PREDICTED HEART RATE: 145 BPM
STRESS BASELINE BP: NORMAL MMHG
STRESS BASELINE HR: 46 BPM
STRESS TARGET HR: 123 BPM
STRESS TARGET HR: 123 BPM

## 2022-10-24 PROCEDURE — 93016 CV STRESS TEST SUPVJ ONLY: CPT | Performed by: NURSE PRACTITIONER

## 2022-10-24 PROCEDURE — A9500 TC99M SESTAMIBI: HCPCS | Performed by: INTERNAL MEDICINE

## 2022-10-24 PROCEDURE — 78452 HT MUSCLE IMAGE SPECT MULT: CPT | Performed by: INTERNAL MEDICINE

## 2022-10-24 PROCEDURE — 25010000002 REGADENOSON 0.4 MG/5ML SOLUTION: Performed by: INTERNAL MEDICINE

## 2022-10-24 PROCEDURE — 0 TECHNETIUM SESTAMIBI: Performed by: INTERNAL MEDICINE

## 2022-10-24 PROCEDURE — 93017 CV STRESS TEST TRACING ONLY: CPT

## 2022-10-24 PROCEDURE — 99214 OFFICE O/P EST MOD 30 MIN: CPT | Performed by: INTERNAL MEDICINE

## 2022-10-24 PROCEDURE — 93306 TTE W/DOPPLER COMPLETE: CPT | Performed by: INTERNAL MEDICINE

## 2022-10-24 PROCEDURE — 93018 CV STRESS TEST I&R ONLY: CPT | Performed by: INTERNAL MEDICINE

## 2022-10-24 PROCEDURE — 78452 HT MUSCLE IMAGE SPECT MULT: CPT

## 2022-10-24 PROCEDURE — 93306 TTE W/DOPPLER COMPLETE: CPT

## 2022-10-24 RX ADMIN — TECHNETIUM TC 99M SESTAMIBI 1 DOSE: 1 INJECTION INTRAVENOUS at 12:16

## 2022-10-24 RX ADMIN — TECHNETIUM TC 99M SESTAMIBI 1 DOSE: 1 INJECTION INTRAVENOUS at 13:36

## 2022-10-24 RX ADMIN — REGADENOSON 0.4 MG: 0.08 INJECTION, SOLUTION INTRAVENOUS at 13:41

## 2022-10-24 NOTE — PROGRESS NOTES
Encounter Date:10/24/2022  Last seen 9/27/2022      Patient ID: Tao Pozo is a 77 y.o. male.    Chief Complaint:  CABG  Hypertension  Dyslipidemia  Diabetes  Renal dysfunction     History of Present Illness  Patient recently was seen with following history.  Reviewed and updated.    Having off-and-on chest discomfort substernal heaviness and tightness sensation with exertion mainly with lifting.     Since I have last seen, the patient has been without any shortness of breath, palpitations, dizziness or syncope.  Denies having any headache ,abdominal pain ,nausea, vomiting , diarrhea constipation, loss of weight or loss of appetite.  Denies having any excessive bruising ,hematuria or blood in the stool.     Review of all systems negative except as indicated.     Reviewed ROS.       Assessment and Plan         ]]]]]]]]]]]]]]]]]]]  Impression  ========  - Chest discomfort-exertional  Echocardiogram-normal-10/24/2022 except for left atrial enlargement  Lexiscan Cardiolite test-normal- 10/24/2022     -Status post CABG x 3 with a LIMA to the mid LAD and reverse individual saphenous vein graft to the distal RCA into the medial marginal-  3/15/2021     -Preoperative chest discomfort-exertional burning sensation in the chest.  More with carrying.  Relieved by rest.  Suggestive of angina pectoris.     Cardiac catheterization 3/12/2021 revealed left ventricle size and contractility is normal with ejection fraction of 60%.  No mitral regurgitation is present.  Left subclavian artery and left internal mammary arteries are normal.  Left main coronary artery normal.  Left anterior descending artery has diffuse calcification with ostial 60% proximal 90 and mid segment 90% disease.  Circumflex coronary artery provided a large marginal branch.  Circumflex coronary artery has 60% disease proximal to the origin of marginal branch.  Circumflex coronary artery distal to the marginal branches small in caliber and has  diffuse 60 to 70% disease.  Right coronary artery is a very large and dominant vessel that has ostial 99% disease and mid segment 90% disease.  There appears to be a clot.      Echocardiogram-normal 2/23/2021  Lexiscan Cardiolite test 2/23/2021-abnormal with apical ischemia.     -Hypertension dyslipidemia diabetes.  CKD 3.  Recent BUN 35 creatinine 1.27-12/27/2020     -History of significant sinus bradycardia that has improved in the past with discontinuation of Bystolic.     - History of COVID-19 positive (prior to surgery)     -History of nonsustained ventricular tachycardia with exercise in the past.     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  ==============  Plan  =========  Chest discomfort-exertional  Possible angina pectoris.      status post CABG 3/15/2021  Patient is off Plavix.  EKG showed sinus bradycardia- 9/27/2022     Hypertension-   well-controlled  124/80 patient's blood pressure has been running mostly normal-up-and-down.  Patient is taking lisinopril 20 mg twice a day and metoprolol 25 mg a day.     Dyslipidemia-continue atorvastatin     Medications were reviewed and updated.     Stress Cardiolite test-normal as above  Echocardiogram-normal as above except for left atrial enlargement  Patient was educated regarding the results of the testing.    Consider cardiac catheterization if patient has continued symptoms.     Follow-up in the office at her regularly scheduled appointment.Further plan will depend on patient's progress  ]]]]]]]]]]]]]                           Diagnosis Plan   1. S/P CABG (coronary artery bypass graft)        2. Chronic coronary artery disease        3. Dyslipidemia        4. Essential hypertension        5. Diabetes mellitus due to underlying condition without complication, without long-term current use of insulin (HCC)        6. Chest discomfort        LAB RESULTS (LAST 7  DAYS)    CBC        BMP        CMP         BNP        TROPONIN        CoAg        Creatinine Clearance  CrCl cannot be calculated (Patient's most recent lab result is older than the maximum 30 days allowed.).    ABG        Radiology  No radiology results for the last day                The following portions of the patient's history were reviewed and updated as appropriate: allergies, current medications, past family history, past medical history, past social history, past surgical history and problem list.    Review of Systems   Constitutional: Negative for malaise/fatigue.   Cardiovascular: Positive for chest pain and palpitations. Negative for dyspnea on exertion and leg swelling.   Respiratory: Negative for cough and shortness of breath.    Gastrointestinal: Negative for abdominal pain, nausea and vomiting.   Neurological: Negative for dizziness, focal weakness, headaches, light-headedness and numbness.   All other systems reviewed and are negative.        Current Outpatient Medications:   •  amoxicillin-clavulanate (Augmentin) 875-125 MG per tablet, Take 1 tablet by mouth 2 (Two) Times a Day., Disp: 20 tablet, Rfl: 0  •  aspirin 81 MG EC tablet, Take 1 tablet by mouth Daily., Disp: 30 tablet, Rfl: 3  •  atorvastatin (LIPITOR) 40 MG tablet, Take 1 tablet by mouth Every Night., Disp: 30 tablet, Rfl: 3  •  benzonatate (TESSALON) 200 MG capsule, Take 1 capsule by mouth 3 (Three) Times a Day As Needed for Cough., Disp: 30 capsule, Rfl: 0  •  CBD (cannabidiol) oral oil, Take 1 mL by mouth Daily. For arthritis, Disp: , Rfl:   •  clopidogrel (PLAVIX) 75 MG tablet, Take 1 tablet by mouth Daily., Disp: 30 tablet, Rfl: 3  •  glimepiride (AMARYL) 1 MG tablet, Take 2 mg by mouth Daily., Disp: , Rfl:   •  hydroCHLOROthiazide (HYDRODIURIL) 12.5 MG tablet, Take 1 tablet by mouth Daily., Disp: 30 tablet, Rfl: 1  •  lisinopril (PRINIVIL,ZESTRIL) 20 MG tablet, Take 20 mg by mouth Daily., Disp: , Rfl:   •  metFORMIN (GLUCOPHAGE)  1000 MG tablet, Take 1,000 mg by mouth 2 (Two) Times a Day With Meals., Disp: , Rfl:   •  methylPREDNISolone (MEDROL) 4 MG dose pack, Take as directed on package instructions., Disp: 21 tablet, Rfl: 0  •  metoprolol tartrate (LOPRESSOR) 25 MG tablet, Take 1 tablet by mouth Every 12 (Twelve) Hours., Disp: 60 tablet, Rfl: 3  •  SITagliptin (JANUVIA) 100 MG tablet, Take 100 mg by mouth Daily., Disp: , Rfl:   No current facility-administered medications for this visit.    Allergies   Allergen Reactions   • Shellfish-Derived Products Anaphylaxis       Family History   Problem Relation Age of Onset   • Diabetes Mother    • Heart disease Father    • Diabetes Sister    • Diabetes Brother    • Hypertension Brother    • Heart disease Brother        Past Surgical History:   Procedure Laterality Date   • CARDIAC CATHETERIZATION N/A 3/12/2021    Procedure: Left Heart Cath with Coronary Angiography;  Surgeon: Huseyin Do MD;  Location: [facility] CATH INVASIVE LOCATION;  Service: Cardiovascular;  Laterality: N/A;   • CORONARY ARTERY BYPASS GRAFT N/A 3/15/2021    Procedure: CORONARY ARTERY BYPASS GRAFTING;  Surgeon: Paxton Vasquez MD;  Location: [facility] CVOR;  Service: Cardiothoracic;  Laterality: N/A;   • TONSILECTOMY, ADENOIDECTOMY, BILATERAL MYRINGOTOMY AND TUBES  1952       Past Medical History:   Diagnosis Date   • Arthritis    • Coronary artery disease 2/14/2012   • Diabetes mellitus (HCC)    • Elevated cholesterol    • Hyperlipidemia    • Hypertension        Family History   Problem Relation Age of Onset   • Diabetes Mother    • Heart disease Father    • Diabetes Sister    • Diabetes Brother    • Hypertension Brother    • Heart disease Brother        Social History     Socioeconomic History   • Marital status:    Tobacco Use   • Smoking status: Never   • Smokeless tobacco: Never   Vaping Use   • Vaping Use: Never used   Substance and Sexual Activity   • Alcohol use: No   • Drug use: Defer   • Sexual activity:  "Defer         Procedures      Objective:       Physical Exam    /80   Pulse (!) 46   Ht 177.8 cm (70\")   Wt 79.8 kg (176 lb)   BMI 25.25 kg/m²   The patient is alert, oriented and in no distress.    Vital signs as noted above.    Head and neck revealed no carotid bruits or jugular venous distension.  No thyromegaly or lymphadenopathy is present.    Lungs clear.  No wheezing.  Breath sounds are normal bilaterally.    Heart normal first and second heart sounds.  No murmur..  No pericardial rub is present.  No gallop is present.    Abdomen soft and nontender.  No organomegaly is present.    Extremities revealed good peripheral pulses without any pedal edema.    Skin warm and dry.    Musculoskeletal system is grossly normal.    CNS grossly normal.    Reviewed and updated.        "

## 2022-11-01 DIAGNOSIS — Z76.89 ENCOUNTER TO ESTABLISH CARE WITH NEW DOCTOR: Primary | ICD-10-CM

## 2022-11-14 ENCOUNTER — OFFICE VISIT (OUTPATIENT)
Dept: CARDIOLOGY | Facility: CLINIC | Age: 77
End: 2022-11-14

## 2022-11-14 VITALS
DIASTOLIC BLOOD PRESSURE: 78 MMHG | OXYGEN SATURATION: 97 % | HEIGHT: 70 IN | BODY MASS INDEX: 25.62 KG/M2 | SYSTOLIC BLOOD PRESSURE: 167 MMHG | WEIGHT: 179 LBS | HEART RATE: 51 BPM

## 2022-11-14 DIAGNOSIS — I25.10 CHRONIC CORONARY ARTERY DISEASE: ICD-10-CM

## 2022-11-14 DIAGNOSIS — E08.9 DIABETES MELLITUS DUE TO UNDERLYING CONDITION WITHOUT COMPLICATION, WITHOUT LONG-TERM CURRENT USE OF INSULIN: ICD-10-CM

## 2022-11-14 DIAGNOSIS — Z95.1 S/P CABG (CORONARY ARTERY BYPASS GRAFT): Primary | ICD-10-CM

## 2022-11-14 DIAGNOSIS — I10 ESSENTIAL HYPERTENSION: ICD-10-CM

## 2022-11-14 DIAGNOSIS — E78.5 DYSLIPIDEMIA: ICD-10-CM

## 2022-11-14 DIAGNOSIS — R07.89 CHEST DISCOMFORT: ICD-10-CM

## 2022-11-14 PROCEDURE — 99214 OFFICE O/P EST MOD 30 MIN: CPT | Performed by: INTERNAL MEDICINE

## 2022-11-14 NOTE — PROGRESS NOTES
Encounter Date:11/14/2022  Last seen 10/24/2022      Patient ID: Tao Pozo is a 77 y.o. male.    Chief Complaint:  CABG  Hypertension  Dyslipidemia  Diabetes  Renal dysfunction     History of Present Illness  Since I have last seen, the patient has been without any chest discomfort ,shortness of breath, palpitations, dizziness or syncope.  Denies having any headache ,abdominal pain ,nausea, vomiting , diarrhea constipation, loss of weight or loss of appetite.  Denies having any excessive bruising ,hematuria or blood in the stool.    Review of all systems negative except as indicated.    Reviewed ROS.  Assessment and Plan         ]]]]]]]]]]]]]]]]]]]  Impression  ========  - Chest discomfort-exertional.-Improved    Echocardiogram-normal-10/24/2022 except for left atrial enlargement  Lexiscan Cardiolite test-normal- 10/24/2022     -Status post CABG x 3 with a LIMA to the mid LAD and reverse individual saphenous vein graft to the distal RCA into the medial marginal-  3/15/2021     -Preoperative chest discomfort-exertional burning sensation in the chest.  More with carrying.  Relieved by rest.  Suggestive of angina pectoris.     Cardiac catheterization 3/12/2021 revealed left ventricle size and contractility is normal with ejection fraction of 60%.  No mitral regurgitation is present.  Left subclavian artery and left internal mammary arteries are normal.  Left main coronary artery normal.  Left anterior descending artery has diffuse calcification with ostial 60% proximal 90 and mid segment 90% disease.  Circumflex coronary artery provided a large marginal branch.  Circumflex coronary artery has 60% disease proximal to the origin of marginal branch.  Circumflex coronary artery distal to the marginal branches small in caliber and has diffuse 60 to 70% disease.  Right coronary artery is a very large and dominant vessel that has ostial 99% disease and mid segment 90% disease.  There appears to be a  clot.      Echocardiogram-normal 2/23/2021  Lexiscan Cardiolite test 2/23/2021-abnormal with apical ischemia.     -Hypertension dyslipidemia diabetes.  CKD 3.  Recent BUN 35 creatinine 1.27-12/27/2020     -History of significant sinus bradycardia that has improved in the past with discontinuation of Bystolic.     - History of COVID-19 positive (prior to surgery)     -History of nonsustained ventricular tachycardia with exercise in the past.     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  ==============  Plan  =========  Chest discomfort-exertional-improved    Stress Cardiolite test-normal as above  Echocardiogram-normal as above except for left atrial enlargement  Patient was educated regarding the results of the testing.      status post CABG 3/15/2021  Patient is off Plavix.  EKG showed sinus bradycardia- 9/27/2022     Hypertension- 160/80  Continue lisinopril 20 mg twice a day and metoprolol 25 mg a day.     Dyslipidemia-continue atorvastatin     Medications were reviewed and updated.      Follow-up in the office  in 6 months.  .Further plan will depend on patient's progress  ]]]]]]]]]]]]]                 Diagnosis Plan   1. S/P CABG (coronary artery bypass graft)        2. Dyslipidemia        3. Essential hypertension        4. Chronic coronary artery disease        5. Diabetes mellitus due to underlying condition without complication, without long-term current use of insulin (HCC)        6. Chest discomfort        LAB RESULTS (LAST 7 DAYS)    CBC        BMP        CMP         BNP        TROPONIN        CoAg        Creatinine Clearance  CrCl cannot be calculated (Patient's most recent lab result is older than the maximum 30 days allowed.).    ABG        Radiology  No radiology results for the last day                The following portions of the patient's history were reviewed and updated as appropriate: allergies,  current medications, past family history, past medical history, past social history, past surgical history and problem list.    Review of Systems   Constitutional: Negative for malaise/fatigue.   Cardiovascular: Negative for chest pain, leg swelling, palpitations and syncope.   Respiratory: Negative for shortness of breath.    Skin: Negative for rash.   Gastrointestinal: Negative for nausea and vomiting.   Neurological: Negative for dizziness, light-headedness and numbness.   All other systems reviewed and are negative.        Current Outpatient Medications:   •  aspirin 81 MG EC tablet, Take 1 tablet by mouth Daily., Disp: 30 tablet, Rfl: 3  •  atorvastatin (LIPITOR) 40 MG tablet, Take 1 tablet by mouth Every Night., Disp: 30 tablet, Rfl: 3  •  benzonatate (TESSALON) 200 MG capsule, Take 1 capsule by mouth 3 (Three) Times a Day As Needed for Cough., Disp: 30 capsule, Rfl: 0  •  CBD (cannabidiol) oral oil, Take 1 mL by mouth Daily. For arthritis, Disp: , Rfl:   •  clopidogrel (PLAVIX) 75 MG tablet, Take 1 tablet by mouth Daily., Disp: 30 tablet, Rfl: 3  •  glimepiride (AMARYL) 1 MG tablet, Take 2 mg by mouth Daily., Disp: , Rfl:   •  hydroCHLOROthiazide (HYDRODIURIL) 12.5 MG tablet, Take 1 tablet by mouth Daily., Disp: 30 tablet, Rfl: 1  •  lisinopril (PRINIVIL,ZESTRIL) 20 MG tablet, Take 20 mg by mouth Daily., Disp: , Rfl:   •  metFORMIN (GLUCOPHAGE) 1000 MG tablet, Take 1,000 mg by mouth 2 (Two) Times a Day With Meals., Disp: , Rfl:   •  metoprolol tartrate (LOPRESSOR) 25 MG tablet, Take 1 tablet by mouth Every 12 (Twelve) Hours., Disp: 60 tablet, Rfl: 3  •  SITagliptin (JANUVIA) 100 MG tablet, Take 100 mg by mouth Daily., Disp: , Rfl:     Allergies   Allergen Reactions   • Shellfish-Derived Products Anaphylaxis       Family History   Problem Relation Age of Onset   • Diabetes Mother    • Heart disease Father    • Diabetes Sister    • Diabetes Brother    • Hypertension Brother    • Heart disease Brother   "      Past Surgical History:   Procedure Laterality Date   • CARDIAC CATHETERIZATION N/A 3/12/2021    Procedure: Left Heart Cath with Coronary Angiography;  Surgeon: Huseyin Do MD;  Location: [facility] CATH INVASIVE LOCATION;  Service: Cardiovascular;  Laterality: N/A;   • CORONARY ARTERY BYPASS GRAFT N/A 3/15/2021    Procedure: CORONARY ARTERY BYPASS GRAFTING;  Surgeon: Paxton Vasquez MD;  Location: [facility] CVOR;  Service: Cardiothoracic;  Laterality: N/A;   • TONSILECTOMY, ADENOIDECTOMY, BILATERAL MYRINGOTOMY AND TUBES  1952       Past Medical History:   Diagnosis Date   • Arthritis    • Coronary artery disease 2/14/2012   • Diabetes mellitus (HCC)    • Elevated cholesterol    • Hyperlipidemia    • Hypertension        Family History   Problem Relation Age of Onset   • Diabetes Mother    • Heart disease Father    • Diabetes Sister    • Diabetes Brother    • Hypertension Brother    • Heart disease Brother        Social History     Socioeconomic History   • Marital status:    Tobacco Use   • Smoking status: Never   • Smokeless tobacco: Never   Vaping Use   • Vaping Use: Never used   Substance and Sexual Activity   • Alcohol use: No   • Drug use: Defer   • Sexual activity: Defer         Procedures      Objective:       Physical Exam    /78 (BP Location: Right arm, Patient Position: Sitting, Cuff Size: Large Adult)   Pulse 51   Ht 177.8 cm (70\")   Wt 81.2 kg (179 lb)   SpO2 97%   BMI 25.68 kg/m²   The patient is alert, oriented and in no distress.    Vital signs as noted above.    Head and neck revealed no carotid bruits or jugular venous distension.  No thyromegaly or lymphadenopathy is present.    Lungs clear.  No wheezing.  Breath sounds are normal bilaterally.    Heart normal first and second heart sounds.  No murmur..  No pericardial rub is present.  No gallop is present.    Abdomen soft and nontender.  No organomegaly is present.    Extremities revealed good peripheral pulses without any " pedal edema.    Skin warm and dry.    Musculoskeletal system is grossly normal.    CNS grossly normal.    Reviewed and updated.

## 2023-01-10 ENCOUNTER — APPOINTMENT (OUTPATIENT)
Dept: GENERAL RADIOLOGY | Facility: HOSPITAL | Age: 78
End: 2023-01-10
Payer: COMMERCIAL

## 2023-01-10 ENCOUNTER — HOSPITAL ENCOUNTER (EMERGENCY)
Facility: HOSPITAL | Age: 78
Discharge: HOME | End: 2023-01-10
Attending: EMERGENCY MEDICINE | Admitting: EMERGENCY MEDICINE
Payer: COMMERCIAL

## 2023-01-10 ENCOUNTER — APPOINTMENT (OUTPATIENT)
Dept: CT IMAGING | Facility: HOSPITAL | Age: 78
End: 2023-01-10
Payer: COMMERCIAL

## 2023-01-10 VITALS
HEIGHT: 70 IN | TEMPERATURE: 97.5 F | OXYGEN SATURATION: 96 % | SYSTOLIC BLOOD PRESSURE: 178 MMHG | RESPIRATION RATE: 16 BRPM | BODY MASS INDEX: 25.34 KG/M2 | DIASTOLIC BLOOD PRESSURE: 68 MMHG | WEIGHT: 177.03 LBS | HEART RATE: 44 BPM

## 2023-01-10 DIAGNOSIS — S20.212A CONTUSION OF LEFT CHEST WALL, INITIAL ENCOUNTER: ICD-10-CM

## 2023-01-10 DIAGNOSIS — S16.1XXA STRAIN OF NECK MUSCLE, INITIAL ENCOUNTER: Primary | ICD-10-CM
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
## 2023-01-10 PROCEDURE — 71046 X-RAY EXAM CHEST 2 VIEWS: CPT

## 2023-01-10 PROCEDURE — 93005 ELECTROCARDIOGRAM TRACING: CPT | Performed by: EMERGENCY MEDICINE

## 2023-01-10 PROCEDURE — 99283 EMERGENCY DEPT VISIT LOW MDM: CPT

## 2023-01-10 PROCEDURE — 72125 CT NECK SPINE W/O DYE: CPT

## 2023-01-10 RX ORDER — HYDROCODONE BITARTRATE AND ACETAMINOPHEN 5; 325 MG/1; MG/1
1 TABLET ORAL EVERY 6 HOURS PRN
Qty: 5 TABLET | Refills: 0 | Status: SHIPPED | OUTPATIENT
Start: 2023-01-10

## 2023-01-11 LAB — QT INTERVAL: 465 MS
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
## 2023-01-11 NOTE — DISCHARGE INSTRUCTIONS
Rest, cool compress, deep tissue massage, follow-up with your doctor in 1 week if symptoms persist to further rule out occult injury.  Consider discussion of further carotid artery evaluation with your doctor based on today's CT findings.

## 2023-01-12 ENCOUNTER — OFFICE VISIT (OUTPATIENT)
Dept: FAMILY MEDICINE CLINIC | Facility: CLINIC | Age: 78
End: 2023-01-12
Payer: MEDICARE

## 2023-01-12 VITALS
DIASTOLIC BLOOD PRESSURE: 72 MMHG | SYSTOLIC BLOOD PRESSURE: 130 MMHG | BODY MASS INDEX: 25.64 KG/M2 | TEMPERATURE: 98.1 F | HEIGHT: 70 IN | RESPIRATION RATE: 18 BRPM | WEIGHT: 179.13 LBS | OXYGEN SATURATION: 96 % | HEART RATE: 45 BPM

## 2023-01-12 DIAGNOSIS — L81.4 SOLAR LENTIGO: ICD-10-CM

## 2023-01-12 DIAGNOSIS — I65.23 CAROTID ATHEROSCLEROSIS, BILATERAL: ICD-10-CM

## 2023-01-12 DIAGNOSIS — Z12.11 SCREENING FOR COLON CANCER: ICD-10-CM

## 2023-01-12 DIAGNOSIS — E78.5 HYPERLIPIDEMIA, UNSPECIFIED HYPERLIPIDEMIA TYPE: Primary | ICD-10-CM

## 2023-01-12 DIAGNOSIS — Z12.5 SCREENING FOR PROSTATE CANCER: ICD-10-CM

## 2023-01-12 DIAGNOSIS — Z79.4 TYPE 2 DIABETES MELLITUS WITH HYPOGLYCEMIA WITHOUT COMA, WITH LONG-TERM CURRENT USE OF INSULIN: ICD-10-CM

## 2023-01-12 DIAGNOSIS — R33.9 URINARY RETENTION: ICD-10-CM

## 2023-01-12 DIAGNOSIS — E66.3 OVERWEIGHT (BMI 25.0-29.9): ICD-10-CM

## 2023-01-12 DIAGNOSIS — K08.9 POOR DENTITION: ICD-10-CM

## 2023-01-12 DIAGNOSIS — Z13.29 SCREENING FOR THYROID DISORDER: ICD-10-CM

## 2023-01-12 DIAGNOSIS — I10 HYPERTENSION, UNSPECIFIED TYPE: ICD-10-CM

## 2023-01-12 DIAGNOSIS — E11.649 TYPE 2 DIABETES MELLITUS WITH HYPOGLYCEMIA WITHOUT COMA, WITH LONG-TERM CURRENT USE OF INSULIN: ICD-10-CM

## 2023-01-12 DIAGNOSIS — M50.30 DEGENERATIVE DISC DISEASE, CERVICAL: ICD-10-CM

## 2023-01-12 DIAGNOSIS — S13.9XXS: ICD-10-CM

## 2023-01-12 DIAGNOSIS — M89.8X1 PAIN OF LEFT SCAPULA: ICD-10-CM

## 2023-01-12 DIAGNOSIS — R00.1 BRADYCARDIA: ICD-10-CM

## 2023-01-12 PROCEDURE — 99214 OFFICE O/P EST MOD 30 MIN: CPT

## 2023-01-12 NOTE — ASSESSMENT & PLAN NOTE
Patient's (Body mass index is 25.7 kg/m².) indicates that they are overweight with health conditions that include hypertension, coronary heart disease, diabetes mellitus and dyslipidemias . Weight is unchanged. BMI is is above average; BMI management plan is completed. We discussed portion control and increasing exercise.

## 2023-01-12 NOTE — PATIENT INSTRUCTIONS
[facility]:  Dental Appointments [phone]  Purchase the OMRON brand blood pressure device.   Follow up with Dr. Do for bradycardia.    Continue current plan of care as discussed.   Take medication as ordered (if applicable).    Practice good sleep hygiene.  Eat a well balanced diet with fresh fruit and vegetables.    Stay hydrated, drink water daily.      Limit sweetened beverages, sodas, juices.    Bake, boil, broil or grill your food, avoid eating fried foods.   Regular exercise is important.  Incorporate weight or resistance into your routine.
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
## 2023-01-14 LAB
ALBUMIN SERPL-MCNC: 4.3 G/DL (ref 3.7–4.7)
ALBUMIN/GLOB SERPL: 2 {RATIO} (ref 1.2–2.2)
ALP SERPL-CCNC: 110 IU/L (ref 44–121)
ALT SERPL-CCNC: 20 IU/L (ref 0–44)
AST SERPL-CCNC: 16 IU/L (ref 0–40)
BASOPHILS # BLD AUTO: 0.1 X10E3/UL (ref 0–0.2)
BASOPHILS NFR BLD AUTO: 1 %
BILIRUB SERPL-MCNC: 1 MG/DL (ref 0–1.2)
BUN SERPL-MCNC: 18 MG/DL (ref 8–27)
BUN/CREAT SERPL: 16 (ref 10–24)
CALCIUM SERPL-MCNC: 9.4 MG/DL (ref 8.6–10.2)
CHLORIDE SERPL-SCNC: 101 MMOL/L (ref 96–106)
CHOLEST SERPL-MCNC: 195 MG/DL (ref 100–199)
CO2 SERPL-SCNC: 25 MMOL/L (ref 20–29)
CREAT SERPL-MCNC: 1.12 MG/DL (ref 0.76–1.27)
EGFRCR SERPLBLD CKD-EPI 2021: 68 ML/MIN/1.73
EOSINOPHIL # BLD AUTO: 0.2 X10E3/UL (ref 0–0.4)
EOSINOPHIL NFR BLD AUTO: 3 %
ERYTHROCYTE [DISTWIDTH] IN BLOOD BY AUTOMATED COUNT: 12.3 % (ref 11.6–15.4)
GLOBULIN SER CALC-MCNC: 2.2 G/DL (ref 1.5–4.5)
GLUCOSE SERPL-MCNC: 240 MG/DL (ref 70–99)
HCT VFR BLD AUTO: 47.7 % (ref 37.5–51)
HDLC SERPL-MCNC: 28 MG/DL
HGB BLD-MCNC: 15.9 G/DL (ref 13–17.7)
IMM GRANULOCYTES # BLD AUTO: 0 X10E3/UL (ref 0–0.1)
IMM GRANULOCYTES NFR BLD AUTO: 0 %
LDLC SERPL CALC-MCNC: 115 MG/DL (ref 0–99)
LYMPHOCYTES # BLD AUTO: 1.9 X10E3/UL (ref 0.7–3.1)
LYMPHOCYTES NFR BLD AUTO: 31 %
MCH RBC QN AUTO: 30.9 PG (ref 26.6–33)
MCHC RBC AUTO-ENTMCNC: 33.3 G/DL (ref 31.5–35.7)
MCV RBC AUTO: 93 FL (ref 79–97)
MONOCYTES # BLD AUTO: 0.5 X10E3/UL (ref 0.1–0.9)
MONOCYTES NFR BLD AUTO: 8 %
NEUTROPHILS # BLD AUTO: 3.6 X10E3/UL (ref 1.4–7)
NEUTROPHILS NFR BLD AUTO: 57 %
PLATELET # BLD AUTO: 255 X10E3/UL (ref 150–450)
POTASSIUM SERPL-SCNC: 4.7 MMOL/L (ref 3.5–5.2)
PROT SERPL-MCNC: 6.5 G/DL (ref 6–8.5)
PSA SERPL-MCNC: 5.2 NG/ML (ref 0–4)
RBC # BLD AUTO: 5.15 X10E6/UL (ref 4.14–5.8)
SODIUM SERPL-SCNC: 140 MMOL/L (ref 134–144)
TRIGL SERPL-MCNC: 296 MG/DL (ref 0–149)
TSH SERPL DL<=0.005 MIU/L-ACNC: 3.24 UIU/ML (ref 0.45–4.5)
VLDLC SERPL CALC-MCNC: 52 MG/DL (ref 5–40)
WBC # BLD AUTO: 6.4 X10E3/UL (ref 3.4–10.8)

## 2023-01-15 DIAGNOSIS — E78.5 DYSLIPIDEMIA: ICD-10-CM

## 2023-01-15 DIAGNOSIS — R97.20 ELEVATED PSA, LESS THAN 10 NG/ML: ICD-10-CM

## 2023-01-15 DIAGNOSIS — E08.9 DIABETES MELLITUS DUE TO UNDERLYING CONDITION WITHOUT COMPLICATION, WITHOUT LONG-TERM CURRENT USE OF INSULIN: Primary | ICD-10-CM

## 2023-01-16 NOTE — PROGRESS NOTES
1. - No recent A1C on record.  Triglycerides elevated, .  Last provider: laura Whalen. 9/2022 visit.  , trig 296. Home b/s not controlled. Please advise pt. to report last a1cc and plan of care or go to [facility] for A1C draw (order in chart).      2.  Your cholesterol labs indicate that your LDL (bad cholesterol) is elevated. HDL (good cholesterol) looks good. Continue to work on decreasing fried foods, red meat, pork and increase consumption of good fats like nuts, salmon, fish oil, etc.  HDL will also increase with exercise.  Work on increasing your activity level to 150 minutes a week of moderate vigorous activity.  Continue statin.    Decrease blood glucose, carbohydrate consumption and control blood sugar.

## 2023-02-06 ENCOUNTER — TELEPHONE (OUTPATIENT)
Dept: FAMILY MEDICINE CLINIC | Facility: CLINIC | Age: 78
End: 2023-02-06

## 2023-02-06 DIAGNOSIS — R19.5 POSITIVE COLORECTAL CANCER SCREENING USING COLOGUARD TEST: Primary | ICD-10-CM

## 2023-02-06 NOTE — TELEPHONE ENCOUNTER
Advised patient the next step is to get a colonoscopy done and he can pick a packet up here in the office to fill out out and mail in.

## 2023-02-06 NOTE — TELEPHONE ENCOUNTER
Caller: Tao Pozo    Relationship: Self    Best call back number: [phone]    What test was performed: COLOGAURD    When was the test performed: RESULTS WHERE RECEIVED 2/6/2023    PATIENT RECEIVED A MESSAGE STATING COLO GUARD WAS POSITIVE. REQUESTING A CALL TO DISCUSS

## 2023-02-07 ENCOUNTER — TELEPHONE (OUTPATIENT)
Dept: FAMILY MEDICINE CLINIC | Facility: CLINIC | Age: 78
End: 2023-02-07

## 2023-02-07 DIAGNOSIS — R19.5 POSITIVE COLORECTAL CANCER SCREENING USING COLOGUARD TEST: Primary | ICD-10-CM

## 2023-02-07 DIAGNOSIS — S13.9XXS: Primary | ICD-10-CM

## 2023-02-07 NOTE — TELEPHONE ENCOUNTER
Caller: Tao Pozo    Relationship: Self    Best call back number: [phone]    What orders are you requesting (i.e. lab or imaging): XRAY OF NECK, INCLUDING EXTENSION AND FLEXION. HE BELIEVES IT IS CALLED A [facility]IES.    In what timeframe would the patient need to come in: ASAP    Where will you receive your lab/imaging services: HOSPITAL    Additional notes: HE SAYS THEY DID CT ONLY AND HE IS WANTING XRAYS AS WELL. HE IS STILL HAVING SOME ISSUES WITH UPPER PART OF NECK.

## 2023-02-08 ENCOUNTER — HOSPITAL ENCOUNTER (OUTPATIENT)
Dept: GENERAL RADIOLOGY | Facility: HOSPITAL | Age: 78
Discharge: HOME | End: 2023-02-08
Payer: MEDICARE

## 2023-02-08 DIAGNOSIS — S13.9XXS: ICD-10-CM

## 2023-02-08 PROCEDURE — 72050 X-RAY EXAM NECK SPINE 4/5VWS: CPT

## 2023-02-10 PROBLEM — R97.20 ELEVATED PSA: Status: ACTIVE | Noted: 2023-02-10
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
## 2023-02-28 ENCOUNTER — OFFICE VISIT (OUTPATIENT)
Dept: FAMILY MEDICINE CLINIC | Facility: CLINIC | Age: 78
End: 2023-02-28
Payer: MEDICARE

## 2023-02-28 VITALS
SYSTOLIC BLOOD PRESSURE: 148 MMHG | TEMPERATURE: 98 F | HEIGHT: 70 IN | RESPIRATION RATE: 16 BRPM | WEIGHT: 173 LBS | BODY MASS INDEX: 24.77 KG/M2 | DIASTOLIC BLOOD PRESSURE: 78 MMHG | HEART RATE: 60 BPM

## 2023-02-28 DIAGNOSIS — R19.5 POSITIVE COLORECTAL CANCER SCREENING USING COLOGUARD TEST: ICD-10-CM

## 2023-02-28 DIAGNOSIS — I10 PRIMARY HYPERTENSION: ICD-10-CM

## 2023-02-28 DIAGNOSIS — E78.5 DYSLIPIDEMIA: ICD-10-CM

## 2023-02-28 DIAGNOSIS — Z98.890 H/O PROSTATE BIOPSY: ICD-10-CM

## 2023-02-28 DIAGNOSIS — E11.9 TYPE 2 DIABETES MELLITUS WITHOUT COMPLICATION, WITHOUT LONG-TERM CURRENT USE OF INSULIN: Primary | ICD-10-CM

## 2023-02-28 DIAGNOSIS — E08.9 DIABETES MELLITUS DUE TO UNDERLYING CONDITION WITHOUT COMPLICATION, WITHOUT LONG-TERM CURRENT USE OF INSULIN: ICD-10-CM

## 2023-02-28 LAB
EXPIRATION DATE: NORMAL
HBA1C MFR BLD: 8.7 %
Lab: NORMAL
POC CREATININE URINE: NORMAL
POC MICROALBUMIN URINE: 20

## 2023-02-28 PROCEDURE — 82044 UR ALBUMIN SEMIQUANTITATIVE: CPT

## 2023-02-28 PROCEDURE — 99213 OFFICE O/P EST LOW 20 MIN: CPT

## 2023-02-28 PROCEDURE — 83036 HEMOGLOBIN GLYCOSYLATED A1C: CPT

## 2023-02-28 PROCEDURE — 3052F HG A1C>EQUAL 8.0%<EQUAL 9.0%: CPT

## 2023-02-28 RX ORDER — ATORVASTATIN CALCIUM 40 MG/1
40 TABLET, FILM COATED ORAL NIGHTLY
Qty: 90 TABLET | Refills: 3 | Status: SHIPPED | OUTPATIENT
Start: 2023-02-28

## 2023-02-28 NOTE — PATIENT INSTRUCTIONS
[facility]:  Dental Appointments [phone]  Make appt with Dr. Do  Please get us a copy of the records.  Keep appt with Dr. Guillaume.

## 2023-03-02 NOTE — TELEPHONE ENCOUNTER
Caller: Tao Pozo    Relationship: Self    Best call back number: [phone]    Requested Prescriptions:   Requested Prescriptions     Pending Prescriptions Disp Refills   • metoprolol tartrate (LOPRESSOR) 25 MG tablet 60 tablet 3     Sig: Take 1 tablet by mouth Every 12 (Twelve) Hours.        Pharmacy where request should be sent: [facility] - [facility] [address] - [phone]  - [phone] FX     Additional details provided by patient: PATIENT HAS 2 DAYS LEFT    Does the patient have less than a 3 day supply:  [x] Yes  [] No    Would you like a call back once the refill request has been completed: [] Yes [x] No    If the office needs to give you a call back, can they leave a voicemail: [] Yes [x] No    Osmar Owen Rep   03/02/23 12:41 EST

## 2023-03-05 RX ORDER — GLIMEPIRIDE 2 MG/1
2 TABLET ORAL
COMMUNITY

## 2023-03-22 PROBLEM — E11.9 TYPE 2 DIABETES MELLITUS WITHOUT COMPLICATION, WITHOUT LONG-TERM CURRENT USE OF INSULIN (HCC): Status: ACTIVE | Noted: 2021-02-23

## 2023-04-21 ENCOUNTER — OFFICE (OUTPATIENT)
Dept: URBAN - METROPOLITAN AREA CLINIC 64 | Facility: CLINIC | Age: 78
End: 2023-04-21

## 2023-04-21 VITALS
WEIGHT: 171.4 LBS | HEART RATE: 50 BPM | DIASTOLIC BLOOD PRESSURE: 67 MMHG | SYSTOLIC BLOOD PRESSURE: 168 MMHG | HEIGHT: 70 IN

## 2023-04-21 DIAGNOSIS — F11.90 OPIOID USE, UNSPECIFIED, UNCOMPLICATED: ICD-10-CM

## 2023-04-21 DIAGNOSIS — R19.5 OTHER FECAL ABNORMALITIES: ICD-10-CM

## 2023-04-21 PROCEDURE — 99204 OFFICE O/P NEW MOD 45 MIN: CPT

## 2023-05-07 NOTE — PROGRESS NOTES
Encounter Date:05/15/2023    Last seen-11/14/2022      Patient ID: Tao Pozo is a 78 y.o. male.    Chief Complaint:    Status post last seen-CABG  Hypertension  Dyslipidemia  Diabetes  Renal dysfunction     History of Present Illness  Since I have last seen, the patient has been without any chest discomfort ,shortness of breath, palpitations, dizziness or syncope.  Denies having any headache ,abdominal pain ,nausea, vomiting , diarrhea constipation, loss of weight or loss of appetite.  Denies having any excessive bruising ,hematuria or blood in the stool.    Review of all systems negative except as indicated.    Reviewed ROS.    Assessment and Plan         ]]]]]]]]]]]]]]]]]]]  History  ========    -Status post CABG x 3 with a LIMA to the mid LAD and reverse individual saphenous vein graft to the distal RCA into the medial marginal-  3/15/2021      Echocardiogram-normal-10/24/2022 except for left atrial enlargement  Lexiscan Cardiolite test-normal- 10/24/2022    Cardiac catheterization 3/12/2021 revealed left ventricle size and contractility is normal with ejection fraction of 60%.  No mitral regurgitation is present.  Left subclavian artery and left internal mammary arteries are normal.  Left main coronary artery normal.  Left anterior descending artery has diffuse calcification with ostial 60% proximal 90 and mid segment 90% disease.  Circumflex coronary artery provided a large marginal branch.  Circumflex coronary artery has 60% disease proximal to the origin of marginal branch.  Circumflex coronary artery distal to the marginal branches small in caliber and has diffuse 60 to 70% disease.  Right coronary artery is a very large and dominant vessel that has ostial 99% disease and mid segment 90% disease.  There appears to be a clot.       -Hypertension dyslipidemia diabetes.  CKD 3.  Recent BUN 35 creatinine 1.27-12/27/2020     -History of significant sinus bradycardia that has improved in the past  with discontinuation of Bystolic.     - History of COVID-19 positive (prior to surgery)     -History of nonsustained ventricular tachycardia with exercise in the past.     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  ==============  Plan  =========    status post CABG 3/15/2021  Patient is off Plavix.  EKG showed sinus bradycardia- 9/27/2022.  EKG was not performed today.     Hypertension- 160/55  Continue lisinopril 40 mg in the morning and increase to 20 mg in the evening starting with 10 mg in the evening.  Continue metoprolol 25 mg a day.     Dyslipidemia-continue atorvastatin     Medications were reviewed and updated.      Follow-up in the office  in 6 months.    .Further plan will depend on patient's progress  ]]]]]]]]]]]]]                 Diagnosis Plan   1. S/P CABG (coronary artery bypass graft)        2. Dyslipidemia        3. Essential hypertension        4. Chronic coronary artery disease        5. Diabetes mellitus due to underlying condition without complication, without long-term current use of insulin        LAB RESULTS (LAST 7 DAYS)    CBC        BMP        CMP         BNP        TROPONIN        CoAg        Creatinine Clearance  CrCl cannot be calculated (Patient's most recent lab result is older than the maximum 30 days allowed.).    ABG        Radiology  No radiology results for the last day                The following portions of the patient's history were reviewed and updated as appropriate: allergies, current medications, past family history, past medical history, past social history, past surgical history and problem list.    Review of Systems   Constitutional: Negative for malaise/fatigue.   Cardiovascular: Negative for chest pain, dyspnea on exertion, leg swelling and palpitations.   Respiratory: Negative for cough and shortness of breath.    Gastrointestinal: Negative for abdominal pain, nausea  and vomiting.   Neurological: Negative for dizziness, focal weakness, headaches, light-headedness and numbness.   All other systems reviewed and are negative.        Current Outpatient Medications:   •  aspirin 81 MG EC tablet, Take 1 tablet by mouth Daily., Disp: 30 tablet, Rfl: 3  •  atorvastatin (LIPITOR) 40 MG tablet, Take 1 tablet by mouth Every Night., Disp: 90 tablet, Rfl: 3  •  CBD (cannabidiol) oral oil, Take 1 mL by mouth Daily. For arthritis, Disp: , Rfl:   •  glimepiride (AMARYL) 2 MG tablet, Take 1 tablet by mouth Every Morning Before Breakfast., Disp: , Rfl:   •  HYDROcodone-acetaminophen (NORCO) 5-325 MG per tablet, Take 1 tablet by mouth Every 6 (Six) Hours As Needed for Moderate Pain. (Patient taking differently: Take 1 tablet by mouth As Needed for Moderate Pain.), Disp: 5 tablet, Rfl: 0  •  lisinopril (PRINIVIL,ZESTRIL) 20 MG tablet, Take 2 tablets by mouth 2 (Two) Times a Day., Disp: , Rfl:   •  metFORMIN (GLUCOPHAGE) 1000 MG tablet, Take 1 tablet by mouth 2 (Two) Times a Day With Meals., Disp: , Rfl:   •  metoprolol tartrate (LOPRESSOR) 25 MG tablet, Take 1 tablet by mouth Every 12 (Twelve) Hours., Disp: 60 tablet, Rfl: 3  •  SITagliptin (JANUVIA) 100 MG tablet, Take 1 tablet by mouth Daily., Disp: , Rfl:     Allergies   Allergen Reactions   • Shellfish-Derived Products Anaphylaxis       Family History   Problem Relation Age of Onset   • Diabetes Mother    • Heart disease Father    • Diabetes Sister    • Diabetes Brother    • Hypertension Brother    • Heart disease Brother        Past Surgical History:   Procedure Laterality Date   • CARDIAC CATHETERIZATION N/A 3/12/2021    Procedure: Left Heart Cath with Coronary Angiography;  Surgeon: Huseyin Do MD;  Location: [facility] CATH INVASIVE LOCATION;  Service: Cardiovascular;  Laterality: N/A;   • CORONARY ARTERY BYPASS GRAFT N/A 3/15/2021    Procedure: CORONARY ARTERY BYPASS GRAFTING;  Surgeon: Paxton Vasquez MD;  Location: [facility] CVOR;   Service: Cardiothoracic;  Laterality: N/A;   • TONSILECTOMY, ADENOIDECTOMY, BILATERAL MYRINGOTOMY AND TUBES  1952       Past Medical History:   Diagnosis Date   • Arthritis    • Coronary artery disease 2/14/2012   • Diabetes mellitus    • Elevated cholesterol    • Hyperlipidemia    • Hypertension        Family History   Problem Relation Age of Onset   • Diabetes Mother    • Heart disease Father    • Diabetes Sister    • Diabetes Brother    • Hypertension Brother    • Heart disease Brother        Social History     Socioeconomic History   • Marital status:    Tobacco Use   • Smoking status: Never   • Smokeless tobacco: Never   Vaping Use   • Vaping Use: Never used   Substance and Sexual Activity   • Alcohol use: No   • Drug use: Never   • Sexual activity: Not Currently     Partners: Female         Procedures      Objective:       Physical Exam    There were no vitals taken for this visit.  The patient is alert, oriented and in no distress.    Vital signs as noted above.    Head and neck revealed no carotid bruits or jugular venous distension.  No thyromegaly or lymphadenopathy is present.    Lungs clear.  No wheezing.  Breath sounds are normal bilaterally.    Heart normal first and second heart sounds.  No murmur..  No pericardial rub is present.  No gallop is present.    Abdomen soft and nontender.  No organomegaly is present.    Extremities revealed good peripheral pulses without any pedal edema.    Skin warm and dry.    Musculoskeletal system is grossly normal.    CNS grossly normal.    Reviewed and updated.

## 2023-05-15 ENCOUNTER — OFFICE VISIT (OUTPATIENT)
Dept: CARDIOLOGY | Facility: CLINIC | Age: 78
End: 2023-05-15
Payer: MEDICARE

## 2023-05-15 VITALS
OXYGEN SATURATION: 99 % | HEART RATE: 49 BPM | HEIGHT: 70 IN | SYSTOLIC BLOOD PRESSURE: 160 MMHG | WEIGHT: 171 LBS | DIASTOLIC BLOOD PRESSURE: 55 MMHG | BODY MASS INDEX: 24.48 KG/M2

## 2023-05-15 DIAGNOSIS — E78.5 DYSLIPIDEMIA: ICD-10-CM

## 2023-05-15 DIAGNOSIS — I10 ESSENTIAL HYPERTENSION: ICD-10-CM

## 2023-05-15 DIAGNOSIS — Z95.1 HX OF CABG: ICD-10-CM

## 2023-05-15 DIAGNOSIS — I25.10 CHRONIC CORONARY ARTERY DISEASE: ICD-10-CM

## 2023-05-15 DIAGNOSIS — Z95.1 S/P CABG (CORONARY ARTERY BYPASS GRAFT): Primary | ICD-10-CM

## 2023-05-15 DIAGNOSIS — E08.9 DIABETES MELLITUS DUE TO UNDERLYING CONDITION WITHOUT COMPLICATION, WITHOUT LONG-TERM CURRENT USE OF INSULIN: ICD-10-CM

## 2023-10-27 ENCOUNTER — ON CAMPUS - OUTPATIENT (OUTPATIENT)
Dept: URBAN - METROPOLITAN AREA HOSPITAL 85 | Facility: HOSPITAL | Age: 78
End: 2023-10-27

## 2023-10-27 ENCOUNTER — ANESTHESIA (OUTPATIENT)
Dept: GASTROENTEROLOGY | Facility: HOSPITAL | Age: 78
End: 2023-10-27
Payer: MEDICARE

## 2023-10-27 ENCOUNTER — TRANSCRIBE ORDERS (OUTPATIENT)
Dept: ADMINISTRATIVE | Facility: HOSPITAL | Age: 78
End: 2023-10-27
Payer: MEDICARE

## 2023-10-27 ENCOUNTER — HOSPITAL ENCOUNTER (OUTPATIENT)
Facility: HOSPITAL | Age: 78
Setting detail: HOSPITAL OUTPATIENT SURGERY
Discharge: HOME | End: 2023-10-27
Attending: INTERNAL MEDICINE | Admitting: INTERNAL MEDICINE
Payer: MEDICARE

## 2023-10-27 ENCOUNTER — ANESTHESIA EVENT (OUTPATIENT)
Dept: GASTROENTEROLOGY | Facility: HOSPITAL | Age: 78
End: 2023-10-27
Payer: MEDICARE

## 2023-10-27 VITALS
DIASTOLIC BLOOD PRESSURE: 51 MMHG | WEIGHT: 168.43 LBS | TEMPERATURE: 97.8 F | HEART RATE: 47 BPM | OXYGEN SATURATION: 98 % | BODY MASS INDEX: 24.11 KG/M2 | SYSTOLIC BLOOD PRESSURE: 110 MMHG | RESPIRATION RATE: 14 BRPM | HEIGHT: 70 IN

## 2023-10-27 DIAGNOSIS — R19.5 POSITIVE COLORECTAL CANCER SCREENING USING COLOGUARD TEST: ICD-10-CM

## 2023-10-27 DIAGNOSIS — C18.7 MALIGNANT NEOPLASM OF SIGMOID COLON: ICD-10-CM

## 2023-10-27 DIAGNOSIS — C18.7 CANCER OF SIGMOID COLON: Primary | ICD-10-CM

## 2023-10-27 DIAGNOSIS — D12.5 BENIGN NEOPLASM OF SIGMOID COLON: ICD-10-CM

## 2023-10-27 DIAGNOSIS — K57.30 DIVERTICULOSIS OF LARGE INTESTINE WITHOUT PERFORATION OR ABS: ICD-10-CM

## 2023-10-27 DIAGNOSIS — R19.5 OTHER FECAL ABNORMALITIES: ICD-10-CM

## 2023-10-27 LAB — GLUCOSE BLDC GLUCOMTR-MCNC: 138 MG/DL (ref 70–105)

## 2023-10-27 PROCEDURE — 45385 COLONOSCOPY W/LESION REMOVAL: CPT | Performed by: INTERNAL MEDICINE

## 2023-10-27 PROCEDURE — 45380 COLONOSCOPY AND BIOPSY: CPT | Mod: 59 | Performed by: INTERNAL MEDICINE

## 2023-10-27 PROCEDURE — 82948 REAGENT STRIP/BLOOD GLUCOSE: CPT

## 2023-10-27 PROCEDURE — 88342 IMHCHEM/IMCYTCHM 1ST ANTB: CPT | Performed by: INTERNAL MEDICINE

## 2023-10-27 PROCEDURE — 25010000002 PROPOFOL 10 MG/ML EMULSION: Performed by: NURSE ANESTHETIST, CERTIFIED REGISTERED

## 2023-10-27 PROCEDURE — 25810000003 SODIUM CHLORIDE 0.9 % SOLUTION: Performed by: NURSE ANESTHETIST, CERTIFIED REGISTERED

## 2023-10-27 PROCEDURE — 88305 TISSUE EXAM BY PATHOLOGIST: CPT | Performed by: INTERNAL MEDICINE

## 2023-10-27 RX ORDER — IPRATROPIUM BROMIDE AND ALBUTEROL SULFATE 2.5; .5 MG/3ML; MG/3ML
3 SOLUTION RESPIRATORY (INHALATION) ONCE AS NEEDED
Status: DISCONTINUED | OUTPATIENT
Start: 2023-10-27 | End: 2023-10-27 | Stop reason: HOSPADM

## 2023-10-27 RX ORDER — ONDANSETRON 2 MG/ML
4 INJECTION INTRAMUSCULAR; INTRAVENOUS ONCE AS NEEDED
Status: DISCONTINUED | OUTPATIENT
Start: 2023-10-27 | End: 2023-10-27 | Stop reason: HOSPADM

## 2023-10-27 RX ORDER — FENTANYL CITRATE 50 UG/ML
50 INJECTION, SOLUTION INTRAMUSCULAR; INTRAVENOUS
Status: DISCONTINUED | OUTPATIENT
Start: 2023-10-27 | End: 2023-10-27 | Stop reason: HOSPADM

## 2023-10-27 RX ORDER — DIPHENHYDRAMINE HYDROCHLORIDE 50 MG/ML
12.5 INJECTION INTRAMUSCULAR; INTRAVENOUS
Status: DISCONTINUED | OUTPATIENT
Start: 2023-10-27 | End: 2023-10-27 | Stop reason: HOSPADM

## 2023-10-27 RX ORDER — PROMETHAZINE HYDROCHLORIDE 25 MG/1
25 TABLET ORAL ONCE AS NEEDED
Status: DISCONTINUED | OUTPATIENT
Start: 2023-10-27 | End: 2023-10-27 | Stop reason: HOSPADM

## 2023-10-27 RX ORDER — LIDOCAINE HYDROCHLORIDE 20 MG/ML
INJECTION, SOLUTION EPIDURAL; INFILTRATION; INTRACAUDAL; PERINEURAL AS NEEDED
Status: DISCONTINUED | OUTPATIENT
Start: 2023-10-27 | End: 2023-10-27 | Stop reason: SURG

## 2023-10-27 RX ORDER — DROPERIDOL 2.5 MG/ML
0.62 INJECTION, SOLUTION INTRAMUSCULAR; INTRAVENOUS
Status: DISCONTINUED | OUTPATIENT
Start: 2023-10-27 | End: 2023-10-27 | Stop reason: HOSPADM

## 2023-10-27 RX ORDER — NALOXONE HCL 0.4 MG/ML
0.2 VIAL (ML) INJECTION AS NEEDED
Status: DISCONTINUED | OUTPATIENT
Start: 2023-10-27 | End: 2023-10-27 | Stop reason: HOSPADM

## 2023-10-27 RX ORDER — SODIUM CHLORIDE 9 MG/ML
INJECTION, SOLUTION INTRAVENOUS CONTINUOUS PRN
Status: DISCONTINUED | OUTPATIENT
Start: 2023-10-27 | End: 2023-10-27 | Stop reason: SURG

## 2023-10-27 RX ORDER — PROMETHAZINE HYDROCHLORIDE 25 MG/1
25 SUPPOSITORY RECTAL ONCE AS NEEDED
Status: DISCONTINUED | OUTPATIENT
Start: 2023-10-27 | End: 2023-10-27 | Stop reason: HOSPADM

## 2023-10-27 RX ORDER — FLUMAZENIL 0.1 MG/ML
0.2 INJECTION INTRAVENOUS AS NEEDED
Status: DISCONTINUED | OUTPATIENT
Start: 2023-10-27 | End: 2023-10-27 | Stop reason: HOSPADM

## 2023-10-27 RX ADMIN — PROPOFOL 100 MCG/KG/MIN: 10 INJECTION, EMULSION INTRAVENOUS at 11:25

## 2023-10-27 RX ADMIN — SODIUM CHLORIDE: 9 INJECTION, SOLUTION INTRAVENOUS at 11:19

## 2023-10-27 RX ADMIN — LIDOCAINE HYDROCHLORIDE 60 MG: 20 INJECTION, SOLUTION EPIDURAL; INFILTRATION; INTRACAUDAL; PERINEURAL at 11:25

## 2023-10-27 NOTE — ANESTHESIA PREPROCEDURE EVALUATION
Anesthesia Evaluation     Patient summary reviewed and Nursing notes reviewed   no history of anesthetic complications:   NPO Solid Status: > 6 hours  NPO Liquid Status: > 6 hours           Airway   Mallampati: II  TM distance: >3 FB  Neck ROM: full  No difficulty expected  Dental    (+) poor dentition    Pulmonary - negative pulmonary ROS and normal exam    breath sounds clear to auscultation  Cardiovascular     Rhythm: regular  Rate: abnormal    (+) hypertension, CAD, CABG >6 Months, dysrhythmias Bradycardia, hyperlipidemia    PE comment: Sinus avis      Neuro/Psych- negative ROS  GI/Hepatic/Renal/Endo    (+) diabetes mellitus    Musculoskeletal     Abdominal  - normal exam    Abdomen: soft.  Bowel sounds: normal.   Substance History - negative use     OB/GYN negative ob/gyn ROS         Other   arthritis,                       Anesthesia Plan    ASA 3     general     intravenous induction     Anesthetic plan, risks, benefits, and alternatives have been provided, discussed and informed consent has been obtained with: patient.    Plan discussed with CRNA.

## 2023-10-27 NOTE — DISCHARGE INSTRUCTIONS
A responsible adult should stay with you and you should rest quietly for the rest of the day.    Do not drink alcohol, drive, operate any heavy machinery or power tools or make any legal/important decisions for the next 24 hours.     Progress your diet as tolerated.  If you begin to experience severe pain, increased shortness of breath, racing heartbeat or a fever above 101 F, seek immediate medical attention.     Follow up with MD as instructed. Call office for results in 3 to 5 days if needed.     [phone]    Impression:  1.  Sigmoid colon cancer  2.  Colon polyps  3.  Diverticulosis     Recommendations:  1.  Regular diet  2.  Schedule CT scan of chest abdomen pelvis  3.  Refer to general surgery  4.  Repeat colonoscopy in 1 year

## 2023-10-27 NOTE — OP NOTE
COLONOSCOPY Procedure Report    Patient Name:  Tao Pozo  YOB: 1945    Date of Surgery:  10/27/2023     Pre-Op Diagnosis:  Positive colorectal cancer screening using Cologuard test [R19.5]    Post-Op Diagnosis:  1.  Sigmoid colon cancer  2.  Sigmoid colon polyp  3.  Cecal polyp  4.  Diverticulosis       Procedure/CPT® Codes:      Procedure(s):  COLONOSCOPY with cold forcep biopsy x1 area and cold snare polypectomy x2    Staff:  Surgeon(s):  Yared Smiley MD      Anesthesia: Monitored Anesthesia Care    Description of Procedure:  A description of the procedure as well as risks, benefits and alternative methods were explained to the patient who voiced understanding and signed the corresponding consent form. A physical exam was performed and vital signs were monitored throughout the procedure.    After informed consent was obtained the patient was placed in the left lateral decubitus position and sedated as above.  Digital rectal examination was performed and was normal.  The Olympus video colonoscope was inserted into the rectum and advanced to the cecum without difficulty.  A careful examination of the colon was performed as the colonoscope was slowly withdrawn.  The bowel prep was excellent.  The cecum and appendiceal orifice were identified. The scope was then retroflexed and the distal rectum was visualized. The procedure was not difficult and there were no immediate complications.  There was no blood loss.    Findings:   Sessile 4 mm polyp in the cecum removed with cold snare polypectomy.  Sessile 4 mm polyp in the sigmoid colon removed with cold snare polypectomy.  Mild sigmoid diverticulosis noted.  There was a horseshoe shaped partially obstructing ulcerated malignant appearing mass in the sigmoid colon extending from 26 cm from the anal verge to 30 cm from the anal verge.  Multiple biopsies were taken to confirm the diagnosis of colon cancer.    Impression:  1.  Sigmoid colon  cancer  2.  Colon polyps  3.  Diverticulosis    Recommendations:  1.  Regular diet  2.  Schedule CT scan of chest abdomen pelvis  3.  Refer to general surgery  4.  Repeat colonoscopy in 1 year      Yared Smiley MD     Date: 10/27/2023    Time: 11:47 EDT

## 2023-10-27 NOTE — H&P
GI PREOPERATIVE HISTORY AND PHYSICAL:    Referring Provider:    Claire Kraus APRN    Chief complaint: Positive Cologuard    Subjective .     History of present illness:      Tao Pozo is a 78 y.o. male who presents today for Procedure(s):  COLONOSCOPY for the indications listed below.     Positive Cologuard    The updated Patient Profile was reviewed prior to the procedure, in conjunction with the Physical Exam, including medical conditions, surgical procedures, medications, allergies, family history and social history.     Pre-operatively, I reviewed the indication(s) for the procedure, the risks of the procedure [including but not limited to: unexpected bleeding possibly requiring hospitalization and/or unplanned repeat procedures, perforation possibly requiring surgical treatment, missed lesions and complications of sedation/MAC (also explained by anesthesia staff)].     I have evaluated the patient for risks associated with the planned anesthesia and the procedure to be performed and find the patient an acceptable candidate for IV sedation.    Multiple opportunities were provided for any questions or concerns, and all questions were answered satisfactorily before any anesthesia was administered. We will proceed with the planned procedure.    Past Medical History:  Past Medical History:   Diagnosis Date    Arthritis     Coronary artery disease 2/14/2012    Diabetes mellitus     Elevated cholesterol     Hyperlipidemia     Hypertension        Past Surgical History:  Past Surgical History:   Procedure Laterality Date    CARDIAC CATHETERIZATION N/A 3/12/2021    Procedure: Left Heart Cath with Coronary Angiography;  Surgeon: Huseyin Do MD;  Location: [facility] CATH INVASIVE LOCATION;  Service: Cardiovascular;  Laterality: N/A;    CORONARY ARTERY BYPASS GRAFT N/A 3/15/2021    Procedure: CORONARY ARTERY BYPASS GRAFTING;  Surgeon: Paxton Vasquez MD;  Location: [facility] CVOR;  Service: Cardiothoracic;   "Laterality: N/A;    TONSILECTOMY, ADENOIDECTOMY, BILATERAL MYRINGOTOMY AND TUBES  1952       Social History:  Social History     Tobacco Use    Smoking status: Never    Smokeless tobacco: Never   Vaping Use    Vaping Use: Never used   Substance Use Topics    Alcohol use: No    Drug use: Never       Family History:  Family History   Problem Relation Age of Onset    Diabetes Mother     Heart disease Father     Diabetes Sister     Diabetes Brother     Hypertension Brother     Heart disease Brother        Medications:  No medications prior to admission.       Scheduled Meds:  Continuous Infusions:No current facility-administered medications for this encounter.    PRN Meds:.    ALLERGIES:  Shellfish-derived products    ROS:  The following systems were reviewed and negative;   Constitution:  No fevers, chills, no unintentional weight loss  Skin: no rash, no jaundice  Eyes:  No blurry vision, no eye pain  HENT:  No change in hearing or smell  Resp:  No dyspnea or cough  CV:  No chest pain or palpitations  :  No dysuria, hematuria  Musculoskeletal:  No leg cramps or arthralgias  Neuro:  No tremor, no numbness  Psych:  No depression or confsuion    Objective     Vital Signs:   Vitals:    10/23/23 0941   Weight: 79.4 kg (175 lb)   Height: 180.3 cm (71\")       Physical Exam:       General Appearance:    Awake and alert, in no acute distress   Head:    Normocephalic, without obvious abnormality, atraumatic   Throat:   No oral lesions, no thrush, oral mucosa moist   Lungs  Cardiac:  Abdomen:  Extremities:     Respirations regular, even and unlabored    Regular rate and rhythm, no murmur, gallop, rub    Non-distended, good bowel sounds, non tender, no masses     No edema, pulses 2+   Skin:   No rash, no jaundice       Results Review:  Lab Results (last 24 hours)       ** No results found for the last 24 hours. **            Imaging Results (Last 24 Hours)       ** No results found for the last 24 hours. **             I " reviewed the patient's labs and imaging.    ASSESSMENT AND PLAN:  Positive Cologuard    Active Problems:    * No active hospital problems. *       Procedure(s):  COLONOSCOPY      I discussed the patients findings and my recommendations with the patient.    Yared Smiley MD  10/27/23  06:58 EDT

## 2023-10-27 NOTE — ANESTHESIA POSTPROCEDURE EVALUATION
Patient: Tao Pozo    Procedure Summary       Date: 10/27/23 Room / Location: [facility] ENDOSCOPY 1 / [facility] ENDOSCOPY    Anesthesia Start: 1119 Anesthesia Stop: 1143    Procedure: COLONOSCOPY with cold forcep biopsy x1 area and cold snare polypectomy x2 Diagnosis:       Positive colorectal cancer screening using Cologuard test      (Positive colorectal cancer screening using Cologuard test [R19.5])    Surgeons: Yared Smiley MD Provider: Joshua Lama MD    Anesthesia Type: general ASA Status: 3            Anesthesia Type: general    Vitals  Vitals Value Taken Time   /51 10/27/23 1157   Temp     Pulse 43 10/27/23 1157   Resp 12 10/27/23 1152   SpO2 98 % 10/27/23 1157   Vitals shown include unfiled device data.        Post Anesthesia Care and Evaluation    Patient location during evaluation: PACU  Patient participation: complete - patient participated  Level of consciousness: awake  Pain scale: See nurse's notes for pain score.  Pain management: adequate    Airway patency: patent  Anesthetic complications: No anesthetic complications  PONV Status: none  Cardiovascular status: acceptable  Respiratory status: acceptable and spontaneous ventilation  Hydration status: acceptable    Comments: Patient seen and examined postoperatively; vital signs stable; SpO2 greater than or equal to 90%; cardiopulmonary status stable; nausea/vomiting adequately controlled; pain adequately controlled; no apparent anesthesia complications; patient discharged from anesthesia care when discharge criteria were met

## 2023-10-31 ENCOUNTER — OFFICE VISIT (OUTPATIENT)
Dept: SURGERY | Facility: CLINIC | Age: 78
End: 2023-10-31
Payer: MEDICARE

## 2023-10-31 VITALS
HEART RATE: 62 BPM | OXYGEN SATURATION: 97 % | HEIGHT: 70 IN | WEIGHT: 168.4 LBS | TEMPERATURE: 97 F | BODY MASS INDEX: 24.11 KG/M2 | DIASTOLIC BLOOD PRESSURE: 76 MMHG | SYSTOLIC BLOOD PRESSURE: 140 MMHG

## 2023-10-31 DIAGNOSIS — C18.7 MALIGNANT NEOPLASM OF SIGMOID COLON: Primary | ICD-10-CM

## 2023-10-31 PROBLEM — K57.90 DIVERTICULOSIS: Status: ACTIVE | Noted: 2023-10-31

## 2023-10-31 PROCEDURE — 1160F RVW MEDS BY RX/DR IN RCRD: CPT | Performed by: STUDENT IN AN ORGANIZED HEALTH CARE EDUCATION/TRAINING PROGRAM

## 2023-10-31 PROCEDURE — 99204 OFFICE O/P NEW MOD 45 MIN: CPT | Performed by: STUDENT IN AN ORGANIZED HEALTH CARE EDUCATION/TRAINING PROGRAM

## 2023-10-31 PROCEDURE — 3077F SYST BP >= 140 MM HG: CPT | Performed by: STUDENT IN AN ORGANIZED HEALTH CARE EDUCATION/TRAINING PROGRAM

## 2023-10-31 PROCEDURE — 3078F DIAST BP <80 MM HG: CPT | Performed by: STUDENT IN AN ORGANIZED HEALTH CARE EDUCATION/TRAINING PROGRAM

## 2023-10-31 PROCEDURE — 1159F MED LIST DOCD IN RCRD: CPT | Performed by: STUDENT IN AN ORGANIZED HEALTH CARE EDUCATION/TRAINING PROGRAM

## 2023-11-01 ENCOUNTER — PREP FOR SURGERY (OUTPATIENT)
Dept: OTHER | Facility: HOSPITAL | Age: 78
End: 2023-11-01
Payer: MEDICARE

## 2023-11-01 DIAGNOSIS — C18.7 CANCER OF SIGMOID: Primary | ICD-10-CM

## 2023-11-01 NOTE — H&P (VIEW-ONLY)
"Chief Complaint  New Patient (Sigmoid Colon Cancer.  Ref Dr. Smiley)    Subjective        Tao Pozo presents to [facility] GENERAL SURGERY  History of Present Illness    78-year-old gentleman history of diabetes, CABG no longer on dual antiplatelet or anticoagulation, who is here to see me for new diagnosis of sigmoid colon cancer.  He underwent colonoscopy by Dr. Smiley for positive Cologuard test with findings of 2 tubular adenomas and a sigmoid colon cancer.  Biopsy confirmed adenocarcinoma.  CT chest abdomen and pelvis was ordered but not done, I ordered CEA.  Denies any abdominal pain nausea vomiting or obstipation.  Does not have an oncologist yet, I will place referral.  Discussed treatment for sigmoid colon cancer, discussed risk benefits and alternatives to robotic sigmoid colectomy including infection, bleeding, injury surrounding structures and patient elected to proceed with surgery.  If he has evidence of metastatic disease on his imaging we will discuss with his oncologist and likely proceed with neoadjuvant chemotherapy instead.    Objective   Vital Signs:  /76 (Cuff Size: Adult)   Pulse 62   Temp 97 °F (36.1 °C) (Infrared)   Ht 177.8 cm (70\")   Wt 76.4 kg (168 lb 6.4 oz)   SpO2 97%   BMI 24.16 kg/m²   Estimated body mass index is 24.16 kg/m² as calculated from the following:    Height as of this encounter: 177.8 cm (70\").    Weight as of this encounter: 76.4 kg (168 lb 6.4 oz).       BMI is within normal parameters. No other follow-up for BMI required.      Physical Exam  Constitutional:       General: He is not in acute distress.     Appearance: Normal appearance. He is not ill-appearing.   HENT:      Head: Normocephalic and atraumatic.      Right Ear: External ear normal.      Left Ear: External ear normal.   Eyes:      Extraocular Movements: Extraocular movements intact.      Conjunctiva/sclera: Conjunctivae normal.   Cardiovascular:      Rate and Rhythm: " Normal rate and regular rhythm.   Pulmonary:      Effort: Pulmonary effort is normal. No respiratory distress.   Abdominal:      General: There is no distension.      Palpations: Abdomen is soft.      Tenderness: There is no abdominal tenderness.   Musculoskeletal:         General: No swelling or deformity.   Skin:     General: Skin is warm and dry.   Neurological:      Mental Status: He is alert and oriented to person, place, and time. Mental status is at baseline.      Result Review :                   Assessment and Plan   Diagnoses and all orders for this visit:    1. Malignant neoplasm of sigmoid colon (Primary)  -     CEA; Future        78-year-old gentleman history of diabetes, CABG no longer on dual antiplatelet or anticoagulation, who is here to see me for new diagnosis of sigmoid colon cancer.  He underwent colonoscopy by Dr. Smiley for positive Cologuard test with findings of 2 tubular adenomas and a sigmoid colon cancer.  Biopsy confirmed adenocarcinoma.  CT chest abdomen and pelvis was ordered but not done, I ordered CEA.  Denies any abdominal pain nausea vomiting or obstipation.  Does not have an oncologist yet, I will place referral.  Discussed treatment for sigmoid colon cancer, discussed risk benefits and alternatives to robotic sigmoid colectomy including infection, bleeding, injury surrounding structures and patient elected to proceed with surgery.  If he has evidence of metastatic disease on his imaging we will discuss with his oncologist and likely proceed with neoadjuvant chemotherapy instead.         Follow Up   No follow-ups on file.  Patient was given instructions and counseling regarding his condition or for health maintenance advice. Please see specific information pulled into the AVS if appropriate.

## 2023-11-01 NOTE — PROGRESS NOTES
"Chief Complaint  New Patient (Sigmoid Colon Cancer.  Ref Dr. Smiley)    Subjective        Tao Pozo presents to [facility] GENERAL SURGERY  History of Present Illness    78-year-old gentleman history of diabetes, CABG no longer on dual antiplatelet or anticoagulation, who is here to see me for new diagnosis of sigmoid colon cancer.  He underwent colonoscopy by Dr. Smiley for positive Cologuard test with findings of 2 tubular adenomas and a sigmoid colon cancer.  Biopsy confirmed adenocarcinoma.  CT chest abdomen and pelvis was ordered but not done, I ordered CEA.  Denies any abdominal pain nausea vomiting or obstipation.  Does not have an oncologist yet, I will place referral.  Discussed treatment for sigmoid colon cancer, discussed risk benefits and alternatives to robotic sigmoid colectomy including infection, bleeding, injury surrounding structures and patient elected to proceed with surgery.  If he has evidence of metastatic disease on his imaging we will discuss with his oncologist and likely proceed with neoadjuvant chemotherapy instead.    Objective   Vital Signs:  /76 (Cuff Size: Adult)   Pulse 62   Temp 97 °F (36.1 °C) (Infrared)   Ht 177.8 cm (70\")   Wt 76.4 kg (168 lb 6.4 oz)   SpO2 97%   BMI 24.16 kg/m²   Estimated body mass index is 24.16 kg/m² as calculated from the following:    Height as of this encounter: 177.8 cm (70\").    Weight as of this encounter: 76.4 kg (168 lb 6.4 oz).       BMI is within normal parameters. No other follow-up for BMI required.      Physical Exam  Constitutional:       General: He is not in acute distress.     Appearance: Normal appearance. He is not ill-appearing.   HENT:      Head: Normocephalic and atraumatic.      Right Ear: External ear normal.      Left Ear: External ear normal.   Eyes:      Extraocular Movements: Extraocular movements intact.      Conjunctiva/sclera: Conjunctivae normal.   Cardiovascular:      Rate and Rhythm: " Normal rate and regular rhythm.   Pulmonary:      Effort: Pulmonary effort is normal. No respiratory distress.   Abdominal:      General: There is no distension.      Palpations: Abdomen is soft.      Tenderness: There is no abdominal tenderness.   Musculoskeletal:         General: No swelling or deformity.   Skin:     General: Skin is warm and dry.   Neurological:      Mental Status: He is alert and oriented to person, place, and time. Mental status is at baseline.      Result Review :                   Assessment and Plan   Diagnoses and all orders for this visit:    1. Malignant neoplasm of sigmoid colon (Primary)  -     CEA; Future        78-year-old gentleman history of diabetes, CABG no longer on dual antiplatelet or anticoagulation, who is here to see me for new diagnosis of sigmoid colon cancer.  He underwent colonoscopy by Dr. Smiley for positive Cologuard test with findings of 2 tubular adenomas and a sigmoid colon cancer.  Biopsy confirmed adenocarcinoma.  CT chest abdomen and pelvis was ordered but not done, I ordered CEA.  Denies any abdominal pain nausea vomiting or obstipation.  Does not have an oncologist yet, I will place referral.  Discussed treatment for sigmoid colon cancer, discussed risk benefits and alternatives to robotic sigmoid colectomy including infection, bleeding, injury surrounding structures and patient elected to proceed with surgery.  If he has evidence of metastatic disease on his imaging we will discuss with his oncologist and likely proceed with neoadjuvant chemotherapy instead.         Follow Up   No follow-ups on file.  Patient was given instructions and counseling regarding his condition or for health maintenance advice. Please see specific information pulled into the AVS if appropriate.

## 2023-11-02 ENCOUNTER — HOSPITAL ENCOUNTER (OUTPATIENT)
Dept: PET IMAGING | Facility: HOSPITAL | Age: 78
Discharge: HOME | End: 2023-11-02
Admitting: INTERNAL MEDICINE
Payer: MEDICARE

## 2023-11-02 ENCOUNTER — LAB (OUTPATIENT)
Dept: LAB | Facility: HOSPITAL | Age: 78
End: 2023-11-02
Payer: MEDICARE

## 2023-11-02 ENCOUNTER — TELEPHONE (OUTPATIENT)
Dept: CARDIOLOGY | Facility: CLINIC | Age: 78
End: 2023-11-02
Payer: MEDICARE

## 2023-11-02 DIAGNOSIS — C18.7 CANCER OF SIGMOID: ICD-10-CM

## 2023-11-02 DIAGNOSIS — C18.7 MALIGNANT NEOPLASM OF SIGMOID COLON: ICD-10-CM

## 2023-11-02 DIAGNOSIS — C18.7 CANCER OF SIGMOID COLON: ICD-10-CM

## 2023-11-02 LAB
CEA SERPL-MCNC: 13.8 NG/ML
CREAT BLDA-MCNC: 1.2 MG/DL (ref 0.6–1.3)
EGFRCR SERPLBLD CKD-EPI 2021: 62.7 ML/MIN/1.73

## 2023-11-02 PROCEDURE — 82378 CARCINOEMBRYONIC ANTIGEN: CPT

## 2023-11-02 PROCEDURE — 36415 COLL VENOUS BLD VENIPUNCTURE: CPT

## 2023-11-02 PROCEDURE — 71260 CT THORAX DX C+: CPT

## 2023-11-02 PROCEDURE — 25510000001 IOPAMIDOL PER 1 ML: Performed by: INTERNAL MEDICINE

## 2023-11-02 PROCEDURE — 82565 ASSAY OF CREATININE: CPT

## 2023-11-02 PROCEDURE — 74177 CT ABD & PELVIS W/CONTRAST: CPT

## 2023-11-02 RX ADMIN — IOPAMIDOL 100 ML: 755 INJECTION, SOLUTION INTRAVENOUS at 09:58

## 2023-11-02 NOTE — TELEPHONE ENCOUNTER
FACILITY: [facility] general & thoracic surgery  DR: Dilshad Chavira  PHONE: [phone]  FAX: [phone]  PROCEDURE: Colon resection Laparoscopic sigmoid  SCHEDULED: 11/10/23  MEDS TO HOLD: aspirin for 5 days       Placed on Dr Do's desk

## 2023-11-03 ENCOUNTER — LAB (OUTPATIENT)
Dept: LAB | Facility: HOSPITAL | Age: 78
End: 2023-11-03
Payer: MEDICARE

## 2023-11-03 ENCOUNTER — HOSPITAL ENCOUNTER (OUTPATIENT)
Dept: CARDIOLOGY | Facility: HOSPITAL | Age: 78
Discharge: HOME | End: 2023-11-03
Payer: MEDICARE

## 2023-11-03 LAB
ABO GROUP BLD: NORMAL
ANION GAP SERPL CALCULATED.3IONS-SCNC: 8.1 MMOL/L (ref 5–15)
BLD GP AB SCN SERPL QL: NEGATIVE
BUN SERPL-MCNC: 20 MG/DL (ref 8–23)
BUN/CREAT SERPL: 16.1 (ref 7–25)
CALCIUM SPEC-SCNC: 9.5 MG/DL (ref 8.6–10.5)
CHLORIDE SERPL-SCNC: 103 MMOL/L (ref 98–107)
CO2 SERPL-SCNC: 29.9 MMOL/L (ref 22–29)
CREAT SERPL-MCNC: 1.24 MG/DL (ref 0.76–1.27)
DEPRECATED RDW RBC AUTO: 40.8 FL (ref 37–54)
EGFRCR SERPLBLD CKD-EPI 2021: 60.3 ML/MIN/1.73
ERYTHROCYTE [DISTWIDTH] IN BLOOD BY AUTOMATED COUNT: 12.5 % (ref 12.3–15.4)
GLUCOSE SERPL-MCNC: 161 MG/DL (ref 65–99)
HCT VFR BLD AUTO: 43.1 % (ref 37.5–51)
HGB BLD-MCNC: 14.6 G/DL (ref 13–17.7)
LAB AP CASE REPORT: NORMAL
LAB AP DIAGNOSIS COMMENT: NORMAL
MCH RBC QN AUTO: 30.7 PG (ref 26.6–33)
MCHC RBC AUTO-ENTMCNC: 33.9 G/DL (ref 31.5–35.7)
MCV RBC AUTO: 90.7 FL (ref 79–97)
PATH REPORT.ADDENDUM SPEC: NORMAL
PATH REPORT.FINAL DX SPEC: NORMAL
PATH REPORT.GROSS SPEC: NORMAL
PLATELET # BLD AUTO: 295 10*3/MM3 (ref 140–450)
PMV BLD AUTO: 10.1 FL (ref 6–12)
POTASSIUM SERPL-SCNC: 4.4 MMOL/L (ref 3.5–5.2)
QT INTERVAL: 457 MS
QTC INTERVAL: 388 MS
RBC # BLD AUTO: 4.75 10*6/MM3 (ref 4.14–5.8)
RH BLD: POSITIVE
SODIUM SERPL-SCNC: 141 MMOL/L (ref 136–145)
T&S EXPIRATION DATE: NORMAL
WBC NRBC COR # BLD: 6.9 10*3/MM3 (ref 3.4–10.8)

## 2023-11-03 PROCEDURE — 93005 ELECTROCARDIOGRAM TRACING: CPT | Performed by: STUDENT IN AN ORGANIZED HEALTH CARE EDUCATION/TRAINING PROGRAM

## 2023-11-03 PROCEDURE — 80048 BASIC METABOLIC PNL TOTAL CA: CPT

## 2023-11-03 PROCEDURE — 85027 COMPLETE CBC AUTOMATED: CPT

## 2023-11-07 ENCOUNTER — TELEPHONE (OUTPATIENT)
Dept: ONCOLOGY | Facility: CLINIC | Age: 78
End: 2023-11-07
Payer: MEDICARE

## 2023-11-07 ENCOUNTER — TELEPHONE (OUTPATIENT)
Dept: FAMILY MEDICINE CLINIC | Facility: CLINIC | Age: 78
End: 2023-11-07
Payer: MEDICARE

## 2023-11-07 NOTE — TELEPHONE ENCOUNTER
----- Message from ESTRELLA Alexander sent at 11/7/2023 12:36 PM EST -----  Great. Thank you. If you don't mind just document a telephone conversation.     ----- Message -----  From: Pascale Duggan CMA  Sent: 11/7/2023  12:08 PM EST  To: ESTRELLA Alexander    Yes I did. He had appointment on the day you sent this so we gave him some time to process and get through his appointment.  ----- Message -----  From: Claire Kraus APRN  Sent: 11/7/2023   9:15 AM EST  To: Pascale Duggan CMA    Did you talk to him?    ----- Message -----  From: Pascale Duggan CMA  Sent: 11/7/2023   8:57 AM EST  To: ESTRELLA Alexander    Patient has had CT scan and it has not spread. He has surgery scheduled for 11/10   ----- Message -----  From: Claire Kraus APRN  Sent: 10/31/2023   8:55 AM EST  To: Hiral Kramer [address]    Please call patient and check to see how he is doing.  He recently had a colonoscopy on October 27 by Dr. Smiley and has been diagnosed with colon cancer.    He has a referral to see Dr. Chavira for surgery and is having a CT of the abdomen.  Both are ordered by Dr. Smiley.

## 2023-11-08 ENCOUNTER — DOCUMENTATION (OUTPATIENT)
Dept: SURGERY | Facility: CLINIC | Age: 78
End: 2023-11-08
Payer: MEDICARE

## 2023-11-08 RX ORDER — METRONIDAZOLE 500 MG/1
500 TABLET ORAL EVERY 4 HOURS
Qty: 3 TABLET | Refills: 0 | Status: CANCELLED | OUTPATIENT
Start: 2023-11-08

## 2023-11-08 RX ORDER — NEOMYCIN SULFATE 500 MG/1
1000 TABLET ORAL EVERY 4 HOURS
Qty: 6 TABLET | Refills: 0 | Status: CANCELLED | OUTPATIENT
Start: 2023-11-08

## 2023-11-09 RX ORDER — NEOMYCIN SULFATE 500 MG/1
1000 TABLET ORAL EVERY 4 HOURS
Qty: 6 TABLET | Refills: 0 | Status: SHIPPED | OUTPATIENT
Start: 2023-11-09 | End: 2023-11-13 | Stop reason: HOSPADM

## 2023-11-09 RX ORDER — METRONIDAZOLE 500 MG/1
500 TABLET ORAL EVERY 4 HOURS
Qty: 3 TABLET | Refills: 0 | Status: DISPENSED | OUTPATIENT
Start: 2023-11-09 | End: 2023-11-09

## 2023-11-09 RX ORDER — NEOMYCIN SULFATE 500 MG/1
1000 TABLET ORAL EVERY 4 HOURS
Status: DISPENSED | OUTPATIENT
Start: 2023-11-09 | End: 2023-11-09

## 2023-11-09 RX ORDER — METRONIDAZOLE 500 MG/1
500 TABLET ORAL EVERY 4 HOURS
Qty: 3 TABLET | Refills: 0 | Status: SHIPPED | OUTPATIENT
Start: 2023-11-09 | End: 2023-11-13 | Stop reason: HOSPADM

## 2023-11-10 ENCOUNTER — ANESTHESIA EVENT (OUTPATIENT)
Dept: PERIOP | Facility: HOSPITAL | Age: 78
End: 2023-11-10
Payer: MEDICARE

## 2023-11-10 ENCOUNTER — APPOINTMENT (OUTPATIENT)
Dept: GENERAL RADIOLOGY | Facility: HOSPITAL | Age: 78
DRG: 330 | End: 2023-11-10
Payer: MEDICARE

## 2023-11-10 ENCOUNTER — ANESTHESIA (OUTPATIENT)
Dept: PERIOP | Facility: HOSPITAL | Age: 78
End: 2023-11-10
Payer: MEDICARE

## 2023-11-10 ENCOUNTER — HOSPITAL ENCOUNTER (INPATIENT)
Facility: HOSPITAL | Age: 78
LOS: 3 days | Discharge: HOME | DRG: 330 | End: 2023-11-13
Attending: STUDENT IN AN ORGANIZED HEALTH CARE EDUCATION/TRAINING PROGRAM | Admitting: STUDENT IN AN ORGANIZED HEALTH CARE EDUCATION/TRAINING PROGRAM
Payer: MEDICARE

## 2023-11-10 DIAGNOSIS — C18.7 CANCER OF SIGMOID: ICD-10-CM

## 2023-11-10 DIAGNOSIS — C18.7 CANCER OF SIGMOID COLON: ICD-10-CM

## 2023-11-10 DIAGNOSIS — R97.20 ELEVATED PSA: Primary | ICD-10-CM

## 2023-11-10 LAB
ANION GAP SERPL CALCULATED.3IONS-SCNC: 10 MMOL/L (ref 5–15)
BASOPHILS # BLD AUTO: 0 10*3/MM3 (ref 0–0.2)
BASOPHILS NFR BLD AUTO: 0.1 % (ref 0–1.5)
BUN SERPL-MCNC: 13 MG/DL (ref 8–23)
BUN/CREAT SERPL: 11.7 (ref 7–25)
CALCIUM SPEC-SCNC: 8.8 MG/DL (ref 8.6–10.5)
CHLORIDE SERPL-SCNC: 104 MMOL/L (ref 98–107)
CO2 SERPL-SCNC: 26 MMOL/L (ref 22–29)
CREAT SERPL-MCNC: 1.11 MG/DL (ref 0.76–1.27)
DEPRECATED RDW RBC AUTO: 44.2 FL (ref 37–54)
EGFRCR SERPLBLD CKD-EPI 2021: 68.8 ML/MIN/1.73
EOSINOPHIL # BLD AUTO: 0 10*3/MM3 (ref 0–0.4)
EOSINOPHIL NFR BLD AUTO: 0 % (ref 0.3–6.2)
ERYTHROCYTE [DISTWIDTH] IN BLOOD BY AUTOMATED COUNT: 13.3 % (ref 12.3–15.4)
GLUCOSE BLDC GLUCOMTR-MCNC: 123 MG/DL (ref 70–105)
GLUCOSE BLDC GLUCOMTR-MCNC: 173 MG/DL (ref 70–105)
GLUCOSE BLDC GLUCOMTR-MCNC: 207 MG/DL (ref 70–105)
GLUCOSE BLDC GLUCOMTR-MCNC: 221 MG/DL (ref 70–105)
GLUCOSE SERPL-MCNC: 180 MG/DL (ref 65–99)
HBA1C MFR BLD: 6.9 % (ref 4.8–5.6)
HCT VFR BLD AUTO: 40.5 % (ref 37.5–51)
HGB BLD-MCNC: 13.7 G/DL (ref 13–17.7)
HOLD SPECIMEN: NORMAL
HOLD SPECIMEN: NORMAL
LYMPHOCYTES # BLD AUTO: 0.5 10*3/MM3 (ref 0.7–3.1)
LYMPHOCYTES NFR BLD AUTO: 3.7 % (ref 19.6–45.3)
MCH RBC QN AUTO: 30.6 PG (ref 26.6–33)
MCHC RBC AUTO-ENTMCNC: 33.7 G/DL (ref 31.5–35.7)
MCV RBC AUTO: 90.6 FL (ref 79–97)
MONOCYTES # BLD AUTO: 0.5 10*3/MM3 (ref 0.1–0.9)
MONOCYTES NFR BLD AUTO: 3.6 % (ref 5–12)
NEUTROPHILS NFR BLD AUTO: 13.4 10*3/MM3 (ref 1.7–7)
NEUTROPHILS NFR BLD AUTO: 92.6 % (ref 42.7–76)
NRBC BLD AUTO-RTO: 0.1 /100 WBC (ref 0–0.2)
PLATELET # BLD AUTO: 309 10*3/MM3 (ref 140–450)
PMV BLD AUTO: 8.1 FL (ref 6–12)
POTASSIUM SERPL-SCNC: 4.6 MMOL/L (ref 3.5–5.2)
RBC # BLD AUTO: 4.47 10*6/MM3 (ref 4.14–5.8)
SODIUM SERPL-SCNC: 140 MMOL/L (ref 136–145)
WBC NRBC COR # BLD: 14.5 10*3/MM3 (ref 3.4–10.8)

## 2023-11-10 PROCEDURE — 25010000002 SUGAMMADEX 200 MG/2ML SOLUTION: Performed by: ANESTHESIOLOGIST ASSISTANT

## 2023-11-10 PROCEDURE — C1758 CATHETER, URETERAL: HCPCS | Performed by: STUDENT IN AN ORGANIZED HEALTH CARE EDUCATION/TRAINING PROGRAM

## 2023-11-10 PROCEDURE — 25010000002 BUPIVACAINE (PF) 0.25 % SOLUTION: Performed by: ANESTHESIOLOGY

## 2023-11-10 PROCEDURE — 25010000002 ONDANSETRON PER 1 MG: Performed by: ANESTHESIOLOGIST ASSISTANT

## 2023-11-10 PROCEDURE — 0T788DZ DILATION OF BILATERAL URETERS WITH INTRALUMINAL DEVICE, VIA NATURAL OR ARTIFICIAL OPENING ENDOSCOPIC: ICD-10-PCS | Performed by: UROLOGY

## 2023-11-10 PROCEDURE — 76000 FLUOROSCOPY <1 HR PHYS/QHP: CPT

## 2023-11-10 PROCEDURE — 25010000002 HYDROMORPHONE 1 MG/ML SOLUTION: Performed by: STUDENT IN AN ORGANIZED HEALTH CARE EDUCATION/TRAINING PROGRAM

## 2023-11-10 PROCEDURE — BT141ZZ FLUOROSCOPY OF KIDNEYS, URETERS AND BLADDER USING LOW OSMOLAR CONTRAST: ICD-10-PCS | Performed by: UROLOGY

## 2023-11-10 PROCEDURE — 25010000002 CEFOXITIN PER 1 G: Performed by: STUDENT IN AN ORGANIZED HEALTH CARE EDUCATION/TRAINING PROGRAM

## 2023-11-10 PROCEDURE — 0DTN4ZZ RESECTION OF SIGMOID COLON, PERCUTANEOUS ENDOSCOPIC APPROACH: ICD-10-PCS | Performed by: STUDENT IN AN ORGANIZED HEALTH CARE EDUCATION/TRAINING PROGRAM

## 2023-11-10 PROCEDURE — C2617 STENT, NON-COR, TEM W/O DEL: HCPCS | Performed by: STUDENT IN AN ORGANIZED HEALTH CARE EDUCATION/TRAINING PROGRAM

## 2023-11-10 PROCEDURE — 25010000002 HYDRALAZINE PER 20 MG: Performed by: ANESTHESIOLOGIST ASSISTANT

## 2023-11-10 PROCEDURE — 44207 L COLECTOMY/COLOPROCTOSTOMY: CPT | Performed by: STUDENT IN AN ORGANIZED HEALTH CARE EDUCATION/TRAINING PROGRAM

## 2023-11-10 PROCEDURE — 44207 L COLECTOMY/COLOPROCTOSTOMY: CPT | Performed by: SURGERY

## 2023-11-10 PROCEDURE — 25810000003 LACTATED RINGERS PER 1000 ML: Performed by: ANESTHESIOLOGY

## 2023-11-10 PROCEDURE — S2900 ROBOTIC SURGICAL SYSTEM: HCPCS | Performed by: STUDENT IN AN ORGANIZED HEALTH CARE EDUCATION/TRAINING PROGRAM

## 2023-11-10 PROCEDURE — 0 IOHEXOL 300 MG/ML SOLUTION: Performed by: STUDENT IN AN ORGANIZED HEALTH CARE EDUCATION/TRAINING PROGRAM

## 2023-11-10 PROCEDURE — 25810000003 LACTATED RINGERS PER 1000 ML: Performed by: ANESTHESIOLOGIST ASSISTANT

## 2023-11-10 PROCEDURE — 25010000002 HYDROMORPHONE 1 MG/ML SOLUTION: Performed by: ANESTHESIOLOGIST ASSISTANT

## 2023-11-10 PROCEDURE — 88309 TISSUE EXAM BY PATHOLOGIST: CPT | Performed by: STUDENT IN AN ORGANIZED HEALTH CARE EDUCATION/TRAINING PROGRAM

## 2023-11-10 PROCEDURE — 25810000003 SODIUM CHLORIDE 0.9 % SOLUTION: Performed by: STUDENT IN AN ORGANIZED HEALTH CARE EDUCATION/TRAINING PROGRAM

## 2023-11-10 PROCEDURE — 25010000002 FENTANYL CITRATE (PF) 100 MCG/2ML SOLUTION: Performed by: ANESTHESIOLOGIST ASSISTANT

## 2023-11-10 PROCEDURE — 25010000002 PROPOFOL 200 MG/20ML EMULSION: Performed by: ANESTHESIOLOGIST ASSISTANT

## 2023-11-10 PROCEDURE — 63710000001 INSULIN LISPRO (HUMAN) PER 5 UNITS: Performed by: STUDENT IN AN ORGANIZED HEALTH CARE EDUCATION/TRAINING PROGRAM

## 2023-11-10 PROCEDURE — 82948 REAGENT STRIP/BLOOD GLUCOSE: CPT

## 2023-11-10 PROCEDURE — 25010000002 DEXAMETHASONE PER 1 MG: Performed by: ANESTHESIOLOGIST ASSISTANT

## 2023-11-10 PROCEDURE — C1769 GUIDE WIRE: HCPCS | Performed by: STUDENT IN AN ORGANIZED HEALTH CARE EDUCATION/TRAINING PROGRAM

## 2023-11-10 PROCEDURE — 80048 BASIC METABOLIC PNL TOTAL CA: CPT | Performed by: STUDENT IN AN ORGANIZED HEALTH CARE EDUCATION/TRAINING PROGRAM

## 2023-11-10 PROCEDURE — 25010000002 INDOCYANINE GREEN 25 MG RECONSTITUTED SOLUTION: Performed by: STUDENT IN AN ORGANIZED HEALTH CARE EDUCATION/TRAINING PROGRAM

## 2023-11-10 PROCEDURE — 88305 TISSUE EXAM BY PATHOLOGIST: CPT | Performed by: STUDENT IN AN ORGANIZED HEALTH CARE EDUCATION/TRAINING PROGRAM

## 2023-11-10 PROCEDURE — 38589 UNLISTED LAPS PX LYMPHTC SYS: CPT | Performed by: SURGERY

## 2023-11-10 PROCEDURE — 83036 HEMOGLOBIN GLYCOSYLATED A1C: CPT | Performed by: STUDENT IN AN ORGANIZED HEALTH CARE EDUCATION/TRAINING PROGRAM

## 2023-11-10 PROCEDURE — 38589 UNLISTED LAPS PX LYMPHTC SYS: CPT | Performed by: STUDENT IN AN ORGANIZED HEALTH CARE EDUCATION/TRAINING PROGRAM

## 2023-11-10 PROCEDURE — 85025 COMPLETE CBC W/AUTO DIFF WBC: CPT | Performed by: STUDENT IN AN ORGANIZED HEALTH CARE EDUCATION/TRAINING PROGRAM

## 2023-11-10 DEVICE — STAPLER 60 RELOAD GREEN
Type: IMPLANTABLE DEVICE | Site: ABDOMEN | Status: FUNCTIONAL
Brand: SUREFORM

## 2023-11-10 DEVICE — VARIABLE LENGTH URETERAL STENT
Type: IMPLANTABLE DEVICE | Site: URETER | Status: FUNCTIONAL
Brand: CONTOUR VL™

## 2023-11-10 DEVICE — STAPLER 60 RELOAD BLUE
Type: IMPLANTABLE DEVICE | Site: ABDOMEN | Status: FUNCTIONAL
Brand: SUREFORM

## 2023-11-10 DEVICE — STAPLER 60 RELOAD WHITE
Type: IMPLANTABLE DEVICE | Site: ABDOMEN | Status: FUNCTIONAL
Brand: SUREFORM

## 2023-11-10 DEVICE — ABSORBABLE WOUND CLOSURE DEVICE
Type: IMPLANTABLE DEVICE | Site: ABDOMEN | Status: FUNCTIONAL
Brand: V-LOC 180

## 2023-11-10 DEVICE — CELLULAR STAPLER WITH TRI-STAPLE TECHNOLOGY
Type: IMPLANTABLE DEVICE | Site: ABDOMEN | Status: FUNCTIONAL
Brand: EEA

## 2023-11-10 RX ORDER — EPHEDRINE SULFATE 5 MG/ML
5 INJECTION INTRAVENOUS ONCE AS NEEDED
Status: DISCONTINUED | OUTPATIENT
Start: 2023-11-10 | End: 2023-11-10 | Stop reason: HOSPADM

## 2023-11-10 RX ORDER — DIPHENHYDRAMINE HCL 25 MG
25 CAPSULE ORAL
Status: DISCONTINUED | OUTPATIENT
Start: 2023-11-10 | End: 2023-11-10 | Stop reason: HOSPADM

## 2023-11-10 RX ORDER — LABETALOL HYDROCHLORIDE 5 MG/ML
5 INJECTION, SOLUTION INTRAVENOUS
Status: DISCONTINUED | OUTPATIENT
Start: 2023-11-10 | End: 2023-11-10 | Stop reason: HOSPADM

## 2023-11-10 RX ORDER — MIDAZOLAM HYDROCHLORIDE 1 MG/ML
1 INJECTION INTRAMUSCULAR; INTRAVENOUS
Status: DISCONTINUED | OUTPATIENT
Start: 2023-11-10 | End: 2023-11-10 | Stop reason: HOSPADM

## 2023-11-10 RX ORDER — DIPHENHYDRAMINE HYDROCHLORIDE 50 MG/ML
12.5 INJECTION INTRAMUSCULAR; INTRAVENOUS
Status: DISCONTINUED | OUTPATIENT
Start: 2023-11-10 | End: 2023-11-10 | Stop reason: HOSPADM

## 2023-11-10 RX ORDER — PROMETHAZINE HYDROCHLORIDE 25 MG/1
25 SUPPOSITORY RECTAL ONCE AS NEEDED
Status: DISCONTINUED | OUTPATIENT
Start: 2023-11-10 | End: 2023-11-10 | Stop reason: HOSPADM

## 2023-11-10 RX ORDER — OXYCODONE HYDROCHLORIDE 5 MG/1
5 TABLET ORAL EVERY 4 HOURS PRN
Status: DISCONTINUED | OUTPATIENT
Start: 2023-11-10 | End: 2023-11-13 | Stop reason: HOSPADM

## 2023-11-10 RX ORDER — NALOXONE HCL 0.4 MG/ML
0.4 VIAL (ML) INJECTION AS NEEDED
Status: DISCONTINUED | OUTPATIENT
Start: 2023-11-10 | End: 2023-11-10 | Stop reason: HOSPADM

## 2023-11-10 RX ORDER — FENTANYL CITRATE 50 UG/ML
50 INJECTION, SOLUTION INTRAMUSCULAR; INTRAVENOUS
Status: DISCONTINUED | OUTPATIENT
Start: 2023-11-10 | End: 2023-11-10 | Stop reason: HOSPADM

## 2023-11-10 RX ORDER — OXYCODONE HYDROCHLORIDE 5 MG/1
10 TABLET ORAL EVERY 4 HOURS PRN
Status: DISCONTINUED | OUTPATIENT
Start: 2023-11-10 | End: 2023-11-10 | Stop reason: HOSPADM

## 2023-11-10 RX ORDER — DEXAMETHASONE SODIUM PHOSPHATE 4 MG/ML
INJECTION, SOLUTION INTRA-ARTICULAR; INTRALESIONAL; INTRAMUSCULAR; INTRAVENOUS; SOFT TISSUE AS NEEDED
Status: DISCONTINUED | OUTPATIENT
Start: 2023-11-10 | End: 2023-11-10 | Stop reason: SURG

## 2023-11-10 RX ORDER — GLYCOPYRROLATE 0.2 MG/ML
INJECTION INTRAMUSCULAR; INTRAVENOUS AS NEEDED
Status: DISCONTINUED | OUTPATIENT
Start: 2023-11-10 | End: 2023-11-10 | Stop reason: SURG

## 2023-11-10 RX ORDER — DEXAMETHASONE SODIUM PHOSPHATE 4 MG/ML
INJECTION, SOLUTION INTRA-ARTICULAR; INTRALESIONAL; INTRAMUSCULAR; INTRAVENOUS; SOFT TISSUE
Status: COMPLETED | OUTPATIENT
Start: 2023-11-10 | End: 2023-11-10

## 2023-11-10 RX ORDER — ENOXAPARIN SODIUM 100 MG/ML
40 INJECTION SUBCUTANEOUS NIGHTLY
Status: DISCONTINUED | OUTPATIENT
Start: 2023-11-11 | End: 2023-11-13 | Stop reason: HOSPADM

## 2023-11-10 RX ORDER — DEXTROSE MONOHYDRATE 25 G/50ML
25 INJECTION, SOLUTION INTRAVENOUS
Status: DISCONTINUED | OUTPATIENT
Start: 2023-11-10 | End: 2023-11-13 | Stop reason: HOSPADM

## 2023-11-10 RX ORDER — BUPIVACAINE HYDROCHLORIDE 2.5 MG/ML
INJECTION, SOLUTION EPIDURAL; INFILTRATION; INTRACAUDAL
Status: COMPLETED | OUTPATIENT
Start: 2023-11-10 | End: 2023-11-10

## 2023-11-10 RX ORDER — EPHEDRINE SULFATE 5 MG/ML
INJECTION INTRAVENOUS AS NEEDED
Status: DISCONTINUED | OUTPATIENT
Start: 2023-11-10 | End: 2023-11-10 | Stop reason: SURG

## 2023-11-10 RX ORDER — LORAZEPAM 2 MG/ML
1 INJECTION INTRAMUSCULAR
Status: DISCONTINUED | OUTPATIENT
Start: 2023-11-10 | End: 2023-11-10 | Stop reason: HOSPADM

## 2023-11-10 RX ORDER — ONDANSETRON 2 MG/ML
4 INJECTION INTRAMUSCULAR; INTRAVENOUS ONCE AS NEEDED
Status: DISCONTINUED | OUTPATIENT
Start: 2023-11-10 | End: 2023-11-10 | Stop reason: HOSPADM

## 2023-11-10 RX ORDER — ATORVASTATIN CALCIUM 40 MG/1
40 TABLET, FILM COATED ORAL NIGHTLY
Status: DISCONTINUED | OUTPATIENT
Start: 2023-11-10 | End: 2023-11-13 | Stop reason: HOSPADM

## 2023-11-10 RX ORDER — ONDANSETRON 2 MG/ML
INJECTION INTRAMUSCULAR; INTRAVENOUS AS NEEDED
Status: DISCONTINUED | OUTPATIENT
Start: 2023-11-10 | End: 2023-11-10 | Stop reason: SURG

## 2023-11-10 RX ORDER — LIDOCAINE HYDROCHLORIDE 10 MG/ML
0.5 INJECTION, SOLUTION INFILTRATION; PERINEURAL ONCE AS NEEDED
Status: DISCONTINUED | OUTPATIENT
Start: 2023-11-10 | End: 2023-11-10 | Stop reason: HOSPADM

## 2023-11-10 RX ORDER — SODIUM CHLORIDE, SODIUM LACTATE, POTASSIUM CHLORIDE, CALCIUM CHLORIDE 600; 310; 30; 20 MG/100ML; MG/100ML; MG/100ML; MG/100ML
INJECTION, SOLUTION INTRAVENOUS CONTINUOUS PRN
Status: DISCONTINUED | OUTPATIENT
Start: 2023-11-10 | End: 2023-11-10 | Stop reason: SURG

## 2023-11-10 RX ORDER — INSULIN LISPRO 100 [IU]/ML
2-9 INJECTION, SOLUTION INTRAVENOUS; SUBCUTANEOUS
Status: DISCONTINUED | OUTPATIENT
Start: 2023-11-10 | End: 2023-11-13 | Stop reason: HOSPADM

## 2023-11-10 RX ORDER — DIPHENHYDRAMINE HYDROCHLORIDE 50 MG/ML
12.5 INJECTION INTRAMUSCULAR; INTRAVENOUS ONCE AS NEEDED
Status: DISCONTINUED | OUTPATIENT
Start: 2023-11-10 | End: 2023-11-10 | Stop reason: HOSPADM

## 2023-11-10 RX ORDER — ONDANSETRON 2 MG/ML
4 INJECTION INTRAMUSCULAR; INTRAVENOUS EVERY 6 HOURS PRN
Status: DISCONTINUED | OUTPATIENT
Start: 2023-11-10 | End: 2023-11-13 | Stop reason: HOSPADM

## 2023-11-10 RX ORDER — LIDOCAINE HYDROCHLORIDE 10 MG/ML
INJECTION, SOLUTION EPIDURAL; INFILTRATION; INTRACAUDAL; PERINEURAL AS NEEDED
Status: DISCONTINUED | OUTPATIENT
Start: 2023-11-10 | End: 2023-11-10 | Stop reason: SURG

## 2023-11-10 RX ORDER — FLUMAZENIL 0.1 MG/ML
0.5 INJECTION INTRAVENOUS AS NEEDED
Status: DISCONTINUED | OUTPATIENT
Start: 2023-11-10 | End: 2023-11-10 | Stop reason: HOSPADM

## 2023-11-10 RX ORDER — DOCUSATE SODIUM 100 MG/1
100 CAPSULE, LIQUID FILLED ORAL 2 TIMES DAILY
Status: DISCONTINUED | OUTPATIENT
Start: 2023-11-10 | End: 2023-11-13 | Stop reason: HOSPADM

## 2023-11-10 RX ORDER — METOPROLOL TARTRATE 5 MG/5ML
INJECTION INTRAVENOUS AS NEEDED
Status: DISCONTINUED | OUTPATIENT
Start: 2023-11-10 | End: 2023-11-10 | Stop reason: SURG

## 2023-11-10 RX ORDER — ONDANSETRON 4 MG/1
4 TABLET, FILM COATED ORAL EVERY 6 HOURS PRN
Status: DISCONTINUED | OUTPATIENT
Start: 2023-11-10 | End: 2023-11-13 | Stop reason: HOSPADM

## 2023-11-10 RX ORDER — PROPOFOL 10 MG/ML
INJECTION, EMULSION INTRAVENOUS AS NEEDED
Status: DISCONTINUED | OUTPATIENT
Start: 2023-11-10 | End: 2023-11-10 | Stop reason: SURG

## 2023-11-10 RX ORDER — IPRATROPIUM BROMIDE AND ALBUTEROL SULFATE 2.5; .5 MG/3ML; MG/3ML
3 SOLUTION RESPIRATORY (INHALATION) ONCE AS NEEDED
Status: DISCONTINUED | OUTPATIENT
Start: 2023-11-10 | End: 2023-11-10 | Stop reason: HOSPADM

## 2023-11-10 RX ORDER — INDOCYANINE GREEN AND WATER 25 MG
KIT INJECTION AS NEEDED
Status: DISCONTINUED | OUTPATIENT
Start: 2023-11-10 | End: 2023-11-10 | Stop reason: HOSPADM

## 2023-11-10 RX ORDER — SODIUM CHLORIDE, SODIUM LACTATE, POTASSIUM CHLORIDE, CALCIUM CHLORIDE 600; 310; 30; 20 MG/100ML; MG/100ML; MG/100ML; MG/100ML
1000 INJECTION, SOLUTION INTRAVENOUS CONTINUOUS
Status: DISCONTINUED | OUTPATIENT
Start: 2023-11-10 | End: 2023-11-10

## 2023-11-10 RX ORDER — VECURONIUM BROMIDE 1 MG/ML
INJECTION, POWDER, LYOPHILIZED, FOR SOLUTION INTRAVENOUS AS NEEDED
Status: DISCONTINUED | OUTPATIENT
Start: 2023-11-10 | End: 2023-11-10 | Stop reason: SURG

## 2023-11-10 RX ORDER — ACETAMINOPHEN 650 MG/1
650 SUPPOSITORY RECTAL EVERY 4 HOURS PRN
Status: DISCONTINUED | OUTPATIENT
Start: 2023-11-10 | End: 2023-11-10 | Stop reason: HOSPADM

## 2023-11-10 RX ORDER — SODIUM CHLORIDE 9 MG/ML
75 INJECTION, SOLUTION INTRAVENOUS CONTINUOUS
Status: DISCONTINUED | OUTPATIENT
Start: 2023-11-10 | End: 2023-11-13 | Stop reason: HOSPADM

## 2023-11-10 RX ORDER — HYDRALAZINE HYDROCHLORIDE 20 MG/ML
5 INJECTION INTRAMUSCULAR; INTRAVENOUS
Status: DISCONTINUED | OUTPATIENT
Start: 2023-11-10 | End: 2023-11-10 | Stop reason: HOSPADM

## 2023-11-10 RX ORDER — ACETAMINOPHEN 325 MG/1
650 TABLET ORAL ONCE AS NEEDED
Status: DISCONTINUED | OUTPATIENT
Start: 2023-11-10 | End: 2023-11-10 | Stop reason: HOSPADM

## 2023-11-10 RX ORDER — FENTANYL CITRATE 50 UG/ML
INJECTION, SOLUTION INTRAMUSCULAR; INTRAVENOUS AS NEEDED
Status: DISCONTINUED | OUTPATIENT
Start: 2023-11-10 | End: 2023-11-10 | Stop reason: SURG

## 2023-11-10 RX ORDER — PROMETHAZINE HYDROCHLORIDE 25 MG/1
25 TABLET ORAL ONCE AS NEEDED
Status: DISCONTINUED | OUTPATIENT
Start: 2023-11-10 | End: 2023-11-10 | Stop reason: HOSPADM

## 2023-11-10 RX ORDER — NICOTINE POLACRILEX 4 MG
15 LOZENGE BUCCAL
Status: DISCONTINUED | OUTPATIENT
Start: 2023-11-10 | End: 2023-11-13 | Stop reason: HOSPADM

## 2023-11-10 RX ORDER — IBUPROFEN 600 MG/1
1 TABLET ORAL
Status: DISCONTINUED | OUTPATIENT
Start: 2023-11-10 | End: 2023-11-13 | Stop reason: HOSPADM

## 2023-11-10 RX ORDER — OXYCODONE HYDROCHLORIDE 5 MG/1
5 TABLET ORAL EVERY 4 HOURS PRN
Status: DISCONTINUED | OUTPATIENT
Start: 2023-11-10 | End: 2023-11-10 | Stop reason: HOSPADM

## 2023-11-10 RX ORDER — ROCURONIUM BROMIDE 10 MG/ML
INJECTION, SOLUTION INTRAVENOUS AS NEEDED
Status: DISCONTINUED | OUTPATIENT
Start: 2023-11-10 | End: 2023-11-10 | Stop reason: SURG

## 2023-11-10 RX ORDER — SODIUM CHLORIDE 0.9 % (FLUSH) 0.9 %
10 SYRINGE (ML) INJECTION AS NEEDED
Status: DISCONTINUED | OUTPATIENT
Start: 2023-11-10 | End: 2023-11-10 | Stop reason: HOSPADM

## 2023-11-10 RX ORDER — ACETAMINOPHEN 500 MG
1000 TABLET ORAL EVERY 8 HOURS
Status: DISCONTINUED | OUTPATIENT
Start: 2023-11-10 | End: 2023-11-13 | Stop reason: HOSPADM

## 2023-11-10 RX ADMIN — SODIUM CHLORIDE, POTASSIUM CHLORIDE, SODIUM LACTATE AND CALCIUM CHLORIDE: 600; 310; 30; 20 INJECTION, SOLUTION INTRAVENOUS at 11:51

## 2023-11-10 RX ADMIN — SODIUM CHLORIDE, POTASSIUM CHLORIDE, SODIUM LACTATE AND CALCIUM CHLORIDE 1000 ML: 600; 310; 30; 20 INJECTION, SOLUTION INTRAVENOUS at 06:40

## 2023-11-10 RX ADMIN — SODIUM CHLORIDE 75 ML/HR: 9 INJECTION, SOLUTION INTRAVENOUS at 15:01

## 2023-11-10 RX ADMIN — ONDANSETRON 4 MG: 2 INJECTION INTRAMUSCULAR; INTRAVENOUS at 11:53

## 2023-11-10 RX ADMIN — HYDROMORPHONE HYDROCHLORIDE 0.5 MG: 1 INJECTION, SOLUTION INTRAMUSCULAR; INTRAVENOUS; SUBCUTANEOUS at 13:22

## 2023-11-10 RX ADMIN — FENTANYL CITRATE 50 MCG: 50 INJECTION, SOLUTION INTRAMUSCULAR; INTRAVENOUS at 07:27

## 2023-11-10 RX ADMIN — ROCURONIUM BROMIDE 20 MG: 10 INJECTION, SOLUTION INTRAVENOUS at 11:14

## 2023-11-10 RX ADMIN — PROPOFOL 120 MCG/KG/MIN: 10 INJECTION, EMULSION INTRAVENOUS at 07:25

## 2023-11-10 RX ADMIN — VECURONIUM BROMIDE 2 MG: 1 INJECTION, POWDER, LYOPHILIZED, FOR SOLUTION INTRAVENOUS at 09:59

## 2023-11-10 RX ADMIN — GLYCOPYRROLATE 0.2 MG: 0.2 INJECTION INTRAMUSCULAR; INTRAVENOUS at 07:21

## 2023-11-10 RX ADMIN — METOPROLOL TARTRATE 2.5 MG: 1 INJECTION, SOLUTION INTRAVENOUS at 07:58

## 2023-11-10 RX ADMIN — GLYCOPYRROLATE 0.2 MG: 0.2 INJECTION INTRAMUSCULAR; INTRAVENOUS at 08:34

## 2023-11-10 RX ADMIN — HYDROMORPHONE HYDROCHLORIDE 0.5 MG: 1 INJECTION, SOLUTION INTRAMUSCULAR; INTRAVENOUS; SUBCUTANEOUS at 19:19

## 2023-11-10 RX ADMIN — PROPOFOL 50 MG: 10 INJECTION, EMULSION INTRAVENOUS at 07:39

## 2023-11-10 RX ADMIN — SODIUM CHLORIDE, SODIUM LACTATE, POTASSIUM CHLORIDE, AND CALCIUM CHLORIDE: .6; .31; .03; .02 INJECTION, SOLUTION INTRAVENOUS at 08:15

## 2023-11-10 RX ADMIN — FENTANYL CITRATE 50 MCG: 50 INJECTION, SOLUTION INTRAMUSCULAR; INTRAVENOUS at 08:33

## 2023-11-10 RX ADMIN — HYDROMORPHONE HYDROCHLORIDE 1 MG: 1 INJECTION, SOLUTION INTRAMUSCULAR; INTRAVENOUS; SUBCUTANEOUS at 07:52

## 2023-11-10 RX ADMIN — FENTANYL CITRATE 50 MCG: 50 INJECTION, SOLUTION INTRAMUSCULAR; INTRAVENOUS at 07:20

## 2023-11-10 RX ADMIN — INSULIN LISPRO 2 UNITS: 100 INJECTION, SOLUTION INTRAVENOUS; SUBCUTANEOUS at 21:23

## 2023-11-10 RX ADMIN — CEFOXITIN 2000 MG: 2 INJECTION, POWDER, FOR SOLUTION INTRAVENOUS at 07:15

## 2023-11-10 RX ADMIN — GLYCOPYRROLATE 0.2 MG: 0.2 INJECTION INTRAMUSCULAR; INTRAVENOUS at 11:42

## 2023-11-10 RX ADMIN — ROCURONIUM BROMIDE 20 MG: 10 INJECTION, SOLUTION INTRAVENOUS at 07:52

## 2023-11-10 RX ADMIN — PROPOFOL 30 MG: 10 INJECTION, EMULSION INTRAVENOUS at 07:33

## 2023-11-10 RX ADMIN — HYDROMORPHONE HYDROCHLORIDE 0.5 MG: 1 INJECTION, SOLUTION INTRAMUSCULAR; INTRAVENOUS; SUBCUTANEOUS at 14:07

## 2023-11-10 RX ADMIN — HYDRALAZINE HYDROCHLORIDE 5 MG: 20 INJECTION INTRAMUSCULAR; INTRAVENOUS at 14:04

## 2023-11-10 RX ADMIN — ROCURONIUM BROMIDE 20 MG: 10 INJECTION, SOLUTION INTRAVENOUS at 08:03

## 2023-11-10 RX ADMIN — ROCURONIUM BROMIDE 10 MG: 10 INJECTION, SOLUTION INTRAVENOUS at 10:47

## 2023-11-10 RX ADMIN — DEXAMETHASONE SODIUM PHOSPHATE 8 MG: 4 INJECTION, SOLUTION INTRA-ARTICULAR; INTRALESIONAL; INTRAMUSCULAR; INTRAVENOUS; SOFT TISSUE at 12:30

## 2023-11-10 RX ADMIN — ROCURONIUM BROMIDE 10 MG: 10 INJECTION, SOLUTION INTRAVENOUS at 07:58

## 2023-11-10 RX ADMIN — SUGAMMADEX 200 MG: 100 INJECTION, SOLUTION INTRAVENOUS at 12:27

## 2023-11-10 RX ADMIN — CEFOXITIN 2000 MG: 2 INJECTION, POWDER, FOR SOLUTION INTRAVENOUS at 09:15

## 2023-11-10 RX ADMIN — PROPOFOL 50 MCG/KG/MIN: 10 INJECTION, EMULSION INTRAVENOUS at 08:57

## 2023-11-10 RX ADMIN — EPHEDRINE SULFATE 10 MG: 5 INJECTION INTRAVENOUS at 09:33

## 2023-11-10 RX ADMIN — PROPOFOL 50 MG: 10 INJECTION, EMULSION INTRAVENOUS at 07:24

## 2023-11-10 RX ADMIN — DOCUSATE SODIUM 100 MG: 100 CAPSULE, LIQUID FILLED ORAL at 21:23

## 2023-11-10 RX ADMIN — ACETAMINOPHEN 1000 MG: 500 TABLET, FILM COATED ORAL at 21:23

## 2023-11-10 RX ADMIN — INDOCYANINE GREEN AND WATER 5 MG: KIT at 10:51

## 2023-11-10 RX ADMIN — HYDROMORPHONE HYDROCHLORIDE 0.5 MG: 1 INJECTION, SOLUTION INTRAMUSCULAR; INTRAVENOUS; SUBCUTANEOUS at 23:38

## 2023-11-10 RX ADMIN — DEXAMETHASONE SODIUM PHOSPHATE 4 MG: 4 INJECTION, SOLUTION INTRAMUSCULAR; INTRAVENOUS at 07:29

## 2023-11-10 RX ADMIN — ROCURONIUM BROMIDE 50 MG: 10 INJECTION, SOLUTION INTRAVENOUS at 07:20

## 2023-11-10 RX ADMIN — HYDRALAZINE HYDROCHLORIDE 5 MG: 20 INJECTION INTRAMUSCULAR; INTRAVENOUS at 13:54

## 2023-11-10 RX ADMIN — INSULIN LISPRO 4 UNITS: 100 INJECTION, SOLUTION INTRAVENOUS; SUBCUTANEOUS at 17:08

## 2023-11-10 RX ADMIN — HYDRALAZINE HYDROCHLORIDE 5 MG: 20 INJECTION INTRAMUSCULAR; INTRAVENOUS at 13:06

## 2023-11-10 RX ADMIN — CEFOXITIN 2000 MG: 2 INJECTION, POWDER, FOR SOLUTION INTRAVENOUS at 11:15

## 2023-11-10 RX ADMIN — HYDRALAZINE HYDROCHLORIDE 5 MG: 20 INJECTION INTRAMUSCULAR; INTRAVENOUS at 13:18

## 2023-11-10 RX ADMIN — BUPIVACAINE HYDROCHLORIDE 60 ML: 2.5 INJECTION, SOLUTION EPIDURAL; INFILTRATION; INTRACAUDAL; PERINEURAL at 12:30

## 2023-11-10 RX ADMIN — PROPOFOL 150 MG: 10 INJECTION, EMULSION INTRAVENOUS at 07:20

## 2023-11-10 RX ADMIN — LIDOCAINE HYDROCHLORIDE 50 MG: 10 INJECTION, SOLUTION EPIDURAL; INFILTRATION; INTRACAUDAL; PERINEURAL at 07:20

## 2023-11-10 RX ADMIN — VECURONIUM BROMIDE 5 MG: 1 INJECTION, POWDER, LYOPHILIZED, FOR SOLUTION INTRAVENOUS at 08:14

## 2023-11-10 RX ADMIN — METOPROLOL TARTRATE 2.5 MG: 1 INJECTION, SOLUTION INTRAVENOUS at 08:03

## 2023-11-10 RX ADMIN — ATORVASTATIN CALCIUM 40 MG: 40 TABLET, FILM COATED ORAL at 21:23

## 2023-11-10 RX ADMIN — VECURONIUM BROMIDE 3 MG: 1 INJECTION, POWDER, LYOPHILIZED, FOR SOLUTION INTRAVENOUS at 08:33

## 2023-11-10 NOTE — ANESTHESIA PREPROCEDURE EVALUATION
Anesthesia Evaluation     NPO Solid Status: > 8 hours  NPO Liquid Status: > 8 hours           Airway   Mallampati: II  TM distance: >3 FB  Neck ROM: full  No difficulty expected  Dental - normal exam   (+) poor dentition    Pulmonary - normal exam   Cardiovascular - normal exam    (+) hypertension, CAD, CABG >6 Months, hyperlipidemia    ROS comment: Structurally and functionally normal cardiac valves.  Left ventricular size and contractility is normal with ejection fraction of 60%.       Neuro/Psych  GI/Hepatic/Renal/Endo    (+) diabetes mellitus type 2 well controlled    Musculoskeletal     Abdominal  - normal exam    Bowel sounds: normal.   Substance History      OB/GYN          Other   arthritis,   history of cancer active                  Anesthesia Plan    ASA 3     general with block   total IV anesthesia  intravenous induction     Anesthetic plan, risks, benefits, and alternatives have been provided, discussed and informed consent has been obtained with: patient.  Pre-procedure education provided  Plan discussed with CRNA and CAA.    CODE STATUS:

## 2023-11-10 NOTE — ANESTHESIA PROCEDURE NOTES
Peripheral Block    Pre-sedation assessment completed: 11/10/2023 12:30 PM    Patient reassessed immediately prior to procedure    Patient location during procedure: radiology  Start time: 11/10/2023 12:30 PM  Stop time: 11/10/2023 12:38 PM  Reason for block: at surgeon's request and post-op pain management  Performed by  Anesthesiologist: Ernesto Mack MD  Preanesthetic Checklist  Completed: patient identified, IV checked, site marked, risks and benefits discussed, surgical consent, monitors and equipment checked, pre-op evaluation and timeout performed  Prep:  Pt Position: supine  Sterile barriers:cap, gloves, mask and washed/disinfected hands  Prep: ChloraPrep  Patient monitoring: blood pressure monitoring, continuous pulse oximetry and EKG  Procedure  Performed under: general  Guidance:ultrasound guided    ULTRASOUND INTERPRETATION.  Using ultrasound guidance a 20 G gauge needle was placed in close proximity to the nerve, at which point, under ultrasound guidance anesthetic was injected in the area of the nerve and spread of the anesthesia was seen on ultrasound in close proximity thereto.  There were no abnormalities seen on ultrasound; a digital image was taken; and the patient tolerated the procedure with no complications. Images:still images obtained, printed/placed on chart    Laterality:Bilateral  Block Type:TAP  Injection Technique:single-shot  Needle Type:echogenic  Needle Gauge:20 G  Resistance on Injection: none    Medications Used: dexamethasone (DECADRON) injection - Injection   8 mg - 11/10/2023 12:30:00 PM  bupivacaine PF (MARCAINE) 0.25 % injection - Perineural   60 mL - 11/10/2023 12:30:00 PM      Post Assessment  Injection Assessment: no paresthesia on injection, incremental injection and negative aspiration for heme  Patient Tolerance:comfortable throughout block  Complications:no

## 2023-11-10 NOTE — OP NOTE
Urology Operative Note    11/10/2023    Tao Pozo  78 y.o.  1945  male  [id number]      Surgeon(s) and Role:  Tj Salazar MD - Primary     Pre-operative Diagnosis: Colon cancer    Post-operative Diagnosis: Same    Complications: None    Procedures:    Cystoscopy, bilateral ureteral catheterization with injection of ICG  Left ureteroscopy and bilateral stent insertion  Fluoroscopy with interpretation    Indications   Tao Pozo is a 78 y.o. male here for surgery with Dr. Chavira for colectomy request for ICG instillation    During the informed consent process, the procedure was discussed in detail including the risks of bleeding, infection, and damage to surrounding structures.      Description of procedure:  The patient was properly identified in the preoperative holding area and taken to the operating room where general anesthesia was induced. The patient was prepped and draped in a sterile fashion. The patient was given antibiotics intravenously before the start of the surgery. After ensuring that all of the required equipment was ready and available a surgical timeout was performed.     Rigid cystoscopy performed revealing intact bladder but 2+ trabeculations with 3+ obstructing prostate with median lobe.  Left ureteral orifice was cannulated with a cone-tip catheter and ICG injected 3 cc on each side diluted to 10 cc on each side, this was repeated on the right side    There were no apparent complications    I was present and scrubbed for the entire procedure.     After Dr. Chavira docked the robot ICG was noted to be present along the retroperitoneum outside of the urinary tract so was called back in.  Cystoscopy was performed bladder was intact, left ureter orifice was cannulated with the wire.  Rigid ureteroscopy was performed over the wire but would not pass beyond the distal ureter due to a stricture.  Pollick was then advanced over the wire and into the kidney.  Fluoroscopy was  performed with the C arm brought into the room and wire had a good curl.  Half-strength contrast was injected to outline the calyces and the wire was in good position.  The Pollick catheter was slowly backed out there was some extravasation from the distal ureter where the stricture was present.  A 6 French by multilength stent was then placed with good curl in the kidney as well as the bladder.  The right side was then inspected wire passed up into the kidney and Pollick over this.  C-arm was used to perform fluoroscopy showing no extravasation but mild hydronephrosis from J hooking.  Decision was made to leave a ureteral stent in place of a 6 French by multilength stent was passed with curl in the upper pole of the kidney as well as the bladder    Fluoroscopy with interpretation: Left retrograde pyelogram showing mild hydronephrosis with pyelovenous backflow and extravasation from the distal ureter.  Right retrograde pyelogram showing mild hydronephrosis with no extravasation.  Bilateral stents in place in the kidneys    Specimens: None    Estimated Blood Loss: None      Plan  Irrigate Gomez as needed, may hookup CBI as needed  Void trial when urine clear and recovering from surgery 1 to 2 days  We will plan to see him in the office 1 week upon discharge to plan repeat ureteroscopy and stent removal in 3-4 weeks post op         Tj Salazar MD  [facility]  [facility], [address]  [place] [address] [phone]

## 2023-11-10 NOTE — ANESTHESIA PROCEDURE NOTES
Airway  Urgency: elective    Date/Time: 11/10/2023 7:22 AM  End Time:11/10/2023 7:22 AM  Airway not difficult    General Information and Staff    Patient location during procedure: OR  Anesthesiologist: Ernesto Mack MD  CRNA/CAA: Gwendolyn Gerard CAA    Indications and Patient Condition  Indications for airway management: airway protection    Preoxygenated: yes  Mask difficulty assessment: 1 - vent by mask    Final Airway Details  Final airway type: endotracheal airway      Successful airway: ETT  Cuffed: yes   Successful intubation technique: direct laryngoscopy  Facilitating devices/methods: intubating stylet  Endotracheal tube insertion site: oral  Blade: Ismael  Blade size: 4  ETT size (mm): 7.5  Cormack-Lehane Classification: grade I - full view of glottis  Placement verified by: chest auscultation, capnometry and palpation of cuff   Measured from: lips  ETT/EBT  to lips (cm): 23  Number of attempts at approach: 1  Assessment: lips, teeth, and gum same as pre-op and atraumatic intubation

## 2023-11-10 NOTE — ANESTHESIA POSTPROCEDURE EVALUATION
Patient: Tao Pozo    Procedure Summary       Date: 11/10/23 Room / Location: [facility] OR 08 / [facility] MAIN OR    Anesthesia Start: 0713 Anesthesia Stop: 1248    Procedures:       COLON RESECTION LAPAROSCOPIC SIGMOID WITH DAVINCI ROBOT (Abdomen)      CYSTOSCOPY, URETERAL ICG INSERTION (Bilateral)      CYSTOSCOPY RETROGRADE PYELOGRAM AND STENT INSERTION (Bilateral) Diagnosis:       Cancer of sigmoid      (Cancer of sigmoid [C18.7])    Surgeons: Dilshad Chavira MD; Tj Salazar MD Provider: Ernesto Mack MD    Anesthesia Type: general with block ASA Status: 3            Anesthesia Type: general with block    Vitals  Vitals Value Taken Time   /71 11/10/23 1348   Temp 97.6 °F (36.4 °C) 11/10/23 1248   Pulse 67 11/10/23 1349   Resp 15 11/10/23 1348   SpO2 96 % 11/10/23 1349   Vitals shown include unfiled device data.        Post Anesthesia Care and Evaluation    Patient location during evaluation: PACU  Patient participation: complete - patient participated  Level of consciousness: awake  Pain scale: See nurse's notes for pain score.  Pain management: adequate    Airway patency: patent  Anesthetic complications: No anesthetic complications  PONV Status: none  Cardiovascular status: acceptable  Respiratory status: acceptable and spontaneous ventilation  Hydration status: acceptable    Comments: Patient seen and examined postoperatively; vital signs stable; SpO2 greater than or equal to 90%; cardiopulmonary status stable; nausea/vomiting adequately controlled; pain adequately controlled; no apparent anesthesia complications; patient discharged from anesthesia care when discharge criteria were met

## 2023-11-11 LAB
GLUCOSE BLDC GLUCOMTR-MCNC: 135 MG/DL (ref 70–105)
GLUCOSE BLDC GLUCOMTR-MCNC: 143 MG/DL (ref 70–105)
GLUCOSE BLDC GLUCOMTR-MCNC: 185 MG/DL (ref 70–105)
GLUCOSE BLDC GLUCOMTR-MCNC: 195 MG/DL (ref 70–105)
QT INTERVAL: 457 MS
QTC INTERVAL: 388 MS

## 2023-11-11 PROCEDURE — 25810000003 SODIUM CHLORIDE 0.9 % SOLUTION: Performed by: STUDENT IN AN ORGANIZED HEALTH CARE EDUCATION/TRAINING PROGRAM

## 2023-11-11 PROCEDURE — 99024 POSTOP FOLLOW-UP VISIT: CPT | Performed by: STUDENT IN AN ORGANIZED HEALTH CARE EDUCATION/TRAINING PROGRAM

## 2023-11-11 PROCEDURE — 85025 COMPLETE CBC W/AUTO DIFF WBC: CPT | Performed by: STUDENT IN AN ORGANIZED HEALTH CARE EDUCATION/TRAINING PROGRAM

## 2023-11-11 PROCEDURE — 25010000002 ENOXAPARIN PER 10 MG: Performed by: STUDENT IN AN ORGANIZED HEALTH CARE EDUCATION/TRAINING PROGRAM

## 2023-11-11 PROCEDURE — 25010000002 HYDROMORPHONE 1 MG/ML SOLUTION: Performed by: STUDENT IN AN ORGANIZED HEALTH CARE EDUCATION/TRAINING PROGRAM

## 2023-11-11 PROCEDURE — 63710000001 INSULIN LISPRO (HUMAN) PER 5 UNITS: Performed by: STUDENT IN AN ORGANIZED HEALTH CARE EDUCATION/TRAINING PROGRAM

## 2023-11-11 PROCEDURE — 82948 REAGENT STRIP/BLOOD GLUCOSE: CPT

## 2023-11-11 PROCEDURE — 80048 BASIC METABOLIC PNL TOTAL CA: CPT | Performed by: STUDENT IN AN ORGANIZED HEALTH CARE EDUCATION/TRAINING PROGRAM

## 2023-11-11 RX ORDER — CEPHALEXIN 500 MG/1
500 CAPSULE ORAL EVERY 8 HOURS SCHEDULED
Status: DISCONTINUED | OUTPATIENT
Start: 2023-11-11 | End: 2023-11-13 | Stop reason: HOSPADM

## 2023-11-11 RX ADMIN — ACETAMINOPHEN 1000 MG: 500 TABLET, FILM COATED ORAL at 13:34

## 2023-11-11 RX ADMIN — DOCUSATE SODIUM 100 MG: 100 CAPSULE, LIQUID FILLED ORAL at 08:16

## 2023-11-11 RX ADMIN — HYDROMORPHONE HYDROCHLORIDE 0.5 MG: 1 INJECTION, SOLUTION INTRAMUSCULAR; INTRAVENOUS; SUBCUTANEOUS at 03:53

## 2023-11-11 RX ADMIN — OXYCODONE 5 MG: 5 TABLET ORAL at 11:39

## 2023-11-11 RX ADMIN — HYDROMORPHONE HYDROCHLORIDE 0.5 MG: 1 INJECTION, SOLUTION INTRAMUSCULAR; INTRAVENOUS; SUBCUTANEOUS at 15:08

## 2023-11-11 RX ADMIN — DOCUSATE SODIUM 100 MG: 100 CAPSULE, LIQUID FILLED ORAL at 19:45

## 2023-11-11 RX ADMIN — OXYCODONE 5 MG: 5 TABLET ORAL at 19:44

## 2023-11-11 RX ADMIN — CEPHALEXIN 500 MG: 500 CAPSULE ORAL at 19:44

## 2023-11-11 RX ADMIN — SODIUM CHLORIDE 75 ML/HR: 9 INJECTION, SOLUTION INTRAVENOUS at 19:46

## 2023-11-11 RX ADMIN — ACETAMINOPHEN 1000 MG: 500 TABLET, FILM COATED ORAL at 06:05

## 2023-11-11 RX ADMIN — ATORVASTATIN CALCIUM 40 MG: 40 TABLET, FILM COATED ORAL at 19:45

## 2023-11-11 RX ADMIN — INSULIN LISPRO 2 UNITS: 100 INJECTION, SOLUTION INTRAVENOUS; SUBCUTANEOUS at 08:16

## 2023-11-11 RX ADMIN — ENOXAPARIN SODIUM 40 MG: 100 INJECTION SUBCUTANEOUS at 22:09

## 2023-11-11 RX ADMIN — INSULIN LISPRO 2 UNITS: 100 INJECTION, SOLUTION INTRAVENOUS; SUBCUTANEOUS at 11:39

## 2023-11-11 RX ADMIN — CEPHALEXIN 500 MG: 500 CAPSULE ORAL at 13:34

## 2023-11-11 RX ADMIN — ACETAMINOPHEN 1000 MG: 500 TABLET, FILM COATED ORAL at 19:43

## 2023-11-11 NOTE — PROGRESS NOTES
General Surgery Progress Note    Name: Tao Pozo ADMIT: 11/10/2023   : 1945  PCP: Claire Kraus APRN    MRN: [id number] LOS: 1 days   AGE/SEX: 78 y.o. male  ROOM: [address] [place] [address]    No chief complaint on file.    Subjective     Complaining of incisional pain this AM. Tolerating clears, denies nausea and vomiting. No BM, has had minimal flatus. Pain is controlled    Objective     Scheduled Medications:   acetaminophen, 1,000 mg, Oral, Q8H  atorvastatin, 40 mg, Oral, Nightly  cephalexin, 500 mg, Oral, Q8H  docusate sodium, 100 mg, Oral, BID  enoxaparin, 40 mg, Subcutaneous, Nightly  insulin lispro, 2-9 Units, Subcutaneous, 4x Daily AC & at Bedtime        Active Infusions:  phenylephrine, 0.5-3 mcg/kg/min  sodium chloride, 75 mL/hr, Last Rate: 75 mL/hr (11/10/23 1501)        As Needed Medications:    dextrose    dextrose    glucagon (human recombinant)    HYDROmorphone    ondansetron **OR** ondansetron    oxyCODONE    phenylephrine    Vital Signs  Vital Signs Patient Vitals for the past 24 hrs:   BP Temp Temp src Pulse Resp SpO2   23 1133 166/62 97.7 °F (36.5 °C) Oral -- 20 --   23 0835 133/57 97.9 °F (36.6 °C) Oral 64 18 97 %   23 0500 132/66 98 °F (36.7 °C) Oral 63 17 96 %   23 0100 138/60 98.5 °F (36.9 °C) Oral -- 16 --   11/10/23 2100 152/66 98.4 °F (36.9 °C) Oral -- 13 --   11/10/23 1509 170/64 98.2 °F (36.8 °C) Oral -- 16 --   11/10/23 1421 161/70 97 °F (36.1 °C) Oral 71 17 96 %   11/10/23 1412 166/61 -- -- 71 18 97 %   11/10/23 1403 180/71 -- -- 70 20 97 %   11/10/23 1348 (!) 181/71 -- -- 67 15 98 %   11/10/23 1333 173/83 -- -- 68 19 100 %   11/10/23 1325 176/69 -- -- 63 -- 100 %   11/10/23 1318 (!) 200/79 -- -- 62 19 100 %   11/10/23 1303 (!) 203/83 -- -- 57 19 100 %     I/O:  I/O last 3 completed shifts:  In: 2840 [P.O.:240; I.V.:2300; IV Piggyback:300]  Out: 3900 [Urine:3700; Drains:200]    Physical Exam:  General: alert and oriented, coopertaive, no acute  distress  Cardiopulmonary: RRR, normal pulmonary effort, no respiratory distress  Abdominal: soft, appropriate tenderness. PATRICIA x 1 present with bloody output, incisions CDI    Results Review:     CBC    Results from last 7 days   Lab Units 11/10/23  2255   WBC 10*3/mm3 14.50*   HEMOGLOBIN g/dL 13.7   PLATELETS 10*3/mm3 309     BMP   Results from last 7 days   Lab Units 11/10/23  2255   SODIUM mmol/L 140   POTASSIUM mmol/L 4.6   CHLORIDE mmol/L 104   CO2 mmol/L 26.0   BUN mg/dL 13   CREATININE mg/dL 1.11   GLUCOSE mg/dL 180*       I reviewed the patient's new clinical results.    Assessment & Plan       Cancer of sigmoid    Cancer of sigmoid colon    POD 1 s/p laparoscopic sigmoid colectomy with Davinci Robot  -Continue clears until return of bowel function  -F/C to remain in place, urology following  -Needs to ambulate, OOBTC  -Educated on using IS bedside  -Monitor drain output, strip and empty drain Qshift and PRN  -Pain control        This note was created using Dragon Voice Recognition software.    ROSEANN De Dios  11/11/23  12:59 EST

## 2023-11-11 NOTE — OP NOTE
Operative Report:    Patient Name:  Tao Pozo  YOB: 1945    Date of Surgery:  11/10/2023     Indications:    78-year-old gentleman history of diabetes, CABG no longer on dual antiplatelet or anticoagulation, who is here to see me for new diagnosis of sigmoid colon cancer.  He underwent colonoscopy by Dr. Smiley for positive Cologuard test with findings of 2 tubular adenomas and a sigmoid colon cancer.  Biopsy confirmed adenocarcinoma.  CT chest abdomen and pelvis was ordered but not done, I ordered CEA.  Denies any abdominal pain nausea vomiting or obstipation.  Does not have an oncologist yet, I will place referral.  Discussed treatment for sigmoid colon cancer, discussed risk benefits and alternatives to robotic sigmoid colectomy including infection, bleeding, injury surrounding structures and patient elected to proceed with surgery.  If he has evidence of metastatic disease on his imaging we will discuss with his oncologist and likely proceed with neoadjuvant chemotherapy instead.     Pre-op Diagnosis:   Cancer of sigmoid [C18.7]       Post-Op Diagnosis Codes:     * Cancer of sigmoid [C18.7]    Procedure/CPT® Codes:      Procedure(s):  COLON RESECTION LAPAROSCOPIC SIGMOID WITH DAVINCI ROBOT    Staff:  Primary Surgeon: Dilshad Chavira MD  First Assistant: WALLACE Fontenot MD  was responsible for performing the following activities: Retraction, Suction, Irrigation, Suturing, and Held/Positioned Camera and their skilled assistance was necessary for the success of this case.    Circulator: Nelli Duffy RN  Radiology Technologist: Federica Lucas  Scrub Person: Fabiola Deras; Chadwick Constantino RN; Jocelyn Fowler          Anesthesia: General with Block    Estimated Blood Loss: minimal    Implants:    Implant Name Type Inv. Item Serial No.  Lot No. LRB No. Used Action   STNT CONTRL VL 6F20 30 - [id number] Stent STNT CONTRL VL 6F20 30  [employer] [id number] Right 1 Implanted    DEV CLS WND VLOC/PBT MARIELA NONABS 1/2CIR SZ3/0 26MM 23CM GRN - [id number] Implant DEV CLS WND VLOC/PBT MARIELA NONABS 1/2CIR SZ3/0 26MM 23CM GRN  COVIDIEN [id number] N/A 1 Implanted   RELOAD STPLR SUREFORM 60 DAVINCI/X/XI 6ROW 2.5 WHT 1P/U - [id number] Implant RELOAD STPLR SUREFORM 60 DAVINCI/X/XI 6ROW 2.5 WHT 1P/U  INTUITIVE SURGICAL [id number] N/A 1 Implanted   RELOAD STPLR SUREFORM 60 DAVINCI/X/XI 6ROW 4.3 GRN 1P/U - [id number] Implant RELOAD STPLR SUREFORM 60 DAVINCI/X/XI 6ROW 4.3 GRN 1P/U  INTUITIVE SURGICAL [id number] N/A 2 Implanted   RELOAD STPLR SUREFORM 60 DAVINCI/X/XI 6ROW 3.5 KAIDEN 1P/U - [id number] Implant RELOAD STPLR SUREFORM 60 DAVINCI/X/XI 6ROW 3.5 KAIDEN 1P/U  INTUITIVE SURGICAL [id number] N/A 1 Implanted   STPLR CIR MILADIS HART THK 28MM - [id number] Implant STPLR CIR MILADIS HART THK 28MM  COVIDIEN [id number] N/A 1 Implanted   STNT CONTRL VL 5F [id number] - [id number] Stent STNT CONTRL VL 5F [id number]  [employer] GILMA [id number] Left 1 Implanted       Specimen:          Specimens       ID Source Type Tests Collected By Collected At Frozen?    A Large Intestine, Sigmoid Colon Tissue TISSUE PATHOLOGY EXAM   Dilshad Chavira MD 11/10/23 1116 No    B Large Intestine, Sigmoid Colon Tissue TISSUE PATHOLOGY EXAM   Dilshad Chavira MD 11/10/23 1128 No    Description: ANASTAMOTIC DONUT                Findings: Sigmoid colon mass    Complications: Ureteral perforation during ICG injection, identified before any dissection near the ureter    Description of Procedure:   After risk benefits and alternatives were discussed with patient informed sent was obtained.  He was transported the operating room placed supine operating table underwent general anesthesia.  He was then placed in lithotomy position.  His abdomen and perineum were prepped and draped in sterile fashion surgical timeout completed.  Dr. Salazar performed cystoscopy with injection of ICG into bilateral ureters for help and identification during surgery.  This portion will  be dictated separately.    I inserted a 5 mm trocar left upper quadrant under laparoscopic visualization.  Insufflated the peritoneal cavity inspected there Blome entry site with no injury identified.  Patient was placed in Trendelenburg and left side up position.  I placed an 8 mm trocar in the right upper quadrant, 8 mm trocar in the right lateral abdomen, I upsized my entry trocar to an 8 mm robotic trocar.  I placed a 5 mm assistant port in the right upper quadrant lateral to my robotic ports.  I made a 3 cm incision just lateral to the ASIS dissected down to the muscle with electrocautery and inserted an Hai wound protector through this incision.  The capsule placed on this and the 12 mm robotic trocar was inserted through the wound protector into the patient's abdomen.  I laparoscopically positioned the small bowel in the right upper quadrant away from the area of dissection.  The robot was brought in and docked and I proceeded to the robotic console.    I was able to identify the SMV and the patient's left upper quadrant near the duodenum.  I made a window in the retroperitoneum just inferior to the SMV and dissected towards the splenic flexure.  Once I entered the retroperitoneum from medial to lateral along the white line of Toldt I encountered some extravasated ICG.  This was before any dissection anywhere near the ureter, and at that time I was concerned for possible ureteral perforation from ICG injection.  I called Dr. Salazar who came back to the operating room.  He performed repeat cystoscopy and using fluoroscopy was able to identify a left-sided distal ureter perforation at the level of the stricture.  He placed bilateral stents.  His portion will be dictated separately in detail.    The robot was redocked.  I entered the plane beneath the SMV and was able to dissect along the white line of Toldt lateral to the colon.  I carried this dissection plane down towards the sigmoid colon.  Once identified  the takeoff of the ERIC I made another window inferior to the ERIC and from medial to lateral I dissected behind the sigmoid colon lateral to the sigmoid colon.  The ureter was identified and preserved.  I made a window around the ERIC takeoff and used a sure form 60 stapler with a vascular load to divide this.  I continued dissecting posterior to the colon along the white line of Toldt until I reached the level of the proximal rectum.  The sigmoid colon mass was easily to be identified in the middle of the sigmoid colon so elected to divide the proximal rectum as my distal margin.  Made a window in the mesorectum and divided the rectum with a sure form 60 stapler with 2 fires of green loads.  I turned my attention back to the sigmoid descending colon I divided the lateral attachments to the white line of Toldt with the vessel sealer.  There was plenty length on the descending colon to reach the rectal stump.  I dissected above the ERIC performing wide lymphadenectomy to an area of the descending colon that would reach the rectal stump easily.  ICG was injected and the descending colon was well-perfused.  I divided this area of the colon with a sure form 60 stapler blue load.    I inserted EEA sizers up to the rectal stump and was able to accommodate size 28 stapler easily.  I chose a [employer] size 28 EEA stapler.  The anvil was placed into the patient's abdomen to the Hai port.  I cut the staple line of the descending colon with the vessel sealer without cautery.  I pursestring of 3 oh V-Loc suture was sutured around the descending colon the anvil was inserted into the colon and this was cinched down.  A PDS Endoloop was then used to secure the colon around the anvil as well.  The staple line the colon was removed from the patient.  The EEA stapler was inserted to the rectal stump by assistant.  The spike was brought out just anterior to the rectal staple line.  The anvil was attached to the spike.  I ensured that  the descending colon was an appropriate orientation and the stapler was closed and fired.  There were 2 intact anastomotic donuts the distal 1 of these was sent to pathology.  Saline was irrigated into the pelvis and I used the rigid sigmoidoscope to insufflate the anastomosis with no leakage of air.  Sinhala Kai drain was inserted into the patient's abdomen through the left upper quadrant port site and placed on the area of the left ureter and into the pelvis.  This was secured to the skin with 2-0 nylon suture.    We broke scrub and rescrubbed with a clean closure set up after the robot was undocked and moved.  The abdomen was irrigated with Irrisept let this dwell for 2 minutes and then suctioned clean.  I extended my right lower quadrant incision and removed the specimen from the patient's abdomen and sent this to pathology as sigmoid colon.  Fascia of the extraction site was closed with running 0 Vicryl suture.  The abdomen was desufflated and all ports were removed.  All skin was reapproximated with 4-0 Vicryl suture and covered surgical glue.  Sponge needle instrument count was reported as correct x2 before closure.  Patient was awoken from general anesthesia and transported recovery unit without incident.    Colon Resection  Operation performed with curative intent Yes   Tumor Location (select all that apply) Sigmoid colon   Extent of colon and vascular resection  (select all that apply) Sigmoid resection - inferior mesenteric         Dilshad Chavira MD     Date: 11/11/2023  Time: 09:43 EST    This note was created using Dragon Voice Recognition software.

## 2023-11-11 NOTE — PLAN OF CARE
Goal Outcome Evaluation:      Pt is able to make needs known. Pain is managed with medication regimen. Pt is able to ambulate with stand by assist. Education is complete, call light is in reach, and no other needs at this time. Continue to monitor.

## 2023-11-11 NOTE — PLAN OF CARE
Goal Outcome Evaluation:           Progress: improving  Outcome Evaluation: Pt resting comfortably in bed. VSS, room air, minimal complaints of pain. Pain treated per MAR. Gomez still in place per order. Plan of care ongoing.

## 2023-11-11 NOTE — PROGRESS NOTES
[facility] Progress Note    Seen/examined  Urine light pink and mosley draining without issues - no clots  Answered all pt's questions    Labs:  CBC - WBC 14  BMP: normal renal function  INR: 1.09    -leave omsley for now   -amount of hematuria he is having is expected  -given mosley and indwelling stents will keep on a po abx - will help prevent cath/stent associated UTI --- will put on Keflex (Rx)    -will follow

## 2023-11-12 LAB
ANION GAP SERPL CALCULATED.3IONS-SCNC: 7 MMOL/L (ref 5–15)
BASOPHILS # BLD AUTO: 0.1 10*3/MM3 (ref 0–0.2)
BASOPHILS NFR BLD AUTO: 0.6 % (ref 0–1.5)
BUN SERPL-MCNC: 15 MG/DL (ref 8–23)
BUN/CREAT SERPL: 16.9 (ref 7–25)
CALCIUM SPEC-SCNC: 8.4 MG/DL (ref 8.6–10.5)
CHLORIDE SERPL-SCNC: 109 MMOL/L (ref 98–107)
CO2 SERPL-SCNC: 26 MMOL/L (ref 22–29)
CREAT SERPL-MCNC: 0.89 MG/DL (ref 0.76–1.27)
DEPRECATED RDW RBC AUTO: 45.1 FL (ref 37–54)
EGFRCR SERPLBLD CKD-EPI 2021: 88.8 ML/MIN/1.73
EOSINOPHIL # BLD AUTO: 0 10*3/MM3 (ref 0–0.4)
EOSINOPHIL NFR BLD AUTO: 0.2 % (ref 0.3–6.2)
ERYTHROCYTE [DISTWIDTH] IN BLOOD BY AUTOMATED COUNT: 13.5 % (ref 12.3–15.4)
GLUCOSE BLDC GLUCOMTR-MCNC: 147 MG/DL (ref 70–105)
GLUCOSE BLDC GLUCOMTR-MCNC: 156 MG/DL (ref 70–105)
GLUCOSE BLDC GLUCOMTR-MCNC: 176 MG/DL (ref 70–105)
GLUCOSE BLDC GLUCOMTR-MCNC: 209 MG/DL (ref 70–105)
GLUCOSE SERPL-MCNC: 138 MG/DL (ref 65–99)
HCT VFR BLD AUTO: 36 % (ref 37.5–51)
HGB BLD-MCNC: 12 G/DL (ref 13–17.7)
LYMPHOCYTES # BLD AUTO: 2.4 10*3/MM3 (ref 0.7–3.1)
LYMPHOCYTES NFR BLD AUTO: 18.7 % (ref 19.6–45.3)
MCH RBC QN AUTO: 30.2 PG (ref 26.6–33)
MCHC RBC AUTO-ENTMCNC: 33.3 G/DL (ref 31.5–35.7)
MCV RBC AUTO: 90.9 FL (ref 79–97)
MONOCYTES # BLD AUTO: 0.9 10*3/MM3 (ref 0.1–0.9)
MONOCYTES NFR BLD AUTO: 6.9 % (ref 5–12)
NEUTROPHILS NFR BLD AUTO: 73.6 % (ref 42.7–76)
NEUTROPHILS NFR BLD AUTO: 9.3 10*3/MM3 (ref 1.7–7)
NRBC BLD AUTO-RTO: 0 /100 WBC (ref 0–0.2)
PLATELET # BLD AUTO: 293 10*3/MM3 (ref 140–450)
PMV BLD AUTO: 7.8 FL (ref 6–12)
POTASSIUM SERPL-SCNC: 3.9 MMOL/L (ref 3.5–5.2)
RBC # BLD AUTO: 3.96 10*6/MM3 (ref 4.14–5.8)
SODIUM SERPL-SCNC: 142 MMOL/L (ref 136–145)
WBC NRBC COR # BLD: 12.6 10*3/MM3 (ref 3.4–10.8)

## 2023-11-12 PROCEDURE — 25010000002 ONDANSETRON PER 1 MG: Performed by: STUDENT IN AN ORGANIZED HEALTH CARE EDUCATION/TRAINING PROGRAM

## 2023-11-12 PROCEDURE — 82948 REAGENT STRIP/BLOOD GLUCOSE: CPT

## 2023-11-12 PROCEDURE — 25010000002 HYDROMORPHONE 1 MG/ML SOLUTION: Performed by: STUDENT IN AN ORGANIZED HEALTH CARE EDUCATION/TRAINING PROGRAM

## 2023-11-12 PROCEDURE — 99024 POSTOP FOLLOW-UP VISIT: CPT | Performed by: STUDENT IN AN ORGANIZED HEALTH CARE EDUCATION/TRAINING PROGRAM

## 2023-11-12 PROCEDURE — 63710000001 INSULIN LISPRO (HUMAN) PER 5 UNITS: Performed by: STUDENT IN AN ORGANIZED HEALTH CARE EDUCATION/TRAINING PROGRAM

## 2023-11-12 PROCEDURE — 25010000002 ENOXAPARIN PER 10 MG: Performed by: STUDENT IN AN ORGANIZED HEALTH CARE EDUCATION/TRAINING PROGRAM

## 2023-11-12 RX ORDER — HYDRALAZINE HYDROCHLORIDE 20 MG/ML
10 INJECTION INTRAMUSCULAR; INTRAVENOUS EVERY 4 HOURS PRN
Status: DISCONTINUED | OUTPATIENT
Start: 2023-11-12 | End: 2023-11-13 | Stop reason: HOSPADM

## 2023-11-12 RX ORDER — LISINOPRIL 20 MG/1
20 TABLET ORAL EVERY 12 HOURS SCHEDULED
Status: DISCONTINUED | OUTPATIENT
Start: 2023-11-12 | End: 2023-11-13 | Stop reason: HOSPADM

## 2023-11-12 RX ORDER — ASPIRIN 81 MG/1
81 TABLET ORAL DAILY
Status: DISCONTINUED | OUTPATIENT
Start: 2023-11-12 | End: 2023-11-13 | Stop reason: HOSPADM

## 2023-11-12 RX ADMIN — INSULIN LISPRO 2 UNITS: 100 INJECTION, SOLUTION INTRAVENOUS; SUBCUTANEOUS at 17:26

## 2023-11-12 RX ADMIN — ACETAMINOPHEN 1000 MG: 500 TABLET, FILM COATED ORAL at 05:36

## 2023-11-12 RX ADMIN — DOCUSATE SODIUM 100 MG: 100 CAPSULE, LIQUID FILLED ORAL at 21:22

## 2023-11-12 RX ADMIN — CEPHALEXIN 500 MG: 500 CAPSULE ORAL at 13:03

## 2023-11-12 RX ADMIN — ENOXAPARIN SODIUM 40 MG: 100 INJECTION SUBCUTANEOUS at 21:22

## 2023-11-12 RX ADMIN — CEPHALEXIN 500 MG: 500 CAPSULE ORAL at 05:36

## 2023-11-12 RX ADMIN — ATORVASTATIN CALCIUM 40 MG: 40 TABLET, FILM COATED ORAL at 21:22

## 2023-11-12 RX ADMIN — ASPIRIN 81 MG: 81 TABLET, COATED ORAL at 14:55

## 2023-11-12 RX ADMIN — OXYCODONE 5 MG: 5 TABLET ORAL at 12:19

## 2023-11-12 RX ADMIN — ONDANSETRON 4 MG: 2 INJECTION INTRAMUSCULAR; INTRAVENOUS at 01:42

## 2023-11-12 RX ADMIN — INSULIN LISPRO 4 UNITS: 100 INJECTION, SOLUTION INTRAVENOUS; SUBCUTANEOUS at 11:18

## 2023-11-12 RX ADMIN — OXYCODONE 5 MG: 5 TABLET ORAL at 20:03

## 2023-11-12 RX ADMIN — HYDROMORPHONE HYDROCHLORIDE 0.5 MG: 1 INJECTION, SOLUTION INTRAMUSCULAR; INTRAVENOUS; SUBCUTANEOUS at 01:42

## 2023-11-12 RX ADMIN — INSULIN LISPRO 2 UNITS: 100 INJECTION, SOLUTION INTRAVENOUS; SUBCUTANEOUS at 21:22

## 2023-11-12 RX ADMIN — ACETAMINOPHEN 1000 MG: 500 TABLET, FILM COATED ORAL at 21:22

## 2023-11-12 RX ADMIN — LISINOPRIL 20 MG: 20 TABLET ORAL at 14:55

## 2023-11-12 RX ADMIN — ACETAMINOPHEN 1000 MG: 500 TABLET, FILM COATED ORAL at 13:03

## 2023-11-12 RX ADMIN — CEPHALEXIN 500 MG: 500 CAPSULE ORAL at 21:22

## 2023-11-12 NOTE — PLAN OF CARE
Goal Outcome Evaluation:  Plan of Care Reviewed With: patient        Progress: no change          Plan of care on-going.

## 2023-11-12 NOTE — PROGRESS NOTES
CC: Is it coming out?    S/p lap sigmoid colectomy and bilateral ureteral stents    Patient resting in bed  Some bladder spasms    Blood tinged urine in mosley, has bilateral stents  Hand irrigated and tiny clot  Discussed VT today    Cr 0.89  WBC 12.6  Hgb 12    Plan:  VT today   Discussed with nursing if PVR >250cc to anchor 16 fr coude mosley  Will see in am    Plan outpatient follow up with Dr. Salazar 1-2 weeks once discharged

## 2023-11-12 NOTE — CONSULTS
[facility] Medicine Services   Consult Note    Patient Name: Tao Pozo  : 1945  MRN: [id number]  Primary Care Physician:  Claire Kraus APRN  Referring Physician: Dilshad Chavira MD  Date of admission: 11/10/2023  Date and Time of Care: 23 @ 1455    Consults    Subjective      Reason for Consult/ Chief Complaint: Postop comanagement of chronic medical problems    Consult Requested By: General surgeon Dr. Chavira    History of Present Illness: Tao Pozo is a 78 y.o. 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  Hospitalist service is consulted to assist with postop medical management.  His BP is notably elevated up to 211/72 with heart rate 64 this morning.  Patient states that he has been off of his medications at least 2 weeks and blamed multiple procedures for this.  Home medications indicates that he takes lisinopril twice daily which was not yet restarted.  Otherwise he feels okay and denies headache, dizziness, nausea, vomiting, chest pain, shortness of breath, cough, abdominal pain, or urinary symptoms.    ROS at least 14 systems reviewed.  Pertinent information as in HPI above    Personal History     Past Medical History:   Diagnosis Date    Arthritis     Coronary artery disease 2012    Diabetes mellitus     Elevated cholesterol     Hyperlipidemia     Hypertension     Malignant neoplasm of sigmoid colon 10/31/2023       Past Surgical History:   Procedure Laterality Date    CARDIAC CATHETERIZATION N/A 3/12/2021    Procedure: Left Heart Cath with Coronary Angiography;  Surgeon: Huseyin Do MD;  Location: [facility] CATH INVASIVE LOCATION;  Service: Cardiovascular;  Laterality: N/A;    COLONOSCOPY N/A 10/27/2023    Procedure: COLONOSCOPY with cold forcep  biopsy x1 area and cold snare polypectomy x2;  Surgeon: Yared Smiley MD;  Location: [facility] ENDOSCOPY;  Service: Gastroenterology;  Laterality: N/A;  Post- colon polyps, sigmoid colon mass, diverticulosis    CORONARY ARTERY BYPASS GRAFT N/A 3/15/2021    Procedure: CORONARY ARTERY BYPASS GRAFTING;  Surgeon: Paxton Vasquez MD;  Location: [facility] CVOR;  Service: Cardiothoracic;  Laterality: N/A;    TONSILECTOMY, ADENOIDECTOMY, BILATERAL MYRINGOTOMY AND TUBES  1952       Family History: family history includes Diabetes in his brother, mother, and sister; Heart disease in his brother and father; Hypertension in his brother. Otherwise pertinent FHx was reviewed and not pertinent to current issue.    Social History:  reports that he has never smoked. He has been exposed to tobacco smoke. He has never used smokeless tobacco. He reports that he does not drink alcohol and does not use drugs.    Home Medications:   CBD, aspirin, atorvastatin, glimepiride, lisinopril, and metFORMIN    Allergies:  Allergies   Allergen Reactions    Shellfish-Derived Products Anaphylaxis         Objective      Vitals:  Temp:  [97.6 °F (36.4 °C)-99.1 °F (37.3 °C)] 98.1 °F (36.7 °C)  Resp:  [16-18] 17  BP: (161-211)/(60-72) 211/72    Physical Exam   General appearance: Cheerful, pleasant, not in distress, normal interaction  Mental status: Alert and oriented x3  Head: Normocephalic and atraumatic  Eyes: EOMI, nonicteric  Oropharynx: Moist mucous membranes, no thrush  Neck: Supple, no JVD or tenderness  CVS: Regular heart sounds, no gallops or murmurs  Respiration: Nonlabored breathing, clear to auscultation bilaterally  GI: Postop changes with abdominal drain in place, bowel sounds present  : No bladder distention, no CVA tenderness  Extremities: No leg edema, no calf tenderness  Musculoskeletal: Well-developed male, no gross deformities  Neurology: Moves all extremities, no facial droop, no dysarthria  Psychiatry: Normal affect,  appropriate behavior, good insight    Result Review    Result Review:  I have personally reviewed the results from the time of this admission to 11/12/2023 14:57 EST and agree with these findings:  [x]  Laboratory  []  Microbiology  [x]  Radiology  []  EKG/Telemetry   []  Cardiology/Vascular   []  Pathology  []  Old records  [x]  Other: Operative notes    Assessment & Plan        Active Hospital Problems:  Active Hospital Problems    Diagnosis     **Cancer of sigmoid     Cancer of sigmoid colon      Adenocarcinoma of the sigmoid colon status post sigmoid colectomy on 11/10  -Postoperative management per general surgery    Bilateral ureteral impression from sigmoid mass  -Status post intraoperative ureteral stents placement  -Urology following    Uncontrolled primary hypertension due to not taking medications for 2 weeks  -Resume home lisinopril  -Start as needed hydralazine  -Monitor BP closely and make medication adjustments accordingly    Uncontrolled DM 2 with hyperglycemia not on long-term insulin  -At home he does Amaryl and Glucophage  -While on admission, we will use insulin    CAD/CABG x3  -Continue home aspirin and statin     Thank you for inviting us to participate in caring for Mr. Pozo.  We will follow along with you to discharge    Signature: Electronically signed by Olman Lambert MD, 11/12/23, 14:57 EST.  [facility] Hospitalist Team

## 2023-11-12 NOTE — PLAN OF CARE
Goal Outcome Evaluation:     Pt is able to make needs known. Pain is managed with medication regimen. Pt ambulates with stand by assist. FC was discontinued but per urology if pt retains urine with in the first six hours replace FC. Dressing has been changed. Education is complete, call light is in reach, and no other needs at this time.

## 2023-11-12 NOTE — PROGRESS NOTES
General Surgery Progress Note    Name: Tao Pozo ADMIT: 11/10/2023   : 1945  PCP: Claire Kraus APRN    MRN: [id number] LOS: 2 days   AGE/SEX: 78 y.o. male  ROOM: [address] [place] [address]    No chief complaint on file.    Subjective     Vital signs stable, afebrile. Doing well this morning, no new complaints.  Abdominal pain is improving.  Tolerating clears, denies nausea and vomiting.  Patient states he has had 10+ BMs overnight.    Objective     Scheduled Medications:   acetaminophen, 1,000 mg, Oral, Q8H  atorvastatin, 40 mg, Oral, Nightly  cephalexin, 500 mg, Oral, Q8H  docusate sodium, 100 mg, Oral, BID  enoxaparin, 40 mg, Subcutaneous, Nightly  insulin lispro, 2-9 Units, Subcutaneous, 4x Daily AC & at Bedtime        Active Infusions:  phenylephrine, 0.5-3 mcg/kg/min  sodium chloride, 75 mL/hr, Last Rate: 75 mL/hr (23)        As Needed Medications:    dextrose    dextrose    glucagon (human recombinant)    HYDROmorphone    ondansetron **OR** ondansetron    oxyCODONE    phenylephrine    Vital Signs  Vital Signs Patient Vitals for the past 24 hrs:   BP Temp Temp src Resp   23 0522 161/60 98.4 °F (36.9 °C) Oral 16   23 1900 165/67 99.1 °F (37.3 °C) Oral 18   23 1642 179/68 97.6 °F (36.4 °C) Oral 18   23 1133 166/62 97.7 °F (36.5 °C) Oral 20     I/O:  I/O last 3 completed shifts:  In: 2320 [P.O.:1320; I.V.:1000]  Out: 3630 [Urine:3475; Drains:155]    Physical Exam:  Physical Exam  Cardiovascular:      Rate and Rhythm: Normal rate.   Pulmonary:      Effort: Pulmonary effort is normal.   Abdominal:      General: There is no distension.      Palpations: Abdomen is soft.      Tenderness: There is abdominal tenderness. There is no guarding.      Comments: PATRICIA X1 present with serosanguineous output   Neurological:      Mental Status: He is alert and oriented to person, place, and time.   Psychiatric:         Mood and Affect: Mood normal.         Results Review:     CBC     Results from last 7 days   Lab Units 11/11/23  2336 11/10/23  2255   WBC 10*3/mm3 12.60* 14.50*   HEMOGLOBIN g/dL 12.0* 13.7   PLATELETS 10*3/mm3 293 309     BMP   Results from last 7 days   Lab Units 11/11/23  2336 11/10/23  2255   SODIUM mmol/L 142 140   POTASSIUM mmol/L 3.9 4.6   CHLORIDE mmol/L 109* 104   CO2 mmol/L 26.0 26.0   BUN mg/dL 15 13   CREATININE mg/dL 0.89 1.11   GLUCOSE mg/dL 138* 180*     Radiology(recent) No radiology results for the last day    I reviewed the patient's new clinical results.    Assessment & Plan       Cancer of sigmoid    Cancer of sigmoid colon      78 y.o. male POD 2 status post laparoscopic sigmoid colectomy with daVinci robot  -Will advance diet to regular  -Hold bowel regimen for now as patient has had multiple episodes of loose bowel movements  -Gomez catheter to remain in place, urology following.  Will appreciate recommendations  -Continue frequent ambulation, OOBTC  -Use I-S bedside  -Monitor drain output, strip and empty drain every shift and as needed  -Will d/c IV pain medication  -DVT PPx  -If patient continues to improve clinically, will consider discharging in next 24 to 48 hours    This note was created using Dragon Voice Recognition software.    ROSEANN De Dios  11/12/23  11:13 EST

## 2023-11-13 VITALS
SYSTOLIC BLOOD PRESSURE: 162 MMHG | HEIGHT: 70 IN | TEMPERATURE: 98.1 F | OXYGEN SATURATION: 97 % | RESPIRATION RATE: 16 BRPM | WEIGHT: 167.8 LBS | DIASTOLIC BLOOD PRESSURE: 65 MMHG | HEART RATE: 54 BPM | BODY MASS INDEX: 24.02 KG/M2

## 2023-11-13 LAB
ANION GAP SERPL CALCULATED.3IONS-SCNC: 6 MMOL/L (ref 5–15)
BASOPHILS # BLD AUTO: 0.2 10*3/MM3 (ref 0–0.2)
BASOPHILS NFR BLD AUTO: 1.4 % (ref 0–1.5)
BUN SERPL-MCNC: 13 MG/DL (ref 8–23)
BUN/CREAT SERPL: 14.8 (ref 7–25)
CALCIUM SPEC-SCNC: 8.7 MG/DL (ref 8.6–10.5)
CHLORIDE SERPL-SCNC: 108 MMOL/L (ref 98–107)
CO2 SERPL-SCNC: 28 MMOL/L (ref 22–29)
CREAT SERPL-MCNC: 0.88 MG/DL (ref 0.76–1.27)
DEPRECATED RDW RBC AUTO: 40.7 FL (ref 37–54)
EGFRCR SERPLBLD CKD-EPI 2021: 89.1 ML/MIN/1.73
EOSINOPHIL # BLD AUTO: 0.3 10*3/MM3 (ref 0–0.4)
EOSINOPHIL NFR BLD AUTO: 2.6 % (ref 0.3–6.2)
ERYTHROCYTE [DISTWIDTH] IN BLOOD BY AUTOMATED COUNT: 12.9 % (ref 12.3–15.4)
GLUCOSE BLDC GLUCOMTR-MCNC: 140 MG/DL (ref 70–105)
GLUCOSE BLDC GLUCOMTR-MCNC: 207 MG/DL (ref 70–105)
GLUCOSE BLDC GLUCOMTR-MCNC: 215 MG/DL (ref 70–105)
GLUCOSE SERPL-MCNC: 131 MG/DL (ref 65–99)
HCT VFR BLD AUTO: 35.2 % (ref 37.5–51)
HGB BLD-MCNC: 11.8 G/DL (ref 13–17.7)
LYMPHOCYTES # BLD AUTO: 2.5 10*3/MM3 (ref 0.7–3.1)
LYMPHOCYTES NFR BLD AUTO: 22 % (ref 19.6–45.3)
MCH RBC QN AUTO: 30.8 PG (ref 26.6–33)
MCHC RBC AUTO-ENTMCNC: 33.5 G/DL (ref 31.5–35.7)
MCV RBC AUTO: 91.8 FL (ref 79–97)
MONOCYTES # BLD AUTO: 0.8 10*3/MM3 (ref 0.1–0.9)
MONOCYTES NFR BLD AUTO: 7.4 % (ref 5–12)
NEUTROPHILS NFR BLD AUTO: 66.6 % (ref 42.7–76)
NEUTROPHILS NFR BLD AUTO: 7.5 10*3/MM3 (ref 1.7–7)
NRBC BLD AUTO-RTO: 0 /100 WBC (ref 0–0.2)
PLATELET # BLD AUTO: 320 10*3/MM3 (ref 140–450)
PMV BLD AUTO: 7.6 FL (ref 6–12)
POTASSIUM SERPL-SCNC: 3.6 MMOL/L (ref 3.5–5.2)
RBC # BLD AUTO: 3.84 10*6/MM3 (ref 4.14–5.8)
SODIUM SERPL-SCNC: 142 MMOL/L (ref 136–145)
WBC NRBC COR # BLD: 11.2 10*3/MM3 (ref 3.4–10.8)

## 2023-11-13 PROCEDURE — 80048 BASIC METABOLIC PNL TOTAL CA: CPT | Performed by: STUDENT IN AN ORGANIZED HEALTH CARE EDUCATION/TRAINING PROGRAM

## 2023-11-13 PROCEDURE — 85025 COMPLETE CBC W/AUTO DIFF WBC: CPT | Performed by: STUDENT IN AN ORGANIZED HEALTH CARE EDUCATION/TRAINING PROGRAM

## 2023-11-13 PROCEDURE — 99024 POSTOP FOLLOW-UP VISIT: CPT | Performed by: STUDENT IN AN ORGANIZED HEALTH CARE EDUCATION/TRAINING PROGRAM

## 2023-11-13 PROCEDURE — 82948 REAGENT STRIP/BLOOD GLUCOSE: CPT

## 2023-11-13 RX ORDER — HYDROCODONE BITARTRATE AND ACETAMINOPHEN 5; 325 MG/1; MG/1
1 TABLET ORAL EVERY 6 HOURS PRN
Qty: 15 TABLET | Refills: 0 | Status: SHIPPED | OUTPATIENT
Start: 2023-11-13 | End: 2023-11-28

## 2023-11-13 RX ORDER — CEPHALEXIN 500 MG/1
500 CAPSULE ORAL EVERY 8 HOURS SCHEDULED
Qty: 20 CAPSULE | Refills: 0 | Status: SHIPPED | OUTPATIENT
Start: 2023-11-13 | End: 2023-11-20

## 2023-11-13 RX ORDER — TAMSULOSIN HYDROCHLORIDE 0.4 MG/1
0.4 CAPSULE ORAL DAILY
Status: DISCONTINUED | OUTPATIENT
Start: 2023-11-13 | End: 2023-11-13 | Stop reason: HOSPADM

## 2023-11-13 RX ORDER — FINASTERIDE 5 MG/1
5 TABLET, FILM COATED ORAL DAILY
Qty: 90 TABLET | Refills: 3 | Status: SHIPPED | OUTPATIENT
Start: 2023-11-13

## 2023-11-13 RX ORDER — TAMSULOSIN HYDROCHLORIDE 0.4 MG/1
0.4 CAPSULE ORAL DAILY
Qty: 90 CAPSULE | Refills: 3 | Status: SHIPPED | OUTPATIENT
Start: 2023-11-13

## 2023-11-13 RX ORDER — ONDANSETRON 4 MG/1
4 TABLET, FILM COATED ORAL EVERY 8 HOURS PRN
Qty: 30 TABLET | Refills: 1 | Status: SHIPPED | OUTPATIENT
Start: 2023-11-13 | End: 2024-11-12

## 2023-11-13 RX ORDER — DIPHENHYDRAMINE HYDROCHLORIDE, ZINC ACETATE 2; .1 G/100G; G/100G
1 CREAM TOPICAL 3 TIMES DAILY PRN
Status: DISCONTINUED | OUTPATIENT
Start: 2023-11-13 | End: 2023-11-13 | Stop reason: HOSPADM

## 2023-11-13 RX ORDER — FINASTERIDE 5 MG/1
5 TABLET, FILM COATED ORAL DAILY
Status: DISCONTINUED | OUTPATIENT
Start: 2023-11-13 | End: 2023-11-13 | Stop reason: HOSPADM

## 2023-11-13 RX ORDER — CEPHALEXIN 500 MG/1
500 CAPSULE ORAL EVERY 8 HOURS SCHEDULED
Qty: 20 CAPSULE | Refills: 0 | OUTPATIENT
Start: 2023-11-13 | End: 2023-11-20

## 2023-11-13 RX ORDER — AMLODIPINE BESYLATE 5 MG/1
10 TABLET ORAL
Status: DISCONTINUED | OUTPATIENT
Start: 2023-11-13 | End: 2023-11-13 | Stop reason: HOSPADM

## 2023-11-13 RX ADMIN — TAMSULOSIN HYDROCHLORIDE 0.4 MG: 0.4 CAPSULE ORAL at 12:02

## 2023-11-13 RX ADMIN — OXYCODONE 5 MG: 5 TABLET ORAL at 08:47

## 2023-11-13 RX ADMIN — FINASTERIDE 5 MG: 5 TABLET, FILM COATED ORAL at 12:02

## 2023-11-13 RX ADMIN — ACETAMINOPHEN 1000 MG: 500 TABLET, FILM COATED ORAL at 12:02

## 2023-11-13 RX ADMIN — CEPHALEXIN 500 MG: 500 CAPSULE ORAL at 05:16

## 2023-11-13 RX ADMIN — ASPIRIN 81 MG: 81 TABLET, COATED ORAL at 08:47

## 2023-11-13 RX ADMIN — AMLODIPINE BESYLATE 10 MG: 5 TABLET ORAL at 08:47

## 2023-11-13 RX ADMIN — CEPHALEXIN 500 MG: 500 CAPSULE ORAL at 14:10

## 2023-11-13 RX ADMIN — LISINOPRIL 20 MG: 20 TABLET ORAL at 08:47

## 2023-11-13 RX ADMIN — ACETAMINOPHEN 1000 MG: 500 TABLET, FILM COATED ORAL at 05:16

## 2023-11-13 NOTE — DISCHARGE INSTRUCTIONS
Call office to schedule follow-up appointment for 1 week  Empty PATRICIA daily and as needed, monitor output  Keep incisions dry for first 48 hours  May shower soap and water but no baths/soaking for 2 weeks  No lifting greater than 10 pounds for 2 weeks  Over-the-counter stool softener twice daily until off narcotics and having regular bowel movements  Milk of magnesia as needed if still constipated with stool softeners

## 2023-11-13 NOTE — H&P (VIEW-ONLY)
"  [facility] DAILY PROGRESS NOTE    Patient Identification  Name: Tao Pozo  Age: 78 y.o.  Sex: male  :  1945  MRN: [id number]    Date: 2023             Subjective:  Interval History: Voiding well, dysuria improved    Objective:    Scheduled Meds:acetaminophen, 1,000 mg, Oral, Q8H  amLODIPine, 10 mg, Oral, Q24H  aspirin, 81 mg, Oral, Daily  atorvastatin, 40 mg, Oral, Nightly  cephalexin, 500 mg, Oral, Q8H  docusate sodium, 100 mg, Oral, BID  enoxaparin, 40 mg, Subcutaneous, Nightly  finasteride, 5 mg, Oral, Daily  insulin lispro, 2-9 Units, Subcutaneous, 4x Daily AC & at Bedtime  lisinopril, 20 mg, Oral, Q12H  tamsulosin, 0.4 mg, Oral, Daily      Continuous Infusions:phenylephrine, 0.5-3 mcg/kg/min  sodium chloride, 75 mL/hr, Last Rate: 75 mL/hr (23)      PRN Meds:  dextrose    dextrose    glucagon (human recombinant)    hydrALAZINE    ondansetron **OR** ondansetron    oxyCODONE    phenylephrine    Vital signs in last 24 hours:  Temp:  [98.1 °F (36.7 °C)-98.3 °F (36.8 °C)] 98.1 °F (36.7 °C)  Heart Rate:  [57-59] 59  Resp:  [16-20] 20  BP: (170-211)/(66-77) 191/77    Intake/Output:    Intake/Output Summary (Last 24 hours) at 2023 0920  Last data filed at 2023 0500  Gross per 24 hour   Intake 240 ml   Output 790 ml   Net -550 ml       Exam:  BP (!) 191/77 (BP Location: Right arm, Patient Position: Lying)   Pulse 59   Temp 98.1 °F (36.7 °C) (Oral)   Resp 20   Ht 177.8 cm (70\")   Wt 76.1 kg (167 lb 12.8 oz)   SpO2 97%   BMI 24.08 kg/m²     General Appearance:    Alert, cooperative, no distress, appears stated age               Abdomen:     Soft, ND   :    No suprapubic distention                Data Review:  All labs (24hrs):   Recent Results (from the past 24 hour(s))   POC Glucose Once    Collection Time: 23 11:10 AM    Specimen: Blood   Result Value Ref Range    Glucose 209 (H) 70 - 105 mg/dL   POC Glucose Once    Collection Time: 23  4:24 PM    " Specimen: Blood   Result Value Ref Range    Glucose 156 (H) 70 - 105 mg/dL   POC Glucose Once    Collection Time: 11/12/23  8:23 PM    Specimen: Blood   Result Value Ref Range    Glucose 176 (H) 70 - 105 mg/dL   Basic Metabolic Panel    Collection Time: 11/13/23 12:23 AM    Specimen: Arm, Right; Blood   Result Value Ref Range    Glucose 131 (H) 65 - 99 mg/dL    BUN 13 8 - 23 mg/dL    Creatinine 0.88 0.76 - 1.27 mg/dL    Sodium 142 136 - 145 mmol/L    Potassium 3.6 3.5 - 5.2 mmol/L    Chloride 108 (H) 98 - 107 mmol/L    CO2 28.0 22.0 - 29.0 mmol/L    Calcium 8.7 8.6 - 10.5 mg/dL    BUN/Creatinine Ratio 14.8 7.0 - 25.0    Anion Gap 6.0 5.0 - 15.0 mmol/L    eGFR 89.1 >60.0 mL/min/1.73   CBC Auto Differential    Collection Time: 11/13/23 12:23 AM    Specimen: Arm, Right; Blood   Result Value Ref Range    WBC 11.20 (H) 3.40 - 10.80 10*3/mm3    RBC 3.84 (L) 4.14 - 5.80 10*6/mm3    Hemoglobin 11.8 (L) 13.0 - 17.7 g/dL    Hematocrit 35.2 (L) 37.5 - 51.0 %    MCV 91.8 79.0 - 97.0 fL    MCH 30.8 26.6 - 33.0 pg    MCHC 33.5 31.5 - 35.7 g/dL    RDW 12.9 12.3 - 15.4 %    RDW-SD 40.7 37.0 - 54.0 fl    MPV 7.6 6.0 - 12.0 fL    Platelets 320 140 - 450 10*3/mm3    Neutrophil % 66.6 42.7 - 76.0 %    Lymphocyte % 22.0 19.6 - 45.3 %    Monocyte % 7.4 5.0 - 12.0 %    Eosinophil % 2.6 0.3 - 6.2 %    Basophil % 1.4 0.0 - 1.5 %    Neutrophils, Absolute 7.50 (H) 1.70 - 7.00 10*3/mm3    Lymphocytes, Absolute 2.50 0.70 - 3.10 10*3/mm3    Monocytes, Absolute 0.80 0.10 - 0.90 10*3/mm3    Eosinophils, Absolute 0.30 0.00 - 0.40 10*3/mm3    Basophils, Absolute 0.20 0.00 - 0.20 10*3/mm3    nRBC 0.0 0.0 - 0.2 /100 WBC   POC Glucose Once    Collection Time: 11/13/23  7:40 AM    Specimen: Blood   Result Value Ref Range    Glucose 140 (H) 70 - 105 mg/dL      Imaging Results (Last 24 Hours)       ** No results found for the last 24 hours. **             Assessment:    Cancer of sigmoid    Cancer of sigmoid colon      BPH with obstruction  Status post  bilateral stent placement    Plan:    Start Flomax and finasteride for BPH and to help with indwelling stents, Rx sent  Okay for discharge from urology standpoint, schedulers messaged to arrange ureteroscopy with bilateral stent removal in 1 to 2 weeks    Tj Salazar MD  [facility]  [facility] [facility], [address]  [place] [address]  Office: [phone]  Available via [employer] Secure Chat  11/13/23  09:20 EST

## 2023-11-13 NOTE — PROGRESS NOTES
"  [facility] DAILY PROGRESS NOTE    Patient Identification  Name: Tao Pozo  Age: 78 y.o.  Sex: male  :  1945  MRN: [id number]    Date: 2023             Subjective:  Interval History: Voiding well, dysuria improved    Objective:    Scheduled Meds:acetaminophen, 1,000 mg, Oral, Q8H  amLODIPine, 10 mg, Oral, Q24H  aspirin, 81 mg, Oral, Daily  atorvastatin, 40 mg, Oral, Nightly  cephalexin, 500 mg, Oral, Q8H  docusate sodium, 100 mg, Oral, BID  enoxaparin, 40 mg, Subcutaneous, Nightly  finasteride, 5 mg, Oral, Daily  insulin lispro, 2-9 Units, Subcutaneous, 4x Daily AC & at Bedtime  lisinopril, 20 mg, Oral, Q12H  tamsulosin, 0.4 mg, Oral, Daily      Continuous Infusions:phenylephrine, 0.5-3 mcg/kg/min  sodium chloride, 75 mL/hr, Last Rate: 75 mL/hr (23)      PRN Meds:  dextrose    dextrose    glucagon (human recombinant)    hydrALAZINE    ondansetron **OR** ondansetron    oxyCODONE    phenylephrine    Vital signs in last 24 hours:  Temp:  [98.1 °F (36.7 °C)-98.3 °F (36.8 °C)] 98.1 °F (36.7 °C)  Heart Rate:  [57-59] 59  Resp:  [16-20] 20  BP: (170-211)/(66-77) 191/77    Intake/Output:    Intake/Output Summary (Last 24 hours) at 2023 0920  Last data filed at 2023 0500  Gross per 24 hour   Intake 240 ml   Output 790 ml   Net -550 ml       Exam:  BP (!) 191/77 (BP Location: Right arm, Patient Position: Lying)   Pulse 59   Temp 98.1 °F (36.7 °C) (Oral)   Resp 20   Ht 177.8 cm (70\")   Wt 76.1 kg (167 lb 12.8 oz)   SpO2 97%   BMI 24.08 kg/m²     General Appearance:    Alert, cooperative, no distress, appears stated age               Abdomen:     Soft, ND   :    No suprapubic distention                Data Review:  All labs (24hrs):   Recent Results (from the past 24 hour(s))   POC Glucose Once    Collection Time: 23 11:10 AM    Specimen: Blood   Result Value Ref Range    Glucose 209 (H) 70 - 105 mg/dL   POC Glucose Once    Collection Time: 23  4:24 PM    " Specimen: Blood   Result Value Ref Range    Glucose 156 (H) 70 - 105 mg/dL   POC Glucose Once    Collection Time: 11/12/23  8:23 PM    Specimen: Blood   Result Value Ref Range    Glucose 176 (H) 70 - 105 mg/dL   Basic Metabolic Panel    Collection Time: 11/13/23 12:23 AM    Specimen: Arm, Right; Blood   Result Value Ref Range    Glucose 131 (H) 65 - 99 mg/dL    BUN 13 8 - 23 mg/dL    Creatinine 0.88 0.76 - 1.27 mg/dL    Sodium 142 136 - 145 mmol/L    Potassium 3.6 3.5 - 5.2 mmol/L    Chloride 108 (H) 98 - 107 mmol/L    CO2 28.0 22.0 - 29.0 mmol/L    Calcium 8.7 8.6 - 10.5 mg/dL    BUN/Creatinine Ratio 14.8 7.0 - 25.0    Anion Gap 6.0 5.0 - 15.0 mmol/L    eGFR 89.1 >60.0 mL/min/1.73   CBC Auto Differential    Collection Time: 11/13/23 12:23 AM    Specimen: Arm, Right; Blood   Result Value Ref Range    WBC 11.20 (H) 3.40 - 10.80 10*3/mm3    RBC 3.84 (L) 4.14 - 5.80 10*6/mm3    Hemoglobin 11.8 (L) 13.0 - 17.7 g/dL    Hematocrit 35.2 (L) 37.5 - 51.0 %    MCV 91.8 79.0 - 97.0 fL    MCH 30.8 26.6 - 33.0 pg    MCHC 33.5 31.5 - 35.7 g/dL    RDW 12.9 12.3 - 15.4 %    RDW-SD 40.7 37.0 - 54.0 fl    MPV 7.6 6.0 - 12.0 fL    Platelets 320 140 - 450 10*3/mm3    Neutrophil % 66.6 42.7 - 76.0 %    Lymphocyte % 22.0 19.6 - 45.3 %    Monocyte % 7.4 5.0 - 12.0 %    Eosinophil % 2.6 0.3 - 6.2 %    Basophil % 1.4 0.0 - 1.5 %    Neutrophils, Absolute 7.50 (H) 1.70 - 7.00 10*3/mm3    Lymphocytes, Absolute 2.50 0.70 - 3.10 10*3/mm3    Monocytes, Absolute 0.80 0.10 - 0.90 10*3/mm3    Eosinophils, Absolute 0.30 0.00 - 0.40 10*3/mm3    Basophils, Absolute 0.20 0.00 - 0.20 10*3/mm3    nRBC 0.0 0.0 - 0.2 /100 WBC   POC Glucose Once    Collection Time: 11/13/23  7:40 AM    Specimen: Blood   Result Value Ref Range    Glucose 140 (H) 70 - 105 mg/dL      Imaging Results (Last 24 Hours)       ** No results found for the last 24 hours. **             Assessment:    Cancer of sigmoid    Cancer of sigmoid colon      BPH with obstruction  Status post  bilateral stent placement    Plan:    Start Flomax and finasteride for BPH and to help with indwelling stents, Rx sent  Okay for discharge from urology standpoint, schedulers messaged to arrange ureteroscopy with bilateral stent removal in 1 to 2 weeks    Tj Salazar MD  [facility]  [facility] [facility], [address]  [place] [address]  Office: [phone]  Available via [employer] Secure Chat  11/13/23  09:20 EST

## 2023-11-13 NOTE — PROGRESS NOTES
General Surgery Progress Note    Name: Tao Pozo ADMIT: 11/10/2023   : 1945  PCP: Claire Kraus APRN    MRN: [id number] LOS: 3 days   AGE/SEX: 78 y.o. male  ROOM: [address] [place] [address]    No chief complaint on file.    Subjective     Blood pressure better, afebrile.  Overall feeling well and patient states he feels like he is getting better.  Continues to pass gas, bowel movements have slowed down since holding bowel regimen.  Tolerating diet, denies nausea and vomiting.  Pain is well controlled.  Gomez catheter removed yesterday, patient has voided since removal.    Objective     Scheduled Medications:   acetaminophen, 1,000 mg, Oral, Q8H  amLODIPine, 10 mg, Oral, Q24H  aspirin, 81 mg, Oral, Daily  atorvastatin, 40 mg, Oral, Nightly  cephalexin, 500 mg, Oral, Q8H  docusate sodium, 100 mg, Oral, BID  enoxaparin, 40 mg, Subcutaneous, Nightly  finasteride, 5 mg, Oral, Daily  insulin lispro, 2-9 Units, Subcutaneous, 4x Daily AC & at Bedtime  lisinopril, 20 mg, Oral, Q12H  tamsulosin, 0.4 mg, Oral, Daily        Active Infusions:  phenylephrine, 0.5-3 mcg/kg/min  sodium chloride, 75 mL/hr, Last Rate: 75 mL/hr (23)        As Needed Medications:    dextrose    dextrose    diphenhydrAMINE-zinc acetate    glucagon (human recombinant)    hydrALAZINE    ondansetron **OR** ondansetron    oxyCODONE    phenylephrine    Vital Signs  Vital Signs Patient Vitals for the past 24 hrs:   BP Temp Temp src Pulse Resp SpO2   23 1134 162/65 98.1 °F (36.7 °C) Oral 54 16 97 %   23 0500 (!) 191/77 98.1 °F (36.7 °C) Oral 59 20 97 %   23 170/66 98.3 °F (36.8 °C) Oral 57 16 98 %     I/O:  I/O last 3 completed shifts:  In: 1480 [P.O.:480; I.V.:1000]  Out: 1780 [Urine:1700; Drains:80]    Physical Exam:  Physical Exam  Cardiovascular:      Rate and Rhythm: Normal rate.   Pulmonary:      Effort: Pulmonary effort is normal.   Abdominal:      General: There is no distension.      Palpations: Abdomen is  soft.      Tenderness: There is no abdominal tenderness. There is no guarding.      Comments: PTARICIA X1 present with serosanguineous output   Neurological:      Mental Status: He is alert and oriented to person, place, and time.   Psychiatric:         Mood and Affect: Mood normal.         Results Review:     CBC    Results from last 7 days   Lab Units 11/13/23  0023 11/11/23 2336 11/10/23  2255   WBC 10*3/mm3 11.20* 12.60* 14.50*   HEMOGLOBIN g/dL 11.8* 12.0* 13.7   PLATELETS 10*3/mm3 320 293 309     BMP   Results from last 7 days   Lab Units 11/13/23  0023 11/11/23  2336 11/10/23  2255   SODIUM mmol/L 142 142 140   POTASSIUM mmol/L 3.6 3.9 4.6   CHLORIDE mmol/L 108* 109* 104   CO2 mmol/L 28.0 26.0 26.0   BUN mg/dL 13 15 13   CREATININE mg/dL 0.88 0.89 1.11   GLUCOSE mg/dL 131* 138* 180*     Radiology(recent) No radiology results for the last day    I reviewed the patient's new clinical results.    Assessment & Plan       Cancer of sigmoid    Cancer of sigmoid colon      78 y.o. male POD 3 status post laparoscopic sigmoid colectomy with da Barby robot  -Overall improving and doing well. Okay to discharge from general surgery standpoint, patient is to go home with drain in place. Follow-up with Dr. Chavira in office in 1 week      This note was created using Dragon Voice Recognition software.    ROSEANN De Dios  11/13/23  15:23 EST

## 2023-11-13 NOTE — PROGRESS NOTES
[facility] Medicine Services   Daily Progress Note    Patient Name: Tao Pozo  : 1945  MRN: [id number]  Primary Care Physician:  Claire Kraus APRN  Date of admission: 11/10/2023  Date and Time of Service: 23    Brief clinical summary:  Tao Pozo is a 78 y.o. 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  Hospitalist service is consulting to assist with postop medical management.  BP remains elevated.  Stated he had stopped taking his medications for about 2 weeks for the practical reasons of having to undergo multiple procedures    Subjective      Medical follow-up on postop day 3.  Met patient resting in bedside chair.  Very cheerful and comfortable.  Voiding with minimal discomfort.  Yet to have a good bowel movement but has passed a number of flatus. Also reports a small erythematous itchy square patch on the lateral aspect of the left thigh    Objective      Vitals:   Temp:  [98.1 °F (36.7 °C)-98.3 °F (36.8 °C)] 98.1 °F (36.7 °C)  Heart Rate:  [57-59] 59  Resp:  [16-20] 20  BP: (170-211)/(66-77) 191/77    Physical Exam   General appearance: Cheerful, pleasant, not in distress, normal interaction  Mental status: Alert and oriented x3  Head: Normocephalic and atraumatic  Eyes: EOMI, nonicteric  Oropharynx: Moist mucous membranes, no thrush  Neck: Supple, no JVD or tenderness  CVS: Regular heart sounds, no gallops or murmurs  Respiration: Nonlabored breathing, clear to auscultation bilaterally  GI: Postop changes with abdominal drain in place, bowel sounds present  : No bladder distention, no CVA tenderness  Extremities: No leg edema, no calf tenderness  Skin: flat erythematous square shaped patch on the lateral aspect of the left thigh  Musculoskeletal:  Well-developed male, no gross deformities  Neurology: Moves all extremities, no facial droop, no dysarthria  Psychiatry: Normal affect, appropriate behavior, good insight     Result Review    Result Review:  I have personally reviewed the results from the time of this admission to 11/13/2023 10:53 EST and agree with these findings:  [x]  Laboratory  []  Microbiology  []  Radiology  []  EKG/Telemetry   []  Cardiology/Vascular   []  Pathology  []  Old records  []  Other:    Wounds (last 24 hours)       LDA Wound       Row Name 11/13/23 0847 11/13/23 0415 11/13/23 0015       Wound 11/10/23 0755 medial abdomen Incision    Wound - Properties Group Placement Date: 11/10/23  -MS Placement Time: 0755  -MS Orientation: medial  -MS Location: abdomen  -MS Primary Wound Type: Incision  -MS    Dressing Appearance dry;intact  -BC -- --    Closure Open to air;Glue  -BC Open to air;Glue  -JW Open to air;Glue  -JW    Retired Wound - Properties Group Placement Date: 11/10/23  -MS Placement Time: 0755  -MS Orientation: medial  -MS Location: abdomen  -MS Primary Wound Type: Incision  -MS    Retired Wound - Properties Group Date first assessed: 11/10/23  -MS Time first assessed: 0755  -MS Location: abdomen  -MS Primary Wound Type: Incision  -MS      Row Name 11/12/23 2003             Wound 11/10/23 0755 medial abdomen Incision    Wound - Properties Group Placement Date: 11/10/23  -MS Placement Time: 0755  -MS Orientation: medial  -MS Location: abdomen  -MS Primary Wound Type: Incision  -MS    Dressing Appearance dry;intact  -JW      Closure Open to air;Glue  -JW      Retired Wound - Properties Group Placement Date: 11/10/23  -MS Placement Time: 0755  -MS Orientation: medial  -MS Location: abdomen  -MS Primary Wound Type: Incision  -MS    Retired Wound - Properties Group Date first assessed: 11/10/23  -MS Time first assessed: 0755  -MS Location: abdomen  -MS Primary Wound Type: Incision  -MS              User Key  (r) = Recorded By, (t)  = Taken By, (c) = Cosigned By      Initials Name Provider Type    MS Nelli Duffy, RN Registered Nurse    Sherrie Vera LPN Licensed Nurse    Zandra Calderon RN Registered Nurse                      Assessment & Plan      acetaminophen, 1,000 mg, Oral, Q8H  amLODIPine, 10 mg, Oral, Q24H  aspirin, 81 mg, Oral, Daily  atorvastatin, 40 mg, Oral, Nightly  cephalexin, 500 mg, Oral, Q8H  docusate sodium, 100 mg, Oral, BID  enoxaparin, 40 mg, Subcutaneous, Nightly  finasteride, 5 mg, Oral, Daily  insulin lispro, 2-9 Units, Subcutaneous, 4x Daily AC & at Bedtime  lisinopril, 20 mg, Oral, Q12H  tamsulosin, 0.4 mg, Oral, Daily       phenylephrine, 0.5-3 mcg/kg/min  sodium chloride, 75 mL/hr, Last Rate: 75 mL/hr (11/11/23 1946)         Active Hospital Problems:  Active Hospital Problems    Diagnosis     **Cancer of sigmoid     Cancer of sigmoid colon      Plan:     Adenocarcinoma of the sigmoid colon status post sigmoid colectomy on 11/10  -Postoperative management per general surgery     Bilateral ureteral impression from sigmoid mass  -Status post intraoperative ureteral stents placement  -Urology following     Uncontrolled primary hypertension due to not taking medications for 2 weeks  -BP remains suboptimal on home lisinopril  -Start amlodipine 10 mg daily  -Continue needed hydralazine  -Monitor BP closely and make medication adjustments accordingly     Uncontrolled DM 2 with hyperglycemia not on long-term insulin  -At home he does Amaryl and Glucophage  -While on admission, we will use insulin     CAD/CABG x3  -Continue home aspirin and statin     Contact dermatitis  -Start topical steroid/Benadryl       DVT prophylaxis:  Medical and mechanical DVT prophylaxis orders are present.    CODE STATUS:    Code Status (Patient has no pulse and is not breathing): CPR (Attempt to Resuscitate)  Medical Interventions (Patient has pulse or is breathing): Full      Disposition:  I expect patient to be discharged 1-2  days.      Electronically signed by Olman Lambert MD, 11/13/23, 10:53 EST.  [facility] Hospitalist Team

## 2023-11-13 NOTE — PLAN OF CARE
Goal Outcome Evaluation:                     Pt continues to be hypertensive. Amlodipine initiated per Hospitalist. Pain treated per PO PRN orders. Pt ambulating in room independently. Pt able to make needs known. Safety measures in place. Pt anticipating dc home with family. Plan of care ongoing.

## 2023-11-13 NOTE — SIGNIFICANT NOTE
11/13/23 1417   OTHER   Discipline physical therapist   Rehab Time/Intention   Session Not Performed   (Spoke with RN, pt is up ad jimmie ambulating on his own.  No skilled PT needs at this time.)   Therapy Assessment/Plan (PT)   Criteria for Skilled Interventions Met (PT) no;does not meet criteria for skilled intervention

## 2023-11-13 NOTE — PLAN OF CARE
Goal Outcome Evaluation:                      Pt to dc home today with family. VSS prior to dc. Surgical Drain care provided.

## 2023-11-13 NOTE — CONSULTS
Nutrition Services    Patient Name: Tao Pozo  YOB: 1945  MRN: [id number]  Admission date: 11/10/2023    Continue current diet as tolerated. Addition of Boost Plus BID (Provides 720 kcals, 28 g protein if consumed).  Boost Glucose Control may be substituted for Boost Plus at this time, due to national shortage of many ONS products. If substituted, each Boost Glucose Control will provide 190 kcal and 16g PRO.      PPE Documentation        PPE Worn By Provider none   PPE Worn By Patient  N/A     NUTRITION SCREENING      Trending Narrative: 11/13: Pt being assessed for malnutrition screening tool score of 2. Pt admitted to [facility] with adenocarcinoma of the sigmoid colon. Pt is s/p sigmoid colectomy. RD visited pt at bedside. Pt reported he hasn't had a great appetite for some time now, but he is still able to eat 2 meals daily with some snacking. Pt does not drink ONS, but he had wondered if he should start. RD provided pt with vanilla Boost and pt felt like it was tolerable. RD encouraged pt to ask for ONS if he wanted more of them. NFPE completed and not consistent with nutrition diagnosis of malnutrition at this time using AND/ASPEN criteria          PO Diet: Diet: Regular/House Diet; Texture: Regular Texture (IDDSI 7); Fluid Consistency: Thin (IDDSI 0)   PO Supplements: -   Trending PO Intake:  11/13: no intake recorded since regular diet initiation. Minimal intake prior with nature of liquid diet       Nutritionally-Pertinent Medications RDN Reviewed, C/W clinical course         Labs (reviewed below): Reviewed.      Results from last 7 days   Lab Units 11/13/23  0023 11/11/23  2336 11/10/23  2255   SODIUM mmol/L 142 142 140   POTASSIUM mmol/L 3.6 3.9 4.6   CHLORIDE mmol/L 108* 109* 104   CO2 mmol/L 28.0 26.0 26.0   BUN mg/dL 13 15 13   CREATININE mg/dL 0.88 0.89 1.11   CALCIUM mg/dL 8.7 8.4* 8.8   GLUCOSE mg/dL 131* 138* 180*     Results from last 7 days   Lab Units 11/13/23  0023   HEMOGLOBIN  "g/dL 11.8*   HEMATOCRIT % 35.2*     Lab Results   Component Value Date    HGBA1C 6.90 (H) 11/10/2023          GI Function:  + BM on 11/12.       Skin: Intact        Weight Review: Estimated body mass index is 24.08 kg/m² as calculated from the following:    Height as of this encounter: 177.8 cm (70\").    Weight as of this encounter: 76.1 kg (167 lb 12.8 oz).    Comment:   ~6% wt loss x 10 months    Wt Readings from Last 30 Encounters:   11/10/23 0650 76.1 kg (167 lb 12.8 oz)   11/02/23 1632 76.2 kg (168 lb)   11/06/23 0907 75.8 kg (167 lb)   10/31/23 1037 76.4 kg (168 lb 6.4 oz)   10/27/23 1032 76.4 kg (168 lb 6.9 oz)   10/23/23 0941 79.4 kg (175 lb)   05/15/23 1115 77.6 kg (171 lb)   02/28/23 1005 78.5 kg (173 lb)   01/12/23 1504 81.3 kg (179 lb 2 oz)   01/10/23 2001 80.3 kg (177 lb 0.5 oz)   11/14/22 1140 81.2 kg (179 lb)   10/24/22 1204 81.6 kg (180 lb)   10/24/22 1416 79.8 kg (176 lb)   10/03/22 1039 81.6 kg (180 lb)   09/27/22 0922 82.1 kg (181 lb)   05/09/22 1140 81.1 kg (178 lb 12 oz)   10/19/21 1305 80.3 kg (177 lb)   06/03/21 1418 78.9 kg (174 lb)   04/26/21 1100 77.6 kg (171 lb)   04/05/21 1137 77.1 kg (170 lb)   04/01/21 1359 78 kg (172 lb)   03/18/21 0500 79.7 kg (175 lb 11.3 oz)   03/17/21 0600 80.8 kg (178 lb 2.1 oz)   03/16/21 0600 80.7 kg (177 lb 14.6 oz)   03/16/21 0300 80.7 kg (177 lb 14.6 oz)   03/15/21 0400 78.2 kg (172 lb 6.4 oz)   03/14/21 0409 76.1 kg (167 lb 12.3 oz)   03/12/21 1359 76.5 kg (168 lb 10.4 oz)   03/12/21 0653 78.8 kg (173 lb 11.6 oz)   02/23/21 1150 78 kg (172 lb)   02/23/21 1348 78 kg (172 lb)   02/09/21 1000 78 kg (172 lb)   12/27/20 0402 78.6 kg (173 lb 3.2 oz)   12/25/20 1945 80 kg (176 lb 4.8 oz)   12/25/20 1417 81.6 kg (180 lb)   11/22/20 1234 81.6 kg (180 lb)   11/07/19 1526 78.9 kg (174 lb)   11/01/19 1218 79.8 kg (176 lb)   07/23/17 1239 82.6 kg (182 lb)          --       Nutrition Problem Statement: Inadequate energy intake related to clinical course as evidenced by " frequent NPO status and liquid diets this admission.         Nutrition Intervention: Continue current diet. Addition of Boost Plus BID (Provides 720 kcals, 28 g protein if consumed).    Boost Glucose Control may be substituted for Boost Plus at this time, due to national shortage of many ONS products. If substituted, each Boost Glucose Control will provide 190 kcal and 16g PRO.            Monitoring/Evaluation Per protocol, PO intake, Supplement intake, Pertinent labs, Weight, GI status          RD to follow up per protocol.    Electronically signed by:  Stormy Odom RD  11/13/23 08:17 EST

## 2023-11-13 NOTE — CASE MANAGEMENT/SOCIAL WORK
Discharge Planning Assessment   [facility]     Patient Name: Tao Pozo  MRN: [id number]  Today's Date: 11/13/2023    Admit Date: 11/10/2023    Plan: Home. Family can transport at discharge.   Discharge Needs Assessment       Row Name 11/13/23 1444       Living Environment    People in Home spouse    Name(s) of People in Home karine Boyer    Current Living Arrangements home    Potentially Unsafe Housing Conditions none    Primary Care Provided by self    Provides Primary Care For no one    Family Caregiver if Needed spouse    Family Caregiver Names karine Boyer    Quality of Family Relationships supportive;helpful;involved    Able to Return to Prior Arrangements yes       Resource/Environmental Concerns    Resource/Environmental Concerns none    Transportation Concerns none       Transition Planning    Patient/Family Anticipates Transition to home with family    Patient/Family Anticipated Services at Transition none    Transportation Anticipated family or friend will provide       Discharge Needs Assessment    Readmission Within the Last 30 Days no previous admission in last 30 days    Equipment Currently Used at Home none    Concerns to be Addressed care coordination/care conferences;discharge planning    Anticipated Changes Related to Illness none    Equipment Needed After Discharge none    Provided Post Acute Provider List? N/A    N/A Provider List Comment denies dc needs                   Discharge Plan       Row Name 11/13/23 1445       Plan    Plan Home. Family can transport at discharge.    Patient/Family in Agreement with Plan yes    Plan Comments Met with patient at bedside.  Confirmed pc and pharmacy.  Lives at home with spouse,  Family able to transport at discharge.  Denies dc needs   Dc barriers:  monitoring BP                  Continued Care and Services - Admitted Since 11/10/2023    Coordination has not been started for this encounter.       Expected Discharge Date and Time       Expected  Discharge Date Expected Discharge Time    Nov 15, 2023            Demographic Summary       Row Name 11/13/23 1444       General Information    Admission Type inpatient    Arrived From home    Referral Source admission list    Reason for Consult discharge planning    Preferred Language English       Contact Information    Permission Granted to Share Info With                    Functional Status       Row Name 11/13/23 1444       Functional Status    Usual Activity Tolerance good    Current Activity Tolerance good       Functional Status, IADL    Medications independent    Meal Preparation independent    Housekeeping independent    Laundry independent    Shopping independent       Mental Status    General Appearance WDL WDL       Mental Status Summary    Recent Changes in Mental Status/Cognitive Functioning no changes                    Pascale Xiong, RN

## 2023-11-14 ENCOUNTER — READMISSION MANAGEMENT (OUTPATIENT)
Dept: CALL CENTER | Facility: HOSPITAL | Age: 78
End: 2023-11-14
Payer: MEDICARE

## 2023-11-14 ENCOUNTER — TELEPHONE (OUTPATIENT)
Dept: SURGERY | Facility: CLINIC | Age: 78
End: 2023-11-14
Payer: MEDICARE

## 2023-11-14 LAB
LAB AP CASE REPORT: NORMAL
LAB AP SYNOPTIC CHECKLIST: NORMAL
PATH REPORT.FINAL DX SPEC: NORMAL
PATH REPORT.GROSS SPEC: NORMAL

## 2023-11-14 NOTE — PROGRESS NOTES
Encounter Date:11/27/2023  Last seen 5/15/2023      Patient ID: Tao Pozo is a 78 y.o. male.      Chief Complaint:     Status post last seen-CABG  Hypertension  Dyslipidemia  Diabetes  Renal dysfunction     History of Present Illness  Patient had sigmoid colectomy for adenocarcinoma of the colon.  Patient apparently had ureteral stents placed prior to colon surgery.  Patient is supposed to see oncologist for possible adjuvant chemotherapy.  Patient apparently has not been taking his blood pressure medications.    Since I have last seen, the patient has been without any chest discomfort ,shortness of breath, palpitations, dizziness or syncope.  Denies having any headache ,abdominal pain ,nausea, vomiting , diarrhea constipation, loss of weight or loss of appetite.  Denies having any excessive bruising ,hematuria or blood in the stool.    Review of all systems negative except as indicated.    Reviewed ROS.  Assessment and Plan         ]]]]]]]]]]]]]]]]]]]  History  ========     -Status post CABG x 3 with a LIMA to the mid LAD and reverse individual saphenous vein graft to the distal RCA into the medial marginal-  3/15/2021      Tyqkmgcfaimnja-zoxzkq-15/24/2022 except for left atrial enlargement  Lexiscan Cardiolite test-normal- 10/24/2022     Cardiac catheterization 3/12/2021 revealed left ventricle size and contractility is normal with ejection fraction of 60%.  No mitral regurgitation is present.  Left subclavian artery and left internal mammary arteries are normal.  Left main coronary artery normal.  Left anterior descending artery has diffuse calcification with ostial 60% proximal 90 and mid segment 90% disease.  Circumflex coronary artery provided a large marginal branch.  Circumflex coronary artery has 60% disease proximal to the origin of marginal branch.  Circumflex coronary artery distal to the marginal branches small in caliber and has diffuse 60 to 70% disease.  Right coronary artery is a very  large and dominant vessel that has ostial 99% disease and mid segment 90% disease.  There appears to be a clot.       -Hypertension dyslipidemia diabetes.  CKD 3.  Recent BUN 35 creatinine 1.27-12/27/2020     -History of significant sinus bradycardia that has improved in the past with discontinuation of Bystolic.     - History of COVID-19 positive (prior to surgery)     -History of nonsustained ventricular tachycardia with exercise in the past.     -Status post tonsillectomy adenoidectomy    - Status post sigmoid colectomy for adenocarcinoma of the colon.     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  ==============  Plan  =========  Patient had sigmoid colectomy for adenocarcinoma of the colon.  Patient apparently had ureteral stents placed prior to colon surgery.  Patient is supposed to see oncologist for possible adjuvant chemotherapy.  Patient apparently has not been taking his blood pressure medications.  Patient did not have any perioperative cardiovascular problems with colon surgery.     status post CABG 3/15/2021  Patient is off Plavix.  EKG sinus bradycardia- 9/27/2022.  EKG-11/27/2023 revealed sinus bradycardia 46/min nonspecific ST-T wave changes normal axis normal intervals no ectopy no significant change from previous EKG.     Hypertension-210/80  Patient apparently has not been taking his blood pressure medications.  Patient was educated to take medications on a regular basis.  Continue lisinopril 40 mg in the morning and increase to 20 mg in the evening starting with 10 mg in the evening.  Continue metoprolol 25 mg a day.     Dyslipidemia-continue atorvastatin     Medications were reviewed and updated.      Follow-up in the office  in 6 months.     .Further plan will depend on patient's progress    Reviewed and updated-11/27/2023   ]]]]]]]]]]]]]                     Diagnosis Plan   1. S/P CABG (coronary artery bypass graft)        2. Dyslipidemia         3. Essential hypertension        4. Chronic coronary artery disease        5. Diabetes mellitus due to underlying condition without complication, without long-term current use of insulin        LAB RESULTS (LAST 7 DAYS)    CBC  Results from last 7 days   Lab Units 11/13/23  0023 11/11/23  2336 11/10/23  2255   WBC 10*3/mm3 11.20* 12.60* 14.50*   RBC 10*6/mm3 3.84* 3.96* 4.47   HEMOGLOBIN g/dL 11.8* 12.0* 13.7   HEMATOCRIT % 35.2* 36.0* 40.5   MCV fL 91.8 90.9 90.6   PLATELETS 10*3/mm3 320 293 309       BMP  Results from last 7 days   Lab Units 11/13/23  0023 11/11/23  2336 11/10/23  2255   SODIUM mmol/L 142 142 140   POTASSIUM mmol/L 3.6 3.9 4.6   CHLORIDE mmol/L 108* 109* 104   CO2 mmol/L 28.0 26.0 26.0   BUN mg/dL 13 15 13   CREATININE mg/dL 0.88 0.89 1.11   GLUCOSE mg/dL 131* 138* 180*       CMP   Results from last 7 days   Lab Units 11/13/23  0023 11/11/23  2336 11/10/23  2255   SODIUM mmol/L 142 142 140   POTASSIUM mmol/L 3.6 3.9 4.6   CHLORIDE mmol/L 108* 109* 104   CO2 mmol/L 28.0 26.0 26.0   BUN mg/dL 13 15 13   CREATININE mg/dL 0.88 0.89 1.11   GLUCOSE mg/dL 131* 138* 180*         BNP        TROPONIN        CoAg        Creatinine Clearance  Estimated Creatinine Clearance: 76.9 mL/min (by C-G formula based on SCr of 0.88 mg/dL).    ABG        Radiology  No radiology results for the last day                The following portions of the patient's history were reviewed and updated as appropriate: allergies, current medications, past family history, past medical history, past social history, past surgical history, and problem list.    ROS      Current Outpatient Medications:   •  aspirin 81 MG EC tablet, Take 1 tablet by mouth Daily., Disp: 30 tablet, Rfl: 3  •  atorvastatin (LIPITOR) 40 MG tablet, Take 1 tablet by mouth Every Night., Disp: 90 tablet, Rfl: 3  •  CBD (cannabidiol) oral oil, Take 1 mL by mouth Daily. For arthritis PRN, Disp: , Rfl:   •  cephalexin (KEFLEX) 500 MG capsule, Take 1 capsule by  mouth Every 8 (Eight) Hours for 20 doses. Indications: Urinary Tract Infection, Disp: 20 capsule, Rfl: 0  •  finasteride (PROSCAR) 5 MG tablet, Take 1 tablet by mouth Daily., Disp: 90 tablet, Rfl: 3  •  glimepiride (AMARYL) 2 MG tablet, Take 1 tablet by mouth Every Morning Before Breakfast., Disp: , Rfl:   •  HYDROcodone-acetaminophen (Norco) 5-325 MG per tablet, Take 1 tablet by mouth Every 6 (Six) Hours As Needed for Moderate Pain for up to 15 days., Disp: 15 tablet, Rfl: 0  •  lisinopril (PRINIVIL,ZESTRIL) 20 MG tablet, Take 2 tablets by mouth 2 (Two) Times a Day., Disp: , Rfl:   •  metFORMIN (GLUCOPHAGE) 1000 MG tablet, Take 1 tablet by mouth 2 (Two) Times a Day With Meals., Disp: , Rfl:   •  ondansetron (Zofran) 4 MG tablet, Take 1 tablet by mouth Every 8 (Eight) Hours As Needed for Nausea or Vomiting., Disp: 30 tablet, Rfl: 1  •  tamsulosin (FLOMAX) 0.4 MG capsule 24 hr capsule, Take 1 capsule by mouth Daily., Disp: 90 capsule, Rfl: 3  No current facility-administered medications for this visit.    Allergies   Allergen Reactions   • Shellfish-Derived Products Anaphylaxis       Family History   Problem Relation Age of Onset   • Diabetes Mother    • Heart disease Father    • Diabetes Sister    • Diabetes Brother    • Hypertension Brother    • Heart disease Brother        Past Surgical History:   Procedure Laterality Date   • CARDIAC CATHETERIZATION N/A 3/12/2021    Procedure: Left Heart Cath with Coronary Angiography;  Surgeon: Huseyin Do MD;  Location: [facility] CATH INVASIVE LOCATION;  Service: Cardiovascular;  Laterality: N/A;   • COLON RESECTION N/A 11/10/2023    Procedure: COLON RESECTION LAPAROSCOPIC SIGMOID WITH DAVINCI ROBOT;  Surgeon: Dilshad Chvaira MD;  Location: [facility] MAIN OR;  Service: Robotics - DaVinci;  Laterality: N/A;   • COLONOSCOPY N/A 10/27/2023    Procedure: COLONOSCOPY with cold forcep biopsy x1 area and cold snare polypectomy x2;  Surgeon: Yared Smiley MD;  Location:   [facility] ENDOSCOPY;  Service: Gastroenterology;  Laterality: N/A;  Post- colon polyps, sigmoid colon mass, diverticulosis   • CORONARY ARTERY BYPASS GRAFT N/A 3/15/2021    Procedure: CORONARY ARTERY BYPASS GRAFTING;  Surgeon: Paxton Vasquez MD;  [facility]  Service: Cardiothoracic;  Laterality: N/A;   • CYSTOSCOPY W/ URETERAL STENT PLACEMENT Bilateral 11/10/2023    Procedure: CYSTOSCOPY, URETERAL ICG INSERTION;  Surgeon: Tj Salazar MD;  Location: [facility] MAIN OR;  Service: Urology;  Laterality: Bilateral;   • CYSTOSCOPY, RETROGRADE PYELOGRAM AND STENT INSERTION Bilateral 11/10/2023    Procedure: CYSTOSCOPY RETROGRADE PYELOGRAM AND STENT INSERTION;  Surgeon: Tj Salazar MD;  Location: [facility] MAIN OR;  Service: Urology;  Laterality: Bilateral;   • TONSILECTOMY, ADENOIDECTOMY, BILATERAL MYRINGOTOMY AND TUBES  1952       Past Medical History:   Diagnosis Date   • Arthritis    • Coronary artery disease 2/14/2012   • Diabetes mellitus    • Elevated cholesterol    • Hyperlipidemia    • Hypertension    • Malignant neoplasm of sigmoid colon 10/31/2023       Family History   Problem Relation Age of Onset   • Diabetes Mother    • Heart disease Father    • Diabetes Sister    • Diabetes Brother    • Hypertension Brother    • Heart disease Brother        Social History     Socioeconomic History   • Marital status:    Tobacco Use   • Smoking status: Never     Passive exposure: Past   • Smokeless tobacco: Never   Vaping Use   • Vaping Use: Never used   Substance and Sexual Activity   • Alcohol use: No   • Drug use: Never   • Sexual activity: Not Currently     Partners: Female           ECG 12 Lead    Date/Time: 11/27/2023 12:23 PM  Performed by: Huseyin Do MD    Authorized by: Huseyin Do MD  Comparison: compared with previous ECG   Similar to previous ECG  Comparison to previous ECG: Sinus bradycardia nonspecific ST-T wave changes 46/min normal axis normal intervals no ectopy no significant  change from previous EKG.        Objective:       Physical Exam    There were no vitals taken for this visit.  The patient is alert, oriented and in no distress.    Vital signs as noted above.    Head and neck revealed no carotid bruits or jugular venous distension.  No thyromegaly or lymphadenopathy is present.    Lungs clear.  No wheezing.  Breath sounds are normal bilaterally.    Heart normal first and second heart sounds.  No murmur..  No pericardial rub is present.  No gallop is present.    Abdomen soft and nontender.  No organomegaly is present.    Extremities revealed good peripheral pulses without any pedal edema.    Skin warm and dry.    Musculoskeletal system is grossly normal.    CNS grossly normal.    Reviewed and updated.

## 2023-11-14 NOTE — OUTREACH NOTE
Prep Survey      Flowsheet Row Responses   [facility] facility patient discharged from? [facility]   Is LACE score < 7 ? No   Eligibility Readm Mgmt   Discharge diagnosis Cancer of sigmoid colon, s/p COLON RESECTION LAPAROSCOPIC SIGMOID,  s/p Cystoscopy, bilateral ureteral catheterization, Left ureteroscopy and bilateral stent insertion   Does the patient have one of the following disease processes/diagnoses(primary or secondary)? General Surgery   Does the patient have Home health ordered? No   Is there a DME ordered? No   Prep survey completed? Yes            Patrizia CAMERON - Registered Nurse

## 2023-11-14 NOTE — CASE MANAGEMENT/SOCIAL WORK
Case Management Discharge Note      Final Note: HOME    Provided Post Acute Provider List?: N/A  N/A Provider List Comment: denies dc needs    Selected Continued Care - Discharged on 11/13/2023 Admission date: 11/10/2023 - Discharge disposition: Home or Self Care            Transportation Services  Private: Car    Final Discharge Disposition Code: 01 - home or self-care

## 2023-11-16 ENCOUNTER — READMISSION MANAGEMENT (OUTPATIENT)
Dept: CALL CENTER | Facility: HOSPITAL | Age: 78
End: 2023-11-16
Payer: MEDICARE

## 2023-11-16 NOTE — OUTREACH NOTE
General Surgery Week 1 Survey      Flowsheet Row Responses   [facility] facility patient discharged from? [facility]   Does the patient have one of the following disease processes/diagnoses(primary or secondary)? General Surgery   Week 1 attempt successful? No   Unsuccessful attempts Attempt 1            Teri Warren Registered Nurse

## 2023-11-20 NOTE — PROGRESS NOTES
Enter Query Response Below      Query Response: yes      Electronically signed by Dilshad Chavira MD, 23, 4:17 PM EST.               If applicable, please update the problem list.   Patient: Tao Pozo        : 1945  Account: [id number]           Admit Date: 11/10/2023        How to Respond to this query:       a. Click New Note     b. Answer query within the yellow box.                c. Update the Problem List, if applicable.    If you have any questions about this query contact me at: betty Chavira,    Based on inpatient coding guidelines, [facility] are not allowed to use diagnoses from pathology reports as the sole basis for billing.  Any findings noted on a pathology report must be affirmed by the treating physician before billing.     The pathologist reported that the specimen shows Specimen #1 (Colon, sigmoid anastomotic donut, excision): Benign colonic mucosa with no significant histopathology. No evidence of malignancy.   Specimen #2 (Colon, sigmoid, segmental resection): Invasive moderately differentiated adenocarcinoma (pT3, pN1a, see synoptic report) Tumor extends into pericolonic adipose tissue. One of nineteen lymph nodes positive for metastatic carcinoma ().     Is this finding consistent with your clinical impression and treatment plan for this patient?    Yes  No  Other explanation for clinical impression and treatment plan_________  Unable to determine    By submitting this query, we are merely seeking further clarification of documentation to accurately reflect all conditions that you are monitoring, evaluating, treating or that extend the hospitalization or utilize additional resources of care. Please utilize your independent clinical judgment when addressing the question(s) above.     This query and your response, once completed, will be entered into the legal medical record.    Sincerely,  Smiley GUSTAFSON RN CCDS  System Director Clinical  Documentation Integrity Program

## 2023-11-21 ENCOUNTER — READMISSION MANAGEMENT (OUTPATIENT)
Dept: CALL CENTER | Facility: HOSPITAL | Age: 78
End: 2023-11-21
Payer: MEDICARE

## 2023-11-21 ENCOUNTER — OFFICE VISIT (OUTPATIENT)
Dept: SURGERY | Facility: CLINIC | Age: 78
End: 2023-11-21
Payer: MEDICARE

## 2023-11-21 VITALS
DIASTOLIC BLOOD PRESSURE: 70 MMHG | OXYGEN SATURATION: 97 % | HEIGHT: 70 IN | HEART RATE: 60 BPM | TEMPERATURE: 98.2 F | SYSTOLIC BLOOD PRESSURE: 140 MMHG | BODY MASS INDEX: 23.19 KG/M2 | WEIGHT: 162 LBS

## 2023-11-21 DIAGNOSIS — C18.7 MALIGNANT NEOPLASM OF SIGMOID COLON: Primary | ICD-10-CM

## 2023-11-21 PROCEDURE — 99024 POSTOP FOLLOW-UP VISIT: CPT | Performed by: STUDENT IN AN ORGANIZED HEALTH CARE EDUCATION/TRAINING PROGRAM

## 2023-11-21 PROCEDURE — 1159F MED LIST DOCD IN RCRD: CPT | Performed by: STUDENT IN AN ORGANIZED HEALTH CARE EDUCATION/TRAINING PROGRAM

## 2023-11-21 PROCEDURE — 3078F DIAST BP <80 MM HG: CPT | Performed by: STUDENT IN AN ORGANIZED HEALTH CARE EDUCATION/TRAINING PROGRAM

## 2023-11-21 PROCEDURE — 1160F RVW MEDS BY RX/DR IN RCRD: CPT | Performed by: STUDENT IN AN ORGANIZED HEALTH CARE EDUCATION/TRAINING PROGRAM

## 2023-11-21 PROCEDURE — 3077F SYST BP >= 140 MM HG: CPT | Performed by: STUDENT IN AN ORGANIZED HEALTH CARE EDUCATION/TRAINING PROGRAM

## 2023-11-21 NOTE — PROGRESS NOTES
"Chief Complaint  Post-op (PO Sigmoid Colectomy 11/10/23)    Subjective        Tao Pozo presents to [facility] GENERAL SURGERY  History of Present Illness    78-year-old gentleman here for follow-up after his robotic sigmoid colectomy.  Discussed his pathology results showing T3 N1a M0 adenocarcinoma of his colon with adequate and negative margins.  I placed a preoperative referral to oncology, he states he has been busy and has been unable to see oncology yet.  His endoscopist is Dr. Smiley.  Preoperative CEA was 13.8.  Suspect he will need adjuvant chemotherapy, I happy to place port if needed he can call the office to get this scheduled if decides to proceed with IV chemotherapy.    He has an appointment with Dr. Salazar for ureteral stent removal.  His drain has been low output and I discussed with Dr. Salazar and removed this in the office today. No lifting over 30 pounds for another month, he can follow-up me again in 1 month for repeat check.    Objective   Vital Signs:  /70   Pulse 60   Temp 98.2 °F (36.8 °C) (Infrared)   Ht 177.8 cm (70\")   Wt 73.5 kg (162 lb)   SpO2 97%   BMI 23.24 kg/m²   Estimated body mass index is 23.24 kg/m² as calculated from the following:    Height as of this encounter: 177.8 cm (70\").    Weight as of this encounter: 73.5 kg (162 lb).       BMI is within normal parameters. No other follow-up for BMI required.      Physical Exam  Constitutional:       General: He is not in acute distress.     Appearance: Normal appearance. He is not ill-appearing.   HENT:      Head: Normocephalic and atraumatic.      Right Ear: External ear normal.      Left Ear: External ear normal.   Eyes:      Extraocular Movements: Extraocular movements intact.      Conjunctiva/sclera: Conjunctivae normal.   Cardiovascular:      Rate and Rhythm: Normal rate and regular rhythm.   Pulmonary:      Effort: Pulmonary effort is normal. No respiratory distress.   Abdominal:      " General: There is no distension.      Palpations: Abdomen is soft.      Tenderness: There is no abdominal tenderness.   Musculoskeletal:         General: No swelling or deformity.   Skin:     General: Skin is warm and dry.   Neurological:      Mental Status: He is alert and oriented to person, place, and time. Mental status is at baseline.      Result Review :                   Assessment and Plan   Diagnoses and all orders for this visit:    1. Malignant neoplasm of sigmoid colon (Primary)      78-year-old gentleman here for follow-up after his robotic sigmoid colectomy.  Discussed his pathology results showing T3 N1a M0 adenocarcinoma of his colon with adequate and negative margins.  I placed a preoperative referral to oncology, he states he has been busy and has been unable to see oncology yet.  His endoscopist is Dr. Smiley.  Preoperative CEA was 13.8.  Suspect he will need adjuvant chemotherapy, I happy to place port if needed he can call the office to get this scheduled if decides to proceed with IV chemotherapy.    He has an appointment with Dr. Salazar for ureteral stent removal.  His drain has been low output and I discussed with Dr. Salazar and removed this in the office today. No lifting over 30 pounds for another month, he can follow-up me again in 1 month for repeat check.         Follow Up   No follow-ups on file.  Patient was given instructions and counseling regarding his condition or for health maintenance advice. Please see specific information pulled into the AVS if appropriate.

## 2023-11-21 NOTE — OUTREACH NOTE
General Surgery Week 1 Survey      Flowsheet Row Responses   [facility] facility patient discharged from? [facility]   Does the patient have one of the following disease processes/diagnoses(primary or secondary)? General Surgery   Week 1 attempt successful? No   Unsuccessful attempts Attempt 2            ELMER AVENDANO - Registered Nurse

## 2023-11-22 NOTE — PROGRESS NOTES
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    History of Present Illness:  Patient is a 78 y.o.     2023: Mr. 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  He presented in October for a screening colonoscopy to his gastroenterologist.  For approximately 6 months he had noted hematochezia.  It was intermittent, irregular and in small amounts.  He attributed this to hemorrhoids.  However, when he underwent them a colonoscopy he was noted to have a horseshoe shaped, partially obstructive, ulcerated and fungating malignant appearing tumor in the sigmoid colon.  It extended from 26 to 30 cm from the anal verge.  There were as well 2 sessile polyps.  Pathology reported moderately differentiated adenocarcinoma.  MLH1, MSH2 and MSH6  had intact nuclear expression.  However PMS2 was lost.  The possibility of Johnson syndrome was raised with this findings.  With this information, on November 10, 2023, he was taken to the operating room.  He underwent prophylactic stenting of both ureters and following that he had a robotic segmental colon resection.  The site of the tumor was the sigmoid and it corresponded to adenocarcinoma.  It was moderately differentiated and its maximum dimension was 5.8 cm.  It invaded through the muscularis propria into the pericolonic tissue.  Tumor perforation was not identified.  Lymphovascular space and invasion was not identified.  Perineural invasion was also absent.  All margins were negative for invasive carcinoma.  All of 19 lymph nodes submitted 1 was positive for metastatic disease.  The disease was thus classified as  pT3N1a, which corresponded to stage IIIa.  He recovered uneventfully from surgery.  On review of systems he had been afebrile but he did admit to some weight loss that was partially intentional but greater than he expected.  He had been free of chest pains, cough or abdominal pain.He did undergo imaging of the chest abdomen pelvis at the end of October 2023.  The circumferential wall thickening with adjacent stranding involving the sigmoid colon that corresponded to the tumor was identified.  There was no suggestion of metastatic disease.  His family history was reviewed.  He did not have any significant history of colon malignancy.  On exam he was found to be alert and well-oriented.  Conversant and in good spirits.  Lungs clear.  Heart regular.  Abdomen rounded.  All surgical incisions healing well.  Soft and without palpable tumors.  No edema.  I reviewed all the chart.  Discussed with him the usual treatment of stage III colon cancer and the rationale of adjuvant chemotherapy.  I have requested next-generation sequencing as well as PD-L1 expression of tumor tissue and asked him to return to see me approximately 3 weeks from now.  He should start treatment between 4 and 6 weeks from the surgery.    Subjective:  11/27/2023: In the office for the first time with the above.    Past Medical History:   Diagnosis Date    Arthritis     BPH with urinary obstruction     Colon cancer 11/10/2023    Sigmoid colon cancer.    Coronary artery disease 02/14/2012    Diabetes mellitus     Elevated cholesterol     Hyperlipidemia     Hypertension     Malignant neoplasm of sigmoid colon 10/31/2023     Past Surgical History:   Procedure Laterality Date    CARDIAC CATHETERIZATION N/A 03/12/2021    Procedure: Left Heart Cath with Coronary Angiography;  Surgeon: Huseyin Do MD;  Location: [facility] CATH INVASIVE LOCATION;  Service: Cardiovascular;  Laterality: N/A;    COLON RESECTION N/A 11/10/2023    Procedure: COLON RESECTION  LAPAROSCOPIC SIGMOID WITH DAVINCI ROBOT;  Surgeon: Dilshad Chavira MD;  Location: [facility] MAIN OR;  Service: Robotics - DaVinci;  Laterality: N/A;    COLONOSCOPY N/A 10/27/2023    Procedure: COLONOSCOPY with cold forcep biopsy x1 area and cold snare polypectomy x2;  Surgeon: Yared Smiley MD;  Location: [facility] ENDOSCOPY;  Service: Gastroenterology;  Laterality: N/A;  Post- colon polyps, sigmoid colon mass, diverticulosis    CORONARY ARTERY BYPASS GRAFT N/A 03/15/2021    Procedure: CORONARY ARTERY BYPASS GRAFTING;  Surgeon: Paxton Vasquez MD;  Location: [facility] CVOR;  Service: Cardiothoracic;  Laterality: N/A;    CYSTOSCOPY W/ URETERAL STENT PLACEMENT Bilateral 11/10/2023    Procedure: CYSTOSCOPY, URETERAL ICG INSERTION;  Surgeon: Tj Salazar MD;  Location: [facility] MAIN OR;  Service: Urology;  Laterality: Bilateral;    CYSTOSCOPY, RETROGRADE PYELOGRAM AND STENT INSERTION Bilateral 11/10/2023    Procedure: CYSTOSCOPY RETROGRADE PYELOGRAM AND STENT INSERTION;  Surgeon: Tj Salazar MD;  Location: [facility] MAIN OR;  Service: Urology;  Laterality: Bilateral;    TONSILLECTOMY         Current Outpatient Medications:     atorvastatin (LIPITOR) 40 MG tablet, Take 1 tablet by mouth Every Night., Disp: 90 tablet, Rfl: 3    finasteride (PROSCAR) 5 MG tablet, Take 1 tablet by mouth Daily. (Patient taking differently: Take 1 tablet by mouth Daily. Has not been taking), Disp: 90 tablet, Rfl: 3    glimepiride (AMARYL) 2 MG tablet, Take 1 tablet by mouth Every Morning Before Breakfast., Disp: , Rfl:     lisinopril (PRINIVIL,ZESTRIL) 20 MG tablet, Take 2 tablets by mouth 2 (Two) Times a Day., Disp: , Rfl:     metFORMIN (GLUCOPHAGE) 1000 MG tablet, Take 1 tablet by mouth 2 (Two) Times a Day With Meals., Disp: , Rfl:     ondansetron (Zofran) 4 MG tablet, Take 1 tablet by mouth Every 8 (Eight) Hours As Needed for Nausea or Vomiting., Disp: 30 tablet, Rfl: 1    tamsulosin (FLOMAX) 0.4 MG capsule 24 hr capsule, Take 1 capsule  by mouth Daily. (Patient taking differently: Take 1 capsule by mouth Daily. Has not been taking), Disp: 90 capsule, Rfl: 3    aspirin 81 MG EC tablet, Take 1 tablet by mouth Daily. (Patient not taking: Reported on 11/27/2023), Disp: 30 tablet, Rfl: 3    CBD (cannabidiol) oral oil, Take 1 mL by mouth Daily. For arthritis PRN holding (Patient not taking: Reported on 11/27/2023), Disp: , Rfl:     HYDROcodone-acetaminophen (Norco) 5-325 MG per tablet, Take 1 tablet by mouth Every 6 (Six) Hours As Needed for Moderate Pain for up to 15 days. (Patient not taking: Reported on 11/27/2023), Disp: 15 tablet, Rfl: 0    Allergies   Allergen Reactions    Shellfish-Derived Products Anaphylaxis     Family History   Problem Relation Age of Onset    Diabetes Mother     Ovarian cancer Mother 45    Heart disease Father     Diabetes Sister     Breast cancer Sister 50    Diabetes Brother     Hypertension Brother     Heart disease Brother      Cancer-related family history includes Breast cancer (age of onset: 50) in his sister; Ovarian cancer (age of onset: 45) in his mother.    Social History     Tobacco Use    Smoking status: Never     Passive exposure: Past    Smokeless tobacco: Never   Vaping Use    Vaping Use: Never used   Substance Use Topics    Alcohol use: Never    Drug use: Never     Social History     Social History Narrative    Not on file     ROS:   Review of Systems   Constitutional:  Positive for unexpected weight change. Negative for activity change, appetite change, chills, diaphoresis, fatigue and fever.   HENT:  Negative for congestion, dental problem, drooling, ear discharge, ear pain, facial swelling, hearing loss, mouth sores, nosebleeds, postnasal drip, rhinorrhea, sinus pressure, sinus pain, sneezing, sore throat, tinnitus, trouble swallowing and voice change.    Eyes:  Negative for photophobia, pain, discharge, redness, itching and visual disturbance.   Respiratory:  Negative for apnea, cough, choking, chest  "tightness, shortness of breath, wheezing and stridor.    Cardiovascular:  Negative for chest pain, palpitations and leg swelling.   Gastrointestinal:  Positive for anal bleeding. Negative for abdominal distention, abdominal pain, blood in stool, constipation, diarrhea, nausea, rectal pain and vomiting.   Endocrine: Negative for cold intolerance, heat intolerance, polydipsia and polyuria.   Genitourinary:  Negative for decreased urine volume, difficulty urinating, dysuria, flank pain, frequency, genital sores, hematuria and urgency.   Musculoskeletal:  Negative for arthralgias, back pain, gait problem, joint swelling, myalgias, neck pain and neck stiffness.   Skin:  Negative for color change, pallor and rash.   Neurological:  Negative for dizziness, tremors, seizures, syncope, facial asymmetry, speech difficulty, weakness, light-headedness, numbness and headaches.   Hematological:  Negative for adenopathy. Does not bruise/bleed easily.   Psychiatric/Behavioral:  Negative for agitation, behavioral problems, confusion, decreased concentration, hallucinations, self-injury, sleep disturbance and suicidal ideas. The patient is not nervous/anxious.      Objective:    Vital Signs:  Vitals:    11/27/23 1438   BP: 160/68   Pulse: 60   Temp: 98.2 °F (36.8 °C)   TempSrc: Oral   SpO2: 98%   Weight: 75.2 kg (165 lb 12.8 oz)   Height: 177.8 cm (70\")   PainSc:   6   PainLoc: Generalized  Comment: prostate     Body mass index is 23.79 kg/m².    ECOG  (0) Fully active, able to carry on all predisease performance without restriction    Physical Exam:   Physical Exam  Constitutional:       General: He is not in acute distress.     Appearance: Normal appearance. He is not ill-appearing, toxic-appearing or diaphoretic.   HENT:      Head: Normocephalic and atraumatic.      Right Ear: External ear normal.      Left Ear: External ear normal.      Nose: Nose normal.      Mouth/Throat:      Mouth: Mucous membranes are moist.      Pharynx: " Oropharynx is clear.   Eyes:      General: No scleral icterus.        Right eye: No discharge.         Left eye: No discharge.      Conjunctiva/sclera: Conjunctivae normal.      Pupils: Pupils are equal, round, and reactive to light.   Cardiovascular:      Rate and Rhythm: Normal rate and regular rhythm.      Pulses: Normal pulses.      Heart sounds: Normal heart sounds. No murmur heard.     No friction rub. No gallop.   Pulmonary:      Effort: No respiratory distress.      Breath sounds: No stridor. No wheezing, rhonchi or rales.   Chest:      Chest wall: No tenderness.   Abdominal:      General: Bowel sounds are normal. There is no distension.      Palpations: Abdomen is soft. There is no mass.      Tenderness: There is no abdominal tenderness. There is no right CVA tenderness, left CVA tenderness, guarding or rebound.      Comments: Rounded.  All surgical incisions healing well.  No erythema or discharge.  All palpation no tumors or visceromegaly.   Musculoskeletal:         General: No swelling, tenderness, deformity or signs of injury.      Cervical back: No rigidity.      Right lower leg: No edema.      Left lower leg: No edema.   Lymphadenopathy:      Cervical: No cervical adenopathy.   Skin:     General: Skin is warm and dry.      Coloration: Skin is not jaundiced.      Findings: No bruising or rash.   Neurological:      General: No focal deficit present.      Mental Status: He is alert and oriented to person, place, and time.      Gait: Gait normal.   Psychiatric:         Mood and Affect: Mood normal.         Behavior: Behavior normal.         Thought Content: Thought content normal.         Judgment: Judgment normal.     SILVANA Gallegos MD performed the physical exam on 11/27/2023 as documented above.    Lab Results - Last 18 Months   Lab Units 11/27/23  1416 11/13/23  0023 11/11/23  2336   WBC 10*3/mm3 8.83 11.20* 12.60*   HEMOGLOBIN g/dL 13.2 11.8* 12.0*   HEMATOCRIT % 40.9 35.2* 36.0*   PLATELETS  10*3/mm3 429 320 293   MCV fL 94.7 91.8 90.9     Lab Results - Last 18 Months   Lab Units 11/13/23  0023 11/11/23  2336 11/10/23  2255 11/02/23  0901 01/13/23  1011   SODIUM mmol/L 142 142 140   < > 140   POTASSIUM mmol/L 3.6 3.9 4.6   < > 4.7   CHLORIDE mmol/L 108* 109* 104   < > 101   CO2 mmol/L 28.0 26.0 26.0   < > 25   BUN mg/dL 13 15 13   < > 18   CREATININE mg/dL 0.88 0.89 1.11   < > 1.12   CALCIUM mg/dL 8.7 8.4* 8.8   < > 9.4   BILIRUBIN mg/dL  --   --   --   --  1.0   ALK PHOS IU/L  --   --   --   --  110   ALT (SGPT) IU/L  --   --   --   --  20   AST (SGOT) IU/L  --   --   --   --  16   GLUCOSE mg/dL 131* 138* 180*   < > 240*    < > = values in this interval not displayed.     Lab Results   Component Value Date    GLUCOSE 131 (H) 11/13/2023    BUN 13 11/13/2023    CREATININE 0.88 11/13/2023    EGFRIFNONA 77 03/18/2021    BCR 14.8 11/13/2023    K 3.6 11/13/2023    CO2 28.0 11/13/2023    CALCIUM 8.7 11/13/2023    PROTENTOTREF 6.5 01/13/2023    ALBUMIN 4.3 01/13/2023    LABIL2 2.0 01/13/2023    AST 16 01/13/2023    ALT 20 01/13/2023     LDH   Date Value Ref Range Status   12/25/2020 286 (H) 135 - 225 U/L Final     Comment:     Specimen hemolyzed.  Results may be affected.     Lab Results   Component Value Date    FIBRINOGEN 124 (L) 03/15/2021     Lab Results   Component Value Date    PTT 25.8 03/15/2021    INR 1.09 03/16/2021     Lab Results   Component Value Date    CEA 13.80 11/02/2023     Lab Results   Component Value Date    PSA 5.2 (H) 01/13/2023     Assessment & Plan     1: pT3N1a or stage IIIA moderately differentiated adenocarcinoma of the sigmoid colon.  Reviewed all records including reports of pathology, surgical notes and all imaging studies.  Reviewed all the laboratory exams.  Discussed with him and with his spouse, explained the importance of staging and the stage of his disease and discussed the use of adjuvant chemotherapy.  Explained the difference between using 5-FU and Cytovene.  Described  the regimens.  Discussed with him side effects and potential complications as well as the benefits.  2.  Johnson syndrome?  Additional testing has been requested.  Next-generation sequencing and PD-L1 expression will be obtained.  3.  I have reviewed all the laboratory exams and all imaging studies.  Independently reviewed the images of the scans.  Reviewed the reports of surgery and reports of pathology.  Discussed with him.  4.  He is to see me in approximately 3 weeks with results.    Xavi Gallegos MD on 11/27/2023 at 1705.

## 2023-11-27 ENCOUNTER — CONSULT (OUTPATIENT)
Dept: ONCOLOGY | Facility: CLINIC | Age: 78
End: 2023-11-27
Payer: MEDICARE

## 2023-11-27 ENCOUNTER — LAB (OUTPATIENT)
Dept: LAB | Facility: HOSPITAL | Age: 78
End: 2023-11-27
Payer: MEDICARE

## 2023-11-27 ENCOUNTER — OFFICE VISIT (OUTPATIENT)
Dept: CARDIOLOGY | Facility: CLINIC | Age: 78
End: 2023-11-27
Payer: MEDICARE

## 2023-11-27 VITALS
BODY MASS INDEX: 23.51 KG/M2 | HEIGHT: 70 IN | WEIGHT: 164.25 LBS | DIASTOLIC BLOOD PRESSURE: 80 MMHG | HEART RATE: 49 BPM | SYSTOLIC BLOOD PRESSURE: 210 MMHG | OXYGEN SATURATION: 97 %

## 2023-11-27 VITALS
HEART RATE: 60 BPM | BODY MASS INDEX: 23.74 KG/M2 | WEIGHT: 165.8 LBS | TEMPERATURE: 98.2 F | HEIGHT: 70 IN | OXYGEN SATURATION: 98 % | DIASTOLIC BLOOD PRESSURE: 68 MMHG | SYSTOLIC BLOOD PRESSURE: 160 MMHG

## 2023-11-27 DIAGNOSIS — I25.10 CHRONIC CORONARY ARTERY DISEASE: ICD-10-CM

## 2023-11-27 DIAGNOSIS — C18.7 CANCER OF SIGMOID: Primary | ICD-10-CM

## 2023-11-27 DIAGNOSIS — I10 ESSENTIAL HYPERTENSION: ICD-10-CM

## 2023-11-27 DIAGNOSIS — E08.9 DIABETES MELLITUS DUE TO UNDERLYING CONDITION WITHOUT COMPLICATION, WITHOUT LONG-TERM CURRENT USE OF INSULIN: ICD-10-CM

## 2023-11-27 DIAGNOSIS — E78.5 DYSLIPIDEMIA: ICD-10-CM

## 2023-11-27 DIAGNOSIS — C18.7 MALIGNANT NEOPLASM OF SIGMOID COLON: ICD-10-CM

## 2023-11-27 DIAGNOSIS — C18.7 MALIGNANT NEOPLASM OF SIGMOID COLON: Primary | ICD-10-CM

## 2023-11-27 DIAGNOSIS — C18.7 CANCER OF SIGMOID: ICD-10-CM

## 2023-11-27 DIAGNOSIS — Z95.1 S/P CABG (CORONARY ARTERY BYPASS GRAFT): Primary | ICD-10-CM

## 2023-11-27 LAB
ALBUMIN SERPL-MCNC: 4.1 G/DL (ref 3.5–5.2)
ALBUMIN/GLOB SERPL: 1.6 G/DL
ALP SERPL-CCNC: 137 U/L (ref 39–117)
ALT SERPL W P-5'-P-CCNC: 21 U/L (ref 1–41)
ANION GAP SERPL CALCULATED.3IONS-SCNC: 9 MMOL/L (ref 5–15)
AST SERPL-CCNC: 20 U/L (ref 1–40)
BASOPHILS # BLD AUTO: 0.09 10*3/MM3 (ref 0–0.2)
BASOPHILS NFR BLD AUTO: 1 % (ref 0–1.5)
BILIRUB SERPL-MCNC: 1 MG/DL (ref 0–1.2)
BUN SERPL-MCNC: 16 MG/DL (ref 8–23)
BUN/CREAT SERPL: 16.7 (ref 7–25)
CALCIUM SPEC-SCNC: 9.1 MG/DL (ref 8.6–10.5)
CHLORIDE SERPL-SCNC: 101 MMOL/L (ref 98–107)
CO2 SERPL-SCNC: 32 MMOL/L (ref 22–29)
CREAT SERPL-MCNC: 0.96 MG/DL (ref 0.76–1.27)
DEPRECATED RDW RBC AUTO: 45.3 FL (ref 37–54)
EGFRCR SERPLBLD CKD-EPI 2021: 81.9 ML/MIN/1.73
EOSINOPHIL # BLD AUTO: 0.41 10*3/MM3 (ref 0–0.4)
EOSINOPHIL NFR BLD AUTO: 4.6 % (ref 0.3–6.2)
ERYTHROCYTE [DISTWIDTH] IN BLOOD BY AUTOMATED COUNT: 13.6 % (ref 12.3–15.4)
GLOBULIN UR ELPH-MCNC: 2.6 GM/DL
GLUCOSE SERPL-MCNC: 244 MG/DL (ref 65–99)
HCT VFR BLD AUTO: 40.9 % (ref 37.5–51)
HGB BLD-MCNC: 13.2 G/DL (ref 13–17.7)
HOLD SPECIMEN: NORMAL
HOLD SPECIMEN: NORMAL
LYMPHOCYTES # BLD AUTO: 1.81 10*3/MM3 (ref 0.7–3.1)
LYMPHOCYTES NFR BLD AUTO: 20.5 % (ref 19.6–45.3)
MCH RBC QN AUTO: 30.6 PG (ref 26.6–33)
MCHC RBC AUTO-ENTMCNC: 32.3 G/DL (ref 31.5–35.7)
MCV RBC AUTO: 94.7 FL (ref 79–97)
MONOCYTES # BLD AUTO: 0.69 10*3/MM3 (ref 0.1–0.9)
MONOCYTES NFR BLD AUTO: 7.8 % (ref 5–12)
NEUTROPHILS NFR BLD AUTO: 5.83 10*3/MM3 (ref 1.7–7)
NEUTROPHILS NFR BLD AUTO: 66.1 % (ref 42.7–76)
PLATELET # BLD AUTO: 429 10*3/MM3 (ref 140–450)
PMV BLD AUTO: 9.4 FL (ref 6–12)
POTASSIUM SERPL-SCNC: 3.6 MMOL/L (ref 3.5–5.2)
PROT SERPL-MCNC: 6.7 G/DL (ref 6–8.5)
RBC # BLD AUTO: 4.32 10*6/MM3 (ref 4.14–5.8)
SODIUM SERPL-SCNC: 142 MMOL/L (ref 136–145)
WBC NRBC COR # BLD AUTO: 8.83 10*3/MM3 (ref 3.4–10.8)

## 2023-11-27 PROCEDURE — 3078F DIAST BP <80 MM HG: CPT | Performed by: INTERNAL MEDICINE

## 2023-11-27 PROCEDURE — 1159F MED LIST DOCD IN RCRD: CPT | Performed by: INTERNAL MEDICINE

## 2023-11-27 PROCEDURE — 93000 ELECTROCARDIOGRAM COMPLETE: CPT | Performed by: INTERNAL MEDICINE

## 2023-11-27 PROCEDURE — 99205 OFFICE O/P NEW HI 60 MIN: CPT | Performed by: INTERNAL MEDICINE

## 2023-11-27 PROCEDURE — 99214 OFFICE O/P EST MOD 30 MIN: CPT | Performed by: INTERNAL MEDICINE

## 2023-11-27 PROCEDURE — 1160F RVW MEDS BY RX/DR IN RCRD: CPT | Performed by: INTERNAL MEDICINE

## 2023-11-27 PROCEDURE — 3077F SYST BP >= 140 MM HG: CPT | Performed by: INTERNAL MEDICINE

## 2023-11-27 PROCEDURE — 82378 CARCINOEMBRYONIC ANTIGEN: CPT | Performed by: INTERNAL MEDICINE

## 2023-11-27 PROCEDURE — 85025 COMPLETE CBC W/AUTO DIFF WBC: CPT

## 2023-11-27 PROCEDURE — 3079F DIAST BP 80-89 MM HG: CPT | Performed by: INTERNAL MEDICINE

## 2023-11-27 PROCEDURE — 80053 COMPREHEN METABOLIC PANEL: CPT | Performed by: INTERNAL MEDICINE

## 2023-11-27 PROCEDURE — 1125F AMNT PAIN NOTED PAIN PRSNT: CPT | Performed by: INTERNAL MEDICINE

## 2023-11-27 PROCEDURE — 36415 COLL VENOUS BLD VENIPUNCTURE: CPT

## 2023-11-28 ENCOUNTER — PATIENT ROUNDING (BHMG ONLY) (OUTPATIENT)
Dept: ONCOLOGY | Facility: CLINIC | Age: 78
End: 2023-11-28
Payer: MEDICARE

## 2023-11-28 LAB — CEA SERPL-MCNC: 2.4 NG/ML

## 2023-11-28 NOTE — PROGRESS NOTES
November 28, 2023    Hello, may I speak with Tao Pozo?    My name is NATHANIEL IVY      I am  with [facility]  [address] [place] [address] [phone].    Before we get started may I verify your date of birth? 1945    I am calling to officially welcome you to our practice and ask about your recent visit. Is this a good time to talk? no    Tell me about your visit with us. What things went well?  A My Chart message was sent to the patient.        We're always looking for ways to make our patients' experiences even better. Do you have recommendations on ways we may improve?  no    Overall were you satisfied with your first visit to our practice? yes       I appreciate you taking the time to speak with me today. Is there anything else I can do for you? no      Thank you, and have a great day.

## 2023-11-29 ENCOUNTER — ANESTHESIA EVENT (OUTPATIENT)
Dept: PERIOP | Facility: HOSPITAL | Age: 78
End: 2023-11-29
Payer: MEDICARE

## 2023-11-29 ENCOUNTER — ANESTHESIA (OUTPATIENT)
Dept: PERIOP | Facility: HOSPITAL | Age: 78
End: 2023-11-29
Payer: MEDICARE

## 2023-11-29 ENCOUNTER — HOSPITAL ENCOUNTER (OUTPATIENT)
Facility: HOSPITAL | Age: 78
Setting detail: HOSPITAL OUTPATIENT SURGERY
Discharge: HOME | End: 2023-11-29
Attending: UROLOGY | Admitting: UROLOGY
Payer: MEDICARE

## 2023-11-29 ENCOUNTER — READMISSION MANAGEMENT (OUTPATIENT)
Dept: CALL CENTER | Facility: HOSPITAL | Age: 78
End: 2023-11-29
Payer: MEDICARE

## 2023-11-29 VITALS
HEART RATE: 68 BPM | SYSTOLIC BLOOD PRESSURE: 160 MMHG | WEIGHT: 164.2 LBS | OXYGEN SATURATION: 98 % | RESPIRATION RATE: 16 BRPM | DIASTOLIC BLOOD PRESSURE: 58 MMHG | TEMPERATURE: 97.7 F | HEIGHT: 70 IN | BODY MASS INDEX: 23.51 KG/M2

## 2023-11-29 LAB
GLUCOSE BLDC GLUCOMTR-MCNC: 165 MG/DL (ref 70–105)
GLUCOSE BLDC GLUCOMTR-MCNC: 194 MG/DL (ref 70–105)

## 2023-11-29 PROCEDURE — 25010000002 DEXAMETHASONE PER 1 MG: Performed by: NURSE ANESTHETIST, CERTIFIED REGISTERED

## 2023-11-29 PROCEDURE — 82948 REAGENT STRIP/BLOOD GLUCOSE: CPT

## 2023-11-29 PROCEDURE — 25010000002 ONDANSETRON PER 1 MG: Performed by: NURSE ANESTHETIST, CERTIFIED REGISTERED

## 2023-11-29 PROCEDURE — 25010000002 HYDROMORPHONE 1 MG/ML SOLUTION: Performed by: NURSE ANESTHETIST, CERTIFIED REGISTERED

## 2023-11-29 PROCEDURE — 25010000002 CEFAZOLIN PER 500 MG: Performed by: ANESTHESIOLOGY

## 2023-11-29 PROCEDURE — 0 IOHEXOL 300 MG/ML SOLUTION: Performed by: UROLOGY

## 2023-11-29 PROCEDURE — C1769 GUIDE WIRE: HCPCS | Performed by: UROLOGY

## 2023-11-29 PROCEDURE — 25810000003 LACTATED RINGERS PER 1000 ML: Performed by: ANESTHESIOLOGY

## 2023-11-29 PROCEDURE — 25010000002 HYDRALAZINE PER 20 MG: Performed by: NURSE ANESTHETIST, CERTIFIED REGISTERED

## 2023-11-29 PROCEDURE — 25010000002 PROPOFOL 200 MG/20ML EMULSION: Performed by: NURSE ANESTHETIST, CERTIFIED REGISTERED

## 2023-11-29 PROCEDURE — 25010000002 HYDRALAZINE PER 20 MG: Performed by: ANESTHESIOLOGY

## 2023-11-29 RX ORDER — HYDRALAZINE HYDROCHLORIDE 20 MG/ML
5 INJECTION INTRAMUSCULAR; INTRAVENOUS
Status: DISCONTINUED | OUTPATIENT
Start: 2023-11-29 | End: 2023-11-30 | Stop reason: HOSPADM

## 2023-11-29 RX ORDER — NALOXONE HCL 0.4 MG/ML
0.4 VIAL (ML) INJECTION AS NEEDED
Status: DISCONTINUED | OUTPATIENT
Start: 2023-11-29 | End: 2023-11-30 | Stop reason: HOSPADM

## 2023-11-29 RX ORDER — DROPERIDOL 2.5 MG/ML
0.62 INJECTION, SOLUTION INTRAMUSCULAR; INTRAVENOUS ONCE AS NEEDED
Status: DISCONTINUED | OUTPATIENT
Start: 2023-11-29 | End: 2023-11-30 | Stop reason: HOSPADM

## 2023-11-29 RX ORDER — HYDRALAZINE HYDROCHLORIDE 20 MG/ML
10 INJECTION INTRAMUSCULAR; INTRAVENOUS ONCE
Status: COMPLETED | OUTPATIENT
Start: 2023-11-29 | End: 2023-11-29

## 2023-11-29 RX ORDER — METOPROLOL TARTRATE 1 MG/ML
5 INJECTION, SOLUTION INTRAVENOUS ONCE
Status: DISCONTINUED | OUTPATIENT
Start: 2023-11-29 | End: 2023-11-30 | Stop reason: HOSPADM

## 2023-11-29 RX ORDER — OXYCODONE HYDROCHLORIDE 5 MG/1
5 TABLET ORAL ONCE AS NEEDED
Status: COMPLETED | OUTPATIENT
Start: 2023-11-29 | End: 2023-11-29

## 2023-11-29 RX ORDER — FLUMAZENIL 0.1 MG/ML
0.2 INJECTION INTRAVENOUS AS NEEDED
Status: DISCONTINUED | OUTPATIENT
Start: 2023-11-29 | End: 2023-11-30 | Stop reason: HOSPADM

## 2023-11-29 RX ORDER — DIPHENHYDRAMINE HYDROCHLORIDE 50 MG/ML
12.5 INJECTION INTRAMUSCULAR; INTRAVENOUS
Status: DISCONTINUED | OUTPATIENT
Start: 2023-11-29 | End: 2023-11-30 | Stop reason: HOSPADM

## 2023-11-29 RX ORDER — IPRATROPIUM BROMIDE AND ALBUTEROL SULFATE 2.5; .5 MG/3ML; MG/3ML
3 SOLUTION RESPIRATORY (INHALATION) ONCE AS NEEDED
Status: DISCONTINUED | OUTPATIENT
Start: 2023-11-29 | End: 2023-11-30 | Stop reason: HOSPADM

## 2023-11-29 RX ORDER — SODIUM CHLORIDE, SODIUM LACTATE, POTASSIUM CHLORIDE, CALCIUM CHLORIDE 600; 310; 30; 20 MG/100ML; MG/100ML; MG/100ML; MG/100ML
1000 INJECTION, SOLUTION INTRAVENOUS CONTINUOUS
Status: DISCONTINUED | OUTPATIENT
Start: 2023-11-29 | End: 2023-11-30 | Stop reason: HOSPADM

## 2023-11-29 RX ORDER — ONDANSETRON 2 MG/ML
INJECTION INTRAMUSCULAR; INTRAVENOUS AS NEEDED
Status: DISCONTINUED | OUTPATIENT
Start: 2023-11-29 | End: 2023-11-29 | Stop reason: SURG

## 2023-11-29 RX ORDER — PROPOFOL 10 MG/ML
INJECTION, EMULSION INTRAVENOUS AS NEEDED
Status: DISCONTINUED | OUTPATIENT
Start: 2023-11-29 | End: 2023-11-29 | Stop reason: SURG

## 2023-11-29 RX ORDER — SODIUM CHLORIDE 0.9 % (FLUSH) 0.9 %
10 SYRINGE (ML) INJECTION AS NEEDED
Status: DISCONTINUED | OUTPATIENT
Start: 2023-11-29 | End: 2023-11-29 | Stop reason: HOSPADM

## 2023-11-29 RX ORDER — ONDANSETRON 2 MG/ML
4 INJECTION INTRAMUSCULAR; INTRAVENOUS ONCE AS NEEDED
Status: DISCONTINUED | OUTPATIENT
Start: 2023-11-29 | End: 2023-11-30 | Stop reason: HOSPADM

## 2023-11-29 RX ORDER — LIDOCAINE HYDROCHLORIDE 10 MG/ML
INJECTION, SOLUTION EPIDURAL; INFILTRATION; INTRACAUDAL; PERINEURAL AS NEEDED
Status: DISCONTINUED | OUTPATIENT
Start: 2023-11-29 | End: 2023-11-29 | Stop reason: SURG

## 2023-11-29 RX ORDER — EPHEDRINE SULFATE 5 MG/ML
5 INJECTION INTRAVENOUS ONCE AS NEEDED
Status: DISCONTINUED | OUTPATIENT
Start: 2023-11-29 | End: 2023-11-30 | Stop reason: HOSPADM

## 2023-11-29 RX ORDER — DEXAMETHASONE SODIUM PHOSPHATE 4 MG/ML
INJECTION, SOLUTION INTRA-ARTICULAR; INTRALESIONAL; INTRAMUSCULAR; INTRAVENOUS; SOFT TISSUE AS NEEDED
Status: DISCONTINUED | OUTPATIENT
Start: 2023-11-29 | End: 2023-11-29 | Stop reason: SURG

## 2023-11-29 RX ORDER — LIDOCAINE HYDROCHLORIDE 10 MG/ML
0.5 INJECTION, SOLUTION INFILTRATION; PERINEURAL ONCE AS NEEDED
Status: DISCONTINUED | OUTPATIENT
Start: 2023-11-29 | End: 2023-11-29 | Stop reason: HOSPADM

## 2023-11-29 RX ORDER — FENTANYL CITRATE 50 UG/ML
50 INJECTION, SOLUTION INTRAMUSCULAR; INTRAVENOUS
Status: DISCONTINUED | OUTPATIENT
Start: 2023-11-29 | End: 2023-11-30 | Stop reason: HOSPADM

## 2023-11-29 RX ORDER — ACETAMINOPHEN 325 MG/1
650 TABLET ORAL ONCE AS NEEDED
Status: DISCONTINUED | OUTPATIENT
Start: 2023-11-29 | End: 2023-11-30 | Stop reason: HOSPADM

## 2023-11-29 RX ORDER — LABETALOL HYDROCHLORIDE 5 MG/ML
5 INJECTION, SOLUTION INTRAVENOUS
Status: DISCONTINUED | OUTPATIENT
Start: 2023-11-29 | End: 2023-11-30 | Stop reason: HOSPADM

## 2023-11-29 RX ADMIN — LIDOCAINE HYDROCHLORIDE 100 MG: 10 INJECTION, SOLUTION EPIDURAL; INFILTRATION; INTRACAUDAL; PERINEURAL at 18:16

## 2023-11-29 RX ADMIN — HYDRALAZINE HYDROCHLORIDE 5 MG: 20 INJECTION INTRAMUSCULAR; INTRAVENOUS at 19:00

## 2023-11-29 RX ADMIN — ONDANSETRON 4 MG: 2 INJECTION INTRAMUSCULAR; INTRAVENOUS at 18:20

## 2023-11-29 RX ADMIN — HYDRALAZINE HYDROCHLORIDE 10 MG: 20 INJECTION, SOLUTION INTRAMUSCULAR; INTRAVENOUS at 14:28

## 2023-11-29 RX ADMIN — HYDROMORPHONE HYDROCHLORIDE 1 MG: 1 INJECTION, SOLUTION INTRAMUSCULAR; INTRAVENOUS; SUBCUTANEOUS at 19:12

## 2023-11-29 RX ADMIN — CEFAZOLIN 2000 MG: 2 INJECTION, POWDER, FOR SOLUTION INTRAMUSCULAR; INTRAVENOUS at 18:20

## 2023-11-29 RX ADMIN — SODIUM CHLORIDE, POTASSIUM CHLORIDE, SODIUM LACTATE AND CALCIUM CHLORIDE 1000 ML: 600; 310; 30; 20 INJECTION, SOLUTION INTRAVENOUS at 13:08

## 2023-11-29 RX ADMIN — OXYCODONE 5 MG: 5 TABLET ORAL at 19:12

## 2023-11-29 RX ADMIN — PROPOFOL 150 MG: 10 INJECTION, EMULSION INTRAVENOUS at 18:16

## 2023-11-29 RX ADMIN — DEXAMETHASONE SODIUM PHOSPHATE 4 MG: 4 INJECTION, SOLUTION INTRAMUSCULAR; INTRAVENOUS at 18:20

## 2023-11-29 NOTE — ANESTHESIA PREPROCEDURE EVALUATION
Anesthesia Evaluation     Patient summary reviewed and Nursing notes reviewed   NPO Solid Status: > 8 hours  NPO Liquid Status: > 8 hours           Airway   Mallampati: II  TM distance: >3 FB  Neck ROM: full  No difficulty expected  Dental - normal exam     Pulmonary    Cardiovascular     (+) hypertension, CAD, CABG, hyperlipidemia      Neuro/Psych  GI/Hepatic/Renal/Endo    (+) diabetes mellitus    Musculoskeletal     Abdominal    Substance History      OB/GYN          Other   arthritis,   history of cancer    ROS/Med Hx Other: ·  Left ventricular ejection fraction appears to be 56 - 60%.  ·  Estimated right ventricular systolic pressure from tricuspid regurgitation is normal (<35 mmHg).                  Anesthesia Plan    ASA 3     general     intravenous induction     Anesthetic plan, risks, benefits, and alternatives have been provided, discussed and informed consent has been obtained with: patient.    Plan discussed with CRNA.    CODE STATUS:

## 2023-11-29 NOTE — ANESTHESIA PROCEDURE NOTES
Airway  Urgency: elective    Date/Time: 11/29/2023 6:17 PM  Airway not difficult    General Information and Staff    Patient location during procedure: OR  Anesthesiologist: Jhoan Carlos MD  CRNA/CAA: Zandra Vergara CRNA    Indications and Patient Condition  Indications for airway management: airway protection    Preoxygenated: yes (Pt pre-O2 with 100% O2)  Mask difficulty assessment: 0 - not attempted    Final Airway Details  Final airway type: supraglottic airway      Successful airway: I-gel  Size 5     Number of attempts at approach: 1  Assessment: lips, teeth, and gum same as pre-op and atraumatic intubation    Additional Comments  ATOLMA x1. No change in dentition. + ETCO2. Airway seal pressure <20cm H2O.

## 2023-11-29 NOTE — OP NOTE
Urology Operative Note    11/29/2023    Tao Pozo  78 y.o.  1945  male  [id number]      Surgeon(s) and Role:  Tj Salazar MD - Primary     Pre-operative Diagnosis: Bilateral hydronephrosis, BPH with obstruction    Post-operative Diagnosis: Same    Complications: None    Procedures:  Cystoscopy  Retrograde pyelogram  Bilateral ureteroscopy  Bilateral stent removal  Fluoroscopy with Interpretation     Indications   Tao Pozo is a 78 y.o. male who was found to have large BPH during cystoscopy and ICG injection preoperatively for colon surgery.  Extravasation of ICG noted so bilateral stents placed during the case.  He now presents for reevaluation and possible stent removal    During the informed consent process, the procedure was discussed in detail including the risks of bleeding, infection, damage to surrounding structures and need for staged procedure.      Findings     Fluoroscopy with interpretation: Bilateral retrograde pyelogram showing mild hydroureteronephrosis with J hooking no obstruction or extravasation    Description of procedure:  The patient was properly identified in the preoperative holding area and taken to the operating room where general anesthesia was induced. The patient was placed in the cystolithotomy position and prepped and draped in a sterile fashion. The patient was given antibiotics intravenously before the start of the surgery. After ensuring that all of the required equipment was ready and available a surgical timeout was performed.     A 21 Japanese rigid cystoscope was inserted into the bladder under direct visualization.  Prostate was 3+ with a median lobe and 2+ bladder trabeculations.  Both stents were removed per the urethra and Sensor wire was passed up each ureter under fluoroscopic guidance.  Semirigid ureteroscope was attempted to be passed into the ureter but had to be passed over the wire just to get access due to the angulation from his enlarged  prostate.  The left ureter was evaluated and had J hooking but no stone stricture or defects and retrograde pyelogram was performed confirming this.  The scope and wire were then removed and no stent was replaced.  The right ureter also required navigation over the wire to get access due to the J hooking.  Retrograde pyelogram was performed and ureteroscopy showed no defect stones or stricture.  The wire and scope were removed.  The cystoscope was then replaced and bladder irrigated out and the bladder was then emptied and scope removed.    There were no apparent complications. The patient woke up in the operating room and was taken to the recovery room in stable condition.     I was present and scrubbed for the entire procedure.     Specimens: None    Estimated Blood Loss: minimal      Plan   -Follow up with Dr. Salazar to complete workup for BPH and discuss BPH intervention 1 to 2 weeks         Tj Salazar MD  [facility]  [facility], [address]  [place] [address] [phone]

## 2023-11-29 NOTE — NURSING NOTE
Pt reports itching and redness to bilateral legs, neck, back. Started approximately 1 week ago. Pt unsure of the cause. Skin with rash/redness, no drainage. Dr. Salazar notified.

## 2023-11-30 LAB
LAB AP CARIS, ADDENDUM: NORMAL
LAB AP CASE REPORT: NORMAL
LAB AP SYNOPTIC CHECKLIST: NORMAL
PATH REPORT.FINAL DX SPEC: NORMAL
PATH REPORT.GROSS SPEC: NORMAL

## 2023-11-30 NOTE — ANESTHESIA POSTPROCEDURE EVALUATION
Patient: Tao Pozo    Procedure Summary       Date: 11/29/23 Room / Location: [facility]    Anesthesia Start: 1810 Anesthesia Stop: 1851    Procedure: CYSTOSCOPY URETEROSCOPY RETROGRADE PYELOGRAM BILATERAL STENT REMOVAL (Bilateral) Diagnosis:       Benign localized prostatic hyperplasia with lower urinary tract symptoms (LUTS)      Malignant neoplasm of sigmoid colon      (Benign localized prostatic hyperplasia with lower urinary tract symptoms (LUTS) [N40.1])      (Malignant neoplasm of sigmoid colon [C18.7])    Surgeons: Tj Salazar MD Provider: Jhoan Carlos MD    Anesthesia Type: general ASA Status: 3            Anesthesia Type: general    Vitals  Vitals Value Taken Time   /59 11/29/23 1932   Temp 98.2 °F (36.8 °C) 11/29/23 1932   Pulse 65 11/29/23 1933   Resp 11 11/29/23 1932   SpO2 98 % 11/29/23 1933   Vitals shown include unfiled device data.        Post Anesthesia Care and Evaluation    Patient location during evaluation: PACU  Patient participation: complete - patient participated  Level of consciousness: awake  Pain scale: See nurse's notes for pain score.  Pain management: adequate    Airway patency: patent  Anesthetic complications: No anesthetic complications  PONV Status: none  Cardiovascular status: acceptable  Respiratory status: acceptable and spontaneous ventilation  Hydration status: acceptable    Comments: Patient seen and examined postoperatively; vital signs stable; SpO2 greater than or equal to 90%; cardiopulmonary status stable; nausea/vomiting adequately controlled; pain adequately controlled; no apparent anesthesia complications; patient discharged from anesthesia care when discharge criteria were met

## 2023-12-06 LAB
LAB AP CARIS, ADDENDUM: NORMAL
LAB AP CASE REPORT: NORMAL
LAB AP SYNOPTIC CHECKLIST: NORMAL
PATH REPORT.ADDENDUM SPEC: NORMAL
PATH REPORT.FINAL DX SPEC: NORMAL
PATH REPORT.GROSS SPEC: NORMAL

## 2023-12-12 ENCOUNTER — OFFICE VISIT (OUTPATIENT)
Dept: SURGERY | Facility: CLINIC | Age: 78
End: 2023-12-12
Payer: MEDICARE

## 2023-12-12 ENCOUNTER — PREP FOR SURGERY (OUTPATIENT)
Dept: OTHER | Facility: HOSPITAL | Age: 78
End: 2023-12-12
Payer: MEDICARE

## 2023-12-12 VITALS
HEIGHT: 70 IN | BODY MASS INDEX: 23.16 KG/M2 | WEIGHT: 161.8 LBS | HEART RATE: 60 BPM | TEMPERATURE: 97.8 F | DIASTOLIC BLOOD PRESSURE: 72 MMHG | OXYGEN SATURATION: 98 % | SYSTOLIC BLOOD PRESSURE: 138 MMHG

## 2023-12-12 DIAGNOSIS — C18.7 MALIGNANT NEOPLASM OF SIGMOID COLON: Primary | ICD-10-CM

## 2023-12-12 PROCEDURE — 99024 POSTOP FOLLOW-UP VISIT: CPT | Performed by: STUDENT IN AN ORGANIZED HEALTH CARE EDUCATION/TRAINING PROGRAM

## 2023-12-12 NOTE — PROGRESS NOTES
HEMATOLOGY ONCOLOGY OUTPATIENT FOLLOW-UP       Patient name: Tao Pozo  : 1945  MRN: [id number]  Primary Care Physician: Claire Kraus APRN  Referring Physician: No ref. 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    History of Present Illness:  Patient is a 78 y.o.     2023: Mr. 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  He presented in October for a screening colonoscopy to his gastroenterologist.  For approximately 6 months he had noted hematochezia.  It was intermittent, irregular and in small amounts.  He attributed this to hemorrhoids.  However, when he underwent them a colonoscopy he was noted to have a horseshoe shaped, partially obstructive, ulcerated and fungating malignant appearing tumor in the sigmoid colon.  It extended from 26 to 30 cm from the anal verge.  There were as well 2 sessile polyps.  Pathology reported moderately differentiated adenocarcinoma.  MLH1, MSH2 and MSH6  had intact nuclear expression.  However PMS2 was lost.  The possibility of Johnson syndrome was raised with this findings.  With this information, on November 10, 2023, he was taken to the operating room.  He underwent prophylactic stenting of both ureters and following that he had a robotic segmental colon resection.  The site of the tumor was the sigmoid and it corresponded to adenocarcinoma.  It was moderately differentiated and its maximum dimension was 5.8 cm.  It invaded through the muscularis propria into the pericolonic tissue.  Tumor perforation was not identified.  Lymphovascular space and invasion was not identified.  Perineural invasion was also absent.  All margins were negative for invasive carcinoma.  All of 19 lymph nodes submitted 1 was positive for metastatic disease.  The disease was thus classified as  pT3N1a, which corresponded to stage IIIa.  He recovered uneventfully from surgery.  On review of systems he had been afebrile but he did admit to some weight loss that was partially intentional but greater than he expected.  He had been free of chest pains, cough or abdominal pain.He did undergo imaging of the chest abdomen pelvis at the end of October 2023.  The circumferential wall thickening with adjacent stranding involving the sigmoid colon that corresponded to the tumor was identified.  There was no suggestion of metastatic disease.  His family history was reviewed.  He did not have any significant history of colon malignancy.  On exam he was found to be alert and well-oriented.  Conversant and in good spirits.  Lungs clear.  Heart regular.  Abdomen rounded.  All surgical incisions healing well.  Soft and without palpable tumors.  No edema.  I reviewed all the chart.  Discussed with him the usual treatment of stage III colon cancer and the rationale of adjuvant chemotherapy.  I have requested next-generation sequencing as well as PD-L1 expression of tumor tissue and asked him to return to see me approximately 3 weeks from now.  He should start treatment between 4 and 6 weeks from the surgery.    12/18/2023: Feels well and is progressively stronger. Has been eating well and has no nausea or vomiting. His surgical incisions have been healing well. No new respiratory or new gastrointestinal symptoms. On exam alert, conversant and in no distress. No jaundice. No oral lesions and respirations not labored. The lungs are clear and the heart regular. Abdomen soft. The laboratory exams were reviewed and discussed with him and his spouse. In the setting of stage IIIA signoid cancer, treatment with adjuvant chemotherapy is well justified. To begin FOLFOX in approximately 3 weeks. Discussed again side effects and procedures. He is to have his port inserted in a few days. He will see me in approximately 4 weeks.      Subjective:  12/18/2023: Feels progressively better and is well recovered from surgery. He has no new symptoms and has been eating well. No nausea or vomiting No chest pain or cough and no abdominal pain.    Past Medical History:   Diagnosis Date    Arthritis     BPH with urinary obstruction     Colon cancer 11/10/2023    Sigmoid colon cancer.    Coronary artery disease 02/14/2012    Diabetes mellitus     Elevated cholesterol     Hyperlipidemia     Hypertension     Malignant neoplasm of sigmoid colon 10/31/2023     Past Surgical History:   Procedure Laterality Date    CARDIAC CATHETERIZATION N/A 03/12/2021    Procedure: Left Heart Cath with Coronary Angiography;  Surgeon: Huseyin Do MD;  Location: [facility] CATH INVASIVE LOCATION;  Service: Cardiovascular;  Laterality: N/A;    COLON RESECTION N/A 11/10/2023    Procedure: COLON RESECTION LAPAROSCOPIC SIGMOID WITH DAVINCI ROBOT;  Surgeon: Dilshad Chavira MD;  Location: [facility] OR;  Service: Robotics - DaVinci;  Laterality: N/A;    COLONOSCOPY N/A 10/27/2023    Procedure: COLONOSCOPY with cold forcep biopsy x1 area and cold snare polypectomy x2;  Surgeon: Yared Smiley MD;  Location: [facility] ENDOSCOPY;  Service: Gastroenterology;  Laterality: N/A;  Post- colon polyps, sigmoid colon mass, diverticulosis    CORONARY ARTERY BYPASS GRAFT N/A 03/15/2021    Procedure: CORONARY ARTERY BYPASS GRAFTING;  Surgeon: Paxton Vasquez MD;  Location: [facility] CVOR;  Service: Cardiothoracic;  Laterality: N/A;    CYSTOSCOPY W/ URETERAL STENT PLACEMENT Bilateral 11/10/2023    Procedure: CYSTOSCOPY, URETERAL ICG INSERTION;  Surgeon: Tj Salazar MD;  Location: [facility] MAIN OR;  Service: Urology;  Laterality: Bilateral;    CYSTOSCOPY W/ URETERAL STENT PLACEMENT Bilateral 11/29/2023    Procedure: CYSTOSCOPY URETEROSCOPY RETROGRADE PYELOGRAM BILATERAL STENT REMOVAL;  Surgeon: Tj Salazar MD;  Location: [facility] OR;  Service: Urology;  Laterality: Bilateral;     CYSTOSCOPY, RETROGRADE PYELOGRAM AND STENT INSERTION Bilateral 11/10/2023    Procedure: CYSTOSCOPY RETROGRADE PYELOGRAM AND STENT INSERTION;  Surgeon: Tj Salazar MD;  Location: [facility];  Service: Urology;  Laterality: Bilateral;    TONSILLECTOMY         Current Outpatient Medications:     aspirin 81 MG EC tablet, Take 1 tablet by mouth Daily., Disp: 30 tablet, Rfl: 3    glimepiride (AMARYL) 2 MG tablet, Take 1 tablet by mouth Every Morning Before Breakfast., Disp: , Rfl:     atorvastatin (LIPITOR) 40 MG tablet, Take 1 tablet by mouth Every Night. (Patient not taking: Reported on 12/18/2023), Disp: 90 tablet, Rfl: 3    CBD (cannabidiol) oral oil, Take 1 mL by mouth Daily. For arthritis PRN holding (Patient not taking: Reported on 12/12/2023), Disp: , Rfl:     finasteride (PROSCAR) 5 MG tablet, Take 1 tablet by mouth Daily. (Patient not taking: Reported on 12/18/2023), Disp: 90 tablet, Rfl: 3    lisinopril (PRINIVIL,ZESTRIL) 20 MG tablet, Take 2 tablets by mouth 2 (Two) Times a Day. (Patient not taking: Reported on 12/18/2023), Disp: , Rfl:     metFORMIN (GLUCOPHAGE) 1000 MG tablet, Take 1 tablet by mouth 2 (Two) Times a Day With Meals. (Patient not taking: Reported on 12/18/2023), Disp: , Rfl:     tamsulosin (FLOMAX) 0.4 MG capsule 24 hr capsule, Take 1 capsule by mouth Daily. (Patient not taking: Reported on 12/18/2023), Disp: 90 capsule, Rfl: 3    Allergies   Allergen Reactions    Shellfish-Derived Products Anaphylaxis     Family History   Problem Relation Age of Onset    Diabetes Mother     Ovarian cancer Mother 45    Heart disease Father     Diabetes Sister     Breast cancer Sister 50    Diabetes Brother     Hypertension Brother     Heart disease Brother      Cancer-related family history includes Breast cancer (age of onset: 50) in his sister; Ovarian cancer (age of onset: 45) in his mother.    Social History     Tobacco Use    Smoking status: Never     Passive exposure: Past    Smokeless tobacco:  Never   Vaping Use    Vaping Use: Never used   Substance Use Topics    Alcohol use: Never    Drug use: Never     Comment: CBD oil     Social History     Social History Narrative    Not on file     ROS:   Review of Systems   Constitutional:  Positive for unexpected weight change. Negative for activity change, appetite change, chills, diaphoresis, fatigue and fever.   HENT:  Negative for congestion, dental problem, drooling, ear discharge, ear pain, facial swelling, hearing loss, mouth sores, nosebleeds, postnasal drip, rhinorrhea, sinus pressure, sinus pain, sneezing, sore throat, tinnitus, trouble swallowing and voice change.    Eyes:  Negative for photophobia, pain, discharge, redness, itching and visual disturbance.   Respiratory:  Negative for apnea, cough, choking, chest tightness, shortness of breath, wheezing and stridor.    Cardiovascular:  Negative for chest pain, palpitations and leg swelling.   Gastrointestinal:  Positive for anal bleeding. Negative for abdominal distention, abdominal pain, blood in stool, constipation, diarrhea, nausea, rectal pain and vomiting.   Endocrine: Negative for cold intolerance, heat intolerance, polydipsia and polyuria.   Genitourinary:  Negative for decreased urine volume, difficulty urinating, dysuria, flank pain, frequency, genital sores, hematuria and urgency.   Musculoskeletal:  Negative for arthralgias, back pain, gait problem, joint swelling, myalgias, neck pain and neck stiffness.   Skin:  Negative for color change, pallor and rash.   Neurological:  Negative for dizziness, tremors, seizures, syncope, facial asymmetry, speech difficulty, weakness, light-headedness, numbness and headaches.   Hematological:  Negative for adenopathy. Does not bruise/bleed easily.   Psychiatric/Behavioral:  Negative for agitation, behavioral problems, confusion, decreased concentration, hallucinations, self-injury, sleep disturbance and suicidal ideas. The patient is not nervous/anxious.   "    Objective:    Vital Signs:  Vitals:    12/18/23 1412   BP: 167/73   Pulse: (!) 44   SpO2: 99%   Weight: 75.4 kg (166 lb 3.2 oz)   Height: 177.8 cm (70\")   PainSc: 0-No pain     Body mass index is 23.85 kg/m².    ECOG  (0) Fully active, able to carry on all predisease performance without restriction    Physical Exam:   Physical Exam  Constitutional:       General: He is not in acute distress.     Appearance: Normal appearance. He is not ill-appearing, toxic-appearing or diaphoretic.   HENT:      Head: Normocephalic and atraumatic.      Right Ear: External ear normal.      Left Ear: External ear normal.      Nose: Nose normal.      Mouth/Throat:      Mouth: Mucous membranes are moist.      Pharynx: Oropharynx is clear.   Eyes:      General: No scleral icterus.        Right eye: No discharge.         Left eye: No discharge.      Conjunctiva/sclera: Conjunctivae normal.      Pupils: Pupils are equal, round, and reactive to light.   Cardiovascular:      Rate and Rhythm: Normal rate and regular rhythm.      Pulses: Normal pulses.      Heart sounds: Normal heart sounds. No murmur heard.     No friction rub. No gallop.   Pulmonary:      Effort: No respiratory distress.      Breath sounds: No stridor. No wheezing, rhonchi or rales.   Chest:      Chest wall: No tenderness.   Abdominal:      General: Bowel sounds are normal. There is no distension.      Palpations: Abdomen is soft. There is no mass.      Tenderness: There is no abdominal tenderness. There is no right CVA tenderness, left CVA tenderness, guarding or rebound.      Comments: Rounded and protuberant. All surgical incisions are healed.    Musculoskeletal:         General: No swelling, tenderness, deformity or signs of injury.      Cervical back: No rigidity.      Right lower leg: No edema.      Left lower leg: No edema.   Lymphadenopathy:      Cervical: No cervical adenopathy.   Skin:     General: Skin is warm and dry.      Coloration: Skin is not jaundiced.    "   Findings: No bruising or rash.   Neurological:      General: No focal deficit present.      Mental Status: He is alert and oriented to person, place, and time.      Gait: Gait normal.   Psychiatric:         Mood and Affect: Mood normal.         Behavior: Behavior normal.         Thought Content: Thought content normal.         Judgment: Judgment normal.     SILVANA Gallegos MD performed the physical exam on 12/18/2023 as documented above.     Lab Results - Last 18 Months   Lab Units 12/13/23  1203 11/27/23  1416 11/13/23  0023   WBC 10*3/mm3 6.79 8.83 11.20*   HEMOGLOBIN g/dL 14.0 13.2 11.8*   HEMATOCRIT % 42.1 40.9 35.2*   PLATELETS 10*3/mm3 252 429 320   MCV fL 93.1 94.7 91.8     Lab Results - Last 18 Months   Lab Units 12/13/23  1203 11/27/23  1416 11/13/23  0023 11/02/23  0901 01/13/23  1011   SODIUM mmol/L 139 142 142   < > 140   POTASSIUM mmol/L 4.3 3.6 3.6   < > 4.7   CHLORIDE mmol/L 99 101 108*   < > 101   CO2 mmol/L 28.5 32.0* 28.0   < > 25   BUN mg/dL 20 16 13   < > 18   CREATININE mg/dL 1.12 0.96 0.88   < > 1.12   CALCIUM mg/dL 9.8 9.1 8.7   < > 9.4   BILIRUBIN mg/dL  --  1.0  --   --  1.0   ALK PHOS U/L  --  137*  --   --  110   ALT (SGPT) U/L  --  21  --   --  20   AST (SGOT) U/L  --  20  --   --  16   GLUCOSE mg/dL 228* 244* 131*   < > 240*    < > = values in this interval not displayed.     Lab Results   Component Value Date    GLUCOSE 228 (H) 12/13/2023    BUN 20 12/13/2023    CREATININE 1.12 12/13/2023    EGFRIFNONA 77 03/18/2021    BCR 17.9 12/13/2023    K 4.3 12/13/2023    CO2 28.5 12/13/2023    CALCIUM 9.8 12/13/2023    PROTENTOTREF 6.5 01/13/2023    ALBUMIN 4.1 11/27/2023    LABIL2 2.0 01/13/2023    AST 20 11/27/2023    ALT 21 11/27/2023     LDH   Date Value Ref Range Status   12/25/2020 286 (H) 135 - 225 U/L Final     Comment:     Specimen hemolyzed.  Results may be affected.     Lab Results   Component Value Date    FIBRINOGEN 124 (L) 03/15/2021     Lab Results   Component Value Date    PTT  25.8 03/15/2021    INR 1.09 03/16/2021     Lab Results   Component Value Date    CEA 2.40 11/27/2023     Lab Results   Component Value Date    PSA 5.2 (H) 01/13/2023     Assessment & Plan     1: pT3N1a or stage IIIA moderately differentiated adenocarcinoma of the sigmoid colon. Reviewed all the records again. The ureteral stents were removed. Plans for port insertion in place. He is to start treatment with adjuvant FOLFOX. Awaiting next generation sequencing.   2.  Johnson syndrome?  Referred to genetic counseling.   3.  Reviewed all the laboratory exams and discussed with him and his spouse.   4.  Start treatment with FOLFOX and see me with the third treatment.     Xavi Gallegos MD on 12/18/2023 at 16:52

## 2023-12-13 ENCOUNTER — LAB (OUTPATIENT)
Dept: LAB | Facility: HOSPITAL | Age: 78
End: 2023-12-13
Payer: MEDICARE

## 2023-12-13 LAB
ANION GAP SERPL CALCULATED.3IONS-SCNC: 11.5 MMOL/L (ref 5–15)
BUN SERPL-MCNC: 20 MG/DL (ref 8–23)
BUN/CREAT SERPL: 17.9 (ref 7–25)
CALCIUM SPEC-SCNC: 9.8 MG/DL (ref 8.6–10.5)
CHLORIDE SERPL-SCNC: 99 MMOL/L (ref 98–107)
CO2 SERPL-SCNC: 28.5 MMOL/L (ref 22–29)
CREAT SERPL-MCNC: 1.12 MG/DL (ref 0.76–1.27)
DEPRECATED RDW RBC AUTO: 41.7 FL (ref 37–54)
EGFRCR SERPLBLD CKD-EPI 2021: 68.1 ML/MIN/1.73
ERYTHROCYTE [DISTWIDTH] IN BLOOD BY AUTOMATED COUNT: 12.1 % (ref 12.3–15.4)
GLUCOSE SERPL-MCNC: 228 MG/DL (ref 65–99)
HCT VFR BLD AUTO: 42.1 % (ref 37.5–51)
HGB BLD-MCNC: 14 G/DL (ref 13–17.7)
MCH RBC QN AUTO: 31 PG (ref 26.6–33)
MCHC RBC AUTO-ENTMCNC: 33.3 G/DL (ref 31.5–35.7)
MCV RBC AUTO: 93.1 FL (ref 79–97)
PLATELET # BLD AUTO: 252 10*3/MM3 (ref 140–450)
PMV BLD AUTO: 10.7 FL (ref 6–12)
POTASSIUM SERPL-SCNC: 4.3 MMOL/L (ref 3.5–5.2)
RBC # BLD AUTO: 4.52 10*6/MM3 (ref 4.14–5.8)
SODIUM SERPL-SCNC: 139 MMOL/L (ref 136–145)
WBC NRBC COR # BLD AUTO: 6.79 10*3/MM3 (ref 3.4–10.8)

## 2023-12-13 PROCEDURE — 85027 COMPLETE CBC AUTOMATED: CPT

## 2023-12-13 PROCEDURE — 80048 BASIC METABOLIC PNL TOTAL CA: CPT

## 2023-12-14 ENCOUNTER — TELEPHONE (OUTPATIENT)
Dept: CARDIOLOGY | Facility: CLINIC | Age: 78
End: 2023-12-14
Payer: MEDICARE

## 2023-12-14 NOTE — TELEPHONE ENCOUNTER
FACILITY: [facility]  DR: Dilshad Chavira  PHONE: [phone]  FAX: [phone]  PROCEDURE: Port placement  SCHEDULED: 12/20/23  MEDS TO HOLD: no anticoagulant

## 2023-12-18 ENCOUNTER — OFFICE VISIT (OUTPATIENT)
Dept: ONCOLOGY | Facility: CLINIC | Age: 78
End: 2023-12-18
Payer: MEDICARE

## 2023-12-18 VITALS
HEIGHT: 70 IN | SYSTOLIC BLOOD PRESSURE: 167 MMHG | OXYGEN SATURATION: 99 % | BODY MASS INDEX: 23.79 KG/M2 | WEIGHT: 166.2 LBS | DIASTOLIC BLOOD PRESSURE: 73 MMHG | HEART RATE: 44 BPM

## 2023-12-18 DIAGNOSIS — C18.7 MALIGNANT NEOPLASM OF SIGMOID COLON: Primary | ICD-10-CM

## 2023-12-18 NOTE — H&P (VIEW-ONLY)
"Chief Complaint  Post-op (COLON RESECTION LAPAROSCOPIC SIGMOID WITH DAVINCI ROBOT 11/10/23)    Subjective        Tao Pozo presents to [facility] GENERAL SURGERY  History of Present Illness    78-year-old gentleman here for follow-up after his robotic sigmoid colectomy.  Discussed his pathology results showing T3 N1a M0 adenocarcinoma of his colon with adequate and negative margins.  He is under Dr. Severe and plans for IV chemotherapy.  I will place port next week.  His endoscopist is Dr. Smiley.  Preoperative CEA was 13.8.  He has seen Dr. Salazar and had his stents removed. No lifting over 30 pounds for 6 weeks from surgery, he can follow-up me again in 1 month for repeat check.     Objective   Vital Signs:  /72   Pulse 60   Temp 97.8 °F (36.6 °C) (Infrared)   Ht 177.8 cm (70\")   Wt 73.4 kg (161 lb 12.8 oz)   SpO2 98%   BMI 23.22 kg/m²   Estimated body mass index is 23.22 kg/m² as calculated from the following:    Height as of this encounter: 177.8 cm (70\").    Weight as of this encounter: 73.4 kg (161 lb 12.8 oz).       BMI is within normal parameters. No other follow-up for BMI required.      Physical Exam  Constitutional:       General: He is not in acute distress.     Appearance: Normal appearance. He is not ill-appearing.   HENT:      Head: Normocephalic and atraumatic.      Right Ear: External ear normal.      Left Ear: External ear normal.   Eyes:      Extraocular Movements: Extraocular movements intact.      Conjunctiva/sclera: Conjunctivae normal.   Cardiovascular:      Rate and Rhythm: Normal rate and regular rhythm.   Pulmonary:      Effort: Pulmonary effort is normal. No respiratory distress.   Abdominal:      General: There is no distension.      Palpations: Abdomen is soft.      Tenderness: There is no abdominal tenderness.   Musculoskeletal:         General: No swelling or deformity.   Skin:     General: Skin is warm and dry.   Neurological:      Mental Status: " He is alert and oriented to person, place, and time. Mental status is at baseline.      Result Review :                   Assessment and Plan   Diagnoses and all orders for this visit:    1. Malignant neoplasm of sigmoid colon (Primary)      78-year-old gentleman here for follow-up after his robotic sigmoid colectomy.  Discussed his pathology results showing T3 N1a M0 adenocarcinoma of his colon with adequate and negative margins.  He is under Dr. Severe and plans for IV chemotherapy.  I will place port next week.  His endoscopist is Dr. Smiley.  Preoperative CEA was 13.8.  He has seen Dr. Salazar and had his stents removed. No lifting over 30 pounds for 6 weeks from surgery, he can follow-up me again in 1 month for repeat check.          Follow Up   No follow-ups on file.  Patient was given instructions and counseling regarding his condition or for health maintenance advice. Please see specific information pulled into the AVS if appropriate.

## 2023-12-18 NOTE — PROGRESS NOTES
"Chief Complaint  Post-op (COLON RESECTION LAPAROSCOPIC SIGMOID WITH DAVINCI ROBOT 11/10/23)    Subjective        Tao Pozo presents to [facility] GENERAL SURGERY  History of Present Illness    78-year-old gentleman here for follow-up after his robotic sigmoid colectomy.  Discussed his pathology results showing T3 N1a M0 adenocarcinoma of his colon with adequate and negative margins.  He is under Dr. Severe and plans for IV chemotherapy.  I will place port next week.  His endoscopist is Dr. Smiley.  Preoperative CEA was 13.8.  He has seen Dr. Salazar and had his stents removed. No lifting over 30 pounds for 6 weeks from surgery, he can follow-up me again in 1 month for repeat check.     Objective   Vital Signs:  /72   Pulse 60   Temp 97.8 °F (36.6 °C) (Infrared)   Ht 177.8 cm (70\")   Wt 73.4 kg (161 lb 12.8 oz)   SpO2 98%   BMI 23.22 kg/m²   Estimated body mass index is 23.22 kg/m² as calculated from the following:    Height as of this encounter: 177.8 cm (70\").    Weight as of this encounter: 73.4 kg (161 lb 12.8 oz).       BMI is within normal parameters. No other follow-up for BMI required.      Physical Exam  Constitutional:       General: He is not in acute distress.     Appearance: Normal appearance. He is not ill-appearing.   HENT:      Head: Normocephalic and atraumatic.      Right Ear: External ear normal.      Left Ear: External ear normal.   Eyes:      Extraocular Movements: Extraocular movements intact.      Conjunctiva/sclera: Conjunctivae normal.   Cardiovascular:      Rate and Rhythm: Normal rate and regular rhythm.   Pulmonary:      Effort: Pulmonary effort is normal. No respiratory distress.   Abdominal:      General: There is no distension.      Palpations: Abdomen is soft.      Tenderness: There is no abdominal tenderness.   Musculoskeletal:         General: No swelling or deformity.   Skin:     General: Skin is warm and dry.   Neurological:      Mental Status: " He is alert and oriented to person, place, and time. Mental status is at baseline.      Result Review :                   Assessment and Plan   Diagnoses and all orders for this visit:    1. Malignant neoplasm of sigmoid colon (Primary)      78-year-old gentleman here for follow-up after his robotic sigmoid colectomy.  Discussed his pathology results showing T3 N1a M0 adenocarcinoma of his colon with adequate and negative margins.  He is under Dr. Severe and plans for IV chemotherapy.  I will place port next week.  His endoscopist is Dr. Smiley.  Preoperative CEA was 13.8.  He has seen Dr. Salazar and had his stents removed. No lifting over 30 pounds for 6 weeks from surgery, he can follow-up me again in 1 month for repeat check.          Follow Up   No follow-ups on file.  Patient was given instructions and counseling regarding his condition or for health maintenance advice. Please see specific information pulled into the AVS if appropriate.

## 2023-12-20 ENCOUNTER — HOSPITAL ENCOUNTER (OUTPATIENT)
Facility: HOSPITAL | Age: 78
Setting detail: HOSPITAL OUTPATIENT SURGERY
Discharge: HOME | End: 2023-12-20
Attending: STUDENT IN AN ORGANIZED HEALTH CARE EDUCATION/TRAINING PROGRAM | Admitting: STUDENT IN AN ORGANIZED HEALTH CARE EDUCATION/TRAINING PROGRAM
Payer: MEDICARE

## 2023-12-20 ENCOUNTER — APPOINTMENT (OUTPATIENT)
Dept: GENERAL RADIOLOGY | Facility: HOSPITAL | Age: 78
End: 2023-12-20
Payer: MEDICARE

## 2023-12-20 ENCOUNTER — HOSPITAL ENCOUNTER (EMERGENCY)
Facility: HOSPITAL | Age: 78
Discharge: HOME | End: 2023-12-20
Attending: EMERGENCY MEDICINE | Admitting: EMERGENCY MEDICINE
Payer: MEDICARE

## 2023-12-20 ENCOUNTER — ANESTHESIA EVENT (OUTPATIENT)
Dept: PERIOP | Facility: HOSPITAL | Age: 78
End: 2023-12-20
Payer: MEDICARE

## 2023-12-20 ENCOUNTER — ANESTHESIA (OUTPATIENT)
Dept: PERIOP | Facility: HOSPITAL | Age: 78
End: 2023-12-20
Payer: MEDICARE

## 2023-12-20 VITALS
DIASTOLIC BLOOD PRESSURE: 63 MMHG | RESPIRATION RATE: 61 BRPM | BODY MASS INDEX: 23.62 KG/M2 | TEMPERATURE: 97.7 F | OXYGEN SATURATION: 97 % | HEIGHT: 70 IN | HEART RATE: 51 BPM | WEIGHT: 165 LBS | SYSTOLIC BLOOD PRESSURE: 161 MMHG

## 2023-12-20 VITALS
HEART RATE: 46 BPM | TEMPERATURE: 97.9 F | HEIGHT: 70 IN | SYSTOLIC BLOOD PRESSURE: 188 MMHG | DIASTOLIC BLOOD PRESSURE: 65 MMHG | OXYGEN SATURATION: 97 % | WEIGHT: 172.84 LBS | BODY MASS INDEX: 24.74 KG/M2 | RESPIRATION RATE: 18 BRPM

## 2023-12-20 DIAGNOSIS — C18.7 MALIGNANT NEOPLASM OF SIGMOID COLON: ICD-10-CM

## 2023-12-20 DIAGNOSIS — R33.8 ACUTE URINARY RETENTION: Primary | ICD-10-CM

## 2023-12-20 LAB
ANION GAP SERPL CALCULATED.3IONS-SCNC: 11 MMOL/L (ref 5–15)
BACTERIA UR QL AUTO: ABNORMAL /HPF
BASOPHILS # BLD AUTO: 0 10*3/MM3 (ref 0–0.2)
BASOPHILS NFR BLD AUTO: 0.5 % (ref 0–1.5)
BILIRUB UR QL STRIP: NEGATIVE
BUN SERPL-MCNC: 13 MG/DL (ref 8–23)
BUN/CREAT SERPL: 15.1 (ref 7–25)
CALCIUM SPEC-SCNC: 9.4 MG/DL (ref 8.6–10.5)
CHLORIDE SERPL-SCNC: 102 MMOL/L (ref 98–107)
CLARITY UR: CLEAR
CO2 SERPL-SCNC: 27 MMOL/L (ref 22–29)
COLOR UR: YELLOW
CREAT SERPL-MCNC: 0.86 MG/DL (ref 0.76–1.27)
DEPRECATED RDW RBC AUTO: 43.3 FL (ref 37–54)
EGFRCR SERPLBLD CKD-EPI 2021: 89.7 ML/MIN/1.73
EOSINOPHIL # BLD AUTO: 0.2 10*3/MM3 (ref 0–0.4)
EOSINOPHIL NFR BLD AUTO: 2 % (ref 0.3–6.2)
ERYTHROCYTE [DISTWIDTH] IN BLOOD BY AUTOMATED COUNT: 13.2 % (ref 12.3–15.4)
GLUCOSE BLDC GLUCOMTR-MCNC: 131 MG/DL (ref 70–105)
GLUCOSE SERPL-MCNC: 185 MG/DL (ref 65–99)
GLUCOSE UR STRIP-MCNC: NEGATIVE MG/DL
HCT VFR BLD AUTO: 38.8 % (ref 37.5–51)
HGB BLD-MCNC: 13.1 G/DL (ref 13–17.7)
HGB UR QL STRIP.AUTO: ABNORMAL
HYALINE CASTS UR QL AUTO: ABNORMAL /LPF
KETONES UR QL STRIP: NEGATIVE
LEUKOCYTE ESTERASE UR QL STRIP.AUTO: NEGATIVE
LYMPHOCYTES # BLD AUTO: 1.8 10*3/MM3 (ref 0.7–3.1)
LYMPHOCYTES NFR BLD AUTO: 17.1 % (ref 19.6–45.3)
MCH RBC QN AUTO: 30.4 PG (ref 26.6–33)
MCHC RBC AUTO-ENTMCNC: 33.8 G/DL (ref 31.5–35.7)
MCV RBC AUTO: 89.8 FL (ref 79–97)
MONOCYTES # BLD AUTO: 0.8 10*3/MM3 (ref 0.1–0.9)
MONOCYTES NFR BLD AUTO: 7.6 % (ref 5–12)
NEUTROPHILS NFR BLD AUTO: 7.8 10*3/MM3 (ref 1.7–7)
NEUTROPHILS NFR BLD AUTO: 72.8 % (ref 42.7–76)
NITRITE UR QL STRIP: NEGATIVE
NRBC BLD AUTO-RTO: 0.1 /100 WBC (ref 0–0.2)
PH UR STRIP.AUTO: 7.5 [PH] (ref 5–8)
PLATELET # BLD AUTO: 256 10*3/MM3 (ref 140–450)
PMV BLD AUTO: 8.3 FL (ref 6–12)
POTASSIUM SERPL-SCNC: 3.6 MMOL/L (ref 3.5–5.2)
PROT UR QL STRIP: NEGATIVE
RBC # BLD AUTO: 4.32 10*6/MM3 (ref 4.14–5.8)
RBC # UR STRIP: ABNORMAL /HPF
REF LAB TEST METHOD: ABNORMAL
REF LAB TEST METHOD: NORMAL
SODIUM SERPL-SCNC: 140 MMOL/L (ref 136–145)
SP GR UR STRIP: 1.01 (ref 1–1.03)
SQUAMOUS #/AREA URNS HPF: ABNORMAL /HPF
UROBILINOGEN UR QL STRIP: ABNORMAL
WBC # UR STRIP: ABNORMAL /HPF
WBC NRBC COR # BLD AUTO: 10.7 10*3/MM3 (ref 3.4–10.8)

## 2023-12-20 PROCEDURE — 71045 X-RAY EXAM CHEST 1 VIEW: CPT

## 2023-12-20 PROCEDURE — 81001 URINALYSIS AUTO W/SCOPE: CPT | Performed by: NURSE PRACTITIONER

## 2023-12-20 PROCEDURE — 25010000002 CEFAZOLIN PER 500 MG: Performed by: STUDENT IN AN ORGANIZED HEALTH CARE EDUCATION/TRAINING PROGRAM

## 2023-12-20 PROCEDURE — 36561 INSERT TUNNELED CV CATH: CPT | Performed by: STUDENT IN AN ORGANIZED HEALTH CARE EDUCATION/TRAINING PROGRAM

## 2023-12-20 PROCEDURE — 25010000002 MIDAZOLAM PER 1 MG: Performed by: NURSE ANESTHETIST, CERTIFIED REGISTERED

## 2023-12-20 PROCEDURE — 25010000002 PROPOFOL 200 MG/20ML EMULSION: Performed by: NURSE ANESTHETIST, CERTIFIED REGISTERED

## 2023-12-20 PROCEDURE — 77001 FLUOROGUIDE FOR VEIN DEVICE: CPT | Performed by: STUDENT IN AN ORGANIZED HEALTH CARE EDUCATION/TRAINING PROGRAM

## 2023-12-20 PROCEDURE — 99283 EMERGENCY DEPT VISIT LOW MDM: CPT

## 2023-12-20 PROCEDURE — 51702 INSERT TEMP BLADDER CATH: CPT

## 2023-12-20 PROCEDURE — 25010000002 FENTANYL CITRATE (PF) 100 MCG/2ML SOLUTION: Performed by: NURSE ANESTHETIST, CERTIFIED REGISTERED

## 2023-12-20 PROCEDURE — 25010000002 HYDRALAZINE PER 20 MG: Performed by: NURSE ANESTHETIST, CERTIFIED REGISTERED

## 2023-12-20 PROCEDURE — C1788 PORT, INDWELLING, IMP: HCPCS | Performed by: STUDENT IN AN ORGANIZED HEALTH CARE EDUCATION/TRAINING PROGRAM

## 2023-12-20 PROCEDURE — 76937 US GUIDE VASCULAR ACCESS: CPT | Performed by: STUDENT IN AN ORGANIZED HEALTH CARE EDUCATION/TRAINING PROGRAM

## 2023-12-20 PROCEDURE — 77001 FLUOROGUIDE FOR VEIN DEVICE: CPT

## 2023-12-20 PROCEDURE — 25010000002 LIDOCAINE 1 % SOLUTION: Performed by: STUDENT IN AN ORGANIZED HEALTH CARE EDUCATION/TRAINING PROGRAM

## 2023-12-20 PROCEDURE — 25810000003 LACTATED RINGERS PER 1000 ML: Performed by: STUDENT IN AN ORGANIZED HEALTH CARE EDUCATION/TRAINING PROGRAM

## 2023-12-20 PROCEDURE — 85025 COMPLETE CBC W/AUTO DIFF WBC: CPT | Performed by: NURSE PRACTITIONER

## 2023-12-20 PROCEDURE — 76000 FLUOROSCOPY <1 HR PHYS/QHP: CPT

## 2023-12-20 PROCEDURE — 25010000002 HEPARIN (PORCINE) PER 1000 UNITS: Performed by: STUDENT IN AN ORGANIZED HEALTH CARE EDUCATION/TRAINING PROGRAM

## 2023-12-20 PROCEDURE — 82948 REAGENT STRIP/BLOOD GLUCOSE: CPT

## 2023-12-20 PROCEDURE — 80048 BASIC METABOLIC PNL TOTAL CA: CPT | Performed by: NURSE PRACTITIONER

## 2023-12-20 PROCEDURE — 25810000003 LACTATED RINGERS PER 1000 ML: Performed by: NURSE ANESTHETIST, CERTIFIED REGISTERED

## 2023-12-20 DEVICE — PRT INTRO VASC/INTERV VORTEX FILL/HL DETACH/POLYURET/CATH 8F: Type: IMPLANTABLE DEVICE | Site: SUBCLAVIAN | Status: FUNCTIONAL

## 2023-12-20 RX ORDER — PROPOFOL 10 MG/ML
INJECTION, EMULSION INTRAVENOUS AS NEEDED
Status: DISCONTINUED | OUTPATIENT
Start: 2023-12-20 | End: 2023-12-20 | Stop reason: SURG

## 2023-12-20 RX ORDER — HYDROCODONE BITARTRATE AND ACETAMINOPHEN 5; 325 MG/1; MG/1
1 TABLET ORAL ONCE AS NEEDED
Status: DISCONTINUED | OUTPATIENT
Start: 2023-12-20 | End: 2023-12-20 | Stop reason: HOSPADM

## 2023-12-20 RX ORDER — LIDOCAINE HYDROCHLORIDE 10 MG/ML
INJECTION, SOLUTION INFILTRATION; PERINEURAL AS NEEDED
Status: DISCONTINUED | OUTPATIENT
Start: 2023-12-20 | End: 2023-12-20 | Stop reason: HOSPADM

## 2023-12-20 RX ORDER — ACETAMINOPHEN 650 MG/1
650 SUPPOSITORY RECTAL EVERY 4 HOURS PRN
Status: DISCONTINUED | OUTPATIENT
Start: 2023-12-20 | End: 2023-12-20 | Stop reason: HOSPADM

## 2023-12-20 RX ORDER — ACETAMINOPHEN 325 MG/1
650 TABLET ORAL ONCE AS NEEDED
Status: DISCONTINUED | OUTPATIENT
Start: 2023-12-20 | End: 2023-12-20 | Stop reason: HOSPADM

## 2023-12-20 RX ORDER — SODIUM CHLORIDE 0.9 % (FLUSH) 0.9 %
10 SYRINGE (ML) INJECTION AS NEEDED
Status: DISCONTINUED | OUTPATIENT
Start: 2023-12-20 | End: 2023-12-21 | Stop reason: HOSPADM

## 2023-12-20 RX ORDER — HYDRALAZINE HYDROCHLORIDE 20 MG/ML
5 INJECTION INTRAMUSCULAR; INTRAVENOUS
Status: DISCONTINUED | OUTPATIENT
Start: 2023-12-20 | End: 2023-12-20 | Stop reason: HOSPADM

## 2023-12-20 RX ORDER — SODIUM CHLORIDE 0.9 % (FLUSH) 0.9 %
10 SYRINGE (ML) INJECTION AS NEEDED
Status: DISCONTINUED | OUTPATIENT
Start: 2023-12-20 | End: 2023-12-20 | Stop reason: HOSPADM

## 2023-12-20 RX ORDER — FENTANYL CITRATE 50 UG/ML
INJECTION, SOLUTION INTRAMUSCULAR; INTRAVENOUS AS NEEDED
Status: DISCONTINUED | OUTPATIENT
Start: 2023-12-20 | End: 2023-12-20 | Stop reason: SURG

## 2023-12-20 RX ORDER — SODIUM CHLORIDE, SODIUM LACTATE, POTASSIUM CHLORIDE, CALCIUM CHLORIDE 600; 310; 30; 20 MG/100ML; MG/100ML; MG/100ML; MG/100ML
1000 INJECTION, SOLUTION INTRAVENOUS CONTINUOUS
Status: DISCONTINUED | OUTPATIENT
Start: 2023-12-20 | End: 2023-12-20 | Stop reason: HOSPADM

## 2023-12-20 RX ORDER — HYDROCODONE BITARTRATE AND ACETAMINOPHEN 7.5; 325 MG/1; MG/1
2 TABLET ORAL EVERY 4 HOURS PRN
Status: DISCONTINUED | OUTPATIENT
Start: 2023-12-20 | End: 2023-12-20 | Stop reason: HOSPADM

## 2023-12-20 RX ORDER — ONDANSETRON 4 MG/1
4 TABLET, FILM COATED ORAL EVERY 8 HOURS PRN
Qty: 30 TABLET | Refills: 1 | Status: SHIPPED | OUTPATIENT
Start: 2023-12-20 | End: 2024-12-19

## 2023-12-20 RX ORDER — ACETAMINOPHEN 500 MG
1000 TABLET ORAL EVERY 6 HOURS PRN
Qty: 30 TABLET | Refills: 0 | Status: SHIPPED | OUTPATIENT
Start: 2023-12-20 | End: 2024-12-19

## 2023-12-20 RX ORDER — MIDAZOLAM HYDROCHLORIDE 1 MG/ML
INJECTION INTRAMUSCULAR; INTRAVENOUS AS NEEDED
Status: DISCONTINUED | OUTPATIENT
Start: 2023-12-20 | End: 2023-12-20 | Stop reason: SURG

## 2023-12-20 RX ORDER — HYDRALAZINE HYDROCHLORIDE 20 MG/ML
INJECTION INTRAMUSCULAR; INTRAVENOUS AS NEEDED
Status: DISCONTINUED | OUTPATIENT
Start: 2023-12-20 | End: 2023-12-20 | Stop reason: SURG

## 2023-12-20 RX ORDER — LIDOCAINE HYDROCHLORIDE 10 MG/ML
INJECTION, SOLUTION EPIDURAL; INFILTRATION; INTRACAUDAL; PERINEURAL AS NEEDED
Status: DISCONTINUED | OUTPATIENT
Start: 2023-12-20 | End: 2023-12-20 | Stop reason: SURG

## 2023-12-20 RX ORDER — LIDOCAINE HYDROCHLORIDE 10 MG/ML
0.5 INJECTION, SOLUTION INFILTRATION; PERINEURAL ONCE AS NEEDED
Status: DISCONTINUED | OUTPATIENT
Start: 2023-12-20 | End: 2023-12-20 | Stop reason: HOSPADM

## 2023-12-20 RX ORDER — ONDANSETRON 2 MG/ML
4 INJECTION INTRAMUSCULAR; INTRAVENOUS ONCE AS NEEDED
Status: DISCONTINUED | OUTPATIENT
Start: 2023-12-20 | End: 2023-12-20 | Stop reason: HOSPADM

## 2023-12-20 RX ORDER — SODIUM CHLORIDE, SODIUM LACTATE, POTASSIUM CHLORIDE, CALCIUM CHLORIDE 600; 310; 30; 20 MG/100ML; MG/100ML; MG/100ML; MG/100ML
INJECTION, SOLUTION INTRAVENOUS CONTINUOUS PRN
Status: DISCONTINUED | OUTPATIENT
Start: 2023-12-20 | End: 2023-12-20 | Stop reason: SURG

## 2023-12-20 RX ORDER — GLYCOPYRROLATE 0.2 MG/ML
INJECTION INTRAMUSCULAR; INTRAVENOUS AS NEEDED
Status: DISCONTINUED | OUTPATIENT
Start: 2023-12-20 | End: 2023-12-20 | Stop reason: SURG

## 2023-12-20 RX ORDER — LABETALOL HYDROCHLORIDE 5 MG/ML
5 INJECTION, SOLUTION INTRAVENOUS
Status: DISCONTINUED | OUTPATIENT
Start: 2023-12-20 | End: 2023-12-20 | Stop reason: HOSPADM

## 2023-12-20 RX ORDER — PROCHLORPERAZINE EDISYLATE 5 MG/ML
10 INJECTION INTRAMUSCULAR; INTRAVENOUS ONCE AS NEEDED
Status: DISCONTINUED | OUTPATIENT
Start: 2023-12-20 | End: 2023-12-20 | Stop reason: HOSPADM

## 2023-12-20 RX ADMIN — MIDAZOLAM 1 MG: 1 INJECTION INTRAMUSCULAR; INTRAVENOUS at 14:11

## 2023-12-20 RX ADMIN — GLYCOPYRROLATE 0.4 MCG: 0.2 INJECTION INTRAMUSCULAR; INTRAVENOUS at 14:18

## 2023-12-20 RX ADMIN — FENTANYL CITRATE 50 MCG: 50 INJECTION, SOLUTION INTRAMUSCULAR; INTRAVENOUS at 14:12

## 2023-12-20 RX ADMIN — CEFAZOLIN 2 G: 2 INJECTION, POWDER, FOR SOLUTION INTRAMUSCULAR; INTRAVENOUS at 14:15

## 2023-12-20 RX ADMIN — PROPOFOL 20 MG: 10 INJECTION, EMULSION INTRAVENOUS at 14:28

## 2023-12-20 RX ADMIN — FENTANYL CITRATE 50 MCG: 50 INJECTION, SOLUTION INTRAMUSCULAR; INTRAVENOUS at 14:21

## 2023-12-20 RX ADMIN — LIDOCAINE HYDROCHLORIDE 20 MG: 10 INJECTION, SOLUTION EPIDURAL; INFILTRATION; INTRACAUDAL; PERINEURAL at 14:25

## 2023-12-20 RX ADMIN — SODIUM CHLORIDE, POTASSIUM CHLORIDE, SODIUM LACTATE AND CALCIUM CHLORIDE 1000 ML: 600; 310; 30; 20 INJECTION, SOLUTION INTRAVENOUS at 13:09

## 2023-12-20 RX ADMIN — MIDAZOLAM 1 MG: 1 INJECTION INTRAMUSCULAR; INTRAVENOUS at 14:22

## 2023-12-20 RX ADMIN — LIDOCAINE HYDROCHLORIDE 30 MG: 10 INJECTION, SOLUTION EPIDURAL; INFILTRATION; INTRACAUDAL; PERINEURAL at 14:19

## 2023-12-20 RX ADMIN — PROPOFOL 30 MG: 10 INJECTION, EMULSION INTRAVENOUS at 14:21

## 2023-12-20 RX ADMIN — HYDRALAZINE HYDROCHLORIDE 5 MG: 20 INJECTION, SOLUTION INTRAMUSCULAR; INTRAVENOUS at 14:43

## 2023-12-20 RX ADMIN — SODIUM CHLORIDE, SODIUM LACTATE, POTASSIUM CHLORIDE, AND CALCIUM CHLORIDE: .6; .31; .03; .02 INJECTION, SOLUTION INTRAVENOUS at 14:13

## 2023-12-20 NOTE — ANESTHESIA PREPROCEDURE EVALUATION
Anesthesia Evaluation     Patient summary reviewed and Nursing notes reviewed   no history of anesthetic complications:   NPO Solid Status: > 8 hours  NPO Liquid Status: > 8 hours           Airway   Mallampati: II  TM distance: >3 FB  Neck ROM: full  No difficulty expected  Dental - normal exam     Pulmonary    (-) not a smoker  Cardiovascular     (+) hypertension, CAD, CABG, hyperlipidemia      Neuro/Psych- negative ROS  GI/Hepatic/Renal/Endo    (+) diabetes mellitus    Musculoskeletal     Abdominal    Substance History      OB/GYN          Other   arthritis,   history of cancer    ROS/Med Hx Other: ·  Left ventricular ejection fraction appears to be 56 - 60%.  ·  Estimated right ventricular systolic pressure from tricuspid regurgitation is normal (<35 mmHg).                  Anesthesia Plan    ASA 3     general     intravenous induction     Anesthetic plan, risks, benefits, and alternatives have been provided, discussed and informed consent has been obtained with: patient.    Plan discussed with CRNA and CAA.    CODE STATUS:

## 2023-12-21 ENCOUNTER — TELEPHONE (OUTPATIENT)
Dept: SURGERY | Facility: CLINIC | Age: 78
End: 2023-12-21
Payer: MEDICARE

## 2023-12-21 ENCOUNTER — HOSPITAL ENCOUNTER (EMERGENCY)
Facility: HOSPITAL | Age: 78
Discharge: HOME | End: 2023-12-21
Attending: EMERGENCY MEDICINE
Payer: MEDICARE

## 2023-12-21 VITALS
RESPIRATION RATE: 18 BRPM | DIASTOLIC BLOOD PRESSURE: 91 MMHG | OXYGEN SATURATION: 97 % | WEIGHT: 172.62 LBS | TEMPERATURE: 98.1 F | HEIGHT: 70 IN | HEART RATE: 81 BPM | SYSTOLIC BLOOD PRESSURE: 232 MMHG | BODY MASS INDEX: 24.71 KG/M2

## 2023-12-21 DIAGNOSIS — R33.9 URINARY RETENTION: Primary | ICD-10-CM

## 2023-12-21 PROCEDURE — 51702 INSERT TEMP BLADDER CATH: CPT

## 2023-12-21 PROCEDURE — 99282 EMERGENCY DEPT VISIT SF MDM: CPT

## 2023-12-21 NOTE — ED PROVIDER NOTES
Subjective   History of Present Illness  Chief complaint unable to urinate    History of present illness this is a 78-year-old gentleman who recently was found to have colon cancer he had an Lplgza-n-Yikc placed recently in he states that he has been having trouble urinating he was seen last night in the ER for unable to urinate he had a Gomez catheter placed had a liter of urine out that Gomez catheter was taken out and he was discharged home.  Patient states he is not able to urinate other than just a little bit today.  Planes of pressure in the lower abdomen no fever chills no back pain he denies any numbness ting weakness to the legs.  No injury.  No other change in medications no recent antibiotic use.  He did have a recent surgery as described above.      Review of Systems   Constitutional:  Negative for chills and fever.   Respiratory:  Negative for chest tightness and shortness of breath.    Cardiovascular:  Negative for chest pain and palpitations.   Gastrointestinal:  Negative for abdominal pain and vomiting.   Genitourinary:  Positive for difficulty urinating. Negative for dysuria, hematuria, scrotal swelling and testicular pain.   Musculoskeletal:  Negative for back pain and neck pain.   Skin:  Negative for rash and wound.   Neurological:  Negative for dizziness and light-headedness.   Psychiatric/Behavioral:  Negative for confusion.        Past Medical History:   Diagnosis Date    Arthritis     BPH with urinary obstruction     Colon cancer 11/10/2023    Sigmoid colon cancer.    Coronary artery disease 02/14/2012    Diabetes mellitus     Elevated cholesterol     Hyperlipidemia     Hypertension     Malignant neoplasm of sigmoid colon 10/31/2023       Allergies   Allergen Reactions    Shellfish-Derived Products Anaphylaxis       Past Surgical History:   Procedure Laterality Date    CARDIAC CATHETERIZATION N/A 03/12/2021    Procedure: Left Heart Cath with Coronary Angiography;  Surgeon: Huseyin Do  MD;  Location: [facility] CATH INVASIVE LOCATION;  Service: Cardiovascular;  Laterality: N/A;    COLON RESECTION N/A 11/10/2023    Procedure: COLON RESECTION LAPAROSCOPIC SIGMOID WITH DAVINCI ROBOT;  Surgeon: Dilshad Chavira MD;  Location: [facility] MAIN OR;  Service: Robotics - DaVinci;  Laterality: N/A;    COLONOSCOPY N/A 10/27/2023    Procedure: COLONOSCOPY with cold forcep biopsy x1 area and cold snare polypectomy x2;  Surgeon: Yared Smiley MD;  Location: [facility] ENDOSCOPY;  Service: Gastroenterology;  Laterality: N/A;  Post- colon polyps, sigmoid colon mass, diverticulosis    CORONARY ARTERY BYPASS GRAFT N/A 03/15/2021    Procedure: CORONARY ARTERY BYPASS GRAFTING;  Surgeon: Paxton Vasquez MD;  Location: [facility] CVOR;  Service: Cardiothoracic;  Laterality: N/A;    CYSTOSCOPY W/ URETERAL STENT PLACEMENT Bilateral 11/10/2023    Procedure: CYSTOSCOPY, URETERAL ICG INSERTION;  Surgeon: Tj Salazar MD;  Location: [facility] MAIN OR;  Service: Urology;  Laterality: Bilateral;    CYSTOSCOPY W/ URETERAL STENT PLACEMENT Bilateral 11/29/2023    Procedure: CYSTOSCOPY URETEROSCOPY RETROGRADE PYELOGRAM BILATERAL STENT REMOVAL;  Surgeon: Tj Salazar MD;  Location: [facility] MAIN OR;  Service: Urology;  Laterality: Bilateral;    CYSTOSCOPY, RETROGRADE PYELOGRAM AND STENT INSERTION Bilateral 11/10/2023    Procedure: CYSTOSCOPY RETROGRADE PYELOGRAM AND STENT INSERTION;  Surgeon: Tj Salazar MD;  Location: [facility] MAIN OR;  Service: Urology;  Laterality: Bilateral;    TONSILLECTOMY         Family History   Problem Relation Age of Onset    Diabetes Mother     Ovarian cancer Mother 45    Heart disease Father     Diabetes Sister     Breast cancer Sister 50    Diabetes Brother     Hypertension Brother     Heart disease Brother        Social History     Socioeconomic History    Marital status:    Tobacco Use    Smoking status: Never     Passive exposure: Past    Smokeless tobacco: Never   Vaping Use    Vaping Use: Never  used   Substance and Sexual Activity    Alcohol use: Never    Drug use: Never     Comment: CBD oil    Sexual activity: Defer     Prior to Admission medications    Medication Sig Start Date End Date Taking? Authorizing Provider   acetaminophen (TYLENOL) 500 MG tablet Take 2 tablets by mouth Every 6 (Six) Hours As Needed for Moderate Pain. 12/20/23 12/19/24  Dilshad Chavira MD   aspirin 81 MG EC tablet Take 1 tablet by mouth Daily. 3/19/21   Casandra Gustafson APRN   atorvastatin (LIPITOR) 40 MG tablet Take 1 tablet by mouth Every Night. 2/28/23   Claire Kraus APRN   CBD (cannabidiol) oral oil Take 1 mL by mouth Daily. For arthritis PRN  holding  Patient not taking: Reported on 12/12/2023    Padmini Villaseñor MD   finasteride (PROSCAR) 5 MG tablet Take 1 tablet by mouth Daily. 11/13/23   Tj Salazar MD   glimepiride (AMARYL) 2 MG tablet Take 1 tablet by mouth Every Morning Before Breakfast.    Padmini Villaseñor MD   lisinopril (PRINIVIL,ZESTRIL) 20 MG tablet Take 2 tablets by mouth 2 (Two) Times a Day. 3/11/22   Padmini Villaseñor MD   metFORMIN (GLUCOPHAGE) 1000 MG tablet Take 1 tablet by mouth 2 (Two) Times a Day With Meals.    Padmini Villaseñor MD   ondansetron (Zofran) 4 MG tablet Take 1 tablet by mouth Every 8 (Eight) Hours As Needed for Nausea or Vomiting. 12/20/23 12/19/24  Dilshad Chavira MD   tamsulosin (FLOMAX) 0.4 MG capsule 24 hr capsule Take 1 capsule by mouth Daily. 11/13/23   Tj Salazar MD          Objective   Physical Exam  Constitutional is a 78-year-old awake alert no acute distress triage vital signs reviewed blood pressure initially elevated 232/91.  HEENT extraocular muscles are intact pupils equal round reactive sclera clear neck supple no adenopathy lungs clear no retraction heart regular without murmur abdomen soft distended bladder up to the umbilicus no rebound no guarding good bowel sounds no significant tenderness no pulsatile masses  examination unremarkable  extremities pulses equal upper and lower extremities no edema no cords no Homans' sign no evidence of DVT skin warm and dry without rashes or cellulitic changes.  Neurologic awake alert follows commands motor strength normal without focal weakness she has no wounds to the buttock areas good sensation in the rectal area in the sacral area.  Negative straight leg testing toes under including big toe dorsiflex plantarflex at difficulty reflexes 2+ symmetrical both knees and ankles no clonus toes downgoing there is no pain to the back to palpation.  Procedures           ED Course      Results for orders placed or performed during the hospital encounter of 12/20/23   Basic Metabolic Panel    Specimen: Blood   Result Value Ref Range    Glucose 185 (H) 65 - 99 mg/dL    BUN 13 8 - 23 mg/dL    Creatinine 0.86 0.76 - 1.27 mg/dL    Sodium 140 136 - 145 mmol/L    Potassium 3.6 3.5 - 5.2 mmol/L    Chloride 102 98 - 107 mmol/L    CO2 27.0 22.0 - 29.0 mmol/L    Calcium 9.4 8.6 - 10.5 mg/dL    BUN/Creatinine Ratio 15.1 7.0 - 25.0    Anion Gap 11.0 5.0 - 15.0 mmol/L    eGFR 89.7 >60.0 mL/min/1.73   Urinalysis With Microscopic If Indicated (No Culture) - Urine, Clean Catch    Specimen: Urine, Clean Catch   Result Value Ref Range    Color, UA Yellow Yellow, Straw    Appearance, UA Clear Clear    pH, UA 7.5 5.0 - 8.0    Specific Gravity, UA 1.007 1.005 - 1.030    Glucose, UA Negative Negative    Ketones, UA Negative Negative    Bilirubin, UA Negative Negative    Blood, UA Small (1+) (A) Negative    Protein, UA Negative Negative    Leuk Esterase, UA Negative Negative    Nitrite, UA Negative Negative    Urobilinogen, UA 0.2 E.U./dL 0.2 - 1.0 E.U./dL   CBC Auto Differential    Specimen: Blood   Result Value Ref Range    WBC 10.70 3.40 - 10.80 10*3/mm3    RBC 4.32 4.14 - 5.80 10*6/mm3    Hemoglobin 13.1 13.0 - 17.7 g/dL    Hematocrit 38.8 37.5 - 51.0 %    MCV 89.8 79.0 - 97.0 fL    MCH 30.4 26.6 - 33.0 pg    MCHC 33.8 31.5 - 35.7 g/dL     RDW 13.2 12.3 - 15.4 %    RDW-SD 43.3 37.0 - 54.0 fl    MPV 8.3 6.0 - 12.0 fL    Platelets 256 140 - 450 10*3/mm3    Neutrophil % 72.8 42.7 - 76.0 %    Lymphocyte % 17.1 (L) 19.6 - 45.3 %    Monocyte % 7.6 5.0 - 12.0 %    Eosinophil % 2.0 0.3 - 6.2 %    Basophil % 0.5 0.0 - 1.5 %    Neutrophils, Absolute 7.80 (H) 1.70 - 7.00 10*3/mm3    Lymphocytes, Absolute 1.80 0.70 - 3.10 10*3/mm3    Monocytes, Absolute 0.80 0.10 - 0.90 10*3/mm3    Eosinophils, Absolute 0.20 0.00 - 0.40 10*3/mm3    Basophils, Absolute 0.00 0.00 - 0.20 10*3/mm3    nRBC 0.1 0.0 - 0.2 /100 WBC   Urinalysis, Microscopic Only - Urine, Clean Catch    Specimen: Urine, Clean Catch   Result Value Ref Range    RBC, UA 6-10 (A) None Seen, 0-2 /HPF    WBC, UA 0-2 None Seen, 0-2 /HPF    Bacteria, UA None Seen None Seen /HPF    Squamous Epithelial Cells, UA 0-2 None Seen, 0-2 /HPF    Hyaline Casts, UA None Seen None Seen /LPF    Methodology Automated Microscopy      XR Chest 1 View    Result Date: 12/20/2023  Impression: No visible pneumothorax status post right chest port catheter placement Electronically Signed: Smiley York MD  12/20/2023 4:32 PM EST  Workstation ID: [id number]   Medications - No data to display                                           Medical Decision Making  Medical decision making.  Patient had labs reviewed from yesterday CBC and basic metabolic profile and urine were unremarkable.  Did not repeating today a Gomez catheter was placed he had 1100 cc of mostly clear urine obtained.  He was feeling much better after this.  His abdomen soft nontender good bowel sounds no distended bladder.  No findings.  I will see evidence here of acute cauda equina cord compression transverse myelitis epidural abscess acute intra-abdominal process and aneurysms dissections infection.  Most likely related to prostate enlargement.  He is feeling much better he will leave the catheter in today he was given a leg bag he [facility]  he will follow-up in the office next week.  He was made aware of the findings and what to return for he voices understanding was discharged home for outpatient management follow-up.  Stable improved ER course.  Blood pressure on the monitor improved.  Suspect the initial was related to his urinary retention and discomfort from that.    Problems Addressed:  Urinary retention: acute illness or injury        Final diagnoses:   Urinary retention       ED Disposition  ED Disposition       ED Disposition   Discharge    Condition   Stable    Comment   --               Claire Kraus, APRN  [address] [facility]  [facility] [address]  [place] [address]  [phone]    In 1 day      Bruce Sanchez MD  [address] [facility] [facility] [address]  [place] [address]  [phone]    In 1 day           Medication List      No changes were made to your prescriptions during this visit.            Sachin Lenz MD  12/21/23 [id number]

## 2023-12-21 NOTE — ED NOTES
This nurse provided pt with leg bag and educated pt and pts wife about how to properly change urinary catheter bags and how to decrease the risk of infection. Pt and pts wife verbalized understanding.

## 2023-12-21 NOTE — OP NOTE
After risk benefits and alternatives were discussed with patient informed consent was obtained.  The patient was transported to the operating room placed supine operating room table, underwent MAC anesthesia and the patient's neck and chest were prepped and draped in sterile fashion.  A surgical timeout was completed.      30 cc of lidocaine 1% with epinephrine was used to infiltrate the site of the pocket for the port as well as the catheter tract and right IJ access site.  Under ultrasound guidance, the right internal jugular vein was accessed using a needle and syringe and a guidewire was placed into the jugular vein.  The ultrasound was used to confirm correct wire placement as well as a fluoroscopic image confirming the guidewire in the patient's right atrium.    A 2 cm incision was made in the patient's right chest wall using a 15 blade scalpel.  Bovie electrocautery was used to create a pocket large enough to accommodate the port.  Using a metal tunneling device the catheter was tunneled from the pocket along the patient's chest wall and out of the access incision in the patient's neck that was used for guidewire access.  Sheath was placed over the wire into the patient's SVC under fluoroscopic guidance.  The wire and inner cannula from the sheath were removed and the catheter was placed into the patient's right atrium.  Fluoroscopic imaging was used to confirm placement of the catheter tip at the cavoatrial junction.    The catheter was cut to appropriate length.  The port was connected to the catheter and placed into the subcutaneous pocket.  The pocket was irrigated with normal saline.  The port was accessed with a Jones needle blood was aspirated and heparinized saline was easily flushed through the port.  The skin incision was closed with interrupted 3-0 Vicryl suture and then a 4-0 Vicryl subcuticular layer.  The access site was closed with a single interrupted 4-0 Vicryl stitch.  The incisions were  covered with surgical glue.  The patient was transported to PACU without incident.  A chest x-ray was ordered to be obtained in PACU.

## 2023-12-21 NOTE — TELEPHONE ENCOUNTER
Patient called c/o of urinary retention.  He stated he went to the ER yesterday and a cath was performed.  I advised him to go to the ER again today.  Dr. Chavira is not in the office and was made aware.

## 2023-12-21 NOTE — ED NOTES
Pt reports coming in yesterday for urinary retention. Pt reports getting a straight cath yesterday and being sent home. Pt reports not being able to urinate since yesterday with abdominal discomfort.

## 2023-12-21 NOTE — DISCHARGE INSTRUCTIONS
Follow-up with urologist call tomorrow to set up an appointment for catheter removal.  Return for catheter not draining fever vomiting altered mental status or abdominal pain.  Bloody urine or any other new or worsening problems or concerns return immediately to the ER.

## 2023-12-21 NOTE — ANESTHESIA POSTPROCEDURE EVALUATION
Patient: Tao Pozo    Procedure Summary       Date: 12/20/23 Room / Location: [facility] OR 09 / [facility] MAIN OR    Anesthesia Start: 1411 Anesthesia Stop: 1508    Procedure: INSERTION VENOUS ACCESS DEVICE (Right) Diagnosis:       Malignant neoplasm of sigmoid colon      (Malignant neoplasm of sigmoid colon [C18.7])    Surgeons: Dilshad Chavira MD Provider: Nena Hendrix MD    Anesthesia Type: general ASA Status: 3            Anesthesia Type: general    Vitals  Vitals Value Taken Time   /69 12/20/23 1628   Temp     Pulse 53 12/20/23 1629   Resp 61 12/20/23 1605   SpO2 98 % 12/20/23 1629   Vitals shown include unfiled device data.        Post Anesthesia Care and Evaluation    Patient location during evaluation: PACU  Patient participation: complete - patient participated  Level of consciousness: awake and alert  Pain management: satisfactory to patient    Airway patency: patent  Anesthetic complications: No anesthetic complications  PONV Status: none  Cardiovascular status: acceptable  Respiratory status: acceptable  Hydration status: acceptable

## 2023-12-21 NOTE — DISCHARGE INSTRUCTIONS
Continue current home medications.  Drink plenty of fluids.  Follow-up with urology as needed.  Return for new or worsening symptoms.

## 2023-12-21 NOTE — ED PROVIDER NOTES
Subjective   History of Present Illness  Patient is a 78-year-old white male with history of hypertension high cholesterol diabetes CAD.  He was also recently diagnosed with colon cancer.  He had a port placed today and has not been able to urinate since then.  He states he feels like he needs to go but cannot.  Denies any abdominal or flank pain.  No fever.  Has never had this before.      Review of Systems   Constitutional:  Negative for fever.   Gastrointestinal:  Negative for abdominal pain and vomiting.   Genitourinary:  Positive for difficulty urinating. Negative for flank pain.       Past Medical History:   Diagnosis Date    Arthritis     BPH with urinary obstruction     Colon cancer 11/10/2023    Sigmoid colon cancer.    Coronary artery disease 02/14/2012    Diabetes mellitus     Elevated cholesterol     Hyperlipidemia     Hypertension     Malignant neoplasm of sigmoid colon 10/31/2023       Allergies   Allergen Reactions    Shellfish-Derived Products Anaphylaxis       Past Surgical History:   Procedure Laterality Date    CARDIAC CATHETERIZATION N/A 03/12/2021    Procedure: Left Heart Cath with Coronary Angiography;  Surgeon: Huseyin Do MD;  Location: [facility] CATH INVASIVE LOCATION;  Service: Cardiovascular;  Laterality: N/A;    COLON RESECTION N/A 11/10/2023    Procedure: COLON RESECTION LAPAROSCOPIC SIGMOID WITH DAVINCI ROBOT;  Surgeon: Dilshad Chavira MD;  Location: [facility] MAIN OR;  Service: Robotics - DaVinci;  Laterality: N/A;    COLONOSCOPY N/A 10/27/2023    Procedure: COLONOSCOPY with cold forcep biopsy x1 area and cold snare polypectomy x2;  Surgeon: Yared Smiley MD;  Location: [facility] ENDOSCOPY;  Service: Gastroenterology;  Laterality: N/A;  Post- colon polyps, sigmoid colon mass, diverticulosis    CORONARY ARTERY BYPASS GRAFT N/A 03/15/2021    Procedure: CORONARY ARTERY BYPASS GRAFTING;  Surgeon: Paxton Vasquez MD;  Location: [facility] CVOR;  Service: Cardiothoracic;  Laterality:  N/A;    CYSTOSCOPY W/ URETERAL STENT PLACEMENT Bilateral 11/10/2023    Procedure: CYSTOSCOPY, URETERAL ICG INSERTION;  Surgeon: Tj Salazar MD;  Location: [facility] MAIN OR;  Service: Urology;  Laterality: Bilateral;    CYSTOSCOPY W/ URETERAL STENT PLACEMENT Bilateral 11/29/2023    Procedure: CYSTOSCOPY URETEROSCOPY RETROGRADE PYELOGRAM BILATERAL STENT REMOVAL;  Surgeon: Tj Salazar MD;  Location: [facility] MAIN OR;  Service: Urology;  Laterality: Bilateral;    CYSTOSCOPY, RETROGRADE PYELOGRAM AND STENT INSERTION Bilateral 11/10/2023    Procedure: CYSTOSCOPY RETROGRADE PYELOGRAM AND STENT INSERTION;  Surgeon: Tj Salazar MD;  Location: [facility] MAIN OR;  Service: Urology;  Laterality: Bilateral;    TONSILLECTOMY         Family History   Problem Relation Age of Onset    Diabetes Mother     Ovarian cancer Mother 45    Heart disease Father     Diabetes Sister     Breast cancer Sister 50    Diabetes Brother     Hypertension Brother     Heart disease Brother        Social History     Socioeconomic History    Marital status:    Tobacco Use    Smoking status: Never     Passive exposure: Past    Smokeless tobacco: Never   Vaping Use    Vaping Use: Never used   Substance and Sexual Activity    Alcohol use: Never    Drug use: Never     Comment: CBD oil    Sexual activity: Defer           Objective   Physical Exam  Vital signs and triage nurse note reviewed.  Constitutional: Awake, alert; well-developed and well-nourished. No acute distress is noted.  Appears mildly uncomfortable.  HEENT: Normocephalic, atraumatic; pupils are PERRL with intact EOM; oropharynx is pink and moist without exudate or erythema.  No drooling or pooling of oral secretions.  Neck: Supple, full range of motion without pain; no cervical lymphadenopathy. Normal phonation.  Cardiovascular: Regular rate and rhythm, normal S1-S2.  No murmur noted.  Pulmonary: Respiratory effort regular nonlabored, breath sounds clear to auscultation all  fields.  Abdomen: Soft, nontender, mildly distended over the suprapubic area with normoactive bowel sounds; no rebound or guarding.  Musculoskeletal: Independent range of motion of all extremities with no palpable tenderness or edema.  Neuro: Alert oriented x3, speech is clear and appropriate, GCS 15.    Skin: Flesh tone, warm, dry, intact; no erythematous or petechial rash or lesion.    Procedures           ED Course      Labs Reviewed   BASIC METABOLIC PANEL - Abnormal; Notable for the following components:       Result Value    Glucose 185 (*)     All other components within normal limits    Narrative:     GFR Normal >60  Chronic Kidney Disease <60  Kidney Failure <15    The GFR formula is only valid for adults with stable renal function between ages 18 and 70.   URINALYSIS W/ MICROSCOPIC IF INDICATED (NO CULTURE) - Abnormal; Notable for the following components:    Blood, UA Small (1+) (*)     All other components within normal limits   CBC WITH AUTO DIFFERENTIAL - Abnormal; Notable for the following components:    Lymphocyte % 17.1 (*)     Neutrophils, Absolute 7.80 (*)     All other components within normal limits   URINALYSIS, MICROSCOPIC ONLY - Abnormal; Notable for the following components:    RBC, UA 6-10 (*)     All other components within normal limits   CBC AND DIFFERENTIAL    Narrative:     The following orders were created for panel order CBC & Differential.  Procedure                               Abnormality         Status                     ---------                               -----------         ------                     CBC Auto Differential[[id number]]        Abnormal            Final result                 Please view results for these tests on the individual orders.     XR Chest 1 View    Result Date: 12/20/2023  Impression: No visible pneumothorax status post right chest port catheter placement Electronically Signed: Smiley York MD  12/20/2023 4:32 PM EST  Workstation ID: [id number]    Medications   sodium chloride 0.9 % flush 10 mL (has no administration in time range)                                            Medical Decision Making  Patient presents today with the above complaint.    He had the above exam and evaluation.  IV was established.  Labs were obtained.  Mosley catheter was placed with immediate return of little over 1000 mL of clear yellow urine.  He reports immediate relief in his symptoms.    Workup: CBC and metabolic panel are unremarkable.  Urinalysis shows small amount of blood, no evidence of infection.    On reexamination patient is resting quietly and in no distress.  He has no new complaints.  He reports complete resolution of symptoms after mosley catheter was placed.      His catheter was removed here in the ED and patient was able to urinate on his own afterwards without difficulty.  He will be discharged home instructed follow-up with urology as needed but was given warning signs for prompt return to the ED and voiced understanding.    Amount and/or Complexity of Data Reviewed  Labs: ordered.    Risk  Prescription drug management.        Final diagnoses:   Acute urinary retention       ED Disposition  ED Disposition       ED Disposition   Discharge    Condition   Stable    Comment   --               [facility] - [place] [address] [facility] [address]  [phone]    As needed    Claire Kraus, ESTRELLA  [address] [place] [address]  [phone]               Medication List      No changes were made to your prescriptions during this visit.            Louisa Chao APRN  12/21/23 0003

## 2023-12-22 ENCOUNTER — TELEPHONE (OUTPATIENT)
Dept: ONCOLOGY | Facility: CLINIC | Age: 78
End: 2023-12-22

## 2023-12-22 NOTE — TELEPHONE ENCOUNTER
pt called to reschedule 12/22's appts  pt would like to reschedule his january appt's. he would like to begin his chemo treatment the week of 01/08

## 2023-12-26 ENCOUNTER — OFFICE VISIT (OUTPATIENT)
Dept: ONCOLOGY | Facility: CLINIC | Age: 78
End: 2023-12-26
Payer: MEDICARE

## 2023-12-26 VITALS
WEIGHT: 168.8 LBS | HEIGHT: 70 IN | DIASTOLIC BLOOD PRESSURE: 62 MMHG | SYSTOLIC BLOOD PRESSURE: 146 MMHG | OXYGEN SATURATION: 95 % | BODY MASS INDEX: 24.17 KG/M2 | HEART RATE: 59 BPM

## 2023-12-26 DIAGNOSIS — C18.7 MALIGNANT NEOPLASM OF SIGMOID COLON: Primary | ICD-10-CM

## 2023-12-26 RX ORDER — LIDOCAINE AND PRILOCAINE 25; 25 MG/G; MG/G
1 CREAM TOPICAL AS NEEDED
Qty: 30 G | Refills: 2 | Status: SHIPPED | OUTPATIENT
Start: 2023-12-26

## 2023-12-26 NOTE — PROGRESS NOTES
TREATMENT  PREPARATION    Tao Pozo  [id number]  1945    Chief Complaint: Treatment preparation and needs assessment    History of present illness:  Tao Pozo is a 78 y.o. year old male who is here today for treatment preparation and needs assessment.  The patient has been diagnosed with   Encounter Diagnosis   Name Primary?    Malignant neoplasm of sigmoid colon Yes    and is scheduled to begin treatment with:     Oncology History:    Oncology/Hematology History   Cancer of sigmoid   11/1/2023 Initial Diagnosis    Cancer of sigmoid     1/2/2024 -  Chemotherapy    OP COLON [id number] OXALIplatin / Leucovorin / Fluorouracil         The current medication list and allergy list were reviewed and reconciled.     Past Medical History, Past Surgical History, Social History, Family History have been reviewed and are without significant changes except as mentioned.    Physical Exam:    Vitals:    12/26/23 1254   BP: 146/62   Pulse: 59   SpO2: 95%     Vitals:    12/26/23 1254   PainSc:   6   PainLoc: Groin                      Physical Exam  Constitutional:       Appearance: Normal appearance. He is not ill-appearing.   HENT:      Head: Normocephalic and atraumatic.   Eyes:      Pupils: Pupils are equal, round, and reactive to light.   Pulmonary:      Effort: Pulmonary effort is normal.   Abdominal:      General: There is no distension.   Musculoskeletal:         General: Normal range of motion.      Cervical back: Normal range of motion.      Comments: Right side port. No erythema or tenderness.    Skin:     General: Skin is warm.   Neurological:      General: No focal deficit present.      Mental Status: He is alert and oriented to person, place, and time.   Psychiatric:         Mood and Affect: Mood normal.         Thought Content: Thought content normal.           NEEDS ASSESSMENTS    Genetics  The patient's new diagnosis and family history have been reviewed for genetic counseling needs. The patient has  been evaluated. Patient has been referred for genetic counseling.     Psychosocial and Barriers to care  The patient has completed a PHQ-9 Depression Screening and the Distress Thermometer (DT) today.  PHQ-9 results show PHQ-2 Total Score: 0 PHQ-9 Total Score: PHQ-9 Total Score: 0     The patient scored their distress today as 6 on a scale of 0-10 with 0 being no distress and 10 being extreme distress. Problems marked by the patient as being an issue for them within the last week include groin discomfort from catheter. He will follow with urology.      Results were reviewed along with psychosocial resources offered by our cancer center.  Our Supportive Oncology team will be flagged for a score of 4 or above, and a same day call will be made for a score of 9 or 10.  A mental health referral is offered at that time. Patients who score less than 4 have been educated on our support services and can be referred to our  upon request.  The patient will be referred to our .       Nutrition  Patients with a score of 3 or higher will be referred to our oncology dietitian for support. Patients beginning at risk treatment regimens or who have dietary concerns will also be referred to our oncology dietitian. The patient will be referred.    Functional Assessment  Older adults with cancer face unique challenges. These may include an increased risk of drug reactions, financial burdens, and caregiver stress. A needs assessment will take place to ensure all needed support is provided as patients plan for their treatments    Intravenous Access Assessment  The patient and I discussed planned intravenous chemo/biotherapy as well as other IV treatments that are often needed throughout the course of treatment. These may include, but are not limited to blood transfusions, antibiotics, and IV hydration. Discussed that depending on selected treatment and vein assessment, patient may require venous access device  "(VAD) which could include but not limited to a Mediport or PICC line. Risks and benefits of VADs reviewed. The patient will be treated via Port.    Reproductive/Sexual Activity   People should avoid becoming pregnant and should not get a partner pregnant while undergoing chemo/biotherapy.  People of childbearing age should use effective contraception during active therapy. The best recommendation for all people is to use a barrier method for a minimum of 1 week after the last infusion of chemo/biotherapy to prevent your partner being exposed to byproducts from treatment medications in bodily fluids. Effective contraception should be discussed with your oncology team to make sure it is safe to take based on your diagnosis. Possible options include oral contraceptives, barrier methods. Chemo/biotherapy can change your ability to reproduce children in the future.  There are options for fertility preservation. NOT APPLICABLE    Advanced Care Planning  Advance Care Planning   The patient and I discussed advanced care planning, \"Conversations that Matter\".   This service is offered for development of advance directives with a certified ACP facilitator.  The patient does not have an up-to-date advanced directive. This document is not on file with our office. The patient is not interested in an appointment with one of our facilitators to create or update their advanced directives.               Smoking cessation  Tobacco Use: Low Risk  (12/26/2023)    Patient History     Smoking Tobacco Use: Never     Smokeless Tobacco Use: Never     Passive Exposure: Past       Patient and I discussed their tobacco use history. Referral will not be made for smoking cessation.      Palliative Care  When appropriate, the patient and I discussed the availability palliative care services and when appropriate Hospice care. Palliative care is not the same as Hospice care which was explained to the patient.NOT APPLICABLE.    Survivorship   When " appropriate, we discussed that we will refer the patient to survivorship clinic to discuss next steps following completion of planned treatment.  Reviewed this visit will include assessment of your physical, psychological, functional, and spiritual needs as a survivor and the need at attend this visit when scheduled.    TREATMENT EDUCATION    Today I met with the patient to discuss the chemo/biotherapy regimen recommended for treatment of Malignant neoplasm of sigmoid colon  - lidocaine-prilocaine (EMLA) 2.5-2.5 % cream  .  The patient was given explanation of treatment premed side effects including office policy that prohibits patients to drive if sedating medications are administered, MD explanation given regarding benefits, side effects, toxicities and goals of treatment.  The patient received a Chemotherapy/Biotherapy Plan Summary including diagnosis and explanation of specific treatment plan.    SIDE EFFECTS:  Common side effects were discussed with the patient and/or significant other.  Discussion included where applicable hair loss/discoloration, anemia/fatigue, infection/chills/fever, appetite, bleeding risk/precautions, constipation, diarrhea, mouth sores, taste alteration, loss of appetite, nausea/vomiting, peripheral neuropathy, skin/nail changes, rash, muscle aches/weakness, photosensitivity, weight gain/loss, hearing loss, dizziness, menopausal symptoms, menstrual irregularity, sterility, high blood pressure, heart damage, liver damage, lung damage, kidney damage, DVT/PE risk, fluid retention, pleural/pericardial effusion, somnolence, electrolyte/LFT imbalance, vein exercises and/or the possible need for vascular access/port placement.  The patient was advised that although uncommon, leakage of an infused medication from the vein or venous access device may lead to skin breakdown and/or other tissue damage.  The patient was advised that he/she may have pain, bleeding, and/or bruising from the insertion  of a needle in their vein or venous access device (port).  The patient was further advised that, in spite of proper technique, infection with redness and irritation may rarely occur at the site where the needle was inserted.  The patient was advised that if complications occur, additional medical treatment is available.  Finally, where applicable we have reviewed rare but potential immune mediated side effects including shortness of breath, cough, chest pain (pneumonitis), abdominal pain, diarrhea (colitis), thyroiditis (hypothyroid or hyperthyroid), hepatitis and liver dysfunction, nephritis and renal dysfunction.    Discussion also included side effects specific to drugs in the treatment plan, specifically:    Treatment Plans       Name Type Hold Status Plan Dates Plan Provider       Active    OP COLON mFOLFOX6 OXALIplatin / Leucovorin / Fluorouracil ONCOLOGY TREATMENT On Automatic Hold  1/1/2024 - Present Xavi Gallegos MD                     Questions answered and additional information discussed on topics including:  Anemia, Thrombocytopenia, Neutropenia, Nutrition and appetite changes, Constipation, Diarrhea, Nausea & vomiting, Mouth sores, Alopecia, Intimate activity, Nervous system changes, Pain, Skin & nail changes, and Home care       Assessment and Plan:    Diagnoses and all orders for this visit:    1. Malignant neoplasm of sigmoid colon (Primary)  -     lidocaine-prilocaine (EMLA) 2.5-2.5 % cream; Apply 1 application  topically to the appropriate area as directed As Needed for Mild Pain.  Dispense: 30 g; Refill: 2      The patient and I have reviewed their diagnosis and scheduled treatment plan. Needs assessment was completed where applicable including genetics, psychosocial needs, barriers to care, VAD evaluation, advanced care planning, survivorship, and palliative care services where indicated. Referrals have been ordered as appropriate based upon evaluation today and patient desires.    Chemo/biotherapy teaching was completed today and consent obtained. See separate documentation for further details.  Adequate time was given to answer questions.  Patient made aware of their care team members and contact information if they have questions or problems throughout the treatment course.  Discussion held and written information provided describing frequency of office visits and ongoing monitoring throughout the treatment plan.     Reviewed with patient any prescribed medication sent to pharmacy.  Education provided regarding proper storage, safe handling, and proper disposal of unused medication.  Proper handling of body fluids and waste discussed and written information provided.  Patient will follow with urology tomorrow, 12/27/2023, for catheter assessment and follow up for urinary retention.   Patient will follow up with Dr. Gallegos as previously scheduled, sooner if condition indicates.     Learning assessment completed at initial patient encounter. See separate flowsheet. Chemo/biotherapy education comprehension assessed at today's visit.    I spent 65 minutes caring for Tao on this date of service. This time includes time spent by me in the following activities: preparing for the visit, performing a medically appropriate examination and/or evaluation, counseling and educating the patient/family/caregiver, and documenting information in the medical record.     Pascale Voss PA-C   12/26/23

## 2023-12-27 ENCOUNTER — SPECIALTY PHARMACY (OUTPATIENT)
Dept: PHARMACY | Facility: HOSPITAL | Age: 78
End: 2023-12-27
Payer: MEDICARE

## 2023-12-27 DIAGNOSIS — C18.7 CANCER OF SIGMOID: Primary | ICD-10-CM

## 2023-12-27 DIAGNOSIS — C18.7 MALIGNANT NEOPLASM OF SIGMOID COLON: ICD-10-CM

## 2023-12-27 NOTE — PROGRESS NOTES
Mr. Pozo called today to enquire about the possibility of taking XELOX instead of FOLFOX. Discussed with him on the phone the difference in the regimen and the side effects of capecitabine as compared to 5fu. I have placed a different treatment plan and communicated with pharmacy.     Xavi Gallegos MD on 12/27/2023 at 16:36

## 2023-12-28 RX ORDER — CAPECITABINE 500 MG/1
1500 TABLET, FILM COATED ORAL 2 TIMES DAILY
Qty: 84 TABLET | Refills: 7 | Status: SHIPPED | OUTPATIENT
Start: 2024-01-07

## 2023-12-28 RX ORDER — CAPECITABINE 150 MG/1
450 TABLET, FILM COATED ORAL 2 TIMES DAILY
Qty: 84 TABLET | Refills: 7 | Status: SHIPPED | OUTPATIENT
Start: 2024-01-07

## 2023-12-28 NOTE — PROGRESS NOTES
Oral Chemotherapy - New Referral    Received a referral from     Treatment Plan: Xeloda (capecitabine) + oxaliplatin  Start date of treatment planned for:  1/8/24  Indication: Colorectal cancer  Relevant past treatments: None  Is the therapy appropriate based on treatment guidelines and FDA labeling?: Yes  Therapeutic Goals:  8 cycles of adjuvant chemotherapy     Patient can self-administer oral medications: Yes    Drug-Drug Interactions: The current medication list was reviewed and there are no relevant drug-drug interactions with the specialty medication.  Medication Allergies: The patient has no relevant allergies as it relates to their oral specialty medication  Review of Labs/Dose Adjustments: The patient's most recent labs were reviewed and all are WNL to start treatment at this dose.     [facility]   Drug: Xeloda (capecitabine)  Strength: 500 mg  Directions: Take 3 tablets by mouth twice a day ON days 1-14, OFF for 7 days of each 21 day cycle (take with 150 mg tablets a total daily dose of 1950 mg twice a day)  Quantity: 84  Refills: 7    +   Drug: Xeloda (capecitabine)  Strength: 150 mg  Directions: Take 3 tablets by mouth twice a day ON days 1-14, OFF for 7 days of each 21 day cycle (take with 500 mg tablets for a total daily dose of 1950 mg twice a day)  Quantity: 84  Refills: 7      Pharmacy education is scheduled for 1/3/24.    Alejandra Bocanegra Pharm.D.    12/28/2023  10:01 EST

## 2023-12-29 ENCOUNTER — SPECIALTY PHARMACY (OUTPATIENT)
Dept: PHARMACY | Facility: HOSPITAL | Age: 78
End: 2023-12-29
Payer: MEDICARE

## 2024-01-03 ENCOUNTER — SPECIALTY PHARMACY (OUTPATIENT)
Dept: PHARMACY | Facility: HOSPITAL | Age: 79
End: 2024-01-03
Payer: MEDICARE

## 2024-01-03 DIAGNOSIS — C18.7 MALIGNANT NEOPLASM OF SIGMOID COLON: ICD-10-CM

## 2024-01-03 DIAGNOSIS — C18.7 CANCER OF SIGMOID: Primary | ICD-10-CM

## 2024-01-03 RX ORDER — ONDANSETRON HYDROCHLORIDE 8 MG/1
8 TABLET, FILM COATED ORAL 3 TIMES DAILY PRN
Qty: 30 TABLET | Refills: 5 | Status: SHIPPED | OUTPATIENT
Start: 2024-01-03

## 2024-01-03 NOTE — PROGRESS NOTES
Specialty Pharmacy Patient Management Program  Oncology Initial Assessment       Tao Pozo is a 78 y.o. male with colon cancer seen by an Oncology provider and enrolled in the Oncology Patient Management program offered by [facility].  An initial outreach was conducted, including assessment of therapy appropriateness and specialty medication education for Xeloda (capecitabine). The patient was introduced to services offered by [facility], including: regular assessments, refill coordination, curbside pick-up or mail order delivery options, prior authorization maintenance, and financial assistance programs as applicable. The patient was also provided with contact information for the pharmacy team.     Regimen: Xeloda (capecitabine) 1950 mg by mouth two times daily on Days 1-14, then off 7 days for each 21 day cycle + oxaliplatin    Start date of oral specialty medication:  01/08/24    Relevant Past Medical History, Comorbidities, and Vaccines  Relevant medical history and concomitant health conditions were discussed with the patient. The patient's chart has been reviewed for relevant past medical history and comorbid health conditions and updated as necessary.  Vaccines are coordinated by the patient's oncologist and primary care provider.  Past Medical History:   Diagnosis Date    Arthritis     BPH with urinary obstruction     Colon cancer 11/10/2023    Sigmoid colon cancer.    Coronary artery disease 02/14/2012    Diabetes mellitus     Elevated cholesterol     Hyperlipidemia     Hypertension     Malignant neoplasm of sigmoid colon 10/31/2023     Social History     Socioeconomic History    Marital status:    Tobacco Use    Smoking status: Never     Passive exposure: Past    Smokeless tobacco: Never   Vaping Use    Vaping Use: Never used   Substance and Sexual Activity    Alcohol use: Never    Drug use: Not Currently     Comment: CBD oil    Sexual  activity: Defer       Allergies  Known allergies and reactions were discussed with the patient. The patient's chart has been reviewed for allergy information and updated as necessary.   Allergies   Allergen Reactions    Shellfish-Derived Products Anaphylaxis        Current Medication List  This medication list has been reviewed with the patient and evaluated for any interactions or necessary modifications/recommendations, and updated to include all prescription medications, OTC medications, and supplements the patient is currently taking.  This list reflects what is contained in the patient's profile, which has also been marked as reviewed to communicate to other providers it is the most up to date version of the patient's current medication therapy.   Prior to Admission medications    Medication Sig Start Date End Date Taking? Authorizing Provider   acetaminophen (TYLENOL) 500 MG tablet Take 2 tablets by mouth Every 6 (Six) Hours As Needed for Moderate Pain. 12/20/23 12/19/24 Yes Dilshad Chavira MD   aspirin 81 MG EC tablet Take 1 tablet by mouth Daily. 3/19/21  Yes Casandra Gustafson APRN   atorvastatin (LIPITOR) 40 MG tablet Take 1 tablet by mouth Every Night. 2/28/23  Yes Claire Kraus APRN   finasteride (PROSCAR) 5 MG tablet Take 1 tablet by mouth Daily. 11/13/23  Yes Tj Salazar MD   glimepiride (AMARYL) 2 MG tablet Take 1 tablet by mouth Every Morning Before Breakfast.   Yes Padmini Villaseñor MD   lisinopril (PRINIVIL,ZESTRIL) 20 MG tablet Take 2 tablets by mouth 2 (Two) Times a Day. 3/11/22  Yes Padmini Villaseñor MD   metFORMIN (GLUCOPHAGE) 1000 MG tablet Take 1 tablet by mouth Daily.   Yes Padmini Villaseñor MD   tamsulosin (FLOMAX) 0.4 MG capsule 24 hr capsule Take 1 capsule by mouth Daily. 11/13/23  Yes Tj Salazar MD   capecitabine (XELODA) 150 MG chemo tablet Take 3 tablets by mouth with 1 other capecitabine prescription for 1,950 mg total 2 (Two) Times a Day. Take 3 500 mg and 3  150 mg tab(s) by mouth in the AM and 3 500 mg and 3 150 mg tab(s) by mouth in the PM on days 1-14, then off for 7 days. 1/7/24   Xavi Gallegos MD   capecitabine (XELODA) 500 MG chemo tablet Take 3 tablets by mouth with 1 other capecitabine prescription for 1,950 mg total 2 (Two) Times a Day. Take 3 500 mg and 3 150 mg tab(s) by mouth in the AM and 3 500 mg and 3 150 mg tab(s) by mouth in the PM on days 1-14, then off for 7 days. 1/7/24   Xavi Gallegos MD   CBD (cannabidiol) oral oil Take 1 mL by mouth Daily. For arthritis PRN  holding  Patient not taking: Reported on 12/12/2023    ProviderPadmini MD   lidocaine-prilocaine (EMLA) 2.5-2.5 % cream Apply 1 application  topically to the appropriate area as directed As Needed for Mild Pain. 12/26/23   Pascale Voss PA-C   ondansetron (ZOFRAN) 8 MG tablet Take 1 tablet by mouth 3 (Three) Times a Day As Needed for Nausea or Vomiting. 1/3/24   Xavi Gallegos MD   ondansetron (Zofran) 4 MG tablet Take 1 tablet by mouth Every 8 (Eight) Hours As Needed for Nausea or Vomiting.  Patient not taking: Reported on 12/18/2023 11/13/23 12/18/23  Dilshad Chavira MD   ondansetron (Zofran) 4 MG tablet Take 1 tablet by mouth Every 8 (Eight) Hours As Needed for Nausea or Vomiting.  Patient not taking: Reported on 12/26/2023 12/20/23 1/3/24  Dilshad Chavira MD       Drug Interactions  Reviewed concomitant medications, allergies, labs, comorbidities/medical history, quality of life, and immunization history.   Drug-drug interactions noted and discussed during education: no significant drug interactions noted. . Reminded the patient to let us know before making any changes or starting any new prescription or OTC medications so we can first assess drug interactions.  Drug-food interactions noted and discussed during education. Patient was instructed to avoid eating grapefruit and drinking grapefruit juice    Recommended Medications Assessment  Bone Health (such as  calcium/vitamin D, bisphosphonate, RANKL inhibitor) - Not Indicated   VTE prophylaxis - Not Indicated The patient is currently on Aspirin 81 mg PO daily  Prophylactic antimicrobials - Not Indicated   Tumor lysis syndrome prophylaxis - Not indicated     Relevant Laboratory Values  Lab Results   Component Value Date    GLUCOSE 185 (H) 12/20/2023    CALCIUM 9.4 12/20/2023     12/20/2023    K 3.6 12/20/2023    CO2 27.0 12/20/2023     12/20/2023    BUN 13 12/20/2023    CREATININE 0.86 12/20/2023    EGFRIFNONA 77 03/18/2021    BCR 15.1 12/20/2023    ANIONGAP 11.0 12/20/2023     Lab Results   Component Value Date    WBC 10.70 12/20/2023    RBC 4.32 12/20/2023    HGB 13.1 12/20/2023    HCT 38.8 12/20/2023    MCV 89.8 12/20/2023    MCH 30.4 12/20/2023    MCHC 33.8 12/20/2023    RDW 13.2 12/20/2023    RDWSD 43.3 12/20/2023    MPV 8.3 12/20/2023     12/20/2023    NEUTRORELPCT 72.8 12/20/2023    LYMPHORELPCT 17.1 (L) 12/20/2023    MONORELPCT 7.6 12/20/2023    EOSRELPCT 2.0 12/20/2023    BASORELPCT 0.5 12/20/2023    NEUTROABS 7.80 (H) 12/20/2023    LYMPHSABS 1.80 12/20/2023    MONOSABS 0.80 12/20/2023    EOSABS 0.20 12/20/2023    BASOSABS 0.00 12/20/2023    NRBC 0.1 12/20/2023       The above labs have been reviewed. No dose adjustments are needed for the oral specialty medication(s) based on the labs.    Initial Education Provided for Specialty Medication  The patient has been provided with the following education. All questions and concerns have been addressed prior to the patient receiving the medication, and the patient has verbalized understanding of the education and any materials provided.  Additional patient education shall be provided and documented upon request by the patient, provider or payer.      Provided patient with:   Chemo calendar to help improve adherence., Education sheets about the medication, 24-hour clinic phone number and my contact information and instructions to call should  additional questions arise.     Medication Education Sheets Provided: (select all that apply)  Oral Specialty Medication: Xeloda (capecitabine)    Other Education Sheets Provided: (select all that apply)  Home Instructions and after hours contact information, Tips From Your Chemotherapy Nurse, Understanding Your Blood, Cancer Related Fatigue, Fluids and Dehydration, Improving Your Veins, Mouth Care, Office Constipation and Diarrhea Protocols, Nausea and Appetite     TOPICS COMMENTS   Storage and Handling of Oral Specialty Medication Store in the original container, in a dry location out of direct sunlight, and out of reach of children or pets. and Store at room temperature.  Discussed safe handling and what to do with any unused medication.   Administration of Oral Specialty Medication Take with food at the same time(s) each day., Take with a full glass of water., and Do not crush or chew tablets.   Adherence to Oral Specialty Regimen and Handling Missed Doses Patient is likely to have good treatment adherence; reinforced the importance of adherence. Reviewed how to address missed doses and encouraged the patient to let us know of any missed doses.   Anemia: role of RBC, cause, s/s, ways to manage, role of transfusion Reviewed the role of RBC and the use of transfusions if hemoglobin decreases too much.  Patient to notify us if he experiences shortness of breath, dizziness, or palpitations.  Also let patient know that he could feel more tired than usual and to try to stay active, but rest if he needs to.    Thrombocytopenia: role of platelet, cause, s/s, ways to prevent bleeding, things to avoid, when to seek help Reviewed the role of platelets in blood clotting and when to call clinic (bloody nose that bleeds for 5 minutes despite pressure, a cut that won't stop bleeding despite pressure, gums that bleed excessively with brushing or flossing). Recommended using an electric razor, soft bristle toothbrush, and  blowing your nose gently.    Neutropenia: role of WBC, cause, infection precautions, s/s of infection, when to call MD Reviewed the role of WBC, good infection prevention practices, and when to call the clinic (temperature 100.4F, sore throat, burning urination, etc).  COVID Vaccines: Previously vaccinated  Flu Vaccine: 2023 flu vaccine received   Nutrition and Appetite Changes:  importance of maintaining healthy diet & weight, ways to manage to improve intake, dietary consult, exercise regimen, electrolyte and/or blood glucose abnormalities    Diarrhea: causes, s/s of dehydration, ways to manage, dietary changes, when to call MD Chemotherapy : Discussed the risk of diarrhea. Instructed patient on use OTC loperamide with diarrhea onset, but emphasized the importance of calling the clinic if 4-6 episodes in 24 hours not relieved by OTC loperamide.   Constipation: causes, ways to manage, dietary changes, when to call MD    Nausea/Vomiting: cause, use of antiemetics, dietary changes, when to call MD Emetic risk: Low-Minimal  PRN home meds: Ondansetron  Pharmacy home meds sent to: [facility]      Instructed the patient to take a dose of the PRN medication at the first onset of nausea and if it's not working to call us for additional medications.  Also provided non-drug measures to mitigate nausea.   Mouth Sores: causes, oral care, ways to manage Mouth sores can be prevented by making a mouth wash mixture of salt, baking soda, and water. The patient was instructed to swish and spit four times daily after meals and before bedtime.  Use of a soft bristle toothbrush was recommended.  The patient was instructed to avoid alcohol-containing OTC mouthwashes.    Alopecia: cause, ways to manage, resources    Nervous System Changes: causes, s/s, neuropathies, cognitive changes, ways to manage    Pain: causes, ways to manage    Skin/Nail Changes: cause, s/s, ways to manage Hand-foot syndrome was discussed, including the s/s,  prevention measures, and treatment measures. A supplementary handout with this information was also given to the patient.    Organ Toxicities: cause, s/s, need for diagnostic tests, labs, when to notify MD Discussed potential effects on organ systems, monitoring, diagnostic tests, labs, and when to notify the MD. Discussed the signs/symptoms of the following: cardiotoxicity, hepatotoxicity, and nephrotoxicity.   Survivorship: distress, distress assessment, secondary malignancies, early/late effects, follow-up, social issues, social support    Miscellaneous Financial Issues: None  Lab Draws:  Baseline, then once every 3 weeks prior to each infusion      Infertility and Sexuality:  causes, fertility preservation options, sexuality changes, ways to manage, importance of birth control The patient is not sexually active with a woman of childbearing potential.   Home Care: how to manage bodily fluids Counseled on management of soiled linens and proper flush technique.  Discussed how to manage all the side effects at home and advised when to contact the MD office     Adherence and Self-Administration  Barriers to Patient Adherence and/or Self-Administration: None  Methods for Supporting Patient Adherence and/or Self-Administration: pill box - discussed use of separate pill box and avoid mixing with maintenance medications  Expected duration of therapy:  8 planned cycles    Goals of Therapy  Patient Goals of Therapy:   Consistently take medications as prescribed  Patient will adhere to medication regimen  Patient will report any medication side effects to healthcare provider  Clinical Goals:    Goals Addressed Today    None       Support patient understanding of medication regimen  Ensure patient knows the pharmacy contact information  Schedule regular follow-up to monitor the treatment serious adverse events  Schedule regular follow-up to confirm medication adherence  Schedule regular follow-up to monitor disease  progression or stability        Reassessment Plan & Follow-Up  Pharmacist to perform regular reassessments no more than (6) months from the previous assessment.  Welcome information and patient satisfaction survey to be sent by retail team with patient's initial fill.  Care Coordinator to set up future refill outreaches, coordinate prescription delivery, and escalate clinical questions to pharmacist.     Additional Plans, Therapy Recommendations or Therapy Problems to Be Addressed: None     Attestation  I attest the patient was actively involved in and has agreed to the above plan of care. If the prescribed therapy is at any point deemed not appropriate based on the current or future assessments, a consultation will be initiated with the patient's specialty care provider to determine the best course of action. The revised plan of therapy will be documented along with any required assessments and/or additional patient education provided.     Alejandra Bocanegra, Pharm.D.    Date and Time: 1/3/2024  14:45 EST

## 2024-01-03 NOTE — PROGRESS NOTES
Specialty Pharmacy Patient Management Program  Oncology Refill Outreach      Tao is a 78 y.o. male contacted today regarding refills of Capecitabine (Xeloda) specialty medication(s).    Specialty medication(s) and dose(s) confirmed: yes  Changes to medications: new start  Changes to insurance: no  Other medications being refilled: None          Delivery Questions      Flowsheet Row Most Recent Value   Delivery method Beeline   Delivery address verified with patient/caregiver? Yes   Delivery address Home   Number of medications in delivery 2   Medication(s) being filled and delivered Capecitabine   Doses left of specialty medications new start   Copay verified? Yes   Copay amount $0   Copay form of payment No copayment ($0)          Ship today 1/3/24 to arrive Thursday 1/4/24 to patient home.    Follow-up: 2 week(s)     Ashlee Lamb, Pharmacy Technician  Specialty Pharmacy Technician   1/3/2024   16:11 EST

## 2024-01-08 ENCOUNTER — SPECIALTY PHARMACY (OUTPATIENT)
Dept: PHARMACY | Facility: HOSPITAL | Age: 79
End: 2024-01-08
Payer: MEDICARE

## 2024-01-08 ENCOUNTER — NUTRITION (OUTPATIENT)
Dept: ONCOLOGY | Facility: CLINIC | Age: 79
End: 2024-01-08
Payer: MEDICARE

## 2024-01-08 ENCOUNTER — TELEPHONE (OUTPATIENT)
Dept: ONCOLOGY | Facility: CLINIC | Age: 79
End: 2024-01-08
Payer: MEDICARE

## 2024-01-08 ENCOUNTER — HOSPITAL ENCOUNTER (OUTPATIENT)
Dept: ONCOLOGY | Facility: HOSPITAL | Age: 79
Discharge: HOME | End: 2024-01-08
Admitting: INTERNAL MEDICINE
Payer: MEDICARE

## 2024-01-08 ENCOUNTER — DOCUMENTATION (OUTPATIENT)
Dept: ONCOLOGY | Facility: CLINIC | Age: 79
End: 2024-01-08
Payer: MEDICARE

## 2024-01-08 VITALS
HEIGHT: 70 IN | RESPIRATION RATE: 16 BRPM | TEMPERATURE: 97.7 F | DIASTOLIC BLOOD PRESSURE: 70 MMHG | HEART RATE: 66 BPM | WEIGHT: 169 LBS | OXYGEN SATURATION: 97 % | BODY MASS INDEX: 24.2 KG/M2 | SYSTOLIC BLOOD PRESSURE: 152 MMHG

## 2024-01-08 DIAGNOSIS — Z95.828 PORT-A-CATH IN PLACE: ICD-10-CM

## 2024-01-08 DIAGNOSIS — C18.7 MALIGNANT NEOPLASM OF SIGMOID COLON: Primary | ICD-10-CM

## 2024-01-08 DIAGNOSIS — C18.7 CANCER OF SIGMOID: ICD-10-CM

## 2024-01-08 LAB
ALBUMIN SERPL-MCNC: 4.3 G/DL (ref 3.5–5.2)
ALBUMIN/GLOB SERPL: 1.9 G/DL
ALP SERPL-CCNC: 103 U/L (ref 39–117)
ALT SERPL W P-5'-P-CCNC: 13 U/L (ref 1–41)
ANION GAP SERPL CALCULATED.3IONS-SCNC: 10 MMOL/L (ref 5–15)
AST SERPL-CCNC: 17 U/L (ref 1–40)
BASOPHILS # BLD AUTO: 0.04 10*3/MM3 (ref 0–0.2)
BASOPHILS NFR BLD AUTO: 0.6 % (ref 0–1.5)
BILIRUB SERPL-MCNC: 0.9 MG/DL (ref 0–1.2)
BUN SERPL-MCNC: 20 MG/DL (ref 8–23)
BUN/CREAT SERPL: 20 (ref 7–25)
CALCIUM SPEC-SCNC: 9.2 MG/DL (ref 8.6–10.5)
CHLORIDE SERPL-SCNC: 103 MMOL/L (ref 98–107)
CO2 SERPL-SCNC: 27 MMOL/L (ref 22–29)
CREAT SERPL-MCNC: 1 MG/DL (ref 0.76–1.27)
DEPRECATED RDW RBC AUTO: 41.9 FL (ref 37–54)
EGFRCR SERPLBLD CKD-EPI 2021: 78 ML/MIN/1.73
EOSINOPHIL # BLD AUTO: 0.26 10*3/MM3 (ref 0–0.4)
EOSINOPHIL NFR BLD AUTO: 3.9 % (ref 0.3–6.2)
ERYTHROCYTE [DISTWIDTH] IN BLOOD BY AUTOMATED COUNT: 12.6 % (ref 12.3–15.4)
GLOBULIN UR ELPH-MCNC: 2.3 GM/DL
GLUCOSE SERPL-MCNC: 214 MG/DL (ref 65–99)
HCT VFR BLD AUTO: 41.2 % (ref 37.5–51)
HGB BLD-MCNC: 13.3 G/DL (ref 13–17.7)
LYMPHOCYTES # BLD AUTO: 1.89 10*3/MM3 (ref 0.7–3.1)
LYMPHOCYTES NFR BLD AUTO: 28.3 % (ref 19.6–45.3)
MCH RBC QN AUTO: 30.2 PG (ref 26.6–33)
MCHC RBC AUTO-ENTMCNC: 32.3 G/DL (ref 31.5–35.7)
MCV RBC AUTO: 93.6 FL (ref 79–97)
MONOCYTES # BLD AUTO: 0.57 10*3/MM3 (ref 0.1–0.9)
MONOCYTES NFR BLD AUTO: 8.5 % (ref 5–12)
NEUTROPHILS NFR BLD AUTO: 3.93 10*3/MM3 (ref 1.7–7)
NEUTROPHILS NFR BLD AUTO: 58.7 % (ref 42.7–76)
PLATELET # BLD AUTO: 228 10*3/MM3 (ref 140–450)
PMV BLD AUTO: 10 FL (ref 6–12)
POTASSIUM SERPL-SCNC: 3.7 MMOL/L (ref 3.5–5.2)
PROT SERPL-MCNC: 6.6 G/DL (ref 6–8.5)
RBC # BLD AUTO: 4.4 10*6/MM3 (ref 4.14–5.8)
SODIUM SERPL-SCNC: 140 MMOL/L (ref 136–145)
WBC NRBC COR # BLD AUTO: 6.69 10*3/MM3 (ref 3.4–10.8)

## 2024-01-08 PROCEDURE — 25010000002 DEXAMETHASONE SODIUM PHOSPHATE 120 MG/30ML SOLUTION: Performed by: INTERNAL MEDICINE

## 2024-01-08 PROCEDURE — 25010000002 HEPARIN LOCK FLUSH PER 10 UNITS: Performed by: INTERNAL MEDICINE

## 2024-01-08 PROCEDURE — 25010000002 PALONOSETRON 0.25 MG/5ML SOLUTION PREFILLED SYRINGE: Performed by: INTERNAL MEDICINE

## 2024-01-08 PROCEDURE — 96367 TX/PROPH/DG ADDL SEQ IV INF: CPT

## 2024-01-08 PROCEDURE — 85025 COMPLETE CBC W/AUTO DIFF WBC: CPT | Performed by: INTERNAL MEDICINE

## 2024-01-08 PROCEDURE — 96375 TX/PRO/DX INJ NEW DRUG ADDON: CPT

## 2024-01-08 PROCEDURE — 96415 CHEMO IV INFUSION ADDL HR: CPT

## 2024-01-08 PROCEDURE — 0 DEXTROSE 5 % SOLUTION: Performed by: INTERNAL MEDICINE

## 2024-01-08 PROCEDURE — 0 DEXTROSE 5 % SOLUTION 250 ML FLEX CONT: Performed by: INTERNAL MEDICINE

## 2024-01-08 PROCEDURE — 80053 COMPREHEN METABOLIC PANEL: CPT | Performed by: INTERNAL MEDICINE

## 2024-01-08 PROCEDURE — 96413 CHEMO IV INFUSION 1 HR: CPT

## 2024-01-08 PROCEDURE — 25010000002 OXALIPLATIN PER 0.5 MG: Performed by: INTERNAL MEDICINE

## 2024-01-08 RX ORDER — PALONOSETRON 0.05 MG/ML
0.25 INJECTION, SOLUTION INTRAVENOUS ONCE
Status: CANCELLED | OUTPATIENT
Start: 2024-01-08

## 2024-01-08 RX ORDER — DEXTROSE MONOHYDRATE 50 MG/ML
20 INJECTION, SOLUTION INTRAVENOUS ONCE
Status: CANCELLED | OUTPATIENT
Start: 2024-01-08

## 2024-01-08 RX ORDER — DEXTROSE MONOHYDRATE 50 MG/ML
20 INJECTION, SOLUTION INTRAVENOUS ONCE
Status: COMPLETED | OUTPATIENT
Start: 2024-01-08 | End: 2024-01-08

## 2024-01-08 RX ORDER — SODIUM CHLORIDE 0.9 % (FLUSH) 0.9 %
10 SYRINGE (ML) INJECTION AS NEEDED
Status: DISCONTINUED | OUTPATIENT
Start: 2024-01-08 | End: 2024-01-09 | Stop reason: HOSPADM

## 2024-01-08 RX ORDER — SODIUM CHLORIDE 0.9 % (FLUSH) 0.9 %
10 SYRINGE (ML) INJECTION AS NEEDED
OUTPATIENT
Start: 2024-01-08

## 2024-01-08 RX ORDER — HEPARIN SODIUM (PORCINE) LOCK FLUSH IV SOLN 100 UNIT/ML 100 UNIT/ML
500 SOLUTION INTRAVENOUS AS NEEDED
OUTPATIENT
Start: 2024-01-08

## 2024-01-08 RX ORDER — FAMOTIDINE 10 MG/ML
20 INJECTION, SOLUTION INTRAVENOUS AS NEEDED
Status: CANCELLED | OUTPATIENT
Start: 2024-01-08

## 2024-01-08 RX ORDER — PALONOSETRON 0.05 MG/ML
0.25 INJECTION, SOLUTION INTRAVENOUS ONCE
Status: COMPLETED | OUTPATIENT
Start: 2024-01-08 | End: 2024-01-08

## 2024-01-08 RX ORDER — DIPHENHYDRAMINE HYDROCHLORIDE 50 MG/ML
50 INJECTION INTRAMUSCULAR; INTRAVENOUS AS NEEDED
Status: CANCELLED | OUTPATIENT
Start: 2024-01-08

## 2024-01-08 RX ORDER — HEPARIN SODIUM (PORCINE) LOCK FLUSH IV SOLN 100 UNIT/ML 100 UNIT/ML
500 SOLUTION INTRAVENOUS AS NEEDED
Status: DISCONTINUED | OUTPATIENT
Start: 2024-01-08 | End: 2024-01-09 | Stop reason: HOSPADM

## 2024-01-08 RX ADMIN — OXALIPLATIN 250 MG: 5 INJECTION, SOLUTION INTRAVENOUS at 09:21

## 2024-01-08 RX ADMIN — PALONOSETRON 0.25 MG: 0.25 INJECTION, SOLUTION INTRAVENOUS at 08:38

## 2024-01-08 RX ADMIN — DEXAMETHASONE SODIUM PHOSPHATE 12 MG: 4 INJECTION, SOLUTION INTRA-ARTICULAR; INTRALESIONAL; INTRAMUSCULAR; INTRAVENOUS; SOFT TISSUE at 08:42

## 2024-01-08 RX ADMIN — HEPARIN 500 UNITS: 100 SYRINGE at 11:38

## 2024-01-08 RX ADMIN — Medication 10 ML: at 11:37

## 2024-01-08 RX ADMIN — DEXTROSE MONOHYDRATE 20 ML/HR: 50 INJECTION, SOLUTION INTRAVENOUS at 08:41

## 2024-01-08 NOTE — PROGRESS NOTES
Specialty Pharmacy Note: Xeloda (capecitabine)    Tao Pozo is a 78 y.o. male with colorectal cancer [facility]  He confirms that he took his first dose of Xeloda this morning, 30 minutes after breakfast and prior to coming in to the clinic.  Labs Review: The CMP and CBC from today have been reviewed. No dose adjustments are needed for the oral specialty medication(s) based on the labs.    Specialty pharmacy will continue to follow patient.    Nessa GALEAS, PharmD    1/8/2024  16:08 EST

## 2024-01-08 NOTE — TELEPHONE ENCOUNTER
Specialty Pharmacy Note: Returned call    Spoke with Dr. Gallegos prior to calling patient back about complaints of blurred vision.  Dr. Gallegos felt like due to the onset of vision change that it was likely from dexamethasone.  Contacted patient and he reports that he was driving home from his infusion appointment and started having blurred vision about two blocks in front of his car. He pulled over and his wife drove the remaining way home.  He does report that he has experienced this before when his blood glucose has been elevated and he does not routinely monitor at home.  Discussed with patient benefit of monitoring blood glucose at home given that he will be receiving D5W with his infusion as well as steroids, both of which can increase his blood sugar.  He also reports that he doesn't always take his diabetic medication.  Encouraged him to also take that as prescribed.  He said symptoms have mostly resolved now and he will continue to monitor.        Thanks,    Nessa GALEAS, PharmD

## 2024-01-08 NOTE — PROGRESS NOTES
Patient is here for C1D1 oxaliplatin. Port accessed by Sarai SHEFFIELD RN and flushed with good blood return noted. 10cc of blood wasted prior to specimen collection. Blood specimen obtained and sent to lab for processing per protocol.  Port flushed with saline and heparin prior to needle removal. Patient stated that he took his first dose of xeloda this morning-he did have a few questions so teresa stopped by from [facility] to speak with patient. Patient educated on the cold sensitivity with the oxaliplatin and how to test his hands to at home to see if it is ok to drink cold liquids again, patient educated on pre-meds. Patient verbalized understanding. Patient did state at discharge he had the tingling in his fingers and was advised to wear gloves and to not drink any cold liquids. Patient tolerated treatment and was discharged with AVS.

## 2024-01-08 NOTE — PROGRESS NOTES
"                 Nutrition Assessment  Tao Pozo    Height: 5'10\"  Weight: 169#  BMI: 24.2  Weight Hx:   Wt Readings from Last 20 Encounters:   01/08/24 76.7 kg (169 lb)   12/26/23 76.6 kg (168 lb 12.8 oz)   12/21/23 78.3 kg (172 lb 9.9 oz)   12/20/23 78.4 kg (172 lb 13.5 oz)   12/20/23 74.8 kg (165 lb)   12/18/23 75.4 kg (166 lb 3.2 oz)   12/12/23 73.4 kg (161 lb 12.8 oz)   11/29/23 74.5 kg (164 lb 3.2 oz)   11/27/23 75.2 kg (165 lb 12.8 oz)   11/27/23 74.5 kg (164 lb 4 oz)   11/21/23 73.5 kg (162 lb)   11/10/23 76.1 kg (167 lb 12.8 oz)   11/06/23 75.8 kg (167 lb)   10/31/23 76.4 kg (168 lb 6.4 oz)   10/27/23 76.4 kg (168 lb 6.9 oz)   05/15/23 77.6 kg (171 lb)   02/28/23 78.5 kg (173 lb)   01/12/23 81.3 kg (179 lb 2 oz)   01/10/23 80.3 kg (177 lb 0.5 oz)   11/14/22 81.2 kg (179 lb)     IBW: 166# (101.8%)  UBW: 176# (96%)    Nutrition Questionnaire    Food Allergies: shellfish    Chewing Problems: Pt denied chewing problems at this time.     Swallowing Problems: Pt denied problems swallowing at this time.    Who does the cooking & shopping: Pt's wife, Samy    Nausea/Vomiting/Diarrhea: Pt denies n/v/d/c    Appetite: (poor) 1 2 3 4 5 6 7 8 9 10 (excellent): 8.5    24-Hour Diet Recall:  Breakfast - slice of ham, two yeast rolls, 2% milk  Lunch - chicken noodle soup, pudding, sliced apples, coke  Dinner - vegetable soup w/cheese, crackers, coke  Snacks - pack of seaweed  Water < 64 oz/day    Discussion: Met the pt and his wife to provide new-pt diet education. We reviewed possible side effects of his treatment and how it may impact his ability to eat and tolerate food. We discussed the importance of weight maintenance, consuming adequate calories and protein, even when appetite is low, taste changes occur, and energy is low, pt verbalized understanding. We reviewed ways to manage side effects with diet, pt verbalized understanding.    Reviewed Enterade protocol w/patient, he is to have two Enterades/day for " three days starting today. Pt had one while I was in the room. I gave him six bottles.    All questions and concerns were addressed and answered. I provided my contact information and encouraged them to call or schedule an appointment as needed.    Nutrition-Related Side Effects:    []Abdominal Pain  [x]Constipation  [x]Diarrhea  []Electrolyte Imbalance  []Fatigue  []HTN  []Hypertriglyceridemia  []Hyponatremia  []Loss of Appetite  []Low Blood Pressure  []Metallic Taste  [x]Mouth Sores  [x]Nausea  [x]Neutropenia  []Peripheral Neuropathy  []Proteinuria  []Shortness of Breath  []Taste Changes  [x]Vomiting  []Weakness  []Weight Gain  []Other    Jeanette Moses, MS,RD,LD-[place],CD-[place]  Registered Dietitian

## 2024-01-08 NOTE — PROGRESS NOTES
OSW was scheduled to see patient this date.  Patient left prior to OSW appointment.  OSW will see next visit.

## 2024-01-09 ENCOUNTER — SPECIALTY PHARMACY (OUTPATIENT)
Dept: PHARMACY | Facility: HOSPITAL | Age: 79
End: 2024-01-09
Payer: MEDICARE

## 2024-01-09 NOTE — PROGRESS NOTES
Specialty Pharmacy Note: Xeloda (capecitabine)      Spoke to pt via phone. He states he does not have any issues with his vision today.  He has not checked his BG today, but he took his anti-diabetic medications this morning and he is feeling fine. Encouraged pt to contact us if he notices any future changes in his vision. Pt verbalized understanding. He denies any SE from capecitabine thus far.      Thank you,  Alejandra Bocanegra, Pharm.D.

## 2024-01-15 ENCOUNTER — SPECIALTY PHARMACY (OUTPATIENT)
Dept: PHARMACY | Facility: HOSPITAL | Age: 79
End: 2024-01-15
Payer: MEDICARE

## 2024-01-15 NOTE — PROGRESS NOTES
Specialty Pharmacy Note: Capecitabine    MTM telephone encounter re:adherence and side effects      Tao reports starting capecitabine on 1/9/24. Thus far, patient denies side effects. . Thus far, no missed doses and has  medication changes due to gout flair of indomethacin and methylprednisolone dose pack.  Reviewed medications for drug interactions and no drug interactions noted.  Also requested patient monitor blood glucose due to possible elevation after his first infusion.  Patient is diabetic but doesn't routinely monitor blood glucose.  He is willing to check more frequently and will start taking in the morning.  Advised pt to call office if any changes. Patient expressed understanding and had no additional questions at this time.       Nessa Rausch  1/15/2024  11:20 EST

## 2024-01-22 ENCOUNTER — SPECIALTY PHARMACY (OUTPATIENT)
Dept: PHARMACY | Facility: HOSPITAL | Age: 79
End: 2024-01-22
Payer: MEDICARE

## 2024-01-22 NOTE — PROGRESS NOTES
Specialty Pharmacy Patient Management Program  Oncology Refill Outreach      Tao is a 79 y.o. male contacted today regarding refills of Capecitabine (Xeloda) specialty medication(s).    Specialty medication(s) and dose(s) confirmed: yes  Changes to medications: no  Changes to insurance: no  Other medications being refilled: None      Refill Questions      Flowsheet Row Most Recent Value   Changes to allergies? No   Changes to medications? No   New conditions or infections since last clinic visit No   Unplanned office visit, urgent care, ED, or hospital admission in the last 4 weeks  No   How does patient/caregiver feel medication is working? Fair   Financial problems or insurance changes  No   Since the previous refill, were any specialty medication doses or scheduled injections missed or delayed?  No   Does this patient require a clinical escalation to a pharmacist? No            Delivery Questions      Flowsheet Row Most Recent Value   Delivery method Beeline   Delivery address verified with patient/caregiver? Yes   Delivery address Home   Number of medications in delivery 2   Medication(s) being filled and delivered Capecitabine   Doses left of specialty medications 0,  on week off   Copay verified? Yes   Copay amount $0   Copay form of payment No copayment ($0)            Ship today on 1/22/24 to arrive Tuesday 1/23/24 to patient home.     Follow-up: 3 week(s)     Ashlee Lamb, Pharmacy Technician  Specialty Pharmacy Technician   1/22/2024   09:51 EST

## 2024-01-23 ENCOUNTER — SPECIALTY PHARMACY (OUTPATIENT)
Dept: PHARMACY | Facility: HOSPITAL | Age: 79
End: 2024-01-23
Payer: MEDICARE

## 2024-01-23 RX ORDER — ONDANSETRON 8 MG/1
8 TABLET, ORALLY DISINTEGRATING ORAL EVERY 8 HOURS PRN
Qty: 30 TABLET | Refills: 5 | Status: SHIPPED | OUTPATIENT
Start: 2024-01-23

## 2024-01-23 NOTE — PROGRESS NOTES
Specialty Pharmacy Note: Xeloda (capecitabine)    Spoke to pt via phone. He states he has been experiencing some SE which some have been manageable and others have been bothersome. He has noted occasional diarrhea which is managed with imodium. He also notes some upset stomach which he states ondansetron does help, but gets frustrated that it takes about an hour to start working. Discussed with Dr. Gallegos and recommended ondansetron ODT for faster onset; provider agreed and Rx sent to pt home pharmacy. Pt said the most bothersome SE has been the fatigue he has experienced the last 3 days. Discussed that fatigue is sometimes greater at the end of the cycle, but should get better as he continues his week off. Recommended Vitamin B12 OTC to see if this helps with fatigue. Also recommended doing some easy physical activity of ADL to help manage the fatigue, but also resting when needed. Discussed how it may become more manageable with time and sometimes it is weather related if he was staying inside all week due to the cold weather. Pt verbalized understanding.    Thank you,  Alejandra Bocanegra, Pharm.D.

## 2024-01-25 ENCOUNTER — SPECIALTY PHARMACY (OUTPATIENT)
Dept: PHARMACY | Facility: HOSPITAL | Age: 79
End: 2024-01-25
Payer: MEDICARE

## 2024-01-25 NOTE — PROGRESS NOTES
Specialty Pharmacy Note: Xeloda    Message received from patient on specialty pharmacy VM that he wants to stop all treatment due to side effects of fatigue and mental fog.  Contacted patient and he said he is generally active and does not like how the medication is effecting him.  I discussed possible dose adjustment and asked if scheduling could contact to discuss discontinuation with his provider and he declined at this time but said he would think about it and call office if he changes his mind.  He requests all future appointments be discontinued and request sent to scheduling to cancel upcoming infusion appointments.  Pharmacy will no longer follow patient and if he changes his mind or agreeable to alternative treatment, please consult again.         Thanks,    Nessa GALEAS, PharmD

## 2024-01-29 ENCOUNTER — DOCUMENTATION (OUTPATIENT)
Dept: ONCOLOGY | Facility: CLINIC | Age: 79
End: 2024-01-29
Payer: MEDICARE

## 2024-01-29 NOTE — PROGRESS NOTES
OSW was scheduled to see pt this date; however, pt has discontinued treatment.  There are no upcoming appts.  OSW will remain available as needed

## 2024-02-01 ENCOUNTER — TELEPHONE (OUTPATIENT)
Dept: ONCOLOGY | Facility: CLINIC | Age: 79
End: 2024-02-01
Payer: MEDICARE

## 2024-02-01 NOTE — TELEPHONE ENCOUNTER
Caller: Kurtis Pozo    Relationship: Self    Best call back number: [phone]    What is the best time to reach you: ANY    Who are you requesting to speak with (clinical staff, provider,  specific staff member): CLINICAL     What was the call regarding: KURTIS IS CALLING NEEDING NEEDLES TO FLUSH PORT, SO THAT A NURSE IN HIS FAMILY CAN FLUSH IT FOR HIM     PLEASE ADVISE     Is it okay if the provider responds through MyChart: NO     [phone]    HE WAS SENT [facility] FROM [facility] WITH THE NEEDLES WITH THE SOLUTION IN THE SYRINGE, AND HE IS NEEDING MORE OF THOSE TOO

## 2024-02-02 NOTE — TELEPHONE ENCOUNTER
Contacted the patient regarding his message we received. I informed the patient that we are unable to give him supplies to flush his port at home therefore we advise that he come into our office every 4-6 weeks to have his port flushed. He stated he has a family member that has offered to flush his port at home; I reiterated that we are unable to give him a prescription for those supplies. He stated someone gave him syringes but no needle when they came into our office education therefore he is wondering what they are for; I stated that I am unaware whom gave the syringes to him or what they are for but in regards to routine port flushes, he will need to come into the office. He asked who he can ask to prescribe the supplies for him to do it at home; I informed him that I do not know who will prescribe the needles, heparin and flushes for at home use. I informed him that I can make him an appt or he can callback; he stated he will callback to schedule a port flush appt at a later time. I confirmed.

## 2024-04-23 NOTE — INTERVAL H&P NOTE
H&P reviewed. The patient was examined and there are no changes to the H&P.         [facility]

## 2024-06-04 ENCOUNTER — OFFICE VISIT (OUTPATIENT)
Dept: CARDIOLOGY | Facility: CLINIC | Age: 79
End: 2024-06-04
Payer: MEDICARE

## 2024-06-04 VITALS
HEART RATE: 61 BPM | HEIGHT: 70 IN | BODY MASS INDEX: 24.2 KG/M2 | DIASTOLIC BLOOD PRESSURE: 67 MMHG | SYSTOLIC BLOOD PRESSURE: 121 MMHG | OXYGEN SATURATION: 96 % | WEIGHT: 169 LBS

## 2024-06-04 DIAGNOSIS — E08.9 DIABETES MELLITUS DUE TO UNDERLYING CONDITION WITHOUT COMPLICATION, WITHOUT LONG-TERM CURRENT USE OF INSULIN: ICD-10-CM

## 2024-06-04 DIAGNOSIS — I25.10 CHRONIC CORONARY ARTERY DISEASE: ICD-10-CM

## 2024-06-04 DIAGNOSIS — E78.5 DYSLIPIDEMIA: ICD-10-CM

## 2024-06-04 DIAGNOSIS — I10 ESSENTIAL HYPERTENSION: ICD-10-CM

## 2024-06-04 DIAGNOSIS — Z95.1 S/P CABG (CORONARY ARTERY BYPASS GRAFT): Primary | ICD-10-CM

## 2024-06-04 PROCEDURE — 3078F DIAST BP <80 MM HG: CPT | Performed by: INTERNAL MEDICINE

## 2024-06-04 PROCEDURE — 99214 OFFICE O/P EST MOD 30 MIN: CPT | Performed by: INTERNAL MEDICINE

## 2024-06-04 PROCEDURE — 1160F RVW MEDS BY RX/DR IN RCRD: CPT | Performed by: INTERNAL MEDICINE

## 2024-06-04 PROCEDURE — 1159F MED LIST DOCD IN RCRD: CPT | Performed by: INTERNAL MEDICINE

## 2024-06-04 PROCEDURE — 3074F SYST BP LT 130 MM HG: CPT | Performed by: INTERNAL MEDICINE

## 2024-06-04 PROCEDURE — 93000 ELECTROCARDIOGRAM COMPLETE: CPT | Performed by: INTERNAL MEDICINE

## 2024-06-04 RX ORDER — DAPAGLIFLOZIN 5 MG/1
5 TABLET, FILM COATED ORAL DAILY
COMMUNITY
Start: 2024-05-21

## 2024-06-04 NOTE — PROGRESS NOTES
Encounter Date:06/04/2024  Last seen 11/27/2023      Patient ID: Tao Pozo is a 79 y.o. male.  Chief Complaint:     Status post last seen-CABG  Hypertension  Dyslipidemia  Diabetes  Renal dysfunction     History of Present Illness  Patient apparently is cancer free from colon cancer.  [facility]    Since I have last seen, the patient has been without any chest discomfort ,shortness of breath, palpitations, dizziness or syncope.  Denies having any headache ,abdominal pain ,nausea, vomiting , diarrhea constipation, loss of weight or loss of appetite.  Denies having any excessive bruising ,hematuria or blood in the stool.    Review of all systems negative except as indicated.    Reviewed ROS.    Assessment and Plan         ]]]]]]]]]]]]]]]]]]]  History  ========     -Status post CABG x 3 with a LIMA to the mid LAD and reverse individual saphenous vein graft to the distal RCA into the medial marginal-  3/15/2021      Zibhwwhncrbzdw-wjeiyz-81/24/2022 except for left atrial enlargement  Lexiscan Cardiolite test-normal- 10/24/2022     Cardiac catheterization 3/12/2021 revealed left ventricle size and contractility is normal with ejection fraction of 60%.  No mitral regurgitation is present.  Left subclavian artery and left internal mammary arteries are normal.  Left main coronary artery normal.  Left anterior descending artery has diffuse calcification with ostial 60% proximal 90 and mid segment 90% disease.  Circumflex coronary artery provided a large marginal branch.  Circumflex coronary artery has 60% disease proximal to the origin of marginal branch.  Circumflex coronary artery distal to the marginal branches small in caliber and has diffuse 60 to 70% disease.  Right coronary artery is a very large and dominant vessel that has ostial 99% disease and mid segment 90% disease.  There appears to be a clot.       -Hypertension dyslipidemia diabetes.  CKD 3.  Recent BUN 35 creatinine 1.27-12/27/2020      -History of significant sinus bradycardia that has improved in the past with discontinuation of Bystolic.     - History of COVID-19 positive (prior to surgery)     -History of nonsustained ventricular tachycardia with exercise in the past.     -Status post tonsillectomy adenoidectomy     - Status post sigmoid colectomy for adenocarcinoma of the colon.     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  ==============  Plan  =========  Status post sigmoid colectomy for adenocarcinoma of the colon.  Patient is cancer free at this time.  [facility].    Status post CABG 3/15/2021  Patient is off Plavix.  EKG sinus bradycardia- 9/27/2022.  EKG-11/27/2023 revealed sinus bradycardia 46/min nonspecific ST-T wave changes normal axis normal intervals no ectopy no significant change from previous EKG EKG 6/4/2024-sinus bradycardia old inferior infarction changes..     Hypertension-well-controlled.  120/67.  Continue lisinopril 40 mg in the morning and increase to 20 mg in the evening starting with 10 mg in the evening.  Continue metoprolol 25 mg a day.     Dyslipidemia-continue atorvastatin     Medications were reviewed and updated.      Follow-up in the office  in 6 months.     .Further plan will depend on patient's progress     Reviewed and updated-6/4/2024.  ]]]]]]]]]]]]]               Diagnosis Plan   1. S/P CABG (coronary artery bypass graft)        2. Dyslipidemia        3. Essential hypertension        4. Diabetes mellitus due to underlying condition without complication, without long-term current use of insulin        5. Chronic coronary artery disease        LAB RESULTS (LAST 7 DAYS)    CBC        BMP        CMP         BNP        TROPONIN        CoAg        Creatinine Clearance  CrCl cannot be calculated (Patient's most recent lab result is older than the maximum 30 days allowed.).    ABG        Radiology  No radiology results for the last day                The  following portions of the patient's history were reviewed and updated as appropriate: allergies, current medications, past family history, past medical history, past social history, past surgical history, and problem list.    Review of Systems   Constitutional: Negative for malaise/fatigue.   Cardiovascular:  Negative for chest pain, dyspnea on exertion, leg swelling and palpitations.   Respiratory:  Positive for shortness of breath. Negative for cough.    Gastrointestinal:  Negative for abdominal pain, nausea and vomiting.   Neurological:  Positive for weakness. Negative for dizziness, focal weakness, headaches, light-headedness and numbness.   All other systems reviewed and are negative.        Current Outpatient Medications:     acetaminophen (TYLENOL) 500 MG tablet, Take 2 tablets by mouth Every 6 (Six) Hours As Needed for Moderate Pain., Disp: 30 tablet, Rfl: 0    aspirin 81 MG EC tablet, Take 1 tablet by mouth Daily., Disp: 30 tablet, Rfl: 3    atorvastatin (LIPITOR) 40 MG tablet, Take 1 tablet by mouth Every Night., Disp: 90 tablet, Rfl: 3    CBD (cannabidiol) oral oil, Take 1 mL by mouth Daily. For arthritis PRN holding, Disp: , Rfl:     dapagliflozin (FARXIGA) 5 MG tablet tablet, Take 1 tablet by mouth Daily., Disp: , Rfl:     finasteride (PROSCAR) 5 MG tablet, Take 1 tablet by mouth Daily., Disp: 90 tablet, Rfl: 3    glimepiride (AMARYL) 2 MG tablet, Take 1 tablet by mouth Every Morning Before Breakfast., Disp: , Rfl:     HYDROCHLOROTHIAZIDE PO, Take  by mouth., Disp: , Rfl:     indomethacin (INDOCIN) 50 MG capsule, Take 1 capsule by mouth 3 (Three) Times a Day With Meals., Disp: 30 capsule, Rfl: 0    lidocaine-prilocaine (EMLA) 2.5-2.5 % cream, Apply 1 application  topically to the appropriate area as directed As Needed for Mild Pain., Disp: 30 g, Rfl: 2    lisinopril (PRINIVIL,ZESTRIL) 20 MG tablet, Take 2 tablets by mouth 2 (Two) Times a Day., Disp: , Rfl:     metFORMIN (GLUCOPHAGE) 1000 MG tablet, Take  1 tablet by mouth Daily., Disp: , Rfl:     metoprolol tartrate (LOPRESSOR) 25 MG tablet, , Disp: , Rfl:     ondansetron (ZOFRAN) 8 MG tablet, Take 1 tablet by mouth 3 (Three) Times a Day As Needed for Nausea or Vomiting., Disp: 30 tablet, Rfl: 5    ondansetron ODT (ZOFRAN-ODT) 8 MG disintegrating tablet, Place 1 tablet on the tongue Every 8 (Eight) Hours As Needed for Nausea or Vomiting., Disp: 30 tablet, Rfl: 5    tamsulosin (FLOMAX) 0.4 MG capsule 24 hr capsule, Take 1 capsule by mouth Daily., Disp: 90 capsule, Rfl: 3    capecitabine (XELODA) 150 MG chemo tablet, Take 3 tablets by mouth with 1 other capecitabine prescription for 1,950 mg total 2 (Two) Times a Day. Take 3 500 mg and 3 150 mg tab(s) by mouth in the AM and 3 500 mg and 3 150 mg tab(s) by mouth in the PM on days 1-14, then off for 7 days. (Patient not taking: Reported on 6/4/2024), Disp: 84 tablet, Rfl: 7    capecitabine (XELODA) 500 MG chemo tablet, Take 3 tablets by mouth with 1 other capecitabine prescription for 1,950 mg total 2 (Two) Times a Day. Take 3 500 mg and 3 150 mg tab(s) by mouth in the AM and 3 500 mg and 3 150 mg tab(s) by mouth in the PM on days 1-14, then off for 7 days. (Patient not taking: Reported on 6/4/2024), Disp: 84 tablet, Rfl: 7    Allergies   Allergen Reactions    Shellfish-Derived Products Anaphylaxis       Family History   Problem Relation Age of Onset    Diabetes Mother     Ovarian cancer Mother 45    Heart disease Father     Diabetes Sister     Breast cancer Sister 50    Diabetes Brother     Hypertension Brother     Heart disease Brother        Past Surgical History:   Procedure Laterality Date    CARDIAC CATHETERIZATION N/A 03/12/2021    Procedure: Left Heart Cath with Coronary Angiography;  Surgeon: Huseyin Do MD;  Location: [facility] CATH INVASIVE LOCATION;  Service: Cardiovascular;  Laterality: N/A;    COLON RESECTION N/A 11/10/2023    Procedure: COLON RESECTION LAPAROSCOPIC SIGMOID WITH DAVINCI ROBOT;  Surgeon:  Dilshad Chavira MD;  Location: [facility] MAIN OR;  Service: Robotics - DaVinci;  Laterality: N/A;    COLONOSCOPY N/A 10/27/2023    Procedure: COLONOSCOPY with cold forcep biopsy x1 area and cold snare polypectomy x2;  Surgeon: Yared Smiley MD;  Location: [facility] ENDOSCOPY;  Service: Gastroenterology;  Laterality: N/A;  Post- colon polyps, sigmoid colon mass, diverticulosis    CORONARY ARTERY BYPASS GRAFT N/A 03/15/2021    Procedure: CORONARY ARTERY BYPASS GRAFTING;  Surgeon: Paxton Vasquez MD;  Location: [facility] CVOR;  Service: Cardiothoracic;  Laterality: N/A;    CYSTOSCOPY W/ URETERAL STENT PLACEMENT Bilateral 11/10/2023    Procedure: CYSTOSCOPY, URETERAL ICG INSERTION;  Surgeon: Tj Salazar MD;  Location: [facility] MAIN OR;  Service: Urology;  Laterality: Bilateral;    CYSTOSCOPY W/ URETERAL STENT PLACEMENT Bilateral 11/29/2023    Procedure: CYSTOSCOPY URETEROSCOPY RETROGRADE PYELOGRAM BILATERAL STENT REMOVAL;  Surgeon: Tj Salazar MD;  Location: [facility] MAIN OR;  Service: Urology;  Laterality: Bilateral;    CYSTOSCOPY, RETROGRADE PYELOGRAM AND STENT INSERTION Bilateral 11/10/2023    Procedure: CYSTOSCOPY RETROGRADE PYELOGRAM AND STENT INSERTION;  Surgeon: Tj Salazar MD;  Location: [facility] MAIN OR;  Service: Urology;  Laterality: Bilateral;    KNEE MENISCAL REPAIR Right 1972    TONSILLECTOMY      VENOUS ACCESS DEVICE (PORT) INSERTION Right 12/20/2023    Procedure: INSERTION VENOUS ACCESS DEVICE;  Surgeon: Dilshad Chavira MD;  Location: [facility] MAIN OR;  Service: General;  Laterality: Right;       Past Medical History:   Diagnosis Date    Arthritis     BPH with urinary obstruction     Colon cancer 11/10/2023    Sigmoid colon cancer.    Coronary artery disease 02/14/2012    Diabetes mellitus     Elevated cholesterol     Hyperlipidemia     Hypertension     Malignant neoplasm of sigmoid colon 10/31/2023       Family History   Problem Relation Age of Onset    Diabetes Mother     Ovarian cancer Mother 45  "   Heart disease Father     Diabetes Sister     Breast cancer Sister 50    Diabetes Brother     Hypertension Brother     Heart disease Brother        Social History     Socioeconomic History    Marital status:    Tobacco Use    Smoking status: Never     Passive exposure: Past    Smokeless tobacco: Never   Vaping Use    Vaping status: Never Used   Substance and Sexual Activity    Alcohol use: Never    Drug use: Not Currently     Comment: CBD oil    Sexual activity: Defer           ECG 12 Lead    Date/Time: 6/4/2024 12:29 PM  Performed by: Huseyin Do MD    Authorized by: Huseyin Do MD  Comparison: compared with previous ECG   Similar to previous ECG  Comparison to previous ECG: Sinus bradycardia 47/min old inferior infarction 47/min normal axis normal intervals no ectopy no significant change from previous EKG.        Objective:       Physical Exam    /67 (BP Location: Right arm, Patient Position: Sitting, Cuff Size: Adult)   Pulse 61   Ht 177.8 cm (70\")   Wt 76.7 kg (169 lb)   SpO2 96%   BMI 24.25 kg/m²   The patient is alert, oriented and in no distress.    Vital signs as noted above.    Head and neck revealed no carotid bruits or jugular venous distension.  No thyromegaly or lymphadenopathy is present.    Lungs clear.  No wheezing.  Breath sounds are normal bilaterally.    Heart normal first and second heart sounds.  No murmur..  No pericardial rub is present.  No gallop is present.    Abdomen soft and nontender.  No organomegaly is present.    Extremities revealed good peripheral pulses without any pedal edema.    Skin warm and dry.    Musculoskeletal system is grossly normal.    CNS grossly normal.    Reviewed and updated.        "

## 2024-06-11 ENCOUNTER — LAB (OUTPATIENT)
Dept: LAB | Facility: HOSPITAL | Age: 79
End: 2024-06-11
Payer: MEDICARE

## 2024-06-11 ENCOUNTER — CLINICAL SUPPORT (OUTPATIENT)
Dept: GENETICS | Facility: HOSPITAL | Age: 79
End: 2024-06-11
Payer: OTHER GOVERNMENT

## 2024-06-11 DIAGNOSIS — Z13.79 GENETIC TESTING: Primary | ICD-10-CM

## 2024-06-11 DIAGNOSIS — C18.7 MALIGNANT NEOPLASM OF SIGMOID COLON: ICD-10-CM

## 2024-06-11 DIAGNOSIS — Z80.3 FAMILY HISTORY OF BREAST CANCER: ICD-10-CM

## 2024-06-11 PROCEDURE — 96040: CPT | Performed by: GENETIC COUNSELOR, MS

## 2024-06-11 NOTE — PROGRESS NOTES
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 Mr. Pozo was diagnosed with colon cancer at age 78. He had a colon resection in November and started chemotherapy but has had some complications with it and discontinued it. 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 Mr. Pozo was interested in discussing his risk for a hereditary cancer syndrome. He decided to pursue comprehensive genetic testing to evaluate his risk of cancer, therefore the ProstateNext Panel was ordered through [employer] which analyzes 14 genes associated with an increased cancer risk including the 5 genes associated with Johnson syndrome. Results are expected in 2-3 weeks.    FAMILY HISTORY:   Mother:  Breast cancer, 48  Sister:   Breast cancer, late 40s/early 50s  Mat Half-Sister: Cancer, unknown type    RISK ASSESSMENT:  Mr. 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 He clearly meets NCCN criteria for testing given his personal history. We discussed Johnson syndrome as a possible diagnosis and the risks associated with the condition. Multigene panel testing is the standard approach to genetic testing.      GENETIC COUNSELING: We reviewed the family history information in detail. Cases of cancer follow three general patterns: sporadic, familial, and hereditary. While most cancer is sporadic, some cases appear to occur in family clusters. These cases are said to be familial and account for 10-20% of cancer cases. Familial cases may be due to a combination of shared genes and environmental factors among family members. In even fewer families, the cancer is said to be inherited, and the genes responsible for the cancer are known.      The pedigree patterns  observed in sporadic versus hereditary cancer families were reviewed. Family histories typical of hereditary cancer syndromes usually include multiple first- and second-degree relatives diagnosed with cancer types that define a syndrome. These cases tend to be diagnosed at younger-than-expected ages and can be bilateral or multifocal. The cancer in these families follows an autosomal dominant inheritance pattern, which indicates the likely presence of a mutation in a cancer susceptibility gene. Children and siblings of an individual believed to carry this mutation have a 50% chance of inheriting that mutation, thereby inheriting the increased risk to develop cancer.    Hereditary colon cancer accounts for 5-10% of all cases of colon cancer. The most common hereditary condition associated with an increased risk for colon cancer is Johnson syndrome. The lifetime risk for colon cancer for individuals with Johnson syndrome is up to 80% if there is no intervention (i.e. removal of polyps detected on colonoscopy).  Routine screening colonoscopy has been shown to reduce the incidence and mortality of colon cancer in Johnson syndrome families by as much as 65%. Other risks include cancer of the endometrium/uterus, ovary, stomach, urinary tract, small intestines, biliary tract, and brain. MLH1 and MSH2 carry these higher risks, while MSH6 and PMS2 carry significantly lower risks. NCCN screening recommendations vary by degree of risk.  There are also other, less common, hereditary cancer syndromes. Genetic testing is typically performed through a multigene panel, evaluating the Johnson syndrome genes as well as other genes associated with hereditary risk for colon cancer and other cancers. Mr. Pozo elected to pursue genetic testing to learn more information about potential risks to herself and relatives.    GENETIC TESTING:  The risks, benefits, and limitations of genetic testing and implications for clinical management following  testing were reviewed. DNA test results can influence decisions regarding screening, prevention, and surgical management.  Genetic testing can have significant psychological implications for both individuals and families. Also discussed was the possibility of employment and insurance discrimination based on genetic test results and the laws in place to prevent this (TELMA). The implications of a positive or negative test result were discussed.  We also discussed the possibility that, in some cases, genetic test results may be ambiguous due to the identification of a genetic variant of uncertain significance (VUS). These variants may or may not be associated with an increased cancer risk. VUSs are frequently reported through genetic testing, and the majority are ultimately reclassified as benign variation.    PLAN: Genetic testing was ordered through [employer]. He had his blood drawn today at [facility]. Results are expected in 2-3 weeks, and Mr. Pozo will be contacted at that time.  We encouraged him to contact us with any questions or concerns in the meantime at [phone].      Florencia Lizama MS, [facility] [place]  Licensed Certified Genetic Counselor

## 2024-06-27 ENCOUNTER — TELEPHONE (OUTPATIENT)
Dept: CARDIOLOGY | Facility: CLINIC | Age: 79
End: 2024-06-27
Payer: OTHER GOVERNMENT

## 2024-06-27 NOTE — TELEPHONE ENCOUNTER
FACILITY: [facility]  DR:   PHONE: [phone]  FAX: [phone]  PROCEDURE: Extraction   SCHEDULED: TBD  MEDS TO HOLD: ASA

## 2024-07-08 ENCOUNTER — DOCUMENTATION (OUTPATIENT)
Dept: GENETICS | Facility: HOSPITAL | Age: 79
End: 2024-07-08
Payer: OTHER GOVERNMENT

## 2024-07-08 NOTE — PROGRESS NOTES
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 Mr. Pozo was diagnosed with colon cancer at age 78. He had a colon resection in November and started chemotherapy but has had some complications with it and discontinued it. 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 Mr. Pozo was interested in discussing his risk for a hereditary cancer syndrome. He decided to pursue comprehensive genetic testing to evaluate his risk of cancer, therefore the ProstateNext Panel was ordered through [employer] which analyzes 14 genes associated with an increased cancer risk including the 5 genes associated with Johnson syndrome. Genetic testing was negative for known pathogenic (harmful) mutations in 14 genes included on the this panel. While no known pathogenic mutations were identified, a variant of uncertain significance (VUS) was identified in the LOUISE gene. Variants are changes in DNA that may or may not affect the function of the gene. The classification of a variant to either a benign gene change or pathogenic mutation depends on a number of factors. The majority (estimated to be >90%) of VUS's are eventually reclassified as benign gene changes. It is not recommended that any unaffected relatives be tested for this variant at this time, and management should not be altered based on this variant. These results were discussed with Mr. Pozo by telephone on 7/8/24.    FAMILY HISTORY:   Mother:  Breast cancer, 48  Sister:   Breast cancer, late 40s/early 50s  Mat Half-Sister: Cancer, unknown type    RISK ASSESSMENT:  Mr. 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 He clearly meets NCCN criteria for testing given his personal history. We  discussed Johnson syndrome as a possible diagnosis and the risks associated with the condition. Multigene panel testing is the standard approach to genetic testing.      GENETIC COUNSELING: We reviewed the family history information in detail. Cases of cancer follow three general patterns: sporadic, familial, and hereditary. While most cancer is sporadic, some cases appear to occur in family clusters. These cases are said to be familial and account for 10-20% of cancer cases. Familial cases may be due to a combination of shared genes and environmental factors among family members. In even fewer families, the cancer is said to be inherited, and the genes responsible for the cancer are known.      The pedigree patterns observed in sporadic versus hereditary cancer families were reviewed. Family histories typical of hereditary cancer syndromes usually include multiple first- and second-degree relatives diagnosed with cancer types that define a syndrome. These cases tend to be diagnosed at younger-than-expected ages and can be bilateral or multifocal. The cancer in these families follows an autosomal dominant inheritance pattern, which indicates the likely presence of a mutation in a cancer susceptibility gene. Children and siblings of an individual believed to carry this mutation have a 50% chance of inheriting that mutation, thereby inheriting the increased risk to develop cancer.    Hereditary colon cancer accounts for 5-10% of all cases of colon cancer. The most common hereditary condition associated with an increased risk for colon cancer is Johnson syndrome. The lifetime risk for colon cancer for individuals with Johnson syndrome is up to 80% if there is no intervention (i.e. removal of polyps detected on colonoscopy).  Routine screening colonoscopy has been shown to reduce the incidence and mortality of colon cancer in Johnson syndrome families by as much as 65%. Other risks include cancer of the endometrium/uterus,  ovary, stomach, urinary tract, small intestines, biliary tract, and brain. MLH1 and MSH2 carry these higher risks, while MSH6 and PMS2 carry significantly lower risks. NCCN screening recommendations vary by degree of risk.  There are also other, less common, hereditary cancer syndromes. Genetic testing is typically performed through a multigene panel, evaluating the Johnson syndrome genes as well as other genes associated with hereditary risk for colon cancer and other cancers. Mr. Pozo elected to pursue genetic testing to learn more information about potential risks to herself and relatives.    GENETIC TESTING:  The risks, benefits, and limitations of genetic testing and implications for clinical management following testing were reviewed. DNA test results can influence decisions regarding screening, prevention, and surgical management.  Genetic testing can have significant psychological implications for both individuals and families. Also discussed was the possibility of employment and insurance discrimination based on genetic test results and the laws in place to prevent this (TEL[place]). The implications of a positive or negative test result were discussed.  We also discussed the possibility that, in some cases, genetic test results may be ambiguous due to the identification of a genetic variant of uncertain significance (VUS). These variants may or may not be associated with an increased cancer risk. VUSs are frequently reported through genetic testing, and the majority are ultimately reclassified as benign variation.    TEST RESULTS: Genetic testing was negative for known pathogenic mutations by sequencing, rearrangement testing, and RNA analysis for the 14 genes on the ProstateNext panel (see attached). A variant of uncertain significance (VUS) was identified in the LOUISE gene, however the majority of VUS's are ultimately reclassified as benign, therefore this result should not be used in making management  decisions. If this variant is ever reclassified to a benign variant or a pathogenic mutation, a new report will be issued by the laboratory and released directly to the ordering physician, and our office. It is possible that the family history is due to a hereditary cancer syndrome that Mr. Pozo did not happen to inherit. This assessment is based on the information provided at the time of the consultation.    CANCER SCREENING: Mr. Pozo's management and surveillance should be determined by his oncology team. For his family members, current NCCN guidelines recommend that individuals who have a first-degree relative diagnosed with colorectal cancer at any age should begin colonoscopy screening at age 40 or ten years before the earliest diagnosis of colorectal cancer in the family, whichever is earliest, and repeat every five years or more frequently based on personal clinical findings. This assessment is based on the information provided at the time of the consultation and could change should new information become available regarding the family history.    PLAN:  Genetic counseling remains available to Mr. Pozo and his family. If he has any questions or concerns in the future, he is welcome to contact us at [phone].      Florencia Lizama MS, [facility] [place], [place], [facility]  Licensed Certified Genetic Counselor      Cc: Tao Martini MD

## 2024-11-26 NOTE — PROGRESS NOTES
Encounter Date:12/11/2024  Last seen 6/4/2024      Patient ID: Tao Pozo is a 79 y.o. male.    Chief Complaint:     Status post last seen-CABG  Hypertension  Dyslipidemia  Diabetes  Renal dysfunction     History of Present Illness  Since I have last seen, the patient has been without any chest discomfort ,shortness of breath, palpitations, dizziness or syncope.  Denies having any headache ,abdominal pain ,nausea, vomiting , diarrhea constipation, loss of weight or loss of appetite.  Denies having any excessive bruising ,hematuria or blood in the stool.    Review of all systems negative except as indicated.    Reviewed ROS..    Assessment and Plan         ]]]]]]]]]]]]]]]]]]]  History  ========     -Status post CABG x 3 with a LIMA to the mid LAD and reverse individual saphenous vein graft to the distal RCA into the medial marginal-  3/15/2021      Njjyacuhozaqlv-xznodf-02/24/2022 except for left atrial enlargement  Lexiscan Cardiolite test-normal- 10/24/2022     Cardiac catheterization 3/12/2021 revealed left ventricle size and contractility is normal with ejection fraction of 60%.  No mitral regurgitation is present.  Left subclavian artery and left internal mammary arteries are normal.  Left main coronary artery normal.  Left anterior descending artery has diffuse calcification with ostial 60% proximal 90 and mid segment 90% disease.  Circumflex coronary artery provided a large marginal branch.  Circumflex coronary artery has 60% disease proximal to the origin of marginal branch.  Circumflex coronary artery distal to the marginal branches small in caliber and has diffuse 60 to 70% disease.  Right coronary artery is a very large and dominant vessel that has ostial 99% disease and mid segment 90% disease.  There appears to be a clot.       -Hypertension dyslipidemia diabetes.  CKD 3.  Recent BUN 35 creatinine 1.27-12/27/2020     -History of significant sinus bradycardia that has improved in the past  with discontinuation of Bystolic.     - History of COVID-19 positive (prior to surgery)     -History of nonsustained ventricular tachycardia with exercise in the past.     -Status post tonsillectomy adenoidectomy     - Status post sigmoid colectomy for adenocarcinoma of the colon.     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  ==============  Plan  =========  Status post sigmoid colectomy for adenocarcinoma of the colon.  Patient is cancer free at this time-[facility])    Status post CABG 3/15/2021  Patient is not having any angina pectoris or congestive heart failure.    Patient is off Plavix.  EKG sinus bradycardia- 9/27/2022.  EKG-11/27/2023 revealed sinus bradycardia 46/min nonspecific ST-T wave changes normal axis normal intervals no ectopy no significant change from previous EKG EKG 6/4/2024-sinus bradycardia old inferior infarction changes..  EKG 12/11/2024-sinus bradycardia without ischemic changes     Hypertension-well-controlled.  129/66.  Continue lisinopril 40 mg in the morning and increase to 20 mg in the evening starting with 10 mg in the evening.  Metoprolol 25 mg a day.    Dyslipidemia-continue atorvastatin    Medications were reviewed and updated.      Follow-up in the office  in 6 months.     .Further plan will depend on patient's progress     Reviewed updated 12/11/2024.  ]]]]]]]]]]]]]                         Diagnosis Plan   1. S/P CABG (coronary artery bypass graft)        2. Diabetes mellitus due to underlying condition without complication, without long-term current use of insulin        3. Dyslipidemia        4. Chronic coronary artery disease        5. Essential hypertension        LAB RESULTS (LAST 7 DAYS)    CBC        BMP        CMP         BNP        TROPONIN        CoAg        Creatinine Clearance  CrCl cannot be calculated (Patient's most recent lab result is older than the maximum 30 days allowed.).    ABG        Radiology  No  radiology results for the last day                The following portions of the patient's history were reviewed and updated as appropriate: allergies, current medications, past family history, past medical history, past social history, past surgical history, and problem list.    Review of Systems   Constitutional: Negative for malaise/fatigue.   Cardiovascular:  Negative for chest pain, leg swelling, palpitations and syncope.   Respiratory:  Negative for shortness of breath.    Skin:  Negative for rash.   Gastrointestinal:  Negative for nausea and vomiting.   Neurological:  Negative for dizziness, light-headedness and numbness.   All other systems reviewed and are negative.      Current Outpatient Medications:     acetaminophen (TYLENOL) 500 MG tablet, Take 2 tablets by mouth Every 6 (Six) Hours As Needed for Moderate Pain., Disp: 30 tablet, Rfl: 0    aspirin 81 MG EC tablet, Take 1 tablet by mouth Daily., Disp: 30 tablet, Rfl: 3    atorvastatin (LIPITOR) 40 MG tablet, Take 1 tablet by mouth Every Night., Disp: 90 tablet, Rfl: 3    CBD (cannabidiol) oral oil, Take 1 mL by mouth Daily. For arthritis PRN holding, Disp: , Rfl:     dapagliflozin (FARXIGA) 5 MG tablet tablet, Take 1 tablet by mouth Daily., Disp: , Rfl:     finasteride (PROSCAR) 5 MG tablet, Take 1 tablet by mouth Daily., Disp: 90 tablet, Rfl: 3    glimepiride (AMARYL) 2 MG tablet, Take 1 tablet by mouth Every Morning Before Breakfast., Disp: , Rfl:     HYDROCHLOROTHIAZIDE PO, Take  by mouth., Disp: , Rfl:     indomethacin (INDOCIN) 50 MG capsule, Take 1 capsule by mouth 3 (Three) Times a Day With Meals., Disp: 30 capsule, Rfl: 0    lidocaine-prilocaine (EMLA) 2.5-2.5 % cream, Apply 1 application  topically to the appropriate area as directed As Needed for Mild Pain., Disp: 30 g, Rfl: 2    lisinopril (PRINIVIL,ZESTRIL) 20 MG tablet, Take 2 tablets by mouth 2 (Two) Times a Day., Disp: , Rfl:     metFORMIN (GLUCOPHAGE) 1000 MG tablet, Take 1 tablet by mouth  Daily., Disp: , Rfl:     metoprolol tartrate (LOPRESSOR) 25 MG tablet, , Disp: , Rfl:     ondansetron (ZOFRAN) 8 MG tablet, Take 1 tablet by mouth 3 (Three) Times a Day As Needed for Nausea or Vomiting., Disp: 30 tablet, Rfl: 5    ondansetron ODT (ZOFRAN-ODT) 8 MG disintegrating tablet, Place 1 tablet on the tongue Every 8 (Eight) Hours As Needed for Nausea or Vomiting., Disp: 30 tablet, Rfl: 5    tamsulosin (FLOMAX) 0.4 MG capsule 24 hr capsule, Take 1 capsule by mouth Daily., Disp: 90 capsule, Rfl: 3    capecitabine (XELODA) 150 MG chemo tablet, Take 3 tablets by mouth with 1 other capecitabine prescription for 1,950 mg total 2 (Two) Times a Day. Take 3 500 mg and 3 150 mg tab(s) by mouth in the AM and 3 500 mg and 3 150 mg tab(s) by mouth in the PM on days 1-14, then off for 7 days. (Patient not taking: Reported on 12/11/2024), Disp: 84 tablet, Rfl: 7    capecitabine (XELODA) 500 MG chemo tablet, Take 3 tablets by mouth with 1 other capecitabine prescription for 1,950 mg total 2 (Two) Times a Day. Take 3 500 mg and 3 150 mg tab(s) by mouth in the AM and 3 500 mg and 3 150 mg tab(s) by mouth in the PM on days 1-14, then off for 7 days. (Patient not taking: Reported on 12/11/2024), Disp: 84 tablet, Rfl: 7    Allergies   Allergen Reactions    Shellfish-Derived Products Anaphylaxis       Family History   Problem Relation Age of Onset    Diabetes Mother     Breast cancer Mother 48    Heart disease Father     Diabetes Sister     Breast cancer Sister 50    Diabetes Brother     Hypertension Brother     Heart disease Brother     Cancer Half-Sister        Past Surgical History:   Procedure Laterality Date    CARDIAC CATHETERIZATION N/A 03/12/2021    Procedure: Left Heart Cath with Coronary Angiography;  Surgeon: Huseyin Do MD;  Location: [facility] CATH INVASIVE LOCATION;  Service: Cardiovascular;  Laterality: N/A;    COLON RESECTION N/A 11/10/2023    Procedure: COLON RESECTION LAPAROSCOPIC SIGMOID WITH DAVINCI ROBOT;   Surgeon: Dilshad Chavira MD;  Location: [facility] MAIN OR;  Service: Robotics - DaVinci;  Laterality: N/A;    COLONOSCOPY N/A 10/27/2023    Procedure: COLONOSCOPY with cold forcep biopsy x1 area and cold snare polypectomy x2;  Surgeon: Yared Smiley MD;  Location: [facility] ENDOSCOPY;  Service: Gastroenterology;  Laterality: N/A;  Post- colon polyps, sigmoid colon mass, diverticulosis    CORONARY ARTERY BYPASS GRAFT N/A 03/15/2021    Procedure: CORONARY ARTERY BYPASS GRAFTING;  Surgeon: Paxton Vasquez MD;  Location: [facility] CVOR;  Service: Cardiothoracic;  Laterality: N/A;    CYSTOSCOPY W/ URETERAL STENT PLACEMENT Bilateral 11/10/2023    Procedure: CYSTOSCOPY, URETERAL ICG INSERTION;  Surgeon: Tj Salazar MD;  Location: [facility] MAIN OR;  Service: Urology;  Laterality: Bilateral;    CYSTOSCOPY W/ URETERAL STENT PLACEMENT Bilateral 11/29/2023    Procedure: CYSTOSCOPY URETEROSCOPY RETROGRADE PYELOGRAM BILATERAL STENT REMOVAL;  Surgeon: Tj Salazar MD;  Location: [facility] MAIN OR;  Service: Urology;  Laterality: Bilateral;    CYSTOSCOPY, RETROGRADE PYELOGRAM AND STENT INSERTION Bilateral 11/10/2023    Procedure: CYSTOSCOPY RETROGRADE PYELOGRAM AND STENT INSERTION;  Surgeon: Tj Salazar MD;  Location: [facility] MAIN OR;  Service: Urology;  Laterality: Bilateral;    KNEE MENISCAL REPAIR Right 1972    TONSILLECTOMY      VENOUS ACCESS DEVICE (PORT) INSERTION Right 12/20/2023    Procedure: INSERTION VENOUS ACCESS DEVICE;  Surgeon: Dilshad Chavira MD;  Location: [facility] MAIN OR;  Service: General;  Laterality: Right;       Past Medical History:   Diagnosis Date    Arthritis     BPH with urinary obstruction     Colon cancer 11/10/2023    Sigmoid colon cancer.    Coronary artery disease 02/14/2012    Diabetes mellitus     Elevated cholesterol     Hyperlipidemia     Hypertension     Malignant neoplasm of sigmoid colon 10/31/2023       Family History   Problem Relation Age of Onset    Diabetes Mother     Breast cancer  "Mother 48    Heart disease Father     Diabetes Sister     Breast cancer Sister 50    Diabetes Brother     Hypertension Brother     Heart disease Brother     Cancer Half-Sister        Social History     Socioeconomic History    Marital status:    Tobacco Use    Smoking status: Never     Passive exposure: Past    Smokeless tobacco: Never   Vaping Use    Vaping status: Never Used   Substance and Sexual Activity    Alcohol use: Never    Drug use: Not Currently     Comment: CBD oil    Sexual activity: Defer           ECG 12 Lead    Date/Time: 12/11/2024 1:12 PM  Performed by: Huseyin Do MD    Authorized by: Huseyin Do MD  Comparison: compared with previous ECG   Similar to previous ECG  Comparison to previous ECG: Sinus bradycardia 49/min normal axis no ectopy normal intervals no significant change from previous EKG.        Objective:       Physical Exam    /66 (BP Location: Left arm, Patient Position: Sitting, Cuff Size: Adult)   Pulse (!) 49   Ht 177.8 cm (70\")   Wt 73.5 kg (162 lb)   SpO2 97%   BMI 23.24 kg/m²   The patient is alert, oriented and in no distress.    Vital signs as noted above.    Head and neck revealed no carotid bruits or jugular venous distension.  No thyromegaly or lymphadenopathy is present.    Lungs clear.  No wheezing.  Breath sounds are normal bilaterally.    Heart normal first and second heart sounds.  No murmur..  No pericardial rub is present.  No gallop is present.    Abdomen soft and nontender.  No organomegaly is present.    Extremities revealed good peripheral pulses without any pedal edema.    Skin warm and dry.    Musculoskeletal system is grossly normal.    CNS grossly normal.    Reviewed and updated.          "

## 2024-12-11 ENCOUNTER — OFFICE VISIT (OUTPATIENT)
Dept: CARDIOLOGY | Facility: CLINIC | Age: 79
End: 2024-12-11
Payer: MEDICARE

## 2024-12-11 VITALS
HEIGHT: 70 IN | OXYGEN SATURATION: 97 % | DIASTOLIC BLOOD PRESSURE: 66 MMHG | BODY MASS INDEX: 23.19 KG/M2 | SYSTOLIC BLOOD PRESSURE: 129 MMHG | HEART RATE: 49 BPM | WEIGHT: 162 LBS

## 2024-12-11 DIAGNOSIS — E08.9 DIABETES MELLITUS DUE TO UNDERLYING CONDITION WITHOUT COMPLICATION, WITHOUT LONG-TERM CURRENT USE OF INSULIN: ICD-10-CM

## 2024-12-11 DIAGNOSIS — I25.10 CHRONIC CORONARY ARTERY DISEASE: ICD-10-CM

## 2024-12-11 DIAGNOSIS — Z95.1 S/P CABG (CORONARY ARTERY BYPASS GRAFT): Primary | ICD-10-CM

## 2024-12-11 DIAGNOSIS — E78.5 DYSLIPIDEMIA: ICD-10-CM

## 2024-12-11 DIAGNOSIS — I10 ESSENTIAL HYPERTENSION: ICD-10-CM

## (undated) DEVICE — CORONARY ARTERY BYPASS GRAFT MARKERS, STAINLESS STEEL, DISTAL, WITHOUT HOLDER: Brand: ANASTOMARK CORONARY ARTERY BYPASS GRAFT MARKERS, STAINLESS STEEL, DISTAL

## (undated) DEVICE — SPNG LAP PREWSH SFTPK 18X18IN STRL PK/5

## (undated) DEVICE — DUAL LUMEN URETERAL CATHETER

## (undated) DEVICE — SYS VASOVIEW HEMOPRO ENDOSCOPIC HARVST VESL

## (undated) DEVICE — SUT SILK 2/0 SH CR8 18IN CR8 C012D

## (undated) DEVICE — CATH URETH FLEXIMA CONE TP 5F 70CM

## (undated) DEVICE — SUT SILK 2 SUTUPAK TIE 60IN SA8H 2STRAND

## (undated) DEVICE — Device

## (undated) DEVICE — TBG PENCL TELESCP MEGADYNE SMOKE EVAC 15FT

## (undated) DEVICE — BLADELESS OBTURATOR: Brand: WECK VISTA

## (undated) DEVICE — CANN ART EOPA 3D NV W/CONN 20F

## (undated) DEVICE — SUT ETHLN 2/0 PS 18IN 585H

## (undated) DEVICE — ANTIBACTERIAL UNDYED BRAIDED (POLYGLACTIN 910), SYNTHETIC ABSORBABLE SUTURE: Brand: COATED VICRYL

## (undated) DEVICE — PREP SPRY PVPI 10P 2OZ

## (undated) DEVICE — PK ENDO GI 50

## (undated) DEVICE — SUT PROLN 5/0 V5 36IN 8934H

## (undated) DEVICE — KT SURG TURNOVER 050

## (undated) DEVICE — CANN AORT ROOT DLP VNT/8IN 14G 7F

## (undated) DEVICE — CATH DIAG IMPULSE FR4 5F 100CM

## (undated) DEVICE — BG BLD SYS

## (undated) DEVICE — GLV SURG BIOGEL LTX PF 7

## (undated) DEVICE — CVR HNDL LT SURG ACCSSRY BLU STRL

## (undated) DEVICE — SUT SILK 2/0 TIES 18IN A185H

## (undated) DEVICE — SUT PDS 0 CT-1 Z340H

## (undated) DEVICE — SNAR POLYP HOTSNARE/BRAIDED OVL/MINI 7F 2.8X10MM 230CM 1P/U

## (undated) DEVICE — PK TRY HEART CATH 50

## (undated) DEVICE — BNDG ELAS ELITE V/CLOSE 6IN 5YD LF STRL

## (undated) DEVICE — VESSEL SEALER EXTEND: Brand: ENDOWRIST

## (undated) DEVICE — SKIN PREP TRAY W/CHG: Brand: MEDLINE INDUSTRIES, INC.

## (undated) DEVICE — VARIABLE LENGTH URETERAL STENT
Type: IMPLANTABLE DEVICE | Site: URETER | Status: NON-FUNCTIONAL
Brand: CONTOUR VL™

## (undated) DEVICE — COVER,MAYO STAND,STERILE: Brand: MEDLINE

## (undated) DEVICE — BLAKE SILICONE DRAIN, 19 FR ROUND, HUBLESS WITH 1/4" BENDABLE TROCAR: Brand: BLAKE

## (undated) DEVICE — CATH DIAG IMPULSE FL4 5F 100CM

## (undated) DEVICE — NITINOL WIRE WITH HYDROPHILIC TIP: Brand: SENSOR

## (undated) DEVICE — 40580 - THE PINK PAD - ADVANCED TRENDELENBURG POSITIONING KIT: Brand: 40580 - THE PINK PAD - ADVANCED TRENDELENBURG POSITIONING KIT

## (undated) DEVICE — SOL IRR H2O BTL 1000ML STRL

## (undated) DEVICE — ADHS SKIN PREMIERPRO EXOFIN TOPICAL HI/VISC .5ML

## (undated) DEVICE — BNDG ELAS ELITE V/CLOSE 4IN 5YD LF STRL

## (undated) DEVICE — SUT ANTIBAC PDS PLS CT/2 SZ0 27IN VIL PDP334H

## (undated) DEVICE — ERBE NESSY®PLATE 170 SPLIT; 168CM²; CABLE 3M: Brand: ERBE

## (undated) DEVICE — 3M™ STERI-DRAPE™ INSTRUMENT POUCH 9097: Brand: 3M™ STERI-DRAPE™

## (undated) DEVICE — GOWN,REINFORCE,POLY,SIRUS,BREATH SLV,XLG: Brand: MEDLINE

## (undated) DEVICE — PINNACLE INTRODUCER SHEATH: Brand: PINNACLE

## (undated) DEVICE — SOL IRR NACL 0.9PCT BT 1000ML

## (undated) DEVICE — ARM DRAPE

## (undated) DEVICE — SENSR CERBRL O2 PK/2

## (undated) DEVICE — DRAPE SHEET ULTRAGARD: Brand: MEDLINE

## (undated) DEVICE — ELECTRD DEFIB M/FUNC PROPADZ STRL 2PK

## (undated) DEVICE — SUT VIC COAT PLS ANTIBAC TP1 27IN

## (undated) DEVICE — DRN WND EVAC BULB 100CC

## (undated) DEVICE — TRAP WIDEEYE POLYP

## (undated) DEVICE — SYR CONTRL LUERLOK 10CC

## (undated) DEVICE — LEGGINGS, PAIR, 33X51 XL, STERILE: Brand: MEDLINE

## (undated) DEVICE — C-ARM: Brand: UNBRANDED

## (undated) DEVICE — SUT PROLN 7/0 BV1 D/A 30IN 8703H

## (undated) DEVICE — SPNG GZ AVANT 6PLY 4X4IN STRL PK/2

## (undated) DEVICE — ELECTRD BLD EZ CLN MOD XLNG 2.75IN

## (undated) DEVICE — RADIFOCUS OBTURATOR: Brand: RADIFOCUS

## (undated) DEVICE — PK MINOR LAPAROTOMY 50

## (undated) DEVICE — BLOOD TRANSFUSION FILTER: Brand: HAEMONETICS

## (undated) DEVICE — TUBING, SUCTION, 1/4" X 12', STRAIGHT: Brand: MEDLINE

## (undated) DEVICE — CATH FOL SIMPLASTIC 3WY 22F 30CC

## (undated) DEVICE — SLV SCD CALF HEMOFORCE DVT THERP REPROC MD

## (undated) DEVICE — TOWEL,OR,DSP,ST,WHITE,DLX,4/PK,20PK/CS: Brand: MEDLINE

## (undated) DEVICE — TEMP PACING WIRE: Brand: MYO/WIRE

## (undated) DEVICE — PK CYSTO 50

## (undated) DEVICE — SUT PROLENE PP 7/0 BV175-6 24IN

## (undated) DEVICE — DRSNG WND BORDR/ADHS NONADHR/GZ LF 4X4IN STRL

## (undated) DEVICE — GLV SURG SIGNATURE ESSENTIAL PF LTX SZ7

## (undated) DEVICE — PAD, GROUNDING, UNIVERSAL, SPLIT, 9': Brand: MEDLINE

## (undated) DEVICE — 450 ML BOTTLE OF 0.05% CHLORHEXIDINE GLUCONATE IN 99.95% STERILE WATER FOR IRRIGATION, USP AND APPLICATOR.: Brand: IRRISEPT ANTIMICROBIAL WOUND LAVAGE

## (undated) DEVICE — HEMOCONCENTRATOR PERFUS LPS06

## (undated) DEVICE — 28 FR STRAIGHT – SOFT PVC CATHETER: Brand: PVC THORACIC CATHETERS

## (undated) DEVICE — CANNULA SEAL

## (undated) DEVICE — SUT PROLN 4/0 V5 36IN 8935H

## (undated) DEVICE — GENERAL LAPAROSCOPY CDS: Brand: MEDLINE INDUSTRIES, INC.

## (undated) DEVICE — BLOWER MISTER CLEARVIEW W/TBG

## (undated) DEVICE — TOWEL,OR,DSP,ST,BLUE,STD,4/PK,20PK/CS: Brand: MEDLINE

## (undated) DEVICE — SUT PROLN 3/0 V7 D/A 36IN 8976H

## (undated) DEVICE — GW DIAG EMERALD HEPCOAT MOVE JTIP STD .035 3MM 150CM

## (undated) DEVICE — PRESSURE TUBING: Brand: TRUWAVE

## (undated) DEVICE — PROVE COVER: Brand: UNBRANDED

## (undated) DEVICE — PLUG,CATHETER,DRAINAGE PROTECTOR,TUBE: Brand: MEDLINE

## (undated) DEVICE — SUCTION CANISTER, 1500CC,SAFELINER: Brand: DEROYAL

## (undated) DEVICE — SUT PROLN 4/0 V7 36IN 8975H

## (undated) DEVICE — UNDERGLV SURG BIOGEL INDICATOR LF PF 7.5

## (undated) DEVICE — SINGLE-USE BIOPSY FORCEPS: Brand: RADIAL JAW 4

## (undated) DEVICE — SOLUTION,WATER,IRRIGATION,1000ML,STERILE: Brand: MEDLINE

## (undated) DEVICE — SUT SILK 0 CT1 CR8 18IN C021D

## (undated) DEVICE — BLANKT WARM UPPR/BDY ARM/OUT 57X196CM

## (undated) DEVICE — WET SKIN PREP TRAY: Brand: MEDLINE INDUSTRIES, INC.

## (undated) DEVICE — PENCL HND ROCKRSWTCH HOLSTR EZ CLEAN TP CRD 10FT

## (undated) DEVICE — SOL NACL 0.9PCT 1000ML

## (undated) DEVICE — CATH DIAG IMPULSE PIG 5F 100CM

## (undated) DEVICE — ELECTRD BLD EZ CLN STD 6.5IN

## (undated) DEVICE — GLV SURG BIOGEL LTX PF 7 1/2

## (undated) DEVICE — SUT SILK 4/0 TIES 18IN A183H

## (undated) DEVICE — PK OPN HEART WHT WRP 50

## (undated) DEVICE — CATH URETRL OPN/END 5F70CM

## (undated) DEVICE — PDS II VLT 0 107CM AG ST3: Brand: ENDOLOOP

## (undated) DEVICE — INTENDED FOR TISSUE SEPARATION, AND OTHER PROCEDURES THAT REQUIRE A SHARP SURGICAL BLADE TO PUNCTURE OR CUT.: Brand: BARD-PARKER ® CARBON RIB-BACK BLADES

## (undated) DEVICE — CABL BIPOL W/ALLGTR CLIP/SM 12FT

## (undated) DEVICE — SUT PROLN 6/0 RB2 D/A 30IN 8711H

## (undated) DEVICE — TOTAL TRAY, DB, 100% SILI FOLEY, 16FR 10: Brand: MEDLINE

## (undated) DEVICE — PK PERFUS CUST W/CARDIOPLEGIA

## (undated) DEVICE — SEAL

## (undated) DEVICE — ROTATING SURGICAL PUNCHES, 1 PER POUCH: Brand: A&E MEDICAL / ROTATING SURGICAL PUNCHES

## (undated) DEVICE — TBG INSUFF MALE L/L W 12MM CON: Brand: MEDLINE INDUSTRIES, INC.

## (undated) DEVICE — TP SXN YANKR BULB STRL

## (undated) DEVICE — 2, DISPOSABLE SUCTION/IRRIGATOR WITH DISPOSABLE TIP: Brand: STRYKEFLOW

## (undated) DEVICE — BLD SCLPL BEAVR MINI STR 2BVL 180D LF

## (undated) DEVICE — SUT PROLN 8/0 BV130/5 D/A 24IN 8732H

## (undated) DEVICE — PASS SUT PRO BARIATRIC XL W/TROC SWABS

## (undated) DEVICE — SYS PERFUS SEP PLATLT W TIPS CUST

## (undated) DEVICE — SOL IRRG H2O BG 3000ML STRL

## (undated) DEVICE — COLUMN DRAPE

## (undated) DEVICE — REDUCER: Brand: ENDOWRIST

## (undated) DEVICE — SYR LUERLOK 30CC

## (undated) DEVICE — SOL IRRIG NACL 1000ML

## (undated) DEVICE — PK ATS CUST W CARDIOTOMY RESEVOIR

## (undated) DEVICE — LAPAROSCOPIC ACCESS SYSTEM: Brand: ALEXIS LAPAROSCOPIC SYSTEM

## (undated) DEVICE — IRRIGATOR BULB ASEPTO 60CC STRL

## (undated) DEVICE — ENDOPATH XCEL WITH OPTIVIEW TECHNOLOGY UNIVERSAL TROCAR STABILITY SLEEVES: Brand: ENDOPATH XCEL OPTIVIEW

## (undated) DEVICE — NDL HYPO PRECISIONGLIDE/REG 18G 11/2 PNK

## (undated) DEVICE — CONNECT Y INTERSEPT W/LL 3/8 X 3/8 X 3/8IN

## (undated) DEVICE — LAPAROVUE VISIBILITY SYSTEM LAPAROSCOPIC SOLUTIONS: Brand: LAPAROVUE

## (undated) DEVICE — UNDRGLV SURG BIOGEL PIMICROINDICATOR SYNTH SZ7.5PF STRL PR/2